# Patient Record
Sex: FEMALE | Race: OTHER | NOT HISPANIC OR LATINO | URBAN - METROPOLITAN AREA
[De-identification: names, ages, dates, MRNs, and addresses within clinical notes are randomized per-mention and may not be internally consistent; named-entity substitution may affect disease eponyms.]

---

## 2019-03-16 ENCOUNTER — INPATIENT (INPATIENT)
Facility: HOSPITAL | Age: 84
LOS: 1 days | Discharge: HOME | DRG: 872 | End: 2019-03-18
Attending: INTERNAL MEDICINE | Admitting: INTERNAL MEDICINE
Payer: MEDICARE

## 2019-03-16 VITALS
SYSTOLIC BLOOD PRESSURE: 164 MMHG | HEART RATE: 129 BPM | OXYGEN SATURATION: 90 % | TEMPERATURE: 101 F | RESPIRATION RATE: 22 BRPM | DIASTOLIC BLOOD PRESSURE: 84 MMHG | WEIGHT: 145.06 LBS | HEIGHT: 62 IN

## 2019-03-16 DIAGNOSIS — Z98.890 OTHER SPECIFIED POSTPROCEDURAL STATES: Chronic | ICD-10-CM

## 2019-03-16 DIAGNOSIS — Z29.9 ENCOUNTER FOR PROPHYLACTIC MEASURES, UNSPECIFIED: ICD-10-CM

## 2019-03-16 DIAGNOSIS — N18.9 CHRONIC KIDNEY DISEASE, UNSPECIFIED: ICD-10-CM

## 2019-03-16 DIAGNOSIS — A41.9 SEPSIS, UNSPECIFIED ORGANISM: ICD-10-CM

## 2019-03-16 DIAGNOSIS — E78.5 HYPERLIPIDEMIA, UNSPECIFIED: ICD-10-CM

## 2019-03-16 DIAGNOSIS — I10 ESSENTIAL (PRIMARY) HYPERTENSION: ICD-10-CM

## 2019-03-16 LAB
ALBUMIN SERPL ELPH-MCNC: 3.8 G/DL — SIGNIFICANT CHANGE UP (ref 3.5–5)
ALP SERPL-CCNC: 54 U/L — SIGNIFICANT CHANGE UP (ref 40–120)
ALT FLD-CCNC: 23 U/L DA — SIGNIFICANT CHANGE UP (ref 10–60)
ANION GAP SERPL CALC-SCNC: 7 MMOL/L — SIGNIFICANT CHANGE UP (ref 5–17)
APPEARANCE UR: CLEAR — SIGNIFICANT CHANGE UP
APTT BLD: 27.8 SEC — SIGNIFICANT CHANGE UP (ref 27.5–36.3)
AST SERPL-CCNC: 26 U/L — SIGNIFICANT CHANGE UP (ref 10–40)
BASOPHILS # BLD AUTO: 0.03 K/UL — SIGNIFICANT CHANGE UP (ref 0–0.2)
BASOPHILS NFR BLD AUTO: 0.3 % — SIGNIFICANT CHANGE UP (ref 0–2)
BILIRUB SERPL-MCNC: 1.1 MG/DL — SIGNIFICANT CHANGE UP (ref 0.2–1.2)
BILIRUB UR-MCNC: NEGATIVE — SIGNIFICANT CHANGE UP
BUN SERPL-MCNC: 64 MG/DL — HIGH (ref 7–18)
CALCIUM SERPL-MCNC: 8.9 MG/DL — SIGNIFICANT CHANGE UP (ref 8.4–10.5)
CHLORIDE SERPL-SCNC: 103 MMOL/L — SIGNIFICANT CHANGE UP (ref 96–108)
CHLORIDE UR-SCNC: 30 MMOL/L — LOW (ref 55–125)
CO2 SERPL-SCNC: 23 MMOL/L — SIGNIFICANT CHANGE UP (ref 22–31)
COLOR SPEC: YELLOW — SIGNIFICANT CHANGE UP
CREAT SERPL-MCNC: 2.02 MG/DL — HIGH (ref 0.5–1.3)
DIFF PNL FLD: NEGATIVE — SIGNIFICANT CHANGE UP
EOSINOPHIL # BLD AUTO: 0 K/UL — SIGNIFICANT CHANGE UP (ref 0–0.5)
EOSINOPHIL NFR BLD AUTO: 0 % — SIGNIFICANT CHANGE UP (ref 0–6)
FLU A RESULT: SIGNIFICANT CHANGE UP
FLU A RESULT: SIGNIFICANT CHANGE UP
FLUAV AG NPH QL: SIGNIFICANT CHANGE UP
FLUBV AG NPH QL: SIGNIFICANT CHANGE UP
GLUCOSE SERPL-MCNC: 121 MG/DL — HIGH (ref 70–99)
GLUCOSE UR QL: NEGATIVE — SIGNIFICANT CHANGE UP
HCT VFR BLD CALC: 29.4 % — LOW (ref 34.5–45)
HGB BLD-MCNC: 9.4 G/DL — LOW (ref 11.5–15.5)
IMM GRANULOCYTES NFR BLD AUTO: 0.9 % — SIGNIFICANT CHANGE UP (ref 0–1.5)
INR BLD: 1.01 RATIO — SIGNIFICANT CHANGE UP (ref 0.88–1.16)
KETONES UR-MCNC: NEGATIVE — SIGNIFICANT CHANGE UP
LACTATE SERPL-SCNC: 1.9 MMOL/L — SIGNIFICANT CHANGE UP (ref 0.7–2)
LEUKOCYTE ESTERASE UR-ACNC: ABNORMAL
LYMPHOCYTES # BLD AUTO: 0.61 K/UL — LOW (ref 1–3.3)
LYMPHOCYTES # BLD AUTO: 5.2 % — LOW (ref 13–44)
MCHC RBC-ENTMCNC: 32 GM/DL — SIGNIFICANT CHANGE UP (ref 32–36)
MCHC RBC-ENTMCNC: 32.8 PG — SIGNIFICANT CHANGE UP (ref 27–34)
MCV RBC AUTO: 102.4 FL — HIGH (ref 80–100)
MONOCYTES # BLD AUTO: 0.96 K/UL — HIGH (ref 0–0.9)
MONOCYTES NFR BLD AUTO: 8.2 % — SIGNIFICANT CHANGE UP (ref 2–14)
NEUTROPHILS # BLD AUTO: 9.95 K/UL — HIGH (ref 1.8–7.4)
NEUTROPHILS NFR BLD AUTO: 85.4 % — HIGH (ref 43–77)
NITRITE UR-MCNC: NEGATIVE — SIGNIFICANT CHANGE UP
NRBC # BLD: 0 /100 WBCS — SIGNIFICANT CHANGE UP (ref 0–0)
OSMOLALITY UR: 395 MOS/KG — SIGNIFICANT CHANGE UP (ref 50–1200)
PH UR: 5 — SIGNIFICANT CHANGE UP (ref 5–8)
PLATELET # BLD AUTO: 178 K/UL — SIGNIFICANT CHANGE UP (ref 150–400)
POTASSIUM SERPL-MCNC: 5.2 MMOL/L — SIGNIFICANT CHANGE UP (ref 3.5–5.3)
POTASSIUM SERPL-SCNC: 5.2 MMOL/L — SIGNIFICANT CHANGE UP (ref 3.5–5.3)
PROT SERPL-MCNC: 7.7 G/DL — SIGNIFICANT CHANGE UP (ref 6–8.3)
PROT UR-MCNC: 15
PROTHROM AB SERPL-ACNC: 11.2 SEC — SIGNIFICANT CHANGE UP (ref 10–12.9)
RBC # BLD: 2.87 M/UL — LOW (ref 3.8–5.2)
RBC # FLD: 11.7 % — SIGNIFICANT CHANGE UP (ref 10.3–14.5)
RSV RESULT: SIGNIFICANT CHANGE UP
RSV RNA RESP QL NAA+PROBE: SIGNIFICANT CHANGE UP
SODIUM SERPL-SCNC: 133 MMOL/L — LOW (ref 135–145)
SODIUM UR-SCNC: 15 MMOL/L — LOW (ref 40–220)
SP GR SPEC: 1.01 — SIGNIFICANT CHANGE UP (ref 1.01–1.02)
UROBILINOGEN FLD QL: NEGATIVE — SIGNIFICANT CHANGE UP
WBC # BLD: 11.66 K/UL — HIGH (ref 3.8–10.5)
WBC # FLD AUTO: 11.66 K/UL — HIGH (ref 3.8–10.5)

## 2019-03-16 PROCEDURE — 93010 ELECTROCARDIOGRAM REPORT: CPT

## 2019-03-16 PROCEDURE — 71045 X-RAY EXAM CHEST 1 VIEW: CPT | Mod: 26

## 2019-03-16 PROCEDURE — 99285 EMERGENCY DEPT VISIT HI MDM: CPT

## 2019-03-16 RX ORDER — ASPIRIN/CALCIUM CARB/MAGNESIUM 324 MG
81 TABLET ORAL DAILY
Qty: 0 | Refills: 0 | Status: DISCONTINUED | OUTPATIENT
Start: 2019-03-16 | End: 2019-03-18

## 2019-03-16 RX ORDER — VANCOMYCIN HCL 1 G
1000 VIAL (EA) INTRAVENOUS ONCE
Qty: 0 | Refills: 0 | Status: COMPLETED | OUTPATIENT
Start: 2019-03-16 | End: 2019-03-16

## 2019-03-16 RX ORDER — METOPROLOL TARTRATE 50 MG
25 TABLET ORAL
Qty: 0 | Refills: 0 | Status: DISCONTINUED | OUTPATIENT
Start: 2019-03-16 | End: 2019-03-18

## 2019-03-16 RX ORDER — SODIUM CHLORIDE 9 MG/ML
500 INJECTION INTRAMUSCULAR; INTRAVENOUS; SUBCUTANEOUS ONCE
Qty: 0 | Refills: 0 | Status: COMPLETED | OUTPATIENT
Start: 2019-03-16 | End: 2019-03-16

## 2019-03-16 RX ORDER — AMLODIPINE BESYLATE 2.5 MG/1
5 TABLET ORAL DAILY
Qty: 0 | Refills: 0 | Status: DISCONTINUED | OUTPATIENT
Start: 2019-03-16 | End: 2019-03-18

## 2019-03-16 RX ORDER — ACETAMINOPHEN 500 MG
650 TABLET ORAL ONCE
Qty: 0 | Refills: 0 | Status: COMPLETED | OUTPATIENT
Start: 2019-03-16 | End: 2019-03-16

## 2019-03-16 RX ORDER — CEFEPIME 1 G/1
1000 INJECTION, POWDER, FOR SOLUTION INTRAMUSCULAR; INTRAVENOUS ONCE
Qty: 0 | Refills: 0 | Status: COMPLETED | OUTPATIENT
Start: 2019-03-16 | End: 2019-03-16

## 2019-03-16 RX ORDER — ATORVASTATIN CALCIUM 80 MG/1
10 TABLET, FILM COATED ORAL AT BEDTIME
Qty: 0 | Refills: 0 | Status: DISCONTINUED | OUTPATIENT
Start: 2019-03-16 | End: 2019-03-18

## 2019-03-16 RX ORDER — INFLUENZA VIRUS VACCINE 15; 15; 15; 15 UG/.5ML; UG/.5ML; UG/.5ML; UG/.5ML
0.5 SUSPENSION INTRAMUSCULAR ONCE
Qty: 0 | Refills: 0 | Status: DISCONTINUED | OUTPATIENT
Start: 2019-03-16 | End: 2019-03-18

## 2019-03-16 RX ORDER — SODIUM CHLORIDE 9 MG/ML
1000 INJECTION INTRAMUSCULAR; INTRAVENOUS; SUBCUTANEOUS ONCE
Qty: 0 | Refills: 0 | Status: COMPLETED | OUTPATIENT
Start: 2019-03-16 | End: 2019-03-16

## 2019-03-16 RX ORDER — HEPARIN SODIUM 5000 [USP'U]/ML
5000 INJECTION INTRAVENOUS; SUBCUTANEOUS EVERY 8 HOURS
Qty: 0 | Refills: 0 | Status: DISCONTINUED | OUTPATIENT
Start: 2019-03-16 | End: 2019-03-18

## 2019-03-16 RX ORDER — PIPERACILLIN AND TAZOBACTAM 4; .5 G/20ML; G/20ML
3.38 INJECTION, POWDER, LYOPHILIZED, FOR SOLUTION INTRAVENOUS EVERY 12 HOURS
Qty: 0 | Refills: 0 | Status: DISCONTINUED | OUTPATIENT
Start: 2019-03-16 | End: 2019-03-16

## 2019-03-16 RX ORDER — SODIUM CHLORIDE 9 MG/ML
1000 INJECTION INTRAMUSCULAR; INTRAVENOUS; SUBCUTANEOUS
Qty: 0 | Refills: 0 | Status: DISCONTINUED | OUTPATIENT
Start: 2019-03-16 | End: 2019-03-17

## 2019-03-16 RX ADMIN — Medication 650 MILLIGRAM(S): at 17:48

## 2019-03-16 RX ADMIN — Medication 250 MILLIGRAM(S): at 18:55

## 2019-03-16 RX ADMIN — HEPARIN SODIUM 5000 UNIT(S): 5000 INJECTION INTRAVENOUS; SUBCUTANEOUS at 22:22

## 2019-03-16 RX ADMIN — CEFEPIME 1000 MILLIGRAM(S): 1 INJECTION, POWDER, FOR SOLUTION INTRAMUSCULAR; INTRAVENOUS at 19:02

## 2019-03-16 RX ADMIN — CEFEPIME 100 MILLIGRAM(S): 1 INJECTION, POWDER, FOR SOLUTION INTRAMUSCULAR; INTRAVENOUS at 17:49

## 2019-03-16 RX ADMIN — Medication 650 MILLIGRAM(S): at 19:02

## 2019-03-16 RX ADMIN — SODIUM CHLORIDE 500 MILLILITER(S): 9 INJECTION INTRAMUSCULAR; INTRAVENOUS; SUBCUTANEOUS at 17:10

## 2019-03-16 RX ADMIN — SODIUM CHLORIDE 500 MILLILITER(S): 9 INJECTION INTRAMUSCULAR; INTRAVENOUS; SUBCUTANEOUS at 19:00

## 2019-03-16 RX ADMIN — SODIUM CHLORIDE 1000 MILLILITER(S): 9 INJECTION INTRAMUSCULAR; INTRAVENOUS; SUBCUTANEOUS at 19:02

## 2019-03-16 NOTE — H&P ADULT - ASSESSMENT
89 y/o F AAOx4 w/ PMH HTN, Arthritis, CKD, and PSH L Forehead Lesion s/p Excision 3/14/19 p/w fever x 1 day - otherwise denied any other acute complaints including CP, SOB, abdominal pain, NVDC, urinary changes, or any other complaints. In the ED patient in NAD, vitals noted for /84fever 38.3 and patient received 1.5 L NS, 650 mg Tylenol, Vancomycin, and Cefepime - EKG noted for NSR 84 BPM. Imaging included CXR w/ no acute abnormality. Patient had a bed pan underneath her in which she urinated but the family accidentally threw out the urine sample. Patient recently underwent dermatological excision w/ Dr Aurelia Pino [phone] - discharged w/out ABx; wound on L forehead is not bleeding, no surrounding erythema, and patient denied any facial complaints. 89 y/o F AAOx4 w/ PMH HTN, Arthritis, CKD, and PSH L Forehead Lesion s/p Excision 3/14/19 p/w fever x 1 day - admitting for further monitoring and evaluation

## 2019-03-16 NOTE — H&P ADULT - OPHTHALMOLOGIC
Dental Pain    A crack or cavity in a tooth can cause tooth pain. This is because the crack or cavity exposes the sensitive inner area of the tooth. An infection in the gum or the root of the tooth can cause pain and swelling. The pain is often made worse when you drink hot or cold beverages. It can also be worse when you bite on hard foods. Pain may spread from the tooth to your ear or the area of the jaw on the same side.  Home care  Follow these tips when caring for yourself at home:  · Avoid hot and cold foods and drinks. Your tooth may be sensitive to changes in temperature.  · Use toothpaste made for sensitive teeth. Brush up and down instead of sideways. Brushing sideways can wear away root surfaces if they are exposed.  · If your tooth is chipped or cracked, or if there is a large open cavity, put oil of cloves directly on the tooth to relieve pain. You can buy oil of cloves at drugstores. Some pharmacies carry an over-the-counter \"toothache kit.\" This contains a paste that you can put on the exposed tooth to make it less sensitive.  · Put a cold pack on your jaw over the sore area to help reduce pain.  · You may use acetaminophen or ibuprofen to ease pain, unless another medicine was prescribed. Note: If you have chronic liver or kidney disease, talk with your health care provider before using these medicines. Also talk with your provider if you’ve had a stomach ulcer or GI bleeding.  · If you have signs of an infection, you will be given an antibiotic. Take it as directed.  Follow-up care  Follow up with your dentist as advised. Your pain may go away with the treatment given today. But only a dentist can fully look at and treat the cause of your pain. This will keep the pain from coming back.  A toothache is a sign of disease in your tooth. It should be looked at and treated by a dentist.  When to seek medical advice  Call your health care provider right away if any of these occur:  · Your face becomes  swollen or red  · Pain gets worse or spreads to your neck  · Fever over 100.4º F (38.0º C)  · Unusual drowsiness  · Headache or stiff neck  · Weakness or fainting  · Pus drains from the tooth  · Difficulty swallowing or breathing     © 9262-1910 [facility]. [address] [place] [address]. All rights reserved. This information is not intended as a substitute for professional medical care. Always follow your healthcare professional's instructions.    =========      Dental Abscess    An abscess is a pocket of pus at the tip of a tooth root in your jaw bone. It is caused by an infection at the root of the tooth. It can cause pain and swelling of the gum, cheek, or jaw. Pain may spread from the tooth to your ear or the area of your jaw on the same side. If the abscess isn’t treated, it spreads to the gum near the tooth. This causes more swelling and pain. More serious infections cause your face to swell.  A dental abscess usually starts with a crack or cavity in the tooth. The pain is often made worse by drinking hot or cold fluids, or biting on hard foods.  Home care  Follow these guidelines when caring for yourself at home:  · Avoid hot and cold foods and drinks. Your tooth may be sensitive to changes in temperature. Don’t chew on the side of the infected tooth.  · If your tooth is chipped or cracked, or if there is a large open cavity, put oil of cloves directly on the tooth to relieve pain. You can buy oil of cloves at drugstores. Some pharmacies carry an over-the-counter \"toothache kit.\" This contains a paste that you can put on the exposed tooth to make it less sensitive.  · Put a cold pack on your jaw over the sore area to help reduce pain.  · You may use acetaminophen or ibuprofen to ease pain, unless another medicine was prescribed. Note: If you have chronic liver or kidney disease, talk with your health care provider before using these medicines. Also talk with your provider if you’ve  had a stomach ulcer or GI bleeding.  · An antibiotic will be prescribed. Take it until finished, even if you are feeling better after a few days.  Follow-up care  Follow up as directed with your dentist or an oral surgeon. Once an infection occurs in a tooth, it will continue to be a problem until the infection is drained. This is done through surgery or a root canal. Or you may need to have your tooth pulled.  When to seek medical advice  Call your health care provider right away if any of these occur:  · Your face becomes more swollen or red  · Your eyelids become swollen  · Pain gets worse or spreads to your neck  · Fever over 100.4º F (38.0º C)  · Unusual drowsiness  · Headache or stiff neck  · Weakness or fainting  · Pus drains from the tooth  · Difficulty swallowing or breathing  © 1044-6892 [employer]. [address] [place] [address], [place] [address]. All rights reserved. This information is not intended as a substitute for professional medical care. Always follow your healthcare professional's instructions.           negative

## 2019-03-16 NOTE — ED PROVIDER NOTE - OBJECTIVE STATEMENT
89 y/o F patient with a significant PMHx of HTN and no significant PSHx presents to the ED with her family c/o fever x 1d. Patient's family endorses that the patient is known to have tachycardia. Patient denies any chest pain, back pain, dysuria, or any other acute complains. NDKA.

## 2019-03-16 NOTE — H&P ADULT - NEUROLOGICAL DETAILS
responds to verbal commands/strength decreased/sensation intact/responds to pain/alert and oriented x 3

## 2019-03-16 NOTE — ED PROVIDER NOTE - CLINICAL SUMMARY MEDICAL DECISION MAKING FREE TEXT BOX
Patient with fever and tachycardia. Patient meets CIRS criteria. Will obtain labs, cultures, XR, 500 cc of fluids, 30 cc per kilo. Concern for cc overload. Will reassess.

## 2019-03-16 NOTE — H&P ADULT - HISTORY OF PRESENT ILLNESS
89 y/o F AAOx4 w/ PMH HTN, Arthritis, CKD, and PSH L Forehead Lesion s/p Excision 3/14/19 p/w fever x 1 day - otherwise denied any other acute complaints including CP, SOB, abdominal pain, NVDC, urinary changes, or any other complaints. In the ED patient in NAD, vitals noted for /84 + fever 38.3 and patient received 1.5 L NS, 650 mg Tylenol, Vancomycin, and Cefepime - EKG noted for NSR 84 BPM. Imaging included CXR w/ no acute abnormality. Patient had a bed pan underneath her in which she urinated but the family accidentally threw out the urine sample. Patient recently underwent dermatological excision w/ Dr Aurelia Pino [phone] - discharged w/out ABx; wound on L forehead is not bleeding, no surrounding erythema, and patient denied any facial complaints. 89 y/o F AAOx4 w/ PMH HTN, Arthritis, CKD, and PSH L Forehead Lesion s/p Excision 3/14/19 p/w fever x 1 day - otherwise denied any other acute complaints including CP, SOB, abdominal pain, NVDC, urinary changes, or any other complaints. In the ED patient in NAD, vitals noted for /84 + fever 38.3 and patient received 1.5 L NS, 650 mg Tylenol, Vancomycin, and Cefepime - EKG noted for NSR 84 BPM. Imaging included CXR w/ no acute abnormality. Patient had a bed pan underneath her in which she urinated but the family accidentally threw out the urine sample. Patient recently underwent dermatological excision w/ Dr Aurelia Pino [phone] - discharged w/out ABx; wound on L forehead is not bleeding, no surrounding erythema, and patient denied any facial complaints. GOC discussed w/ patient and son at bedside - discussion never had but the family plans to think about it further - full code for now.

## 2019-03-16 NOTE — H&P ADULT - ATTENDING COMMENTS
1. Sepsis- pt s/p vanco/cefepime.  For now, will hold Abx.  F/u BCx.  Plan for IV or PO 3rd Gen Ceph tomorrow evening if conditions warrants.  2. SHAUNA- likely hemodyanamically-mediated.  Pt s/p IVF boluses in ED.  Begin gentle hydration, NS @ 50ml/hr.  Hold ACEi.  3. CKD3- likely due to hypertensive nephropathy.  W/u can be done as outpatient.  4. HTN- BP stable now.  Hold ACEi due to SHAUNA.  BP may be lower in the setting of sepsis.  Will give amlodipine and metoprolol with hold for SBP<120.  5. HLD- cont statin.      [facility] NEPHROLOGY  Joe Barnes M.D.  Ari Lawton D.O.  Michaela Mai M.D.  Tami Arshad, MSN, ANP-C    Telephone: [phone]  Facsimile: [phone]    [address] [place] [address]

## 2019-03-16 NOTE — H&P ADULT - NSICDXPROBLEM_GEN_ALL_CORE_FT
PROBLEM DIAGNOSES  Problem: Sepsis, due to unspecified organism  Assessment and Plan: 2/2 F 38.3 and sinus tachycardia 129; resolved w/ 1.5 L NS, 650 mg Tylenol, Vancomycin, and Cefepime   - No WBC elevation, CXR w/ no acute abnormality. ROS negative but recent excision w/ Dr Aurelia Pino [phone] and discharged w/out ABx  - Wound on L forehead does not appear infected but will cover broadly w/ Vancoymcin and Zosyn until Cx return  ID TBD  ***F/u BCx and UA/UCx    Problem: Chronic kidney disease (CKD)  Assessment and Plan: Known Hx of kidney disease as per patient and family but baseline currently unknown  - Likely component of anemia of CKD w/ H&H 9.4/29.4; f/u anemia panel and consider outpatient EPO if indicated  - Patient urianting freely, no changes nor c/o dysuria  - Held renal acting antihypertensives  ***F/u US renal, UA, urine lytes, and further recommendations    Problem: HTN (hypertension)  Assessment and Plan: Resumed Amlodipine and Metoprolol; held Lisinopril given unknown baseline renal function  - Monitor and adjust PRN    Problem: HLD (hyperlipidemia)  Assessment and Plan: Resumed Lovastatin equivalent; f/u Lipid panel and adjust PRN    Problem: Prophylactic measure  Assessment and Plan: IMPROVE score 2 2/2 age and immobilization - DVT PPx w/ HSQ PROBLEM DIAGNOSES  Problem: Sepsis, due to unspecified organism  Assessment and Plan: 2/2 F 38.3 and sinus tachycardia 129; resolved w/ 1.5 L NS, 650 mg Tylenol, Vancomycin, and Cefepime   - No WBC elevation, CXR w/ no acute abnormality. ROS negative but recent excision w/ Dr Janes Pino [phone] and discharged w/out ABx  - Wound on L forehead does not appear infected; will hold ABx for now  ***F/u BCx and UA/UCx; may consider transition to PO ABx in AM    Problem: Chronic kidney disease (CKD)  Assessment and Plan: Known Hx of kidney disease as per patient and family but baseline currently unknown  - Likely component of anemia of CKD w/ H&H 9.4/29.4; f/u anemia panel and consider outpatient EPO if indicated  - Patient urianting freely, no changes nor c/o dysuria  - Held renal acting antihypertensives  - S/p 1.5 L NS in ED;  C/w gentle IVF 50/HR x 24 hours  ***F/u US renal, UA, urine lytes, and further recommendations    Problem: HTN (hypertension)  Assessment and Plan: Resumed Amlodipine and Metoprolol; held Lisinopril given unknown baseline renal function  - Monitor and adjust PRN    Problem: HLD (hyperlipidemia)  Assessment and Plan: Resumed Lovastatin equivalent; f/u Lipid panel and adjust PRN    Problem: Prophylactic measure  Assessment and Plan: IMPROVE score 2 2/2 age and immobilization - DVT PPx w/ HSQ

## 2019-03-16 NOTE — H&P ADULT - NSHPPHYSICALEXAM_GEN_ALL_CORE
T(C): 37.8 (16 Mar 2019 18:53), Max: 38.3 (16 Mar 2019 16:20)  T(F): 100 (16 Mar 2019 18:53), Max: 101 (16 Mar 2019 16:20)  HR: 77 (16 Mar 2019 18:53) (77 - 129)  BP: 111/71 (16 Mar 2019 18:53) (111/71 - 164/84)  RR: 18 (16 Mar 2019 18:53) (18 - 22)  SpO2: 98% (16 Mar 2019 18:53) (90% - 98%)

## 2019-03-17 LAB
ANION GAP SERPL CALC-SCNC: 6 MMOL/L — SIGNIFICANT CHANGE UP (ref 5–17)
BASOPHILS # BLD AUTO: 0.01 K/UL — SIGNIFICANT CHANGE UP (ref 0–0.2)
BASOPHILS NFR BLD AUTO: 0.1 % — SIGNIFICANT CHANGE UP (ref 0–2)
BUN SERPL-MCNC: 53 MG/DL — HIGH (ref 7–18)
CALCIUM SERPL-MCNC: 8.1 MG/DL — LOW (ref 8.4–10.5)
CHLORIDE SERPL-SCNC: 110 MMOL/L — HIGH (ref 96–108)
CHOLEST SERPL-MCNC: 101 MG/DL — SIGNIFICANT CHANGE UP (ref 10–199)
CO2 SERPL-SCNC: 21 MMOL/L — LOW (ref 22–31)
CREAT SERPL-MCNC: 1.59 MG/DL — HIGH (ref 0.5–1.3)
EOSINOPHIL # BLD AUTO: 0.01 K/UL — SIGNIFICANT CHANGE UP (ref 0–0.5)
EOSINOPHIL NFR BLD AUTO: 0.1 % — SIGNIFICANT CHANGE UP (ref 0–6)
FERRITIN SERPL-MCNC: 155 NG/ML — HIGH (ref 15–150)
FOLATE SERPL-MCNC: 10.5 NG/ML — SIGNIFICANT CHANGE UP
GLUCOSE SERPL-MCNC: 92 MG/DL — SIGNIFICANT CHANGE UP (ref 70–99)
HBA1C BLD-MCNC: 5.4 % — SIGNIFICANT CHANGE UP (ref 4–5.6)
HCT VFR BLD CALC: 24.5 % — LOW (ref 34.5–45)
HDLC SERPL-MCNC: 55 MG/DL — SIGNIFICANT CHANGE UP
HGB BLD-MCNC: 7.8 G/DL — LOW (ref 11.5–15.5)
IMM GRANULOCYTES NFR BLD AUTO: 0.4 % — SIGNIFICANT CHANGE UP (ref 0–1.5)
IRON SATN MFR SERPL: 13 UG/DL — LOW (ref 40–150)
IRON SATN MFR SERPL: 6 % — LOW (ref 15–50)
LIPID PNL WITH DIRECT LDL SERPL: 35 MG/DL — SIGNIFICANT CHANGE UP
LYMPHOCYTES # BLD AUTO: 0.93 K/UL — LOW (ref 1–3.3)
LYMPHOCYTES # BLD AUTO: 13.1 % — SIGNIFICANT CHANGE UP (ref 13–44)
MAGNESIUM SERPL-MCNC: 2.1 MG/DL — SIGNIFICANT CHANGE UP (ref 1.6–2.6)
MCHC RBC-ENTMCNC: 31.8 GM/DL — LOW (ref 32–36)
MCHC RBC-ENTMCNC: 32.4 PG — SIGNIFICANT CHANGE UP (ref 27–34)
MCV RBC AUTO: 101.7 FL — HIGH (ref 80–100)
MONOCYTES # BLD AUTO: 0.53 K/UL — SIGNIFICANT CHANGE UP (ref 0–0.9)
MONOCYTES NFR BLD AUTO: 7.5 % — SIGNIFICANT CHANGE UP (ref 2–14)
NEUTROPHILS # BLD AUTO: 5.6 K/UL — SIGNIFICANT CHANGE UP (ref 1.8–7.4)
NEUTROPHILS NFR BLD AUTO: 78.8 % — HIGH (ref 43–77)
NRBC # BLD: 0 /100 WBCS — SIGNIFICANT CHANGE UP (ref 0–0)
PHOSPHATE SERPL-MCNC: 2.8 MG/DL — SIGNIFICANT CHANGE UP (ref 2.5–4.5)
PLATELET # BLD AUTO: 145 K/UL — LOW (ref 150–400)
POTASSIUM SERPL-MCNC: 4.5 MMOL/L — SIGNIFICANT CHANGE UP (ref 3.5–5.3)
POTASSIUM SERPL-SCNC: 4.5 MMOL/L — SIGNIFICANT CHANGE UP (ref 3.5–5.3)
RBC # BLD: 2.41 M/UL — LOW (ref 3.8–5.2)
RBC # FLD: 11.9 % — SIGNIFICANT CHANGE UP (ref 10.3–14.5)
SODIUM SERPL-SCNC: 137 MMOL/L — SIGNIFICANT CHANGE UP (ref 135–145)
TIBC SERPL-MCNC: 223 UG/DL — LOW (ref 250–450)
TOTAL CHOLESTEROL/HDL RATIO MEASUREMENT: 1.8 RATIO — LOW (ref 3.3–7.1)
TRIGL SERPL-MCNC: 55 MG/DL — SIGNIFICANT CHANGE UP (ref 10–149)
TSH SERPL-MCNC: 1.63 UU/ML — SIGNIFICANT CHANGE UP (ref 0.34–4.82)
UIBC SERPL-MCNC: 210 UG/DL — SIGNIFICANT CHANGE UP (ref 110–370)
VIT B12 SERPL-MCNC: 440 PG/ML — SIGNIFICANT CHANGE UP (ref 232–1245)
WBC # BLD: 7.11 K/UL — SIGNIFICANT CHANGE UP (ref 3.8–10.5)
WBC # FLD AUTO: 7.11 K/UL — SIGNIFICANT CHANGE UP (ref 3.8–10.5)

## 2019-03-17 RX ORDER — SODIUM CHLORIDE 9 MG/ML
1000 INJECTION INTRAMUSCULAR; INTRAVENOUS; SUBCUTANEOUS
Qty: 0 | Refills: 0 | Status: DISCONTINUED | OUTPATIENT
Start: 2019-03-17 | End: 2019-03-18

## 2019-03-17 RX ORDER — POLYETHYLENE GLYCOL 3350 17 G/17G
17 POWDER, FOR SOLUTION ORAL ONCE
Qty: 0 | Refills: 0 | Status: COMPLETED | OUTPATIENT
Start: 2019-03-17 | End: 2019-03-18

## 2019-03-17 RX ORDER — CEFTRIAXONE 500 MG/1
1 INJECTION, POWDER, FOR SOLUTION INTRAMUSCULAR; INTRAVENOUS ONCE
Qty: 0 | Refills: 0 | Status: COMPLETED | OUTPATIENT
Start: 2019-03-17 | End: 2019-03-18

## 2019-03-17 RX ADMIN — HEPARIN SODIUM 5000 UNIT(S): 5000 INJECTION INTRAVENOUS; SUBCUTANEOUS at 05:17

## 2019-03-17 RX ADMIN — AMLODIPINE BESYLATE 5 MILLIGRAM(S): 2.5 TABLET ORAL at 09:20

## 2019-03-17 RX ADMIN — HEPARIN SODIUM 5000 UNIT(S): 5000 INJECTION INTRAVENOUS; SUBCUTANEOUS at 21:48

## 2019-03-17 RX ADMIN — HEPARIN SODIUM 5000 UNIT(S): 5000 INJECTION INTRAVENOUS; SUBCUTANEOUS at 14:20

## 2019-03-17 RX ADMIN — ATORVASTATIN CALCIUM 10 MILLIGRAM(S): 80 TABLET, FILM COATED ORAL at 21:50

## 2019-03-17 RX ADMIN — Medication 81 MILLIGRAM(S): at 12:10

## 2019-03-17 RX ADMIN — Medication 25 MILLIGRAM(S): at 18:18

## 2019-03-17 RX ADMIN — Medication 25 MILLIGRAM(S): at 09:20

## 2019-03-17 NOTE — PROGRESS NOTE ADULT - ATTENDING COMMENTS
[facility] NEPHROLOGY  Joe Barnes M.D.  Ari Lawton D.O.  Michaela Mai M.D.  Tami Arshad, MSN, ANP-C    Telephone: [phone]  Facsimile: [phone]    [address] [place] [address]

## 2019-03-17 NOTE — PROGRESS NOTE ADULT - ASSESSMENT
Assessment and Plan:   Assessment:  · Assessment		  89 y/o F AAOx4 w/ PMH HTN, Arthritis, CKD, and PSH L Forehead Lesion s/p Excision 3/14/19 p/w fever x 1 day - admitting for further monitoring and evaluation    1. Sepsis- pt s/p vanco/cefepime.  For now, will hold Abx.  F/u BCx.  Plan for IV or PO 3rd Gen Ceph tomorrow evening if conditions warrants.  2. SHAUNA- Cr downtrending, likely hemodyanamically-mediated.  Pt s/p IVF boluses in ED.  Continue gentle hydration, NS @ 50ml/hr plan to discontinue in the morning.  Hold ACEi.  3. CKD3- likely due to hypertensive nephropathy.  W/u can be done as outpatient.  4. HTN- BP stable now.  Hold ACEi due to SHAUNA.  BP may be lower in the setting of sepsis.  Will give amlodipine and metoprolol with hold for SBP<120.  5. HLD- cont statin.      [facility] NEPHROLOGY  Joe Barnes M.D.  Ari Lawton D.O.  Michaela Mai M.D.  Tami Arshad, VIRGINIE, ANP-C    Telephone: [phone]  Facsimile: [phone]    [address] [place] [address]. Assessment and Plan:   Assessment:  · Assessment		  89 y/o F AAOx4 w/ PMH HTN, Arthritis, CKD, and PSH L Forehead Lesion s/p Excision 3/14/19 p/w fever x 1 day - admitting for further monitoring and evaluation    1. Sepsis- pt s/p vanco/cefepime.  For now, will hold Abx.  F/u BCx.  Plan for IV or PO 3rd Gen Ceph tomorrow evening if conditions warrants.  2. SHAUNA- Cr downtrending, likely hemodyanamically-mediated.  Pt s/p IVF boluses in ED.  Continue gentle hydration, NS @ 50ml/hr (U Na 15) plan to discontinue iIVF tomorrow, encourage po intake.  Hold ACEi.  3. CKD3- likely due to hypertensive nephropathy.  W/u can be done as outpatient.  4. HTN- BP stable now.  Hold ACEi due to SHAUNA.  BP may be lower in the setting of sepsis.  Will give amlodipine and metoprolol with hold for SBP<120.  5. HLD- cont statin.  6. Iron deficiency Anemia- low Tsat Hgb downtrending follow up Hgb in am (questionable if dilutional)  check stool OB       [facility] NEPHROLOGY  Joe Branes M.D.  Ari Lawton D.O.  Michaela Mai M.D.  Tami Arshad, MSN, ANP-C    Telephone: [phone]  Facsimile: [phone]    [address] [place] [address]. Assessment and Plan:   Assessment:  · Assessment		  89 y/o F AAOx4 w/ PMH HTN, Arthritis, CKD, and PSH L Forehead Lesion s/p Excision 3/14/19 p/w fever x 1 day - admitting for further monitoring and evaluation    1. Sepsis- pt s/p vanco/cefepime. F/u BCx.  Given rapid improvement in WBCs s/p Abx, it is possible that pt has bacterial infection.  Will give Cephtriaxone 1g X 1 now.  ID consult , Dr. Kan, in the am for recommendations on PO abx for d/c tomorrow if possible.    2. SHAUNA- Cr downtrending, likely hemodyanamically-mediated. Continue IV hydration, NS @ 50ml/hr (U Na 15) plan to discontinue IVF tomorrow, encourage po intake.  Hold ACEi still.  No need for potassium or phosphorus diet restriction- these have been discontinued.  3. CKD3- likely due to hypertensive nephropathy.  W/u can be done as outpatient.  4. HTN- BP stable now.  Hold ACEi due to SHAUNA.  BP may be lower in the setting of sepsis.  Continue amlodipine and metoprolol with hold for SBP<120.  5. HLD- cont statin.  6. Iron deficiency Anemia- low Tsat Hgb downtrending follow up Hgb in am (likely dilutional)  check stool OB. Monitor H/H.   Consider po iron as outpatient.   7. Constipation- Miralax

## 2019-03-17 NOTE — PROGRESS NOTE ADULT - SUBJECTIVE AND OBJECTIVE BOX
[facility] NEPHROLOGY- PROGRESS NOTE, Follow up     Patient is a 89y Female with PMH HTN, Arthritis, CKD, and PSH L Forehead Lesion s/p Excision 3/14/19 p/w fever x 1 day      3/16/19-denied any other acute complaints including CP, SOB, abdominal pain, NVDC, urinary changes, or any other complaints. In the ED patient in NAD, vitals noted for /84 + fever 38.3 and patient received 1.5 L NS, 650 mg Tylenol, Vancomycin, and Cefepime - EKG noted for NSR 84 BPM. Imaging included CXR w/ no acute abnormality. Patient had a bed pan underneath her in which she urinated but the family accidentally threw out the urine sample. Patient recently underwent dermatological excision w/ Dr Aurelia Pino [phone] - discharged w/out ABx; wound on L forehead is not bleeding, no surrounding erythema, and patient denied any facial complaints. GOC discussed w/ patient and son at bedside - discussion never had but the family plans to think about it further - full code for now.    Allergy:  No Known Allergies    Hospital Medications:   MEDICATIONS  (STANDING):  amLODIPine   Tablet 5 milliGRAM(s) Oral daily  aspirin  chewable 81 milliGRAM(s) Oral daily  atorvastatin 10 milliGRAM(s) Oral at bedtime  heparin  Injectable 5000 Unit(s) SubCutaneous every 8 hours  influenza   Vaccine 0.5 milliLiter(s) IntraMuscular once  metoprolol tartrate 25 milliGRAM(s) Oral two times a day  sodium chloride 0.9%. 1000 milliLiter(s) (50 mL/Hr) IV Continuous <Continuous>    Home Medications:   * Patient Currently Takes Medications as of 16-Mar-2019 20:20 documented in Structured Notes  · 	amLODIPine 5 mg oral tablet: 1 tab(s) orally once a day, Last Dose Taken:    · 	lisinopril 40 mg oral tablet: 1 tab(s) orally once a day, Last Dose Taken:    · 	lovastatin 20 mg oral tablet: 1 tab(s) orally once a day, Last Dose Taken:    · 	metoprolol tartrate 25 mg oral tablet: 1 tab(s) orally 2 times a day, Last Dose Taken:    · 	aspirin 81 mg oral tablet, chewable: 1 tab(s) orally once a day, Last Dose Taken:      .    VITALS:  T(F): 99.1 (19 @ 05:07), Max: 101 (19 @ 16:20)  HR: 85 (19 @ 05:07)  BP: 117/38 (19 @ 05:07)  RR: 18 (19 @ 05:07)  SpO2: 98% (19 @ 05:07)  Wt(kg): --    Height (cm): 157.48 ( @ 15:54)  Weight (kg): 72.4 ( @ 21:40)  BMI (kg/m2): 29.2 ( @ 21:40)  BSA (m2): 1.74 ( @ 21:40)    PHYSICAL EXAM:  GEN AAOx4 NAD   HEENT NC left forehead with wound above eyebrow scabbed over and dry EOMI MMM   Lungs Clear B/L   CV Reg S1S2   Abd obese soft NT ND + BS   Ext No pitting edema of B/L LE   Skin warm and dry       LABS:      137  |  110<H>  |  53<H>  ----------------------------<  92  4.5   |  21<L>  |  1.59<H>    Ca    8.1<L>      17 Mar 2019 07:29  Phos  2.8       Mg     2.1         TPro  7.7  /  Alb  3.8  /  TBili  1.1  /  DBili  x   /  AST  26  /  ALT  23  /  AlkPhos  54      Creatinine Trend: 1.59 <--, 2.02 <--                        7.8    7.11  )-----------( 145      ( 17 Mar 2019 07:29 )             24.5     Urine Studies:  Urinalysis Basic - ( 16 Mar 2019 22:50 )    Color: Yellow / Appearance: Clear / S.010 / pH:   Gluc:  / Ketone: Negative  / Bili: Negative / Urobili: Negative   Blood:  / Protein: 15 / Nitrite: Negative   Leuk Esterase: Small / RBC: 0-2 /HPF / WBC 6-10 /HPF   Sq Epi:  / Non Sq Epi: Few /HPF / Bacteria: Few /HPF      Chloride, Random Urine: 30 mmol/L ( @ 22:50)  Sodium, Random Urine: 15 mmol/L ( @ 22:50)  Osmolality, Random Urine: 395 mos/kg ( @ 22:50)  Creatinine, Random Urine: 80 mg/dL ( @ 22:49)    RADIOLOGY & ADDITIONAL STUDIES:  < from: Xray Chest 1 View-PORTABLE IMMEDIATE (19 @ 18:09) >  EXAM:  XR CHEST PORTABLE IMMED 1V                            PROCEDURE DATE:  2019          INTERPRETATION:  Clinical history: 89-year-old female, sepsis.    Single expiratory/kyphotic view without comparison demonstrates mild   cardiomegaly and normal pulmonary vasculature with no consolidation,   effusion, gross adenopathy, or thorax or osseous finding.    Slightly increased perihilar markings are secondary to poor inspiration.    Extensive degenerative change particularly at the right shoulder noted   with no acute findings.    Impression    Mild cardiomegaly with no acute findings.                GRAYSON CRENSHAW M.D., ATTENDING RADIOLOGIST  This document has been electronically signed. Mar 17 2019 11:09AM        < end of copied text > [facility] NEPHROLOGY- PROGRESS NOTE, Follow up     Patient is a 89y Female with PMH HTN, Arthritis, CKD, and PSH L Forehead Lesion s/p Excision 3/14/19 p/w fever x 1 day      3/16/19-denied any other acute complaints including CP, SOB, abdominal pain, NVDC, urinary changes, or any other complaints. In the ED patient in NAD, vitals noted for /84 + fever 38.3 and patient received 1.5 L NS, 650 mg Tylenol, Vancomycin, and Cefepime - EKG noted for NSR 84 BPM. Imaging included CXR w/ no acute abnormality. Patient had a bed pan underneath her in which she urinated but the family accidentally threw out the urine sample. Patient recently underwent dermatological excision w/ Dr Aurelia Pino [phone] - discharged w/out ABx; wound on L forehead is not bleeding, no surrounding erythema, and patient denied any facial complaints. GOC discussed w/ patient and son at bedside - discussion never had but the family plans to think about it further - full code for now.    Allergy:  No Known Allergies    Hospital Medications:   MEDICATIONS  (STANDING):  amLODIPine   Tablet 5 milliGRAM(s) Oral daily  aspirin  chewable 81 milliGRAM(s) Oral daily  atorvastatin 10 milliGRAM(s) Oral at bedtime  heparin  Injectable 5000 Unit(s) SubCutaneous every 8 hours  influenza   Vaccine 0.5 milliLiter(s) IntraMuscular once  metoprolol tartrate 25 milliGRAM(s) Oral two times a day  sodium chloride 0.9%. 1000 milliLiter(s) (50 mL/Hr) IV Continuous <Continuous>    Home Medications:   * Patient Currently Takes Medications as of 16-Mar-2019 20:20 documented in Structured Notes  · 	amLODIPine 5 mg oral tablet: 1 tab(s) orally once a day, Last Dose Taken:    · 	lisinopril 40 mg oral tablet: 1 tab(s) orally once a day, Last Dose Taken:    · 	lovastatin 20 mg oral tablet: 1 tab(s) orally once a day, Last Dose Taken:    · 	metoprolol tartrate 25 mg oral tablet: 1 tab(s) orally 2 times a day, Last Dose Taken:    · 	aspirin 81 mg oral tablet, chewable: 1 tab(s) orally once a day, Last Dose Taken:      .    VITALS:  T(F): 99.1 (19 @ 05:07), Max: 101 (19 @ 16:20)  HR: 85 (19 @ 05:07)  BP: 117/38 (19 @ 05:07)  RR: 18 (19 @ 05:07)  SpO2: 98% (19 @ 05:07)  Wt(kg): --    Height (cm): 157.48 ( @ 15:54)  Weight (kg): 72.4 ( @ 21:40)  BMI (kg/m2): 29.2 ( @ 21:40)  BSA (m2): 1.74 ( @ 21:40)    PHYSICAL EXAM:  GEN AAOx4 NAD   HEENT NC left forehead with wound above eyebrow scabbed over and dry EOMI MMM   Lungs Clear B/L   CV Reg S1S2   Abd obese soft NT ND + BS   Ext No pitting edema of B/L LE   Skin warm and dry       LABS:      137  |  110<H>  |  53<H>  ----------------------------<  92  4.5   |  21<L>  |  1.59<H>    Ca    8.1<L>      17 Mar 2019 07:29  Phos  2.8       Mg     2.1         TPro  7.7  /  Alb  3.8  /  TBili  1.1  /  DBili  x   /  AST  26  /  ALT  23  /  AlkPhos  54  -    Creatinine Trend: 1.59 <--, 2.02 <--                        7.8    7.11  )-----------( 145      ( 17 Mar 2019 07:29 )             24.5       Iron with Total Binding Capacity (19 @ 07:29)    Iron - Total Binding Capacity.: 223 ug/dL    % Saturation, Iron: 6 %    Iron Total, Serum: 13 ug/dL    Unsaturated Iron Binding Capacity: 210 ug/dL      Urine Studies:  Urinalysis Basic - ( 16 Mar 2019 22:50 )    Color: Yellow / Appearance: Clear / S.010 / pH:   Gluc:  / Ketone: Negative  / Bili: Negative / Urobili: Negative   Blood:  / Protein: 15 / Nitrite: Negative   Leuk Esterase: Small / RBC: 0-2 /HPF / WBC 6-10 /HPF   Sq Epi:  / Non Sq Epi: Few /HPF / Bacteria: Few /HPF      Chloride, Random Urine: 30 mmol/L ( @ 22:50)  Sodium, Random Urine: 15 mmol/L ( @ 22:50)  Osmolality, Random Urine: 395 mos/kg ( @ 22:50)  Creatinine, Random Urine: 80 mg/dL ( @ 22:49)    RADIOLOGY & ADDITIONAL STUDIES:  < from: Xray Chest 1 View-PORTABLE IMMEDIATE (19 @ 18:09) >  EXAM:  XR CHEST PORTABLE IMMED 1V                            PROCEDURE DATE:  2019          INTERPRETATION:  Clinical history: 89-year-old female, sepsis.    Single expiratory/kyphotic view without comparison demonstrates mild   cardiomegaly and normal pulmonary vasculature with no consolidation,   effusion, gross adenopathy, or thorax or osseous finding.    Slightly increased perihilar markings are secondary to poor inspiration.    Extensive degenerative change particularly at the right shoulder noted   with no acute findings.    Impression    Mild cardiomegaly with no acute findings.                GRAYSON CRENSHAW M.D., ATTENDING RADIOLOGIST  This document has been electronically signed. Mar 17 2019 11:09AM        < end of copied text > [facility] NEPHROLOGY- PROGRESS NOTE, Follow up     Patient is a 89y Female with PMH HTN, Arthritis, CKD, and PSH L Forehead Lesion s/p Excision 3/14/19 p/w fever x 1 day      3/16/19-denied any other acute complaints including CP, SOB, abdominal pain, NVDC, urinary changes, or any other complaints. In the ED patient in NAD, vitals noted for /84 + fever 38.3 and patient received 1.5 L NS, 650 mg Tylenol, Vancomycin, and Cefepime - EKG noted for NSR 84 BPM. Imaging included CXR w/ no acute abnormality. Patient had a bed pan underneath her in which she urinated but the family accidentally threw out the urine sample. Patient recently underwent dermatological excision w/ Dr Aurelia Pino [phone] - discharged w/out ABx; wound on L forehead is not bleeding, no surrounding erythema, and patient denied any facial complaints. GOC discussed w/ patient and son at bedside - discussion never had but the family plans to think about it further - full code for now.    Pt feels well, no complaints.    Allergy:  No Known Allergies    Hospital Medications:   MEDICATIONS  (STANDING):  amLODIPine   Tablet 5 milliGRAM(s) Oral daily  aspirin  chewable 81 milliGRAM(s) Oral daily  atorvastatin 10 milliGRAM(s) Oral at bedtime  heparin  Injectable 5000 Unit(s) SubCutaneous every 8 hours  influenza   Vaccine 0.5 milliLiter(s) IntraMuscular once  metoprolol tartrate 25 milliGRAM(s) Oral two times a day  sodium chloride 0.9%. 1000 milliLiter(s) (50 mL/Hr) IV Continuous <Continuous>    Home Medications:   * Patient Currently Takes Medications as of 16-Mar-2019 20:20 documented in Structured Notes  · 	amLODIPine 5 mg oral tablet: 1 tab(s) orally once a day, Last Dose Taken:    · 	lisinopril 40 mg oral tablet: 1 tab(s) orally once a day, Last Dose Taken:    · 	lovastatin 20 mg oral tablet: 1 tab(s) orally once a day, Last Dose Taken:    · 	metoprolol tartrate 25 mg oral tablet: 1 tab(s) orally 2 times a day, Last Dose Taken:    · 	aspirin 81 mg oral tablet, chewable: 1 tab(s) orally once a day, Last Dose Taken:      .    VITALS:  T(F): 99.1 (19 @ 05:07), Max: 101 (19 @ 16:20)  HR: 85 (19 @ 05:07)  BP: 117/38 (19 @ 05:07)  RR: 18 (19 @ 05:07)  SpO2: 98% (19 @ 05:07)  Wt(kg): --    Height (cm): 157.48 ( @ 15:54)  Weight (kg): 72.4 ( @ 21:40)  BMI (kg/m2): 29.2 ( @ 21:40)  BSA (m2): 1.74 ( @ 21:40)    PHYSICAL EXAM:  GEN AAOx4 NAD   HEENT NC left forehead with wound above eyebrow scabbed over and dry EOMI MMM   Lungs Clear B/L   CV Reg S1S2   Abd obese soft NT ND + BS   Ext No pitting edema of B/L LE   Skin warm and dry       LABS:    Culture - Blood (19 @ 21:45)    Specimen Source: .Blood    Culture Results:   No growth to date.          137  |  110<H>  |  53<H>  ----------------------------<  92  4.5   |  21<L>  |  1.59<H>    Ca    8.1<L>      17 Mar 2019 07:29  Phos  2.8       Mg     2.1         TPro  7.7  /  Alb  3.8  /  TBili  1.1  /  DBili  x   /  AST  26  /  ALT  23  /  AlkPhos  54      Creatinine Trend: 1.59 <--, 2.02 <--                        7.8    7.11  )-----------( 145      ( 17 Mar 2019 07:29 )             24.5       Iron with Total Binding Capacity (19 @ 07:29)    Iron - Total Binding Capacity.: 223 ug/dL    % Saturation, Iron: 6 %    Iron Total, Serum: 13 ug/dL    Unsaturated Iron Binding Capacity: 210 ug/dL      Urine Studies:  Urinalysis Basic - ( 16 Mar 2019 22:50 )    Color: Yellow / Appearance: Clear / S.010 / pH:   Gluc:  / Ketone: Negative  / Bili: Negative / Urobili: Negative   Blood:  / Protein: 15 / Nitrite: Negative   Leuk Esterase: Small / RBC: 0-2 /HPF / WBC 6-10 /HPF   Sq Epi:  / Non Sq Epi: Few /HPF / Bacteria: Few /HPF      Chloride, Random Urine: 30 mmol/L ( @ 22:50)  Sodium, Random Urine: 15 mmol/L ( @ 22:50)  Osmolality, Random Urine: 395 mos/kg ( @ 22:50)  Creatinine, Random Urine: 80 mg/dL ( @ 22:49)    RADIOLOGY & ADDITIONAL STUDIES:  < from: Xray Chest 1 View-PORTABLE IMMEDIATE (19 @ 18:09) >  EXAM:  XR CHEST PORTABLE IMMED 1V                            PROCEDURE DATE:  2019          INTERPRETATION:  Clinical history: 89-year-old female, sepsis.    Single expiratory/kyphotic view without comparison demonstrates mild   cardiomegaly and normal pulmonary vasculature with no consolidation,   effusion, gross adenopathy, or thorax or osseous finding.    Slightly increased perihilar markings are secondary to poor inspiration.    Extensive degenerative change particularly at the right shoulder noted   with no acute findings.    Impression    Mild cardiomegaly with no acute findings.                GRAYSON CRENSHAW M.D., ATTENDING RADIOLOGIST  This document has been electronically signed. Mar 17 2019 11:09AM        < end of copied text >

## 2019-03-18 VITALS
HEART RATE: 67 BPM | SYSTOLIC BLOOD PRESSURE: 105 MMHG | OXYGEN SATURATION: 99 % | TEMPERATURE: 98 F | DIASTOLIC BLOOD PRESSURE: 78 MMHG | RESPIRATION RATE: 16 BRPM

## 2019-03-18 LAB
ANION GAP SERPL CALC-SCNC: 7 MMOL/L — SIGNIFICANT CHANGE UP (ref 5–17)
BASOPHILS # BLD AUTO: 0.02 K/UL — SIGNIFICANT CHANGE UP (ref 0–0.2)
BASOPHILS NFR BLD AUTO: 0.3 % — SIGNIFICANT CHANGE UP (ref 0–2)
BUN SERPL-MCNC: 54 MG/DL — HIGH (ref 7–18)
CALCIUM SERPL-MCNC: 7.9 MG/DL — LOW (ref 8.4–10.5)
CHLORIDE SERPL-SCNC: 114 MMOL/L — HIGH (ref 96–108)
CO2 SERPL-SCNC: 19 MMOL/L — LOW (ref 22–31)
CREAT SERPL-MCNC: 1.88 MG/DL — HIGH (ref 0.5–1.3)
EOSINOPHIL # BLD AUTO: 0.16 K/UL — SIGNIFICANT CHANGE UP (ref 0–0.5)
EOSINOPHIL NFR BLD AUTO: 2.5 % — SIGNIFICANT CHANGE UP (ref 0–6)
GLUCOSE SERPL-MCNC: 83 MG/DL — SIGNIFICANT CHANGE UP (ref 70–99)
HCT VFR BLD CALC: 23.2 % — LOW (ref 34.5–45)
HCT VFR BLD CALC: 23.3 % — LOW (ref 34.5–45)
HGB BLD-MCNC: 7.2 G/DL — LOW (ref 11.5–15.5)
HGB BLD-MCNC: 7.3 G/DL — LOW (ref 11.5–15.5)
IMM GRANULOCYTES NFR BLD AUTO: 0.6 % — SIGNIFICANT CHANGE UP (ref 0–1.5)
LYMPHOCYTES # BLD AUTO: 1.62 K/UL — SIGNIFICANT CHANGE UP (ref 1–3.3)
LYMPHOCYTES # BLD AUTO: 25.2 % — SIGNIFICANT CHANGE UP (ref 13–44)
MCHC RBC-ENTMCNC: 31 GM/DL — LOW (ref 32–36)
MCHC RBC-ENTMCNC: 31.3 GM/DL — LOW (ref 32–36)
MCHC RBC-ENTMCNC: 32.1 PG — SIGNIFICANT CHANGE UP (ref 27–34)
MCHC RBC-ENTMCNC: 32.4 PG — SIGNIFICANT CHANGE UP (ref 27–34)
MCV RBC AUTO: 103.6 FL — HIGH (ref 80–100)
MCV RBC AUTO: 103.6 FL — HIGH (ref 80–100)
MONOCYTES # BLD AUTO: 0.66 K/UL — SIGNIFICANT CHANGE UP (ref 0–0.9)
MONOCYTES NFR BLD AUTO: 10.3 % — SIGNIFICANT CHANGE UP (ref 2–14)
NEUTROPHILS # BLD AUTO: 3.92 K/UL — SIGNIFICANT CHANGE UP (ref 1.8–7.4)
NEUTROPHILS NFR BLD AUTO: 61.1 % — SIGNIFICANT CHANGE UP (ref 43–77)
NRBC # BLD: 0 /100 WBCS — SIGNIFICANT CHANGE UP (ref 0–0)
NRBC # BLD: 0 /100 WBCS — SIGNIFICANT CHANGE UP (ref 0–0)
PHOSPHATE SERPL-MCNC: 3.4 MG/DL — SIGNIFICANT CHANGE UP (ref 2.5–4.5)
PLATELET # BLD AUTO: 135 K/UL — LOW (ref 150–400)
PLATELET # BLD AUTO: 136 K/UL — LOW (ref 150–400)
POTASSIUM SERPL-MCNC: 4.4 MMOL/L — SIGNIFICANT CHANGE UP (ref 3.5–5.3)
POTASSIUM SERPL-SCNC: 4.4 MMOL/L — SIGNIFICANT CHANGE UP (ref 3.5–5.3)
RBC # BLD: 2.24 M/UL — LOW (ref 3.8–5.2)
RBC # BLD: 2.25 M/UL — LOW (ref 3.8–5.2)
RBC # FLD: 12 % — SIGNIFICANT CHANGE UP (ref 10.3–14.5)
RBC # FLD: 12 % — SIGNIFICANT CHANGE UP (ref 10.3–14.5)
SODIUM SERPL-SCNC: 140 MMOL/L — SIGNIFICANT CHANGE UP (ref 135–145)
WBC # BLD: 5.6 K/UL — SIGNIFICANT CHANGE UP (ref 3.8–10.5)
WBC # BLD: 6.42 K/UL — SIGNIFICANT CHANGE UP (ref 3.8–10.5)
WBC # FLD AUTO: 5.6 K/UL — SIGNIFICANT CHANGE UP (ref 3.8–10.5)
WBC # FLD AUTO: 6.42 K/UL — SIGNIFICANT CHANGE UP (ref 3.8–10.5)

## 2019-03-18 PROCEDURE — 84156 ASSAY OF PROTEIN URINE: CPT

## 2019-03-18 PROCEDURE — 86850 RBC ANTIBODY SCREEN: CPT

## 2019-03-18 PROCEDURE — 82570 ASSAY OF URINE CREATININE: CPT

## 2019-03-18 PROCEDURE — 36415 COLL VENOUS BLD VENIPUNCTURE: CPT

## 2019-03-18 PROCEDURE — 71045 X-RAY EXAM CHEST 1 VIEW: CPT

## 2019-03-18 PROCEDURE — 86900 BLOOD TYPING SEROLOGIC ABO: CPT

## 2019-03-18 PROCEDURE — 80053 COMPREHEN METABOLIC PANEL: CPT

## 2019-03-18 PROCEDURE — 83540 ASSAY OF IRON: CPT

## 2019-03-18 PROCEDURE — 82607 VITAMIN B-12: CPT

## 2019-03-18 PROCEDURE — 85730 THROMBOPLASTIN TIME PARTIAL: CPT

## 2019-03-18 PROCEDURE — 85610 PROTHROMBIN TIME: CPT

## 2019-03-18 PROCEDURE — 84300 ASSAY OF URINE SODIUM: CPT

## 2019-03-18 PROCEDURE — 85027 COMPLETE CBC AUTOMATED: CPT

## 2019-03-18 PROCEDURE — 84443 ASSAY THYROID STIM HORMONE: CPT

## 2019-03-18 PROCEDURE — 84100 ASSAY OF PHOSPHORUS: CPT

## 2019-03-18 PROCEDURE — 94640 AIRWAY INHALATION TREATMENT: CPT

## 2019-03-18 PROCEDURE — 83935 ASSAY OF URINE OSMOLALITY: CPT

## 2019-03-18 PROCEDURE — 99285 EMERGENCY DEPT VISIT HI MDM: CPT | Mod: 25

## 2019-03-18 PROCEDURE — 96375 TX/PRO/DX INJ NEW DRUG ADDON: CPT

## 2019-03-18 PROCEDURE — 96374 THER/PROPH/DIAG INJ IV PUSH: CPT

## 2019-03-18 PROCEDURE — 83605 ASSAY OF LACTIC ACID: CPT

## 2019-03-18 PROCEDURE — 80048 BASIC METABOLIC PNL TOTAL CA: CPT

## 2019-03-18 PROCEDURE — 82436 ASSAY OF URINE CHLORIDE: CPT

## 2019-03-18 PROCEDURE — 86901 BLOOD TYPING SEROLOGIC RH(D): CPT

## 2019-03-18 PROCEDURE — 82746 ASSAY OF FOLIC ACID SERUM: CPT

## 2019-03-18 PROCEDURE — 87040 BLOOD CULTURE FOR BACTERIA: CPT

## 2019-03-18 PROCEDURE — 93005 ELECTROCARDIOGRAM TRACING: CPT

## 2019-03-18 PROCEDURE — 82728 ASSAY OF FERRITIN: CPT

## 2019-03-18 PROCEDURE — 81001 URINALYSIS AUTO W/SCOPE: CPT

## 2019-03-18 PROCEDURE — 83735 ASSAY OF MAGNESIUM: CPT

## 2019-03-18 PROCEDURE — 93306 TTE W/DOPPLER COMPLETE: CPT

## 2019-03-18 PROCEDURE — 80061 LIPID PANEL: CPT

## 2019-03-18 PROCEDURE — 83036 HEMOGLOBIN GLYCOSYLATED A1C: CPT

## 2019-03-18 PROCEDURE — 87631 RESP VIRUS 3-5 TARGETS: CPT

## 2019-03-18 PROCEDURE — 83550 IRON BINDING TEST: CPT

## 2019-03-18 RX ORDER — ALBUTEROL 90 UG/1
2 AEROSOL, METERED ORAL EVERY 6 HOURS
Qty: 0 | Refills: 0 | Status: DISCONTINUED | OUTPATIENT
Start: 2019-03-18 | End: 2019-03-18

## 2019-03-18 RX ORDER — DOCUSATE SODIUM 100 MG
100 CAPSULE ORAL DAILY
Qty: 0 | Refills: 0 | Status: DISCONTINUED | OUTPATIENT
Start: 2019-03-18 | End: 2019-03-18

## 2019-03-18 RX ORDER — SENNA PLUS 8.6 MG/1
2 TABLET ORAL
Qty: 14 | Refills: 0
Start: 2019-03-18 | End: 2019-03-24

## 2019-03-18 RX ORDER — LOVASTATIN 20 MG
1 TABLET ORAL
Qty: 0 | Refills: 0 | COMMUNITY

## 2019-03-18 RX ORDER — ATORVASTATIN CALCIUM 80 MG/1
1 TABLET, FILM COATED ORAL
Qty: 20 | Refills: 0
Start: 2019-03-18 | End: 2019-04-06

## 2019-03-18 RX ORDER — POLYETHYLENE GLYCOL 3350 17 G/17G
17 POWDER, FOR SOLUTION ORAL ONCE
Qty: 0 | Refills: 0 | Status: COMPLETED | OUTPATIENT
Start: 2019-03-18 | End: 2019-03-18

## 2019-03-18 RX ORDER — LACTULOSE 10 G/15ML
200 SOLUTION ORAL ONCE
Qty: 0 | Refills: 0 | Status: DISCONTINUED | OUTPATIENT
Start: 2019-03-18 | End: 2019-03-18

## 2019-03-18 RX ORDER — DOCUSATE SODIUM 100 MG
1 CAPSULE ORAL
Qty: 7 | Refills: 0
Start: 2019-03-18 | End: 2019-03-24

## 2019-03-18 RX ORDER — SENNA PLUS 8.6 MG/1
2 TABLET ORAL AT BEDTIME
Qty: 0 | Refills: 0 | Status: DISCONTINUED | OUTPATIENT
Start: 2019-03-18 | End: 2019-03-18

## 2019-03-18 RX ADMIN — Medication 81 MILLIGRAM(S): at 11:59

## 2019-03-18 RX ADMIN — Medication 1 ENEMA: at 16:25

## 2019-03-18 RX ADMIN — HEPARIN SODIUM 5000 UNIT(S): 5000 INJECTION INTRAVENOUS; SUBCUTANEOUS at 05:09

## 2019-03-18 RX ADMIN — POLYETHYLENE GLYCOL 3350 17 GRAM(S): 17 POWDER, FOR SOLUTION ORAL at 11:59

## 2019-03-18 RX ADMIN — ALBUTEROL 2 PUFF(S): 90 AEROSOL, METERED ORAL at 16:25

## 2019-03-18 RX ADMIN — CEFTRIAXONE 100 GRAM(S): 500 INJECTION, POWDER, FOR SOLUTION INTRAMUSCULAR; INTRAVENOUS at 05:09

## 2019-03-18 NOTE — CONSULT NOTE ADULT - ASSESSMENT
Fevers - unknown source, likely viral in origin    plan - can dc home today on no antibiotics  reconsult prn

## 2019-03-18 NOTE — DISCHARGE NOTE PROVIDER - NSDCCPCAREPLAN_GEN_ALL_CORE_FT
PRINCIPAL DISCHARGE DIAGNOSIS  Diagnosis: Sepsis, due to unspecified organism  Assessment and Plan of Treatment: You presented with fever and tachycardis. You were given IV antibiotics at ED along with IV fluids. Your symptoms improved, We consulted Infectious disease and no antibiotics were recommended. You should follow up with primary care provider.      SECONDARY DISCHARGE DIAGNOSES  Diagnosis: Anemia  Assessment and Plan of Treatment: Your hemoglobin was found to be on lower range, likely due to anemia of chronic disease. You should follow up with your primary care physician.    Diagnosis: HTN (hypertension)  Assessment and Plan of Treatment: You should continue your medication as above. You should consume low salt diet like fruits and vegetables. You should monitor your blood pressure and follow up with primary care provider.    Diagnosis: Constipation  Assessment and Plan of Treatment: You should continue your medicaiton as above and follow up with primary care physician.    Diagnosis: Chronic kidney disease (CKD)  Assessment and Plan of Treatment: You should avoid nephrotoxic medications like ibuprofen and follow up with primary care physician.

## 2019-03-18 NOTE — CONSULT NOTE ADULT - GASTROINTESTINAL DETAILS
bowel sounds normal/no organomegaly/no guarding/no distention/no masses palpable/nontender/no rebound tenderness

## 2019-03-18 NOTE — CONSULT NOTE ADULT - SUBJECTIVE AND OBJECTIVE BOX
HPI:  89 y/o F AAOx4 w/ PMH HTN, Arthritis, CKD, and PSH L Forehead Lesion s/p Excision 3/14/19 p/w fever x 1 day - otherwise denied any other acute complaints including CP, SOB, abdominal pain, NVDC, urinary changes, or any other complaints. In the ED patient in NAD, vitals noted for /84 + fever 38.3 and patient received 1.5 L NS, 650 mg Tylenol, Vancomycin, and Cefepime - EKG noted for NSR 84 BPM. Imaging included CXR w/ no acute abnormality. Patient had a bed pan underneath her in which she urinated but the family accidentally threw out the urine sample. Patient recently underwent dermatological excision w/ Dr Aurelia Pino [phone] - discharged w/out ABx; wound on L forehead is not bleeding, no surrounding erythema, and patient denied any facial complaints. GOC discussed w/ patient and son at bedside - discussion never had but the family plans to think about it further - full code for now.     History as above on admission. Pt had a fever on 3/16 along with shaking of hands which brought her to the hospital. Pt is s/p forehead lesion excision on 3/14 by dermatologist Dr. Pino. Denies any redness, swelling or drainage from the excision site.      PAST MEDICAL & SURGICAL HISTORY:  HLD (hyperlipidemia)  Chronic kidney disease (CKD)  HTN (hypertension)  H/O local excision of skin lesion      No Known Allergies      Meds:  ALBUTerol    90 MICROgram(s) HFA Inhaler 2 Puff(s) Inhalation every 6 hours PRN  amLODIPine   Tablet 5 milliGRAM(s) Oral daily  aspirin  chewable 81 milliGRAM(s) Oral daily  atorvastatin 10 milliGRAM(s) Oral at bedtime  heparin  Injectable 5000 Unit(s) SubCutaneous every 8 hours  influenza   Vaccine 0.5 milliLiter(s) IntraMuscular once  metoprolol tartrate 25 milliGRAM(s) Oral two times a day  sodium chloride 0.9%. 1000 milliLiter(s) IV Continuous <Continuous>      SOCIAL HISTORY:  Smoker:  NO    ETOH use:   NO          Ilicit Drug use:   NO  Occupation:  Assisted device use (Cane / Walker): Cane, walker  Live with: Alone    FAMILY HISTORY:  No pertinent family history in first degree relatives      VITALS:  Vital Signs Last 24 Hrs  T(C): 37 (18 Mar 2019 05:57), Max: 37.2 (17 Mar 2019 14:47)  T(F): 98.6 (18 Mar 2019 05:57), Max: 98.9 (17 Mar 2019 14:47)  HR: 62 (18 Mar 2019 05:57) (61 - 70)  BP: 107/43 (18 Mar 2019 05:57) (105/37 - 113/54)  BP(mean): --  RR: 18 (18 Mar 2019 05:57) (17 - 18)  SpO2: 97% (18 Mar 2019 05:57) (97% - 97%)    LABS/DIAGNOSTIC TESTS:                          7.3    5.60  )-----------( 135      ( 18 Mar 2019 12:24 )             23.3     WBC Count: 5.60 K/uL ( @ 12:24)  WBC Count: 6.42 K/uL ( @ 07:37)  WBC Count: 7.11 K/uL ( @ 07:29)  WBC Count: 11.66 K/uL ( @ 17:42)          140  |  114<H>  |  54<H>  ----------------------------<  83  4.4   |  19<L>  |  1.88<H>    Ca    7.9<L>      18 Mar 2019 07:37  Phos  3.4       Mg     2.1         TPro  7.7  /  Alb  3.8  /  TBili  1.1  /  DBili  x   /  AST  26  /  ALT  23  /  AlkPhos  54  03-16      Urinalysis Basic - ( 16 Mar 2019 22:50 )    Color: Yellow / Appearance: Clear / S.010 / pH: x  Gluc: x / Ketone: Negative  / Bili: Negative / Urobili: Negative   Blood: x / Protein: 15 / Nitrite: Negative   Leuk Esterase: Small / RBC: 0-2 /HPF / WBC 6-10 /HPF   Sq Epi: x / Non Sq Epi: Few /HPF / Bacteria: Few /HPF        LIVER FUNCTIONS - ( 16 Mar 2019 17:42 )  Alb: 3.8 g/dL / Pro: 7.7 g/dL / ALK PHOS: 54 U/L / ALT: 23 U/L DA / AST: 26 U/L / GGT: x             PT/INR - ( 16 Mar 2019 17:42 )   PT: 11.2 sec;   INR: 1.01 ratio         PTT - ( 16 Mar 2019 17:42 )  PTT:27.8 sec    LACTATE:    ABG -     CULTURES:   .Blood   @ 21:45   No growth to date.  --  --          RADIOLOGY:  < from: Xray Chest 1 View-PORTABLE IMMEDIATE (19 @ 18:09) >  EXAM:  XR CHEST PORTABLE IMMED 1V                            PROCEDURE DATE:  2019          INTERPRETATION:  Clinical history: 89-year-old female, sepsis.    Single expiratory/kyphotic view without comparison demonstrates mild   cardiomegaly and normal pulmonary vasculature with no consolidation,   effusion, gross adenopathy, or thorax or osseous finding.    Slightly increased perihilar markings are secondary to poor inspiration.    Extensive degenerative change particularly at the right shoulder noted   with no acute findings.    Impression    Mild cardiomegaly with no acute findings.    < end of copied text > HPI:  89 y/o F AAOx4 w/ PMH HTN, Arthritis, CKD, and PSH L Forehead Lesion s/p Excision 3/14/19 p/w fever x 1 day - otherwise denied any other acute complaints including CP, SOB, abdominal pain, NVDC, urinary changes, or any other complaints. In the ED patient in NAD, vitals noted for /84 + fever 38.3 and patient received 1.5 L NS, 650 mg Tylenol, Vancomycin, and Cefepime - EKG noted for NSR 84 BPM. Imaging included CXR w/ no acute abnormality. Patient had a bed pan underneath her in which she urinated but the family accidentally threw out the urine sample. Patient recently underwent dermatological excision w/ Dr Aurelia Pino [phone] - discharged w/out ABx; wound on L forehead is not bleeding, no surrounding erythema, and patient denied any facial complaints. GOC discussed w/ patient and son at bedside - discussion never had but the family plans to think about it further - full code for now.     History as above on admission. Pt had a fever on 3/16 along with shaking of hands which brought her to the hospital. Pt is s/p forehead lesion excision on 3/14 by dermatologist Dr. Pino. Denies any redness, swelling or drainage from the excision site.  She has not had any recurrence of fevers since being here and her u/a, blood cultures and CXR have been negative, she is asymptomatic at this time as well. She was on antibiotics for a few days and now off.    PAST MEDICAL & SURGICAL HISTORY:  HLD (hyperlipidemia)  Chronic kidney disease (CKD)  HTN (hypertension)  H/O local excision of skin lesion      No Known Allergies      Meds:  ALBUTerol    90 MICROgram(s) HFA Inhaler 2 Puff(s) Inhalation every 6 hours PRN  amLODIPine   Tablet 5 milliGRAM(s) Oral daily  aspirin  chewable 81 milliGRAM(s) Oral daily  atorvastatin 10 milliGRAM(s) Oral at bedtime  heparin  Injectable 5000 Unit(s) SubCutaneous every 8 hours  influenza   Vaccine 0.5 milliLiter(s) IntraMuscular once  metoprolol tartrate 25 milliGRAM(s) Oral two times a day  sodium chloride 0.9%. 1000 milliLiter(s) IV Continuous <Continuous>      SOCIAL HISTORY:  Smoker:  NO    ETOH use:   NO          Ilicit Drug use:   NO  Occupation:  Assisted device use (Cane / Walker): Cane, walker  Live with: Alone    FAMILY HISTORY:  No pertinent family history in first degree relatives      VITALS:  Vital Signs Last 24 Hrs  T(C): 37 (18 Mar 2019 05:57), Max: 37.2 (17 Mar 2019 14:47)  T(F): 98.6 (18 Mar 2019 05:57), Max: 98.9 (17 Mar 2019 14:47)  HR: 62 (18 Mar 2019 05:57) (61 - 70)  BP: 107/43 (18 Mar 2019 05:57) (105/37 - 113/54)  BP(mean): --  RR: 18 (18 Mar 2019 05:57) (17 - 18)  SpO2: 97% (18 Mar 2019 05:57) (97% - 97%)    LABS/DIAGNOSTIC TESTS:                          7.3    5.60  )-----------( 135      ( 18 Mar 2019 12:24 )             23.3     WBC Count: 5.60 K/uL ( @ 12:24)  WBC Count: 6.42 K/uL ( @ 07:37)  WBC Count: 7.11 K/uL ( @ 07:29)  WBC Count: 11.66 K/uL ( @ 17:42)          140  |  114<H>  |  54<H>  ----------------------------<  83  4.4   |  19<L>  |  1.88<H>    Ca    7.9<L>      18 Mar 2019 07:37  Phos  3.4       Mg     2.1         TPro  7.7  /  Alb  3.8  /  TBili  1.1  /  DBili  x   /  AST  26  /  ALT  23  /  AlkPhos  54        Urinalysis Basic - ( 16 Mar 2019 22:50 )    Color: Yellow / Appearance: Clear / S.010 / pH: x  Gluc: x / Ketone: Negative  / Bili: Negative / Urobili: Negative   Blood: x / Protein: 15 / Nitrite: Negative   Leuk Esterase: Small / RBC: 0-2 /HPF / WBC 6-10 /HPF   Sq Epi: x / Non Sq Epi: Few /HPF / Bacteria: Few /HPF        LIVER FUNCTIONS - ( 16 Mar 2019 17:42 )  Alb: 3.8 g/dL / Pro: 7.7 g/dL / ALK PHOS: 54 U/L / ALT: 23 U/L DA / AST: 26 U/L / GGT: x             PT/INR - ( 16 Mar 2019 17:42 )   PT: 11.2 sec;   INR: 1.01 ratio         PTT - ( 16 Mar 2019 17:42 )  PTT:27.8 sec    LACTATE:    ABG -     CULTURES:   .Blood   @ 21:45   No growth to date.  --  --          RADIOLOGY:  < from: Xray Chest 1 View-PORTABLE IMMEDIATE (19 @ 18:09) >  EXAM:  XR CHEST PORTABLE IMMED 1V                            PROCEDURE DATE:  2019          INTERPRETATION:  Clinical history: 89-year-old female, sepsis.    Single expiratory/kyphotic view without comparison demonstrates mild   cardiomegaly and normal pulmonary vasculature with no consolidation,   effusion, gross adenopathy, or thorax or osseous finding.    Slightly increased perihilar markings are secondary to poor inspiration.    Extensive degenerative change particularly at the right shoulder noted   with no acute findings.    Impression    Mild cardiomegaly with no acute findings.    < end of copied text >

## 2019-03-18 NOTE — CONSULT NOTE ADULT - RS GEN PE MLT RESP DETAILS PC
no rhonchi/no wheezes/no rales/breath sounds equal/clear to auscultation bilaterally good air movement/no rhonchi/no wheezes/no rales/breath sounds equal/clear to auscultation bilaterally

## 2019-03-18 NOTE — DISCHARGE NOTE NURSING/CASE MANAGEMENT/SOCIAL WORK - NSDCDPATPORTLINK_GEN_ALL_CORE
You can access the [facility] Patient Portal, offered by [facility], by registering with the following website: [email or web address]

## 2019-03-18 NOTE — DISCHARGE NOTE PROVIDER - HOSPITAL COURSE
fever x 1 day - otherwise denied any other acute complaints including CP, SOB, abdominal pain, NVDC, urinary changes, or any other complaints. In the ED patient in NAD, vitals noted for /84 + fever 38.3 and patient received 1.5 L NS, 650 mg Tylenol, Vancomycin, and Cefepime - EKG noted for NSR 84 BPM. Imaging included CXR w/ no acute abnormality. Patient had a bed pan underneath her in which she urinated but the family accidentally threw out the urine sample. Patient recently underwent dermatological excision w/ Dr Aurelia Pino [phone] - discharged w/out ABx; wound on L forehead is not bleeding, no surrounding erythema, and patient denied any facial complaints. GOC discussed w/ patient and son at bedside - discussion never had but the family plans to think about it further - full code for now. Patient is a 89 y/o F AAOx4 with PMH of HTN, Arthritis, CKD, and PSH of left Forehead Lesion s/p Excision 3/14/19 who presented with fever x 1 da. In the ED patient  was in NAD, vitals noted for /84 + fever 38.3 and patient received 1.5 L NS, 650 mg Tylenol, Vancomycin, and Cefepime . EKG noted for NSR 84 BPM. Imaging included chest x-ray  without  acute abnormality. Patient recently underwent dermatological excision with  Dr Aurelia Pino [phone]. Pt was given IV antibiotics at ED. Pt was admitted for infection likely from forehead infection. Blood culture was negative, Patient is a 89 y/o F AAOx4 with PMH of HTN, Arthritis, CKD, and PSH of left Forehead Lesion s/p Excision 3/14/19 who presented with fever x 1 da. In the ED patient  was in NAD, vitals noted for /84 + fever 38.3 and patient received 1.5 L NS, 650 mg Tylenol, Vancomycin, and Cefepime . EKG noted for NSR 84 BPM. Imaging included chest x-ray  without  acute abnormality. Patient recently underwent dermatological excision with  Dr Aurelia Pino [phone]. Pt was given IV antibiotics at ED. Pt was admitted for infection likely from forehead infection. Blood culture was negative. Infectious disease was consulted and no ant biotics were recommended.     Patient is stable for discharge. Case has been discussed with the attending. This is just a summary of the case. For further information please refer to pt. chart document.

## 2019-03-21 LAB
CULTURE RESULTS: SIGNIFICANT CHANGE UP
CULTURE RESULTS: SIGNIFICANT CHANGE UP
SPECIMEN SOURCE: SIGNIFICANT CHANGE UP
SPECIMEN SOURCE: SIGNIFICANT CHANGE UP

## 2019-04-24 ENCOUNTER — INPATIENT (INPATIENT)
Facility: HOSPITAL | Age: 84
LOS: 4 days | Discharge: HOME | DRG: 378 | End: 2019-04-29
Attending: INTERNAL MEDICINE | Admitting: INTERNAL MEDICINE
Payer: MEDICARE

## 2019-04-24 VITALS
RESPIRATION RATE: 17 BRPM | HEART RATE: 73 BPM | DIASTOLIC BLOOD PRESSURE: 60 MMHG | TEMPERATURE: 98 F | HEIGHT: 59 IN | SYSTOLIC BLOOD PRESSURE: 96 MMHG | OXYGEN SATURATION: 100 % | WEIGHT: 113.98 LBS

## 2019-04-24 DIAGNOSIS — D64.9 ANEMIA, UNSPECIFIED: ICD-10-CM

## 2019-04-24 DIAGNOSIS — I10 ESSENTIAL (PRIMARY) HYPERTENSION: ICD-10-CM

## 2019-04-24 DIAGNOSIS — N17.9 ACUTE KIDNEY FAILURE, UNSPECIFIED: ICD-10-CM

## 2019-04-24 DIAGNOSIS — Z98.890 OTHER SPECIFIED POSTPROCEDURAL STATES: Chronic | ICD-10-CM

## 2019-04-24 DIAGNOSIS — M19.90 UNSPECIFIED OSTEOARTHRITIS, UNSPECIFIED SITE: ICD-10-CM

## 2019-04-24 DIAGNOSIS — L03.90 CELLULITIS, UNSPECIFIED: ICD-10-CM

## 2019-04-24 DIAGNOSIS — Z29.9 ENCOUNTER FOR PROPHYLACTIC MEASURES, UNSPECIFIED: ICD-10-CM

## 2019-04-24 DIAGNOSIS — E78.5 HYPERLIPIDEMIA, UNSPECIFIED: ICD-10-CM

## 2019-04-24 PROBLEM — N18.9 CHRONIC KIDNEY DISEASE, UNSPECIFIED: Chronic | Status: ACTIVE | Noted: 2019-03-16

## 2019-04-24 LAB
ALBUMIN SERPL ELPH-MCNC: 2.9 G/DL — LOW (ref 3.5–5)
ALP SERPL-CCNC: 55 U/L — SIGNIFICANT CHANGE UP (ref 40–120)
ALT FLD-CCNC: 24 U/L DA — SIGNIFICANT CHANGE UP (ref 10–60)
ANION GAP SERPL CALC-SCNC: 7 MMOL/L — SIGNIFICANT CHANGE UP (ref 5–17)
APTT BLD: 24.4 SEC — LOW (ref 27.5–36.3)
AST SERPL-CCNC: 33 U/L — SIGNIFICANT CHANGE UP (ref 10–40)
BASOPHILS # BLD AUTO: 0.01 K/UL — SIGNIFICANT CHANGE UP (ref 0–0.2)
BASOPHILS NFR BLD AUTO: 0.1 % — SIGNIFICANT CHANGE UP (ref 0–2)
BILIRUB SERPL-MCNC: 0.7 MG/DL — SIGNIFICANT CHANGE UP (ref 0.2–1.2)
BUN SERPL-MCNC: 93 MG/DL — HIGH (ref 7–18)
CALCIUM SERPL-MCNC: 8.1 MG/DL — LOW (ref 8.4–10.5)
CHLORIDE SERPL-SCNC: 108 MMOL/L — SIGNIFICANT CHANGE UP (ref 96–108)
CO2 SERPL-SCNC: 20 MMOL/L — LOW (ref 22–31)
CREAT SERPL-MCNC: 2.49 MG/DL — HIGH (ref 0.5–1.3)
EOSINOPHIL # BLD AUTO: 0.02 K/UL — SIGNIFICANT CHANGE UP (ref 0–0.5)
EOSINOPHIL NFR BLD AUTO: 0.3 % — SIGNIFICANT CHANGE UP (ref 0–6)
GLUCOSE SERPL-MCNC: 105 MG/DL — HIGH (ref 70–99)
HCT VFR BLD CALC: 16.1 % — CRITICAL LOW (ref 34.5–45)
HGB BLD-MCNC: 5.1 G/DL — CRITICAL LOW (ref 11.5–15.5)
IMM GRANULOCYTES NFR BLD AUTO: 0.8 % — SIGNIFICANT CHANGE UP (ref 0–1.5)
INR BLD: 1.03 RATIO — SIGNIFICANT CHANGE UP (ref 0.88–1.16)
IRON SATN MFR SERPL: 13 UG/DL — LOW (ref 40–150)
IRON SATN MFR SERPL: 13 UG/DL — LOW (ref 40–150)
IRON SATN MFR SERPL: 5 % — LOW (ref 15–50)
LDH SERPL L TO P-CCNC: 145 U/L — SIGNIFICANT CHANGE UP (ref 120–225)
LYMPHOCYTES # BLD AUTO: 0.56 K/UL — LOW (ref 1–3.3)
LYMPHOCYTES # BLD AUTO: 7.3 % — LOW (ref 13–44)
MCHC RBC-ENTMCNC: 31.7 GM/DL — LOW (ref 32–36)
MCHC RBC-ENTMCNC: 33.1 PG — SIGNIFICANT CHANGE UP (ref 27–34)
MCV RBC AUTO: 104.5 FL — HIGH (ref 80–100)
MONOCYTES # BLD AUTO: 0.41 K/UL — SIGNIFICANT CHANGE UP (ref 0–0.9)
MONOCYTES NFR BLD AUTO: 5.4 % — SIGNIFICANT CHANGE UP (ref 2–14)
NEUTROPHILS # BLD AUTO: 6.57 K/UL — SIGNIFICANT CHANGE UP (ref 1.8–7.4)
NEUTROPHILS NFR BLD AUTO: 86.1 % — HIGH (ref 43–77)
NRBC # BLD: 0 /100 WBCS — SIGNIFICANT CHANGE UP (ref 0–0)
OB PNL STL: POSITIVE
PLATELET # BLD AUTO: 224 K/UL — SIGNIFICANT CHANGE UP (ref 150–400)
POTASSIUM SERPL-MCNC: 5.3 MMOL/L — SIGNIFICANT CHANGE UP (ref 3.5–5.3)
POTASSIUM SERPL-SCNC: 5.3 MMOL/L — SIGNIFICANT CHANGE UP (ref 3.5–5.3)
PROT SERPL-MCNC: 6.3 G/DL — SIGNIFICANT CHANGE UP (ref 6–8.3)
PROTHROM AB SERPL-ACNC: 11.4 SEC — SIGNIFICANT CHANGE UP (ref 10–12.9)
RBC # BLD: 1.39 M/UL — LOW (ref 3.8–5.2)
RBC # BLD: 1.54 M/UL — LOW (ref 3.8–5.2)
RBC # FLD: 14.4 % — SIGNIFICANT CHANGE UP (ref 10.3–14.5)
RETICS #: 80.8 K/UL — SIGNIFICANT CHANGE UP (ref 25–125)
RETICS/RBC NFR: 5.8 % — HIGH (ref 0.5–2.5)
SODIUM SERPL-SCNC: 135 MMOL/L — SIGNIFICANT CHANGE UP (ref 135–145)
TIBC SERPL-MCNC: 248 UG/DL — LOW (ref 250–450)
UIBC SERPL-MCNC: 235 UG/DL — SIGNIFICANT CHANGE UP (ref 110–370)
WBC # BLD: 7.63 K/UL — SIGNIFICANT CHANGE UP (ref 3.8–10.5)
WBC # FLD AUTO: 7.63 K/UL — SIGNIFICANT CHANGE UP (ref 3.8–10.5)

## 2019-04-24 PROCEDURE — 71045 X-RAY EXAM CHEST 1 VIEW: CPT | Mod: 26

## 2019-04-24 PROCEDURE — 99285 EMERGENCY DEPT VISIT HI MDM: CPT

## 2019-04-24 RX ORDER — DOCUSATE SODIUM 100 MG
100 CAPSULE ORAL DAILY
Qty: 0 | Refills: 0 | Status: DISCONTINUED | OUTPATIENT
Start: 2019-04-24 | End: 2019-04-29

## 2019-04-24 RX ORDER — SENNA PLUS 8.6 MG/1
2 TABLET ORAL AT BEDTIME
Qty: 0 | Refills: 0 | Status: DISCONTINUED | OUTPATIENT
Start: 2019-04-24 | End: 2019-04-29

## 2019-04-24 RX ORDER — PANTOPRAZOLE SODIUM 20 MG/1
40 TABLET, DELAYED RELEASE ORAL EVERY 12 HOURS
Qty: 0 | Refills: 0 | Status: DISCONTINUED | OUTPATIENT
Start: 2019-04-24 | End: 2019-04-27

## 2019-04-24 RX ORDER — PANTOPRAZOLE SODIUM 20 MG/1
40 TABLET, DELAYED RELEASE ORAL ONCE
Qty: 0 | Refills: 0 | Status: COMPLETED | OUTPATIENT
Start: 2019-04-24 | End: 2019-04-24

## 2019-04-24 RX ORDER — SODIUM CHLORIDE 9 MG/ML
1000 INJECTION INTRAMUSCULAR; INTRAVENOUS; SUBCUTANEOUS ONCE
Qty: 0 | Refills: 0 | Status: COMPLETED | OUTPATIENT
Start: 2019-04-24 | End: 2019-04-24

## 2019-04-24 RX ORDER — AMPICILLIN SODIUM AND SULBACTAM SODIUM 250; 125 MG/ML; MG/ML
1.5 INJECTION, POWDER, FOR SUSPENSION INTRAMUSCULAR; INTRAVENOUS EVERY 24 HOURS
Qty: 0 | Refills: 0 | Status: DISCONTINUED | OUTPATIENT
Start: 2019-04-24 | End: 2019-04-26

## 2019-04-24 RX ORDER — ATORVASTATIN CALCIUM 80 MG/1
10 TABLET, FILM COATED ORAL AT BEDTIME
Qty: 0 | Refills: 0 | Status: DISCONTINUED | OUTPATIENT
Start: 2019-04-24 | End: 2019-04-29

## 2019-04-24 RX ADMIN — PANTOPRAZOLE SODIUM 40 MILLIGRAM(S): 20 TABLET, DELAYED RELEASE ORAL at 18:55

## 2019-04-24 RX ADMIN — SODIUM CHLORIDE 2000 MILLILITER(S): 9 INJECTION INTRAMUSCULAR; INTRAVENOUS; SUBCUTANEOUS at 18:54

## 2019-04-24 NOTE — H&P ADULT - ASSESSMENT
Patient admitted for likely upper GI bleeding, pt is guaiac positive on admission with no BRBPR, hemoglobin of 5.1 on admission; plan to transfuse 2U PRBC for now, f/u post-transfusion CBC, will continue to monitor CBC q6h, will keep pt NPO for now.

## 2019-04-24 NOTE — H&P ADULT - NSHPPHYSICALEXAM_GEN_ALL_CORE
Vital Signs Last 24 Hrs  T(C): 37.3 (24 Apr 2019 20:50), Max: 37.3 (24 Apr 2019 20:50)  T(F): 99.2 (24 Apr 2019 20:50), Max: 99.2 (24 Apr 2019 20:50)  HR: 71 (24 Apr 2019 20:50) (70 - 73)  BP: 133/94 (24 Apr 2019 20:50) (90/43 - 133/94)  RR: 17 (24 Apr 2019 20:50) (16 - 17)  SpO2: 100% (24 Apr 2019 20:50) (97% - 100%)  ________________________________________________  PHYSICAL EXAM:  GENERAL: NAD  HEENT: Normocephalic;  conjunctivae and sclerae clear; moist mucous membranes;   NECK : supple, no JVD  CHEST/LUNG: Clear to auscultation bilaterally with good air entry   HEART: S1 S2  regular; no murmurs, gallops or rubs  ABDOMEN: Soft, Nontender, Nondistended; Bowel sounds present  EXTREMITIES: no cyanosis; no edema; no calf tenderness  NERVOUS SYSTEM:  Awake and alert; Oriented  to place, person and time Vital Signs Last 24 Hrs  T(C): 37.3 (24 Apr 2019 20:50), Max: 37.3 (24 Apr 2019 20:50)  T(F): 99.2 (24 Apr 2019 20:50), Max: 99.2 (24 Apr 2019 20:50)  HR: 71 (24 Apr 2019 20:50) (70 - 73)  BP: 133/94 (24 Apr 2019 20:50) (90/43 - 133/94)  RR: 17 (24 Apr 2019 20:50) (16 - 17)  SpO2: 100% (24 Apr 2019 20:50) (97% - 100%)  ________________________________________________  PHYSICAL EXAM:  GENERAL: NAD  HEENT: Normocephalic;  conjunctival pallor; dry mucous membranes;   NECK : supple, no JVD  CHEST/LUNG: Clear to auscultation bilaterally with good air entry   HEART: S1 S2  regular; no murmurs, gallops or rubs  ABDOMEN: Soft, Nontender, Nondistended; Bowel sounds present  EXTREMITIES: no cyanosis; RLE 2+ pitting edema on bilateral lower extremities; erythema and tenderness to touch of RLE; no calf tenderness  NERVOUS SYSTEM:  Awake and alert; Oriented  to place, person and time

## 2019-04-24 NOTE — H&P ADULT - PROBLEM SELECTOR PLAN 3
RLE with erythema, tender on palpation  will given Unasyn for now SAHUNA on CKD4 likely due to hypovolemia; possible ATN  will transfuse 2 U PRBC overnight  s/p 1L NS bolus  f/u creatinine in AM

## 2019-04-24 NOTE — H&P ADULT - PROBLEM SELECTOR PLAN 1
hemoglobin 5.1 on admission  plan to transfuse 2U PRBC  guaiac positive on admission, however no bright red blood per rectum as per patient  likely upper GI bleeding  will keep NPO for now  c/w protonix 40mg IV bid for now  f/u GI consult in AM  f/u CBC post transfusion hemoglobin 5.1 on admission  plan to transfuse 2U PRBC  guaiac positive on admission, however no bright red blood per rectum as per patient  likely upper GI bleeding  will keep NPO for now  c/w protonix 40mg IV bid for now  f/u GI consult in AM - Dr. Yoder  f/u CBC post transfusion

## 2019-04-24 NOTE — H&P ADULT - NSHPLABSRESULTS_GEN_ALL_CORE
LABS:      CBC Full  -  ( 24 Apr 2019 21:11 )  WBC Count : x  RBC Count : 1.39 M/uL  Hemoglobin : x  Hematocrit : x  Platelet Count - Automated : x  Mean Cell Volume : x  Mean Cell Hemoglobin : x  Mean Cell Hemoglobin Concentration : x  Auto Neutrophil # : x  Auto Lymphocyte # : x  Auto Monocyte # : x  Auto Eosinophil # : x  Auto Basophil # : x  Auto Neutrophil % : x  Auto Lymphocyte % : x  Auto Monocyte % : x  Auto Eosinophil % : x  Auto Basophil % : x    04-24    135  |  108  |  93<H>  ----------------------------<  105<H>  5.3   |  20<L>  |  2.49<H>    Ca    8.1<L>      24 Apr 2019 17:08    TPro  6.3  /  Alb  2.9<L>  /  TBili  0.7  /  DBili  x   /  AST  33  /  ALT  24  /  AlkPhos  55  04-24    PT/INR - ( 24 Apr 2019 17:08 )   PT: 11.4 sec;   INR: 1.03 ratio      PTT - ( 24 Apr 2019 17:08 )  PTT:24.4 sec    RADIOLOGY & ADDITIONAL STUDIES (The following images were personally reviewed): LABS:      CBC Full  -  ( 24 Apr 2019 21:11 )  WBC Count : x  RBC Count : 1.39 M/uL  Hemoglobin : x  Hematocrit : x  Platelet Count - Automated : x  Mean Cell Volume : x  Mean Cell Hemoglobin : x  Mean Cell Hemoglobin Concentration : x  Auto Neutrophil # : x  Auto Lymphocyte # : x  Auto Monocyte # : x  Auto Eosinophil # : x  Auto Basophil # : x  Auto Neutrophil % : x  Auto Lymphocyte % : x  Auto Monocyte % : x  Auto Eosinophil % : x  Auto Basophil % : x    04-24    135  |  108  |  93<H>  ----------------------------<  105<H>  5.3   |  20<L>  |  2.49<H>    Ca    8.1<L>      24 Apr 2019 17:08    TPro  6.3  /  Alb  2.9<L>  /  TBili  0.7  /  DBili  x   /  AST  33  /  ALT  24  /  AlkPhos  55  04-24    PT/INR - ( 24 Apr 2019 17:08 )   PT: 11.4 sec;   INR: 1.03 ratio      PTT - ( 24 Apr 2019 17:08 )  PTT:24.4 sec

## 2019-04-24 NOTE — H&P ADULT - NSICDXPASTMEDICALHX_GEN_ALL_CORE_FT
PAST MEDICAL HISTORY:  Arthritis     Chronic kidney disease (CKD)     HLD (hyperlipidemia)     HTN (hypertension)

## 2019-04-24 NOTE — H&P ADULT - PROBLEM SELECTOR PLAN 2
SHAUNA on CKD likely due to hypovolemia  will transfuse 2 U PRBC overnight  s/p 1L NS bolus  f/u creatinine in AM SHAUNA on CKD likely due to hypovolemia; possible ATN  will transfuse 2 U PRBC overnight  s/p 1L NS bolus  f/u creatinine in AM hemoglobin 5.1 on admission  plan to transfuse 2U PRBC  guaiac positive on admission, however no bright red blood per rectum as per patient  likely upper GI bleeding  will keep NPO for now  c/w protonix 40mg IV bid for now  f/u GI consult in AM  f/u CBC post transfusion

## 2019-04-24 NOTE — ED ADULT NURSE NOTE - NSIMPLEMENTINTERV_GEN_ALL_ED
Implemented All Fall with Harm Risk Interventions:  [place] to call system. Call bell, personal items and telephone within reach. Instruct patient to call for assistance. Room bathroom lighting operational. Non-slip footwear when patient is off stretcher. Physically safe environment: no spills, clutter or unnecessary equipment. Stretcher in lowest position, wheels locked, appropriate side rails in place. Provide visual cue, wrist band, yellow gown, etc. Monitor gait and stability. Monitor for mental status changes and reorient to person, place, and time. Review medications for side effects contributing to fall risk. Reinforce activity limits and safety measures with patient and family. Provide visual clues: red socks.

## 2019-04-24 NOTE — H&P ADULT - PROBLEM SELECTOR PLAN 6
c/w statin  f/u lipid profile in AM holding antihypertensives due to hypotension in setting of GI bleeding  continue to monitor BP  re-start medications as appropriate

## 2019-04-24 NOTE — H&P ADULT - ATTENDING COMMENTS
[facility] NEPHROLOGY  Joe Barnes M.D.  Ari Lawton D.O.  Michaela Mai M.D.  Tami Arshad, MSN, ANP-C    Telephone: [phone]  Facsimile: [phone]    [address] [place] [address]

## 2019-04-24 NOTE — H&P ADULT - PROBLEM SELECTOR PLAN 5
holding antihypertensives in setting of GI bleeding and hypotension  continue to monitor BP  re-start medications as appropriate CKD Stage 4 . Avoid nephrotoxins/ RCA/ NSAIDs. Monitor BMP.

## 2019-04-24 NOTE — H&P ADULT - PROBLEM SELECTOR PLAN 9
IMPROVE VTE Individual Risk Assessment          RISK                                                          Points  [  ] Previous VTE                                                3  [  ] Thrombophilia                                             2  [  ] Lower limb paralysis                                   2        (unable to hold up >15 seconds)    [  ] Current Cancer                                             2         (within 6 months)  [ x ] Immobilization > 24 hrs                              1  [  ] ICU/CCU stay > 24 hours                             1  [ x ] Age > 60                                                         1    IMPROVE VTE Score: 2    holding vte prophylaxis in setting of gi bleeding

## 2019-04-24 NOTE — H&P ADULT - PROBLEM SELECTOR PLAN 7
IMPROVE VTE Individual Risk Assessment          RISK                                                          Points  [  ] Previous VTE                                                3  [  ] Thrombophilia                                             2  [  ] Lower limb paralysis                                   2        (unable to hold up >15 seconds)    [  ] Current Cancer                                             2         (within 6 months)  [ x ] Immobilization > 24 hrs                              1  [  ] ICU/CCU stay > 24 hours                             1  [ x ] Age > 60                                                         1    IMPROVE VTE Score: 2    holding vte prophylaxis in setting of gi bleeding not on any medication

## 2019-04-24 NOTE — H&P ADULT - HISTORY OF PRESENT ILLNESS
89 y/o F with a PMHx of HTN, Arthritis, CKD, HLD and no significant PSHx presents to ED due to a hemoglobin lab finding of 6.3 as outpatient. Pt endorses increased tiredness but denies any other complaints. Pt denies shortness of breath, abdominal pain, nausea, vomiting, diarrhea, pt denies BRBPR. All other ROS negative. In ED, pt found to be guaiac positive and hypotensive to 96/60 with HR of 70s. Pt was given 1L NS bolus with appropriate rise of blood pressure to systolic . 89 y/o F with a PMHx of HTN, Arthritis, CKD, HLD and no significant PSHx presents to ED due to a hemoglobin lab finding of 6.3 as outpatient. Pt endorses increased tiredness, has been sleeping more than usual as per son, however denies any other complaints including shortness of breath, abdominal pain, nausea, vomiting. Pt notes diarrhea couple of days ago, however states this was after taking a lot of prune juice after constipation, denies seeing any kaity blood in stool. All other ROS negative. Pt notes she had endoscopy/colonoscopy 5 years ago, results of which were normal.     In ED, pt found to be guaiac positive and hypotensive to 96/60 with HR of 70s. Pt was given 1L NS bolus with appropriate rise of blood pressure to systolic .

## 2019-04-25 DIAGNOSIS — N18.4 CHRONIC KIDNEY DISEASE, STAGE 4 (SEVERE): ICD-10-CM

## 2019-04-25 DIAGNOSIS — K92.2 GASTROINTESTINAL HEMORRHAGE, UNSPECIFIED: ICD-10-CM

## 2019-04-25 LAB
ANION GAP SERPL CALC-SCNC: 8 MMOL/L — SIGNIFICANT CHANGE UP (ref 5–17)
APPEARANCE UR: ABNORMAL
BILIRUB UR-MCNC: NEGATIVE — SIGNIFICANT CHANGE UP
BUN SERPL-MCNC: 76 MG/DL — HIGH (ref 7–18)
CALCIUM SERPL-MCNC: 7.8 MG/DL — LOW (ref 8.4–10.5)
CHLORIDE SERPL-SCNC: 112 MMOL/L — HIGH (ref 96–108)
CO2 SERPL-SCNC: 19 MMOL/L — LOW (ref 22–31)
COLOR SPEC: YELLOW — SIGNIFICANT CHANGE UP
CREAT SERPL-MCNC: 2.1 MG/DL — HIGH (ref 0.5–1.3)
DIFF PNL FLD: NEGATIVE — SIGNIFICANT CHANGE UP
FERRITIN SERPL-MCNC: 162 NG/ML — HIGH (ref 15–150)
GLUCOSE SERPL-MCNC: 87 MG/DL — SIGNIFICANT CHANGE UP (ref 70–99)
GLUCOSE UR QL: NEGATIVE — SIGNIFICANT CHANGE UP
HCT VFR BLD CALC: 23 % — LOW (ref 34.5–45)
HCT VFR BLD CALC: 29.3 % — LOW (ref 34.5–45)
HGB BLD-MCNC: 7.3 G/DL — LOW (ref 11.5–15.5)
HGB BLD-MCNC: 9.2 G/DL — LOW (ref 11.5–15.5)
KETONES UR-MCNC: NEGATIVE — SIGNIFICANT CHANGE UP
LEUKOCYTE ESTERASE UR-ACNC: NEGATIVE — SIGNIFICANT CHANGE UP
MAGNESIUM SERPL-MCNC: 1.9 MG/DL — SIGNIFICANT CHANGE UP (ref 1.6–2.6)
MCHC RBC-ENTMCNC: 30.7 PG — SIGNIFICANT CHANGE UP (ref 27–34)
MCHC RBC-ENTMCNC: 31.2 PG — SIGNIFICANT CHANGE UP (ref 27–34)
MCHC RBC-ENTMCNC: 31.4 GM/DL — LOW (ref 32–36)
MCHC RBC-ENTMCNC: 31.7 GM/DL — LOW (ref 32–36)
MCV RBC AUTO: 97.7 FL — SIGNIFICANT CHANGE UP (ref 80–100)
MCV RBC AUTO: 98.3 FL — SIGNIFICANT CHANGE UP (ref 80–100)
NITRITE UR-MCNC: NEGATIVE — SIGNIFICANT CHANGE UP
NRBC # BLD: 0 /100 WBCS — SIGNIFICANT CHANGE UP (ref 0–0)
NRBC # BLD: 0 /100 WBCS — SIGNIFICANT CHANGE UP (ref 0–0)
PH UR: 5 — SIGNIFICANT CHANGE UP (ref 5–8)
PHOSPHATE SERPL-MCNC: 3.4 MG/DL — SIGNIFICANT CHANGE UP (ref 2.5–4.5)
PLATELET # BLD AUTO: 186 K/UL — SIGNIFICANT CHANGE UP (ref 150–400)
PLATELET # BLD AUTO: 195 K/UL — SIGNIFICANT CHANGE UP (ref 150–400)
POTASSIUM SERPL-MCNC: 4.2 MMOL/L — SIGNIFICANT CHANGE UP (ref 3.5–5.3)
POTASSIUM SERPL-SCNC: 4.2 MMOL/L — SIGNIFICANT CHANGE UP (ref 3.5–5.3)
PROT UR-MCNC: 15
RBC # BLD: 2.34 M/UL — LOW (ref 3.8–5.2)
RBC # BLD: 3 M/UL — LOW (ref 3.8–5.2)
RBC # FLD: 16.1 % — HIGH (ref 10.3–14.5)
RBC # FLD: 17.1 % — HIGH (ref 10.3–14.5)
SODIUM SERPL-SCNC: 139 MMOL/L — SIGNIFICANT CHANGE UP (ref 135–145)
SP GR SPEC: 1.01 — SIGNIFICANT CHANGE UP (ref 1.01–1.02)
TRANSFERRIN SERPL-MCNC: 187 MG/DL — LOW (ref 200–360)
UROBILINOGEN FLD QL: NEGATIVE — SIGNIFICANT CHANGE UP
WBC # BLD: 5.35 K/UL — SIGNIFICANT CHANGE UP (ref 3.8–10.5)
WBC # BLD: 5.73 K/UL — SIGNIFICANT CHANGE UP (ref 3.8–10.5)
WBC # FLD AUTO: 5.35 K/UL — SIGNIFICANT CHANGE UP (ref 3.8–10.5)
WBC # FLD AUTO: 5.73 K/UL — SIGNIFICANT CHANGE UP (ref 3.8–10.5)

## 2019-04-25 RX ORDER — IRON SUCROSE 20 MG/ML
200 INJECTION, SOLUTION INTRAVENOUS EVERY 24 HOURS
Qty: 0 | Refills: 0 | Status: DISCONTINUED | OUTPATIENT
Start: 2019-04-25 | End: 2019-04-29

## 2019-04-25 RX ADMIN — PANTOPRAZOLE SODIUM 40 MILLIGRAM(S): 20 TABLET, DELAYED RELEASE ORAL at 17:44

## 2019-04-25 RX ADMIN — PANTOPRAZOLE SODIUM 40 MILLIGRAM(S): 20 TABLET, DELAYED RELEASE ORAL at 06:15

## 2019-04-25 RX ADMIN — IRON SUCROSE 110 MILLIGRAM(S): 20 INJECTION, SOLUTION INTRAVENOUS at 17:44

## 2019-04-25 RX ADMIN — ATORVASTATIN CALCIUM 10 MILLIGRAM(S): 80 TABLET, FILM COATED ORAL at 22:16

## 2019-04-25 RX ADMIN — Medication 100 MILLIGRAM(S): at 11:41

## 2019-04-25 RX ADMIN — AMPICILLIN SODIUM AND SULBACTAM SODIUM 100 GRAM(S): 250; 125 INJECTION, POWDER, FOR SUSPENSION INTRAMUSCULAR; INTRAVENOUS at 10:16

## 2019-04-25 NOTE — PROGRESS NOTE ADULT - ATTENDING COMMENTS
Patient seen and examined. Agree with plan above.  Note edited as necessary.  Pt with iron def anemia in the setting of GI bleed  Will start Venofer 200mg IV daily x 5 doses  s/p 2 units prbc O/N. Plan for 1 unit prbc today.   Will give Lasix 80mg IV x1 prn.   f/u [facility] NEPHROLOGY  Joe Barnes M.D.  Ari Lawton D.O.  Michaela Mai M.D.  Tami Arshad, MSN, ANP-C  [phone]    [address] [place], [place] [id number] Patient seen and examined. Agree with plan above.  Note edited as necessary.  Pt with iron def anemia in the setting of GI bleed  Will start Venofer 200mg IV daily x 5 doses  s/p 2 units prbc O/N. Plan for 1 unit prbc today.   Will give Lasix 80mg IV x1 prn if fluid overloaded  f/u [facility] NEPHROLOGY  Joe Barnes M.D.  Ari Lawton D.O.  Michaela Mai M.D.  Tami Arshad, MSN, ANP-C  [phone]    [address] [place], [place] [id number]

## 2019-04-25 NOTE — PROGRESS NOTE ADULT - PROBLEM SELECTOR PLAN 9
IMPROVE VTE Individual Risk Assessment          RISK                                                          Points  [  ] Previous VTE                                                3  [  ] Thrombophilia                                             2  [  ] Lower limb paralysis                                   2        (unable to hold up >15 seconds)    [  ] Current Cancer                                             2         (within 6 months)  [ x ] Immobilization > 24 hrs                              1  [  ] ICU/CCU stay > 24 hours                             1  [ x ] Age > 60                                                         1    IMPROVE VTE Score: 2    holding vte prophylaxis in setting of gi bleeding IMPROVE VTE Individual Risk Assessment          RISK                                                          Points  [  ] Previous VTE                                                3  [  ] Thrombophilia                                             2  [  ] Lower limb paralysis                                   2        (unable to hold up >15 seconds)    [  ] Current Cancer                                             2         (within 6 months)  [ x ] Immobilization > 24 hrs                              1  [  ] ICU/CCU stay > 24 hours                             1  [ x ] Age > 60                                                         1    IMPROVE VTE Score: 2    Holding VTE ppx in setting of GI bleeding

## 2019-04-25 NOTE — PROGRESS NOTE ADULT - SUBJECTIVE AND OBJECTIVE BOX
PGY1 Note discussed with Supervising Resident and Primary Attending.    Patient is a 89y old  Female who presents with a chief complaint of low hemoglobin, upper GI bleeding (24 Apr 2019 21:01)      INTERVAL HPI/OVERNIGHT EVENTS :  No acute overnight events. Patient seen and examined at bedside. Patient has no current complaints. Patient denies nausea, vomiting, diarrhea, chest pain, SOB, headache.  MEDICATIONS  (STANDING):  ampicillin/sulbactam  IVPB 1.5 Gram(s) IV Intermittent every 24 hours  atorvastatin 10 milliGRAM(s) Oral at bedtime  docusate sodium 100 milliGRAM(s) Oral daily  pantoprazole  Injectable 40 milliGRAM(s) IV Push every 12 hours  senna 2 Tablet(s) Oral at bedtime    MEDICATIONS  (PRN):      Allergies    No Known Allergies    Intolerances        REVIEW OF SYSTEMS :  * General: denies chills, fatigue  * Eyes: denies vision changes  * Respiratory: denies cough, SOB, wheezing  * Cardio: denies chest pain, palpitations  * GI: denies abd pain, nausea, vomiting, diarrhea  * : denies dysuria  * Neuro: denies headaches, numbness, weakness  * MSK: denies joint pain  * Skin: denies itching, rashes    Vital Signs Last 24 Hrs  T(C): 36.7 (25 Apr 2019 05:04), Max: 37.3 (24 Apr 2019 20:50)  T(F): 98.1 (25 Apr 2019 05:04), Max: 99.2 (24 Apr 2019 20:50)  HR: 71 (25 Apr 2019 05:04) (68 - 93)  BP: 124/85 (25 Apr 2019 05:04) (72/54 - 134/80)  BP(mean): 50 (25 Apr 2019 00:55) (50 - 50)  RR: 16 (25 Apr 2019 05:04) (16 - 19)  SpO2: 98% (25 Apr 2019 05:04) (94% - 100%)    PHYSICAL EXAM :  * General: no acute distress, well-nourished, well-developed  * HEENT: atraumatic, normocephalic; moist mucus membranes; supple neck; no JVD  * CN: EOMI, PERRL  * Respiratory: cta b/l; no wheezes, rales, rhonchi  * Cardio: RRR; no appreciable murmurs, rubs, gallops  * Abd: soft, nontender, nondistended, +BS  * Neuro: AAOx3; strength 5/5; sensation intact throughout  * Extremities: no clubbing, cyanosis, edema  * Skin: no rashes    LABS:                          7.3    5.35  )-----------( 186      ( 25 Apr 2019 07:23 )             23.0     04-25    139  |  112<H>  |  76<H>  ----------------------------<  87  4.2   |  19<L>  |  2.10<H>    Ca    7.8<L>      25 Apr 2019 07:23  Phos  3.4     04-25  Mg     1.9     04-25    TPro  6.3  /  Alb  2.9<L>  /  TBili  0.7  /  DBili  x   /  AST  33  /  ALT  24  /  AlkPhos  55  04-24    PT/INR - ( 24 Apr 2019 17:08 )   PT: 11.4 sec;   INR: 1.03 ratio         PTT - ( 24 Apr 2019 17:08 )  PTT:24.4 sec    CAPILLARY BLOOD GLUCOSE          RADIOLOGY & ADDITIONAL TESTS:   no new imaging    Imaging Personally Reviewed:    Consultant(s) Notes Reviewed: PGY1 Note discussed with Supervising Resident and Primary Attending.    Patient is a 89y old  Female who presents with a chief complaint of low hemoglobin, upper GI bleeding (24 Apr 2019 21:01)      INTERVAL HPI/OVERNIGHT EVENTS :  No acute overnight events. Patient seen and examined at bedside. Patient has no current complaints. Patient denies nausea, vomiting, diarrhea, chest pain, SOB, headache.  MEDICATIONS  (STANDING):  ampicillin/sulbactam  IVPB 1.5 Gram(s) IV Intermittent every 24 hours  atorvastatin 10 milliGRAM(s) Oral at bedtime  docusate sodium 100 milliGRAM(s) Oral daily  pantoprazole  Injectable 40 milliGRAM(s) IV Push every 12 hours  senna 2 Tablet(s) Oral at bedtime    MEDICATIONS  (PRN):      Allergies    No Known Allergies    Intolerances        REVIEW OF SYSTEMS :  * General: denies chills, fatigue  * Eyes: denies vision changes  * Respiratory: denies cough, SOB, wheezing  * Cardio: denies chest pain, palpitations  * GI: denies abd pain, nausea, vomiting, diarrhea  * : denies dysuria  * Neuro: denies headaches, numbness, weakness  * MSK: denies joint pain  * Skin: denies itching, rashes    Vital Signs Last 24 Hrs  T(C): 36.7 (25 Apr 2019 05:04), Max: 37.3 (24 Apr 2019 20:50)  T(F): 98.1 (25 Apr 2019 05:04), Max: 99.2 (24 Apr 2019 20:50)  HR: 71 (25 Apr 2019 05:04) (68 - 93)  BP: 124/85 (25 Apr 2019 05:04) (72/54 - 134/80)  BP(mean): 50 (25 Apr 2019 00:55) (50 - 50)  RR: 16 (25 Apr 2019 05:04) (16 - 19)  SpO2: 98% (25 Apr 2019 05:04) (94% - 100%)    PHYSICAL EXAM :  * General: no acute distress, well-nourished, well-developed  * HEENT: atraumatic, normocephalic; moist mucus membranes; supple neck; no JVD  * CN: EOMI, PERRL  * Respiratory: cta b/l; no wheezes, rales, rhonchi  * Cardio: RRR; +ROLLY  * Abd: soft, nontender, nondistended, +BS  * Neuro: AAOx3; strength 5/5; sensation intact throughout  * Extremities: trace edema +RLE erythema and warmth      LABS:                          7.3    5.35  )-----------( 186      ( 25 Apr 2019 07:23 )             23.0     04-25    139  |  112<H>  |  76<H>  ----------------------------<  87  4.2   |  19<L>  |  2.10<H>    Ca    7.8<L>      25 Apr 2019 07:23  Phos  3.4     04-25  Mg     1.9     04-25    TPro  6.3  /  Alb  2.9<L>  /  TBili  0.7  /  DBili  x   /  AST  33  /  ALT  24  /  AlkPhos  55  04-24    PT/INR - ( 24 Apr 2019 17:08 )   PT: 11.4 sec;   INR: 1.03 ratio         PTT - ( 24 Apr 2019 17:08 )  PTT:24.4 sec    CAPILLARY BLOOD GLUCOSE          RADIOLOGY & ADDITIONAL TESTS:   no new imaging    Imaging Personally Reviewed:    Consultant(s) Notes Reviewed: PGY1 Note discussed with Supervising Resident and Primary Attending.    Patient is a 89y old  Female who presents with a chief complaint of low hemoglobin, upper GI bleeding (24 Apr 2019 21:01)      INTERVAL HPI/OVERNIGHT EVENTS:  No acute overnight events. Patient seen and examined at bedside. Patient reports some RLE pain and tenderness. Patient denies nausea, vomiting, diarrhea, chest pain, SOB, headache. S/p 2PRBC, giving 1PRBC today and 1 dose venofer. Will repeat cbc in evening.    MEDICATIONS  (STANDING):  ampicillin/sulbactam  IVPB 1.5 Gram(s) IV Intermittent every 24 hours  atorvastatin 10 milliGRAM(s) Oral at bedtime  docusate sodium 100 milliGRAM(s) Oral daily  pantoprazole  Injectable 40 milliGRAM(s) IV Push every 12 hours  senna 2 Tablet(s) Oral at bedtime    MEDICATIONS  (PRN):      Allergies    No Known Allergies    Intolerances        REVIEW OF SYSTEMS:  * General: denies chills, fatigue  * Eyes: denies vision changes  * Respiratory: denies cough, SOB, wheezing  * Cardio: denies chest pain, palpitations  * GI: denies abd pain, nausea, vomiting, diarrhea  * : denies dysuria  * Neuro: denies headaches, numbness, weakness  * MSK: reports some mild RLE pain and swelling and tenderness; denies joint pain  * Skin: denies itching, rashes    Vital Signs Last 24 Hrs  T(C): 36.7 (25 Apr 2019 05:04), Max: 37.3 (24 Apr 2019 20:50)  T(F): 98.1 (25 Apr 2019 05:04), Max: 99.2 (24 Apr 2019 20:50)  HR: 71 (25 Apr 2019 05:04) (68 - 93)  BP: 124/85 (25 Apr 2019 05:04) (72/54 - 134/80)  BP(mean): 50 (25 Apr 2019 00:55) (50 - 50)  RR: 16 (25 Apr 2019 05:04) (16 - 19)  SpO2: 98% (25 Apr 2019 05:04) (94% - 100%)    PHYSICAL EXAM:  * General: no acute distress, well-nourished, well-developed  * HEENT: atraumatic, normocephalic; moist mucus membranes; supple neck; no JVD  * CN: EOMI, PERRL  * Respiratory: cta b/l; no wheezes, rales, rhonchi  * Cardio: RRR; +ROLLY  * Abd: soft, nontender, nondistended, +BS  * Neuro: AAOx3; strength 5/5; sensation intact throughout  * Extremities: trace edema of LLE; +RLE erythema and warmth with edema      LABS:                          7.3    5.35  )-----------( 186      ( 25 Apr 2019 07:23 )             23.0     04-25    139  |  112<H>  |  76<H>  ----------------------------<  87  4.2   |  19<L>  |  2.10<H>    Ca    7.8<L>      25 Apr 2019 07:23  Phos  3.4     04-25  Mg     1.9     04-25    TPro  6.3  /  Alb  2.9<L>  /  TBili  0.7  /  DBili  x   /  AST  33  /  ALT  24  /  AlkPhos  55  04-24    PT/INR - ( 24 Apr 2019 17:08 )   PT: 11.4 sec;   INR: 1.03 ratio         PTT - ( 24 Apr 2019 17:08 )  PTT:24.4 sec    CAPILLARY BLOOD GLUCOSE          RADIOLOGY & ADDITIONAL TESTS:   no new imaging    Consultant(s) Notes Reviewed: none

## 2019-04-25 NOTE — PROGRESS NOTE ADULT - ASSESSMENT
Patient admitted for likely upper GI bleeding, pt is guaiac positive on admission with no BRBPR, hemoglobin of 5.1 on admission; plan to transfuse 2U PRBC for now, f/u post-transfusion CBC, will continue to monitor CBC q6h, will keep pt NPO for now. Patient admitted for likely upper GI bleeding, pt is guaiac positive on admission with no BRBPR, hemoglobin of 5.1 on admission;

## 2019-04-25 NOTE — PROGRESS NOTE ADULT - PROBLEM SELECTOR PLAN 2
hemoglobin 5.1 on admission  plan to transfuse 2U PRBC  guaiac positive on admission, however no bright red blood per rectum as per patient  likely upper GI bleeding  will keep NPO for now  c/w protonix 40mg IV bid for now  f/u GI consult in AM  f/u CBC post transfusion hgb improving s/p 2units prbc tx. Plan for 1 unit prbc today.   guaiac positive on admission, however no bright red blood per rectum as per patient  likely upper GI bleeding  will keep NPO for now  c/w protonix 40mg IV bid for now  f/u GI consult   monitor CBC Hgb improving s/p 2PRBC  - Plan for 1PRBC + 1 dose venofer today  - Guaiac positive on admission, however no bright red blood per rectum as per patient  - No active bleeding since admission  - Likely upper GI bleeding  - Clears for now, keep NPO after midnight in case of EGD/colonoscopy tomorrow  - C/w protonix 40mg IV bid for now  - GI consult Dr Del Toro  - F/u post transfusion CBC

## 2019-04-25 NOTE — PROGRESS NOTE ADULT - PROBLEM SELECTOR PLAN 3
SHAUNA on CKD4 likely due to hypovolemia; possible ATN  will transfuse 2 U PRBC overnight  s/p 1L NS bolus  f/u creatinine in AM SHAUNA on CKD4 likely due to hypovolemia;   Renal function improving s/p prbc tx.   Strict I/Os. Avoid nephrotoxins/ NSAIDs/ RCA. Monitor BMP. SHAUNA on CKD4 likely due to hypovolemia  - Renal function improving s/p PRBC  - Strict I/Os  - Avoid nephrotoxins/NSAIDs/RCA  - Monitor BMP

## 2019-04-25 NOTE — PROGRESS NOTE ADULT - PROBLEM SELECTOR PLAN 6
holding antihypertensives due to hypotension in setting of GI bleeding  continue to monitor BP  re-start medications as appropriate Holding antihypertensives due to hypotension in setting of GI bleeding  - Continue to monitor BP, currently still on soft side  - Restart medications as appropriate

## 2019-04-25 NOTE — PROGRESS NOTE ADULT - PROBLEM SELECTOR PLAN 5
CKD Stage 4 . Avoid nephrotoxins/ RCA/ NSAIDs. Monitor BMP. CKD Stage 4  - Avoid nephrotoxins/ RCA/ NSAIDs  - Monitor BMP.

## 2019-04-25 NOTE — PROGRESS NOTE ADULT - PROBLEM SELECTOR PLAN 4
RLE with erythema, tender on palpation  will given Unasyn for now RLE with erythema, tender on palpation  c/w Unasyn RLE with erythema, tender on palpation, warm to touch  - Afebrile, WBC wnl  - C/w unasyn day 2

## 2019-04-25 NOTE — PROGRESS NOTE ADULT - PROBLEM SELECTOR PLAN 1
hemoglobin 5.1 on admission  plan to transfuse 2U PRBC  guaiac positive on admission, however no bright red blood per rectum as per patient  likely upper GI bleeding  will keep NPO for now  c/w protonix 40mg IV bid for now  f/u GI consult in AM - Dr. Yoder  f/u CBC post transfusion hemoglobin 5.1 on admission. s/p 2 units prbc with improvement in hgb to 7.3.  Plan for an additional 1 unit prbc tx.   guaiac positive on admission,   Will keep NPO for now  c/w protonix 40mg IV bid for now  f/u GI consult - Dr. Yoder Hgb 5.1 on admission  - S/p 2PRBC with improvement in hgb to 7.3  - Plan for an additional 1PRBC + 1 dose venofer  - Guaiac positive on admission,   - Will give clears, keep NPO after midnight in case of EGD/colonoscopy tomorrow  - C/w protonix 40mg IV bid for now  - GI consult Dr Del Toro

## 2019-04-26 LAB
ANION GAP SERPL CALC-SCNC: 7 MMOL/L — SIGNIFICANT CHANGE UP (ref 5–17)
APTT BLD: 28.8 SEC — SIGNIFICANT CHANGE UP (ref 27.5–36.3)
BUN SERPL-MCNC: 52 MG/DL — HIGH (ref 7–18)
CALCIUM SERPL-MCNC: 8.3 MG/DL — LOW (ref 8.4–10.5)
CHLORIDE SERPL-SCNC: 114 MMOL/L — HIGH (ref 96–108)
CO2 SERPL-SCNC: 20 MMOL/L — LOW (ref 22–31)
CREAT SERPL-MCNC: 1.62 MG/DL — HIGH (ref 0.5–1.3)
GLUCOSE SERPL-MCNC: 85 MG/DL — SIGNIFICANT CHANGE UP (ref 70–99)
HCT VFR BLD CALC: 27.3 % — LOW (ref 34.5–45)
HCT VFR BLD CALC: 29 % — LOW (ref 34.5–45)
HGB BLD-MCNC: 8.9 G/DL — LOW (ref 11.5–15.5)
HGB BLD-MCNC: 9.4 G/DL — LOW (ref 11.5–15.5)
INR BLD: 0.98 RATIO — SIGNIFICANT CHANGE UP (ref 0.88–1.16)
MCHC RBC-ENTMCNC: 30.9 PG — SIGNIFICANT CHANGE UP (ref 27–34)
MCHC RBC-ENTMCNC: 31 PG — SIGNIFICANT CHANGE UP (ref 27–34)
MCHC RBC-ENTMCNC: 32.4 GM/DL — SIGNIFICANT CHANGE UP (ref 32–36)
MCHC RBC-ENTMCNC: 32.6 GM/DL — SIGNIFICANT CHANGE UP (ref 32–36)
MCV RBC AUTO: 94.8 FL — SIGNIFICANT CHANGE UP (ref 80–100)
MCV RBC AUTO: 95.7 FL — SIGNIFICANT CHANGE UP (ref 80–100)
NRBC # BLD: 0 /100 WBCS — SIGNIFICANT CHANGE UP (ref 0–0)
NRBC # BLD: 0 /100 WBCS — SIGNIFICANT CHANGE UP (ref 0–0)
PLATELET # BLD AUTO: 214 K/UL — SIGNIFICANT CHANGE UP (ref 150–400)
PLATELET # BLD AUTO: 226 K/UL — SIGNIFICANT CHANGE UP (ref 150–400)
POTASSIUM SERPL-MCNC: 4.2 MMOL/L — SIGNIFICANT CHANGE UP (ref 3.5–5.3)
POTASSIUM SERPL-SCNC: 4.2 MMOL/L — SIGNIFICANT CHANGE UP (ref 3.5–5.3)
PROTHROM AB SERPL-ACNC: 10.9 SEC — SIGNIFICANT CHANGE UP (ref 10–12.9)
RBC # BLD: 2.88 M/UL — LOW (ref 3.8–5.2)
RBC # BLD: 3.03 M/UL — LOW (ref 3.8–5.2)
RBC # FLD: 18.2 % — HIGH (ref 10.3–14.5)
RBC # FLD: 18.2 % — HIGH (ref 10.3–14.5)
SODIUM SERPL-SCNC: 141 MMOL/L — SIGNIFICANT CHANGE UP (ref 135–145)
WBC # BLD: 4.55 K/UL — SIGNIFICANT CHANGE UP (ref 3.8–10.5)
WBC # BLD: 4.76 K/UL — SIGNIFICANT CHANGE UP (ref 3.8–10.5)
WBC # FLD AUTO: 4.55 K/UL — SIGNIFICANT CHANGE UP (ref 3.8–10.5)
WBC # FLD AUTO: 4.76 K/UL — SIGNIFICANT CHANGE UP (ref 3.8–10.5)

## 2019-04-26 PROCEDURE — 88312 SPECIAL STAINS GROUP 1: CPT | Mod: 26

## 2019-04-26 PROCEDURE — 99222 1ST HOSP IP/OBS MODERATE 55: CPT | Mod: 57,25

## 2019-04-26 PROCEDURE — 88305 TISSUE EXAM BY PATHOLOGIST: CPT | Mod: 26

## 2019-04-26 PROCEDURE — 43239 EGD BIOPSY SINGLE/MULTIPLE: CPT

## 2019-04-26 RX ORDER — CEFAZOLIN SODIUM 1 G
1000 VIAL (EA) INJECTION EVERY 12 HOURS
Qty: 0 | Refills: 0 | Status: DISCONTINUED | OUTPATIENT
Start: 2019-04-27 | End: 2019-04-29

## 2019-04-26 RX ORDER — CEFAZOLIN SODIUM 1 G
VIAL (EA) INJECTION
Qty: 0 | Refills: 0 | Status: DISCONTINUED | OUTPATIENT
Start: 2019-04-26 | End: 2019-04-29

## 2019-04-26 RX ORDER — INFLUENZA VIRUS VACCINE 15; 15; 15; 15 UG/.5ML; UG/.5ML; UG/.5ML; UG/.5ML
0.5 SUSPENSION INTRAMUSCULAR ONCE
Qty: 0 | Refills: 0 | Status: DISCONTINUED | OUTPATIENT
Start: 2019-04-26 | End: 2019-04-29

## 2019-04-26 RX ORDER — CEFAZOLIN SODIUM 1 G
1000 VIAL (EA) INJECTION ONCE
Qty: 0 | Refills: 0 | Status: COMPLETED | OUTPATIENT
Start: 2019-04-26 | End: 2019-04-26

## 2019-04-26 RX ADMIN — AMPICILLIN SODIUM AND SULBACTAM SODIUM 100 GRAM(S): 250; 125 INJECTION, POWDER, FOR SUSPENSION INTRAMUSCULAR; INTRAVENOUS at 10:42

## 2019-04-26 RX ADMIN — PANTOPRAZOLE SODIUM 40 MILLIGRAM(S): 20 TABLET, DELAYED RELEASE ORAL at 05:14

## 2019-04-26 RX ADMIN — ATORVASTATIN CALCIUM 10 MILLIGRAM(S): 80 TABLET, FILM COATED ORAL at 22:58

## 2019-04-26 RX ADMIN — PANTOPRAZOLE SODIUM 40 MILLIGRAM(S): 20 TABLET, DELAYED RELEASE ORAL at 18:19

## 2019-04-26 RX ADMIN — SENNA PLUS 2 TABLET(S): 8.6 TABLET ORAL at 22:58

## 2019-04-26 RX ADMIN — Medication 100 MILLIGRAM(S): at 18:57

## 2019-04-26 RX ADMIN — IRON SUCROSE 110 MILLIGRAM(S): 20 INJECTION, SOLUTION INTRAVENOUS at 18:19

## 2019-04-26 NOTE — DIETITIAN INITIAL EVALUATION ADULT. - PERTINENT MEDS FT
reviewed   MEDICATIONS  (STANDING):  ampicillin/sulbactam  IVPB 1.5 Gram(s) IV Intermittent every 24 hours  atorvastatin 10 milliGRAM(s) Oral at bedtime  docusate sodium 100 milliGRAM(s) Oral daily  influenza   Vaccine 0.5 milliLiter(s) IntraMuscular once  iron sucrose IVPB 200 milliGRAM(s) IV Intermittent every 24 hours  pantoprazole  Injectable 40 milliGRAM(s) IV Push every 12 hours  senna 2 Tablet(s) Oral at bedtime    MEDICATIONS  (PRN):

## 2019-04-26 NOTE — ADVANCED PRACTICE NURSE CONSULT - RECOMMEDATIONS
-Clean all wounds with normal saline and apply skin prep to the surrounding skin  -Apply Zinc Oxide Moisture Barrier Cream to the Bilateral Gluteal areas bi.d. PRN  -Elevate/float the patients heels using heel protectors and reposition the patient Q 2hrs using wedges or pillows

## 2019-04-26 NOTE — CONSULT NOTE ADULT - ASSESSMENT
A/P: Profound anemia of unclear etiology.   The patient has a history of anemia. The etiology of the anemia is unknown. I recommend following to hemoglobin/hematocrit level q. shift. I recommend a type and screen any event that the patient requires a blood transfusion. The initial hemoglobin/hematocrit level was 5.1/16.1, respectively.  I recommend a trial of pantoprazole 40 mg once a day for the symptoms.  I recommend an upper endoscopy and possible colonoscopy to assess the anemia. The patient was told of the risk and benefits of the procedure the patient was told of the risks of perforation, emergency surgery, bleeding, infections and missed lesions. The patient agreed and signed a consent. The patient is to be kept n.p.o. after midnight. Will proceed with the upper endoscopy.  If the upper endoscopy is unremarkable, patient will require a colonoscopy.  Continue present care.  Will follow the patient closely.
Right leg Cellulitis    plan - dc unasyn  start Kefzol 1gm iv q12hrs

## 2019-04-26 NOTE — PROGRESS NOTE ADULT - PROBLEM SELECTOR PLAN 3
SHAUNA on CKD4 likely due to hypovolemia  - Renal function improving s/p PRBCs  - Strict I/Os  - Avoid nephrotoxins/NSAIDs/RCA  - Monitor BMP

## 2019-04-26 NOTE — PHYSICAL THERAPY INITIAL EVALUATION ADULT - PERTINENT HX OF CURRENT PROBLEM, REHAB EVAL
Pt. admitted from MD office due to abnormal lab finding; pt.'s son also states patient has been feeling increasingly tired.

## 2019-04-26 NOTE — ADVANCED PRACTICE NURSE CONSULT - ASSESSMENT
This is a 89yr old female patient admitted for Anemia, presenting with the following:  -There is a healed wound to the L. Gluteus  -There is a Stage 1 Pressure Injury to the L. Gluteus and Bilateral Heels, as evident by non-blanchable erythema

## 2019-04-26 NOTE — PROGRESS NOTE ADULT - PROBLEM SELECTOR PLAN 6
Holding antihypertensives due to hypotension in setting of GI bleeding  - Continue to monitor BP, currently still on soft side  - Restart medications as appropriate

## 2019-04-26 NOTE — CONSULT NOTE ADULT - SUBJECTIVE AND OBJECTIVE BOX
The patient is a 89 year-old female with a past medical history significant for hypertension, chronic kidney disease, arthritis, hyperlipidemia who was admitted to [facility] [place] on April 24, 2019 because of an abnormal lab finding with a hemoglobin of 6.3 g/dL.  I was asked to render an opinion for consultation.  The patient complains that she is feeling tired.  The patient complains of constipation.  The patient denies any abdominal pain, abdominal gas and bloating, nausea, vomiting, gastroesophageal reflux disease, dysphagia, atypical chest pain, shortness of breath, palpitations, diarrhea, bright red blood per rectum, melena, hematemesis, weight loss, anorexia, fever or chills.  The patient admits to having a prior upper endoscopy and colonoscopy performed approximately 5 years ago.  According to the patient a colonoscopy and upper endoscopy were unremarkable.   PMH: hypertension, chronic kidney disease, arthritis, hyperlipidemia  PSH:   Allergies: NKDA  Social Hx: (-) smoking, (-) ETOH, (-) IVDA  Family Hx. noncontributory  Review of Symptoms:   	  	NORMAL	ABNORMAL	  Constitutional	?	?weight loss	?anorexia	?fever	?weakness	?sweats	  Eyes	?	?blurred vision	?diplopia	?eyepain	?red eye		  ENT	?	?hearing loss	?tinnitus	?vertigo	?tongue burning	?hoarseness	  CVS	?	?chest pain	?palpitations	?syncope	?dizziness		  Respir	?	?cough	?breathless	?wheezing	?sputum		  GI	?	?nausea	?heartburn	?dysphagia	?abd distension		  	?	?dysuria	?polyuria	?hematuria	?nocturia	?impotence	  Musculo	?	?joint pain	?muscle pain	?joint stiffness	?joint swelling		  Integument	?	?rash	?lesion	?photosensitivity			  Neurol	?	?headache	?dizziness	?seizure	?syncope		  Psychiatry	?	?depression	?suicidal	?anxiety disorder	?sleep disorder		  Endocrin	?	?heat intol	?goiter	?irrad neck	?polyuria		  Hematol	?	?anemia	?spont bleed	?post-op bleeding	?LN swelling		  Allergy	?	?seasonal allergies	?rhin	?itchy eyes			  	      Vitals: Temp: 98.1, BP: 124/85, HR: 71, RR: 16  Physical Examination:   General: well developed, well-nourished white female in no acute distress  Eyes: No icteric sclera, conjunctiva clear.  Ears/Nose/Throat: Oropharynx not erythematous, no enlarged tonsils, nose not congested, ears unremarkable  Head: normal cephalic, nontraumatic  Neck: Neck supple, no masses, thyroid not palpable, no JVD  Chest: normal appearance, normal symmetrical,  Respiratory: clear breath sounds equal bilaterally no wheezing no rales or rhonchi   Cardiovascular: S1-S2 audible, no murmur, no rubs or gallops, no palpable masses  Abdomen: (+) BS, soft, nontender, no rebound or guarding or masses noted. No hepatosplenomegaly  Back: (-) CVAT, no spinal tenderness  Rectal: guaiac (+) no masses  Extremities: full range of motion in all extremities, (-) c, (-) c, (-) e  Musculoskeletal: Poor motor strength, poor range of motion, normal appearing lower extremities  Neuro: A+O x 3, no ataxia,  Psych: good affect, not depressed, no anxiety  Skin: normal, no jaundice  Complete Blood Count (04.25.19 @ 17:11)    Nucleated RBC: 0 /100 WBCs    WBC Count: 5.73 K/uL    RBC Count: 3.00 M/uL    Hemoglobin: 9.2 g/dL    Hematocrit: 29.3 %    Mean Cell Volume: 97.7 fl    Mean Cell Hemoglobin: 30.7 pg    Mean Cell Hemoglobin Conc: 31.4 gm/dL    Red Cell Distrib Width: 17.1 %    Platelet Count - Automated: 195 K/uL    Basic Metabolic Panel in AM (04.25.19 @ 07:23)    Sodium, Serum: 139 mmol/L    Potassium, Serum: 4.2: Results verified. mmol/L    Chloride, Serum: 112 mmol/L    Carbon Dioxide, Serum: 19 mmol/L    Anion Gap, Serum: 8 mmol/L    Blood Urea Nitrogen, Serum: 76 mg/dL    Creatinine, Serum: 2.10 mg/dL    Glucose, Serum: 87 mg/dL    Calcium, Total Serum: 7.8 mg/dL    eGFR if Non : 20: Interpretative comment  The units for eGFR are mL/min/1.73M2 (normalized body surface area). The  eGFR is calculated from a serum creatinine using the CKD-EPI equation.  Other variables required for calculation are race, age and sex. Among  patients with chronic kidney disease (CKD), the eGFR is useful in  determining the stage of disease according to KDOQI CKD classification.  All eGFR results are reported numerically with the following  interpretation.          GFR                    With                 Without     (ml/min/1.73 m2)    Kidney Damage       Kidney Damage        >= 90                    Stage 1                     Normal        60-89                    Stage 2                     Decreased GFR        30-59     Stage 3                     Stage 3        15-29                    Stage 4                     Stage 4        < 15                      Stage 5                     Stage 5  Each stage of CKD assumes that the associated GFR level has been in  effect for at least 3 months. Determination of stages one and two (with  eGFR > 59 ml/min/m2) requires estimation of kidney damage for at least 3  months as defined by structural or functional abnormalities.  Limitations: All estimates of GFR will be less accurate for patients at  extremes of muscle mass (including but not limited to frail elderly,  critically ill, or cancer patients), those with unusual diets, and those  with conditions associated with reduced secretion or extrarenal  elimination of creatinine. The eGFR equation is not recommended for use  in patients with unstable creatinine levels. mL/min/1.73M2    eGFR if African American: 23 mL/min/1.73M2    Blood Typing (ABO + Rho D) (04.24.19 @ 17:20)    ABO RH Interpretation: A POS    Occult Blood, Feces (04.24.19 @ 16:00)    Occult Blood, Feces: Positive  EXAM:  XR CHEST PORTABLE IMMED 1V                            PROCEDURE DATE:  04/24/2019          INTERPRETATION:  INDICATION: Shortness of breath, anemia.    PRIORS: 3/16/2019    VIEWS: Portable AP radiography of the chest performed.    FINDINGS: Heart size appears within normal limits. No hilar or superior   mediastinal abnormalities are identified. There is no evidence for focal   infiltrate, lobar consolidation or pulmonary edema. No pleural effusion   or pneumothorax is demonstrated. No mediastinal shift is noted. Chronic   appearing remodeling deformity of the bilateral humeral heads with   degenerative change noted. Degenerative changes of the thoracic spine   also evident.    IMPRESSION: No evidence for focal infiltrate or lobar consolidation.                 LUBNA PIKE   This document has been electronically signed. Apr 24 2019  5:38PM

## 2019-04-26 NOTE — CONSULT NOTE ADULT - SUBJECTIVE AND OBJECTIVE BOX
HPI:  89 y/o F with a PMHx of HTN, Arthritis, CKD, HLD and no significant PSHx presents to ED due to a hemoglobin lab finding of 6.3 as outpatient. Pt endorses increased tiredness, has been sleeping more than usual as per son, however denies any other complaints including shortness of breath, abdominal pain, nausea, vomiting. Pt notes diarrhea couple of days ago, however states this was after taking a lot of prune juice after constipation, denies seeing any kaity blood in stool. All other ROS negative. Pt notes she had endoscopy/colonoscopy 5 years ago, results of which were normal.     In ED, pt found to be guaiac positive and hypotensive to 96/60 with HR of 70s. Pt was given 1L NS bolus with appropriate rise of blood pressure to systolic . (2019 21:01)      PAST MEDICAL & SURGICAL HISTORY:  Arthritis  HLD (hyperlipidemia)  Chronic kidney disease (CKD)  HTN (hypertension)  H/O local excision of skin lesion      No Known Allergies      Meds:  ampicillin/sulbactam  IVPB 1.5 Gram(s) IV Intermittent every 24 hours  atorvastatin 10 milliGRAM(s) Oral at bedtime  docusate sodium 100 milliGRAM(s) Oral daily  influenza   Vaccine 0.5 milliLiter(s) IntraMuscular once  iron sucrose IVPB 200 milliGRAM(s) IV Intermittent every 24 hours  pantoprazole  Injectable 40 milliGRAM(s) IV Push every 12 hours  senna 2 Tablet(s) Oral at bedtime      SOCIAL HISTORY:  Smoker:  YES / NO        PACK YEARS:                         WHEN QUIT?  ETOH use:  YES / NO               FREQUENCY / QUANTITY:  [place] Drug use:  YES / NO  Occupation:  Assisted device use (Cane / Walker):  Live with:    FAMILY HISTORY:  No pertinent family history in first degree relatives      VITALS:  Vital Signs Last 24 Hrs  T(C): 36.9 (2019 14:56), Max: 36.9 (2019 14:56)  T(F): 98.4 (2019 14:56), Max: 98.4 (2019 14:56)  HR: 72 (2019 15:38) (70 - 75)  BP: 100/40 (2019 15:38) (100/40 - 122/55)  BP(mean): --  RR: 18 (2019 14:56) (18 - 18)  SpO2: 98% (2019 15:38) (96% - 98%)    LABS/DIAGNOSTIC TESTS:                          9.4    4.76  )-----------( 226      ( 2019 15:49 )             29.0     WBC Count: 4.76 K/uL ( @ 15:49)  WBC Count: 4.55 K/uL ( @ 07:07)  WBC Count: 5.73 K/uL ( @ 17:11)  WBC Count: 5.35 K/uL ( @ 07:23)  WBC Count: 7.63 K/uL ( @ 17:08)          141  |  114<H>  |  52<H>  ----------------------------<  85  4.2   |  20<L>  |  1.62<H>    Ca    8.3<L>      2019 07:07  Phos  3.4       Mg     1.9             Urinalysis Basic - ( 2019 16:46 )    Color: Yellow / Appearance: Slightly Turbid / S.010 / pH: x  Gluc: x / Ketone: Negative  / Bili: Negative / Urobili: Negative   Blood: x / Protein: 15 / Nitrite: Negative   Leuk Esterase: Negative / RBC: 0-2 /HPF / WBC 3-5 /HPF   Sq Epi: x / Non Sq Epi: Few /HPF / Bacteria: Few /HPF            PT/INR - ( 2019 07:07 )   PT: 10.9 sec;   INR: 0.98 ratio         PTT - ( 2019 07:07 )  PTT:28.8 sec    LACTATE:    ABG -     CULTURES:   .Blood  -16 @ 21:45   No growth at 5 days.  --  --          RADIOLOGY: < from: Xray Chest 1 View-PORTABLE IMMEDIATE (19 @ 17:26) >  EXAM:  XR CHEST PORTABLE IMMED 1V                            PROCEDURE DATE:  2019          INTERPRETATION:  INDICATION: Shortness of breath, anemia.    PRIORS: 3/16/2019    VIEWS: Portable AP radiography of the chest performed.    FINDINGS: Heart size appears within normal limits. No hilar or superior   mediastinal abnormalities are identified. There is no evidence for focal   infiltrate, lobar consolidation or pulmonary edema. No pleural effusion   or pneumothorax is demonstrated. No mediastinal shift is noted. Chronic   appearing remodeling deformity of the bilateral humeral heads with   degenerative change noted. Degenerative changes of the thoracic spine   also evident.    IMPRESSION: No evidence for focal infiltrate or lobar consolidation.           < end of copied text >        ROS  [  ] UNABLE TO ELICIT HPI:  89 y/o F with a PMHx of HTN, Arthritis, CKD, HLD and no significant PSHx presents to ED due to a hemoglobin lab finding of 6.3 as outpatient. Pt endorses increased tiredness, has been sleeping more than usual as per son, however denies any other complaints including shortness of breath, abdominal pain, nausea, vomiting. Pt notes diarrhea couple of days ago, however states this was after taking a lot of prune juice after constipation, denies seeing any kaity blood in stool. All other ROS negative. Pt notes she had endoscopy/colonoscopy 5 years ago, results of which were normal.     History as above, asked to eval pt as she was found to have a right leg cellulitis. she has mild pain and swelling of her right leg but no fevers or chills at this time.      PAST MEDICAL & SURGICAL HISTORY:  Arthritis  HLD (hyperlipidemia)  Chronic kidney disease (CKD)  HTN (hypertension)  H/O local excision of skin lesion      No Known Allergies      Meds:  ampicillin/sulbactam  IVPB 1.5 Gram(s) IV Intermittent every 24 hours  atorvastatin 10 milliGRAM(s) Oral at bedtime  docusate sodium 100 milliGRAM(s) Oral daily  influenza   Vaccine 0.5 milliLiter(s) IntraMuscular once  iron sucrose IVPB 200 milliGRAM(s) IV Intermittent every 24 hours  pantoprazole  Injectable 40 milliGRAM(s) IV Push every 12 hours  senna 2 Tablet(s) Oral at bedtime      SOCIAL HISTORY:  Smoker:  no  ETOH use:  no    FAMILY HISTORY:  No pertinent family history in first degree relatives      VITALS:  Vital Signs Last 24 Hrs  T(C): 36.9 (2019 14:56), Max: 36.9 (2019 14:56)  T(F): 98.4 (2019 14:56), Max: 98.4 (2019 14:56)  HR: 72 (2019 15:38) (70 - 75)  BP: 100/40 (2019 15:38) (100/40 - 122/55)  BP(mean): --  RR: 18 (2019 14:56) (18 - 18)  SpO2: 98% (2019 15:38) (96% - 98%)    LABS/DIAGNOSTIC TESTS:                          9.4    4.76  )-----------( 226      ( 2019 15:49 )             29.0     WBC Count: 4.76 K/uL ( @ 15:49)  WBC Count: 4.55 K/uL ( @ 07:07)  WBC Count: 5.73 K/uL ( @ 17:11)  WBC Count: 5.35 K/uL ( @ 07:23)  WBC Count: 7.63 K/uL ( @ 17:08)          141  |  114<H>  |  52<H>  ----------------------------<  85  4.2   |  20<L>  |  1.62<H>    Ca    8.3<L>      2019 07:07  Phos  3.4       Mg     1.9             Urinalysis Basic - ( 2019 16:46 )    Color: Yellow / Appearance: Slightly Turbid / S.010 / pH: x  Gluc: x / Ketone: Negative  / Bili: Negative / Urobili: Negative   Blood: x / Protein: 15 / Nitrite: Negative   Leuk Esterase: Negative / RBC: 0-2 /HPF / WBC 3-5 /HPF   Sq Epi: x / Non Sq Epi: Few /HPF / Bacteria: Few /HPF            PT/INR - ( 2019 07:07 )   PT: 10.9 sec;   INR: 0.98 ratio         PTT - ( 2019 07:07 )  PTT:28.8 sec    LACTATE:    ABG -     CULTURES:   .Blood   @ 21:45   No growth at 5 days.  --  --          RADIOLOGY: < from: Xray Chest 1 View-PORTABLE IMMEDIATE (19 @ 17:26) >  EXAM:  XR CHEST PORTABLE IMMED 1V                            PROCEDURE DATE:  2019          INTERPRETATION:  INDICATION: Shortness of breath, anemia.    PRIORS: 3/16/2019    VIEWS: Portable AP radiography of the chest performed.    FINDINGS: Heart size appears within normal limits. No hilar or superior   mediastinal abnormalities are identified. There is no evidence for focal   infiltrate, lobar consolidation or pulmonary edema. No pleural effusion   or pneumothorax is demonstrated. No mediastinal shift is noted. Chronic   appearing remodeling deformity of the bilateral humeral heads with   degenerative change noted. Degenerative changes of the thoracic spine   also evident.    IMPRESSION: No evidence for focal infiltrate or lobar consolidation.           < end of copied text >        ROS  [  ] UNABLE TO ELICIT

## 2019-04-26 NOTE — PROGRESS NOTE ADULT - SUBJECTIVE AND OBJECTIVE BOX
SUBJECTIVE:   Calvin Mckay is a 10 year old male who presents to clinic today with mother because of:    Chief Complaint   Patient presents with     Recheck Medication        HPI  ADHD Follow-Up    Date of last ADHD office visit: 12/2017  Status since last visit: Stable  Taking controlled (daily) medications as prescribed: Yes                       Parent/Patient Concerns with Medications: None  ADHD Medication     Amphetamines Disp Start End    amphetamine-dextroamphetamine (ADDERALL XR) 15 MG per 24 hr capsule 30 capsule 10/1/2018     Sig - Route: Take 1 capsule (15 mg) by mouth daily - Oral    Class: Local [employer]    amphetamine-dextroamphetamine (ADDERALL XR) 15 MG per 24 hr capsule 30 capsule 8/7/2018     Sig - Route: Take 1 capsule (15 mg) by mouth daily - Oral    Class: [employer]          School:  Name of  : [employer]   Grade: 5th   School Concerns/Teacher Feedback: Stable and nothing but good reports.  School services/Modifications: none  Homework: None  Grades: Stable    Sleep: no problems  Home/Family Concerns: Stable  Peer Concerns: Stable    Co-Morbid Diagnosis: None    Currently in counseling: No        Medication Benefits:   Controlled symptoms: Attention span, Distractability, Finishing tasks, Frustration tolerance, Accepting limits, Peer relations and School failure  Uncontrolled Symptoms: None    Medication side effects:  Side effects noted: keeping close eye on weight,   Denies: weight loss and insomnia          ROS  Constitutional, eye, ENT, skin, respiratory, cardiac, and GI are normal except as otherwise noted.    PROBLEM LIST  Patient Active Problem List    Diagnosis Date Noted     Mild intermittent reactive airway disease 09/29/2016     Priority: Medium     Eczema 02/08/2015     Priority: Medium     Attention deficit hyperactivity disorder (ADHD) 11/28/2014     Priority: Medium     Started trial of Adderall XR 5mg daily  11/28/2014  Doing well but wearing off in pm.  Will try 2 5mg  PGY1 Note discussed with Supervising Resident and Primary Attending.    Patient is a 89y old  Female who presents with a chief complaint of low hemoglobin, upper GI bleeding (2019 01:36)      INTERVAL HPI/OVERNIGHT EVENTS:  No acute overnight events. Patient seen and examined at bedside. Patient has no current complaints. Patient denies nausea, vomiting, diarrhea, chest pain, SOB, headache. EGD today.    MEDICATIONS  (STANDING):  ampicillin/sulbactam  IVPB 1.5 Gram(s) IV Intermittent every 24 hours  atorvastatin 10 milliGRAM(s) Oral at bedtime  docusate sodium 100 milliGRAM(s) Oral daily  influenza   Vaccine 0.5 milliLiter(s) IntraMuscular once  iron sucrose IVPB 200 milliGRAM(s) IV Intermittent every 24 hours  pantoprazole  Injectable 40 milliGRAM(s) IV Push every 12 hours  senna 2 Tablet(s) Oral at bedtime    MEDICATIONS  (PRN):      Allergies    No Known Allergies    Intolerances        REVIEW OF SYSTEMS:  * General: denies chills, fatigue  * Eyes: denies vision changes  * Respiratory: denies cough, SOB, wheezing  * Cardio: denies chest pain, palpitations  * GI: denies abd pain, nausea, vomiting, diarrhea  * : denies dysuria  * Neuro: denies headaches, numbness, weakness  * MSK: denies joint pain  * Skin: denies itching, rashes    Vital Signs Last 24 Hrs  T(C): 36.7 (2019 11:00), Max: 36.7 (2019 14:58)  T(F): 98.1 (2019 11:00), Max: 98.1 (2019 14:58)  HR: 75 (2019 11:00) (67 - 75)  BP: 120/50 (2019 11:00) (106/45 - 122/47)  BP(mean): --  RR: 18 (2019 11:00) (18 - 18)  SpO2: 97% (2019 11:00) (96% - 99%)    PHYSICAL EXAM:  * General: no acute distress, well-nourished, well-developed  * HEENT: atraumatic, normocephalic; moist mucus membranes; supple neck; no JVD  * CN: EOMI, PERRL  * Respiratory: cta b/l; no wheezes, rales, rhonchi  * Cardio: RRR; no appreciable murmurs, rubs, gallops  * Abd: soft, nontender, nondistended, +BS  * Neuro: AAOx3; strength 5/5; sensation intact throughout  * Extremities: no clubbing, cyanosis; erythema, warmth, mild edema of RLE  * Skin: no rashes    LABS:                          8.9    4.55  )-----------( 214      ( 2019 07:07 )             27.3     04-    141  |  114<H>  |  52<H>  ----------------------------<  85  4.2   |  20<L>  |  1.62<H>    Ca    8.3<L>      2019 07:07  Phos  3.4     04-25  Mg     1.9     04-25    TPro  6.3  /  Alb  2.9<L>  /  TBili  0.7  /  DBili  x   /  AST  33  /  ALT  24  /  AlkPhos  55  04-24    PT/INR - ( 2019 07:07 )   PT: 10.9 sec;   INR: 0.98 ratio         PTT - ( 2019 07:07 )  PTT:28.8 sec  Urinalysis Basic - ( 2019 16:46 )    Color: Yellow / Appearance: Slightly Turbid / S.010 / pH: x  Gluc: x / Ketone: Negative  / Bili: Negative / Urobili: Negative   Blood: x / Protein: 15 / Nitrite: Negative   Leuk Esterase: Negative / RBC: 0-2 /HPF / WBC 3-5 /HPF   Sq Epi: x / Non Sq Epi: Few /HPF / Bacteria: Few /HPF      CAPILLARY BLOOD GLUCOSE          RADIOLOGY & ADDITIONAL TESTS:   no new imaging    Consultant(s) Notes Reviewed: yes tabs q am.  If not tolerated may continue at Adderall XR 5mg daily or consider adding small non slow release pm dose.   3/6/2015  Tried above.  Felt Adderall XR 5mg working better.  Will continue. Follow up 3 mo    7/10/2015  School year ended extremely well.  Family using meds during week this summer for .  Plan to continue into new school year.  Family may call for 3 refills.  Recheck in October 2015, sooner for concerns  Problem list name updated by automated process. Provider to review    10/7/2015:  Patient doing well with increase from 5 to 10 mg of Adderall XR.  Confirmed this information with father.  Will give script today and family may call for one additional script.  Will see patient in office in December 2015 1/6/2016:  In for recheck ADHD. Doing great.  Family does NOT need refill today, have enough for next month.  May call for 4 additional refills.  Recheck near end of school year    7/1/2016: doing great; may call for 5 additional refills and then be seen in clinic    2/27/2017:  Had trouble when change to generic formulation of Adderall XR.  Will ask for Adderall non generic from this point.  Will see in 6 month.    12/18/2017:  Doing great.  Will plan another visit in 6 months.  Refill until that time          MEDICATIONS  Current Outpatient Prescriptions   Medication Sig Dispense Refill     albuterol (2.5 MG/3ML) 0.083% neb solution Take 1 vial (2.5 mg) by nebulization every 6 hours as needed for shortness of breath / dyspnea or wheezing 50 vial 1     amphetamine-dextroamphetamine (ADDERALL XR) 15 MG per 24 hr capsule Take 1 capsule (15 mg) by mouth daily 30 capsule 0     amphetamine-dextroamphetamine (ADDERALL XR) 15 MG per 24 hr capsule Take 1 capsule (15 mg) by mouth daily 30 capsule 0     budesonide (PULMICORT) 0.25 MG/2ML nebulizer solution Take 2 mLs (0.25 mg) by nebulization 2 times daily as needed 1 Box 0     EPINEPHrine (EPIPEN JR) 0.15 MG/0.3ML injection 2-pack Inject 0.3 mLs  "(0.15 mg) into the muscle as needed for anaphylaxis 0.6 mL 1     Multiple Vitamins-Minerals (MULTIVITAL) CHEW Take  by mouth.       triamcinolone (KENALOG) 0.1 % ointment Apply topically 2 times daily 80 g 3      ALLERGIES  Allergies   Allergen Reactions     Nkda [No Known Drug Allergies]        Reviewed and updated as needed this visit by clinical staff  Tobacco  Allergies  Meds  Soc Hx        Reviewed and updated as needed this visit by Provider       OBJECTIVE:     BP 99/64  Pulse 82  Temp 98.1  F (36.7  C) (Oral)  Ht 4' 7.5\" (1.41 m)  Wt 66 lb 6 oz (30.1 kg)  SpO2 100%  BMI 15.15 kg/m2  47 %ile based on CDC 2-20 Years stature-for-age data using vitals from 11/7/2018.  23 %ile based on CDC 2-20 Years weight-for-age data using vitals from 11/7/2018.  15 %ile based on CDC 2-20 Years BMI-for-age data using vitals from 11/7/2018.  Blood pressure percentiles are 42.5 % systolic and 55.6 % diastolic based on the August 2017 AAP Clinical Practice Guideline.    GENERAL:  Alert and interactive., EYES:  Normal extra-ocular movements.  PERRLA, LUNGS:  Clear, HEART:  Normal rate and rhythm.  Normal S1 and S2.  No murmurs., ABDOMEN:  Soft, non-tender, no organomegaly., NEURO:  No tics or tremor.  Normal tone and strength. Normal gait and balance.  and dry skin ( discussed care plan )    DIAGNOSTICS: None    ASSESSMENT/PLAN:   ADHD--combined type    ADHD Medications:   No change in medication. Continue on current Rx.     stable on his treatment plan.    Goal for measurement at Follow-up (specific criteria): Distractibility, Attention Span and Following Directions      Time: I spent 20 minutes with patient; greater than one half devoted to coordination of care for diagnosis and plan above.           FOLLOW UP: 6 months    Lana Correa MD     "

## 2019-04-26 NOTE — CHART NOTE - NSCHARTNOTEFT_GEN_A_CORE
This is a 89 y/o female with PMH of HTN, arthritis, CKD, HLD who has deconditioning and fatigue with minimal ambulation. Walker is no longer sufficient. This patient would benefit from wheelchair.

## 2019-04-26 NOTE — PHYSICAL THERAPY INITIAL EVALUATION ADULT - RANGE OF MOTION EXAMINATION, REHAB EVAL
Left LE ROM was WFL (within functional limits)/Left UE ROM was WFL (within functional limits)/Right UE ROM was WFL (within functional limits)/Right LE ROM was WFL (within functional limits)

## 2019-04-26 NOTE — PROGRESS NOTE ADULT - ASSESSMENT
Patient admitted for likely upper GI bleeding, pt is guaiac positive on admission with no BRBPR, hemoglobin of 5.1 on admission;

## 2019-04-26 NOTE — PROGRESS NOTE ADULT - PROBLEM SELECTOR PLAN 1
Hgb 5.1 on admission  - S/p 3PRBC and venofer with improvement in hgb to 8.9  - Guaiac positive on admission,   - NPO past midnight last night  - EGD today  - C/w protonix 40mg IV bid for now  - F/u EGD report  - GI Dr Del Toro

## 2019-04-26 NOTE — DIETITIAN INITIAL EVALUATION ADULT. - OTHER INFO
Nutrition assessment for Clear Liquid/NPO status x 2 to 3d; lives home PTA; pressure ulcer stage I, Wound Care consult noted; NPO for endoscopy today

## 2019-04-26 NOTE — PROGRESS NOTE ADULT - ATTENDING COMMENTS
Patient seen and examined. Agree with plan above.  Note edited as necessary.  hgb improved s/p 3 units. Pt for EGD today. f/u GI Dr. Del Toro.   +RLE cellultis- f/u ID  [facility] NEPHROLOGY  Joe Barnes M.D.  Ari Lawton D.O.  Michaela Mai M.D.  Tami Arshad, MSN, ANP-C  [phone]    [address] [place], [place] [id number]

## 2019-04-26 NOTE — PHYSICAL THERAPY INITIAL EVALUATION ADULT - GENERAL OBSERVATIONS, REHAB EVAL
Pt. received in supine; AxOX3; son present Pt. received in supine; AxOX3; son present; pt. presents w/ hematoma on (L) lower leg; (R) lower leg warm to touch.

## 2019-04-26 NOTE — CONSULT NOTE ADULT - RS GEN PE MLT RESP DETAILS PC
clear to auscultation bilaterally/no rales/no wheezes/breath sounds equal/good air movement/no rhonchi

## 2019-04-26 NOTE — PROGRESS NOTE ADULT - PROBLEM SELECTOR PLAN 2
Hgb improving s/p 3PRBC + venofer  - No longer symptomatic  - Guaiac positive on admission, however no bright red blood per rectum as per patient  - No active bleeding since admission  - Likely upper GI bleeding  - Clears for now, keep NPO after midnight in case of EGD/colonoscopy tomorrow  - C/w protonix 40mg IV bid for now  - GI Dr Del Toro

## 2019-04-26 NOTE — PROGRESS NOTE ADULT - PROBLEM SELECTOR PLAN 9
IMPROVE VTE Individual Risk Assessment          RISK                                                          Points  [  ] Previous VTE                                                3  [  ] Thrombophilia                                             2  [  ] Lower limb paralysis                                   2        (unable to hold up >15 seconds)    [  ] Current Cancer                                             2         (within 6 months)  [ x ] Immobilization > 24 hrs                              1  [  ] ICU/CCU stay > 24 hours                             1  [ x ] Age > 60                                                         1    IMPROVE VTE Score: 2    Holding VTE ppx in setting of GI bleeding

## 2019-04-26 NOTE — CONSULT NOTE ADULT - SKIN COMMENTS
erythema, mild warmth , swelling and mild tenderness of her right leg with extension of erythema and warmth to lower thigh

## 2019-04-27 LAB
ANION GAP SERPL CALC-SCNC: 6 MMOL/L — SIGNIFICANT CHANGE UP (ref 5–17)
BUN SERPL-MCNC: 28 MG/DL — HIGH (ref 7–18)
CALCIUM SERPL-MCNC: 8.3 MG/DL — LOW (ref 8.4–10.5)
CHLORIDE SERPL-SCNC: 115 MMOL/L — HIGH (ref 96–108)
CO2 SERPL-SCNC: 22 MMOL/L — SIGNIFICANT CHANGE UP (ref 22–31)
CREAT SERPL-MCNC: 1.34 MG/DL — HIGH (ref 0.5–1.3)
GLUCOSE SERPL-MCNC: 92 MG/DL — SIGNIFICANT CHANGE UP (ref 70–99)
HCT VFR BLD CALC: 28.2 % — LOW (ref 34.5–45)
HCT VFR BLD CALC: 30.9 % — LOW (ref 34.5–45)
HGB BLD-MCNC: 10 G/DL — LOW (ref 11.5–15.5)
HGB BLD-MCNC: 8.8 G/DL — LOW (ref 11.5–15.5)
MCHC RBC-ENTMCNC: 30.3 PG — SIGNIFICANT CHANGE UP (ref 27–34)
MCHC RBC-ENTMCNC: 30.9 PG — SIGNIFICANT CHANGE UP (ref 27–34)
MCHC RBC-ENTMCNC: 31.2 GM/DL — LOW (ref 32–36)
MCHC RBC-ENTMCNC: 32.4 GM/DL — SIGNIFICANT CHANGE UP (ref 32–36)
MCV RBC AUTO: 95.4 FL — SIGNIFICANT CHANGE UP (ref 80–100)
MCV RBC AUTO: 97.2 FL — SIGNIFICANT CHANGE UP (ref 80–100)
NRBC # BLD: 0 /100 WBCS — SIGNIFICANT CHANGE UP (ref 0–0)
NRBC # BLD: 0 /100 WBCS — SIGNIFICANT CHANGE UP (ref 0–0)
PLATELET # BLD AUTO: 220 K/UL — SIGNIFICANT CHANGE UP (ref 150–400)
PLATELET # BLD AUTO: 237 K/UL — SIGNIFICANT CHANGE UP (ref 150–400)
POTASSIUM SERPL-MCNC: 4.3 MMOL/L — SIGNIFICANT CHANGE UP (ref 3.5–5.3)
POTASSIUM SERPL-SCNC: 4.3 MMOL/L — SIGNIFICANT CHANGE UP (ref 3.5–5.3)
RBC # BLD: 2.9 M/UL — LOW (ref 3.8–5.2)
RBC # BLD: 3.24 M/UL — LOW (ref 3.8–5.2)
RBC # FLD: 17.7 % — HIGH (ref 10.3–14.5)
RBC # FLD: 18 % — HIGH (ref 10.3–14.5)
SODIUM SERPL-SCNC: 143 MMOL/L — SIGNIFICANT CHANGE UP (ref 135–145)
WBC # BLD: 4.61 K/UL — SIGNIFICANT CHANGE UP (ref 3.8–10.5)
WBC # BLD: 4.73 K/UL — SIGNIFICANT CHANGE UP (ref 3.8–10.5)
WBC # FLD AUTO: 4.61 K/UL — SIGNIFICANT CHANGE UP (ref 3.8–10.5)
WBC # FLD AUTO: 4.73 K/UL — SIGNIFICANT CHANGE UP (ref 3.8–10.5)

## 2019-04-27 RX ORDER — PANTOPRAZOLE SODIUM 20 MG/1
40 TABLET, DELAYED RELEASE ORAL
Qty: 0 | Refills: 0 | Status: DISCONTINUED | OUTPATIENT
Start: 2019-04-27 | End: 2019-04-29

## 2019-04-27 RX ADMIN — Medication 100 MILLIGRAM(S): at 12:10

## 2019-04-27 RX ADMIN — Medication 100 MILLIGRAM(S): at 18:06

## 2019-04-27 RX ADMIN — IRON SUCROSE 110 MILLIGRAM(S): 20 INJECTION, SOLUTION INTRAVENOUS at 18:02

## 2019-04-27 RX ADMIN — Medication 100 MILLIGRAM(S): at 05:52

## 2019-04-27 RX ADMIN — ATORVASTATIN CALCIUM 10 MILLIGRAM(S): 80 TABLET, FILM COATED ORAL at 22:33

## 2019-04-27 RX ADMIN — PANTOPRAZOLE SODIUM 40 MILLIGRAM(S): 20 TABLET, DELAYED RELEASE ORAL at 05:53

## 2019-04-27 NOTE — PROGRESS NOTE ADULT - PROBLEM SELECTOR PLAN 4
RLE with erythema, tender on palpation, warm to touch  abx changed to Kefzol IV.   ID - Dr. Kan following.

## 2019-04-27 NOTE — PROGRESS NOTE ADULT - ASSESSMENT
Patient is a 89yo Female with CKD-4, HTN, Arthritis, HLD a/w anemia, SHAUNA on CKD-4 and RLE cellulitis.

## 2019-04-27 NOTE — PROGRESS NOTE ADULT - SUBJECTIVE AND OBJECTIVE BOX
[facility] NEPHROLOGY- PROGRESS NOTE    Patient is a 89yo Female with CKD-4, HTN, Arthritis, HLD a/w anemia, SHAUNA on CKD-4 and RLE cellulitis.   s/p 3 unit prbc.     Hospital Medications: Medications reviewed.  REVIEW OF SYSTEMS:  CONSTITUTIONAL: No fevers or chills  RESPIRATORY: No shortness of breath  CARDIOVASCULAR: No chest pain.  GASTROINTESTINAL: No nausea, vomiting, diarrhea or abdominal pain. No BM  VASCULAR: No bilateral lower extremity edema.     VITALS:  T(F): 98.4 (19 @ 06:40), Max: 98.4 (19 @ 14:56)  HR: 77 (19 @ 06:40)  BP: 114/45 (19 @ 06:40)  RR: 18 (19 @ 06:40)  SpO2: 96% (19 @ 06:40)  Wt(kg): --  Height (cm): 149.86 ( @ 14:25)  Weight (kg): 51.7 ( @ 14:25)  BMI (kg/m2): 23 ( @ 14:25)  BSA (m2): 1.45 ( @ 14:25)    PHYSICAL EXAM:  Constitutional: NAD  Neurological: Awake and Alert  HEENT: anicteric sclera,   Respiratory: CTA ant  Cardiovascular: S1, S2, +ROLLY  Gastrointestinal: BS+, soft, NT/ND  : No pearson.  Extremities: Trace b/l peripheral edema, +improving RLE erythema/ warmth    LABS:      143  |  115<H>  |  28<H>  ----------------------------<  92  4.3   |  22  |  1.34<H>    Ca    8.3<L>      2019 06:57      Creatinine Trend: 1.34 <--, 1.62 <--, 2.10 <--, 2.49 <--                        8.8    4.73  )-----------( 220      ( 2019 06:57 )             28.2     Urine Studies:  Urinalysis Basic - ( 2019 16:46 )    Color: Yellow / Appearance: Slightly Turbid / S.010 / pH:   Gluc:  / Ketone: Negative  / Bili: Negative / Urobili: Negative   Blood:  / Protein: 15 / Nitrite: Negative   Leuk Esterase: Negative / RBC: 0-2 /HPF / WBC 3-5 /HPF   Sq Epi:  / Non Sq Epi: Few /HPF / Bacteria: Few /HPF        RADIOLOGY & ADDITIONAL STUDIES:

## 2019-04-27 NOTE — PROGRESS NOTE ADULT - ATTENDING COMMENTS
[facility] NEPHROLOGY  Joe Barnes M.D.  Ari Lawotn D.O.  Michaela Mai M.D.  Tami Arshad, MSN, ANP-C  [phone]    [address] [place] [address]

## 2019-04-27 NOTE — PROGRESS NOTE ADULT - PROBLEM SELECTOR PLAN 2
Hgb improving s/p 3PRBC + venofer  - No longer symptomatic  - Guaiac positive on admission,  - No active bleeding since admission  - C/w protonix   - GI Dr Del Toro  Monitor CBC

## 2019-04-27 NOTE — PROGRESS NOTE ADULT - PROBLEM SELECTOR PLAN 1
Hgb 5.1 on admission. hgb improving s/p 3 units prbc  - Guaiac positive on admission, Dr. Del Toro (GI following)   - s/p EGD 4/26 mild diffuse bile gastritis with scattered antral ulcer/ erosions. Several non-bleeding clean based duodenal ulcers and moderate duodenitis.   Check CBC q 12hrs. If declining h/h, will pursue colonoscopy.   Will change protonix IV to 40mg PO daily (x 6 months)  f/u GI Dr Del Toro

## 2019-04-27 NOTE — PROGRESS NOTE ADULT - PROBLEM SELECTOR PLAN 6
Holding antihypertensives due to hypotension in setting of GI bleeding  - Continue to monitor BP,  - Continue to hold anti-hypertensive medications.

## 2019-04-28 DIAGNOSIS — K25.9 GASTRIC ULCER, UNSPECIFIED AS ACUTE OR CHRONIC, WITHOUT HEMORRHAGE OR PERFORATION: ICD-10-CM

## 2019-04-28 LAB
ANION GAP SERPL CALC-SCNC: 3 MMOL/L — LOW (ref 5–17)
BUN SERPL-MCNC: 18 MG/DL — SIGNIFICANT CHANGE UP (ref 7–18)
CALCIUM SERPL-MCNC: 8.4 MG/DL — SIGNIFICANT CHANGE UP (ref 8.4–10.5)
CHLORIDE SERPL-SCNC: 115 MMOL/L — HIGH (ref 96–108)
CO2 SERPL-SCNC: 25 MMOL/L — SIGNIFICANT CHANGE UP (ref 22–31)
CREAT SERPL-MCNC: 1.19 MG/DL — SIGNIFICANT CHANGE UP (ref 0.5–1.3)
GLUCOSE SERPL-MCNC: 92 MG/DL — SIGNIFICANT CHANGE UP (ref 70–99)
HCT VFR BLD CALC: 28.2 % — LOW (ref 34.5–45)
HCT VFR BLD CALC: 30 % — LOW (ref 34.5–45)
HGB BLD-MCNC: 8.9 G/DL — LOW (ref 11.5–15.5)
HGB BLD-MCNC: 9.5 G/DL — LOW (ref 11.5–15.5)
MCHC RBC-ENTMCNC: 30.9 PG — SIGNIFICANT CHANGE UP (ref 27–34)
MCHC RBC-ENTMCNC: 30.9 PG — SIGNIFICANT CHANGE UP (ref 27–34)
MCHC RBC-ENTMCNC: 31.6 GM/DL — LOW (ref 32–36)
MCHC RBC-ENTMCNC: 31.7 GM/DL — LOW (ref 32–36)
MCV RBC AUTO: 97.7 FL — SIGNIFICANT CHANGE UP (ref 80–100)
MCV RBC AUTO: 97.9 FL — SIGNIFICANT CHANGE UP (ref 80–100)
NRBC # BLD: 0 /100 WBCS — SIGNIFICANT CHANGE UP (ref 0–0)
NRBC # BLD: 0 /100 WBCS — SIGNIFICANT CHANGE UP (ref 0–0)
PLATELET # BLD AUTO: 226 K/UL — SIGNIFICANT CHANGE UP (ref 150–400)
PLATELET # BLD AUTO: 228 K/UL — SIGNIFICANT CHANGE UP (ref 150–400)
POTASSIUM SERPL-MCNC: 4.2 MMOL/L — SIGNIFICANT CHANGE UP (ref 3.5–5.3)
POTASSIUM SERPL-SCNC: 4.2 MMOL/L — SIGNIFICANT CHANGE UP (ref 3.5–5.3)
RBC # BLD: 2.88 M/UL — LOW (ref 3.8–5.2)
RBC # BLD: 3.07 M/UL — LOW (ref 3.8–5.2)
RBC # FLD: 17.2 % — HIGH (ref 10.3–14.5)
RBC # FLD: 17.3 % — HIGH (ref 10.3–14.5)
SODIUM SERPL-SCNC: 143 MMOL/L — SIGNIFICANT CHANGE UP (ref 135–145)
WBC # BLD: 3.39 K/UL — LOW (ref 3.8–10.5)
WBC # BLD: 4.65 K/UL — SIGNIFICANT CHANGE UP (ref 3.8–10.5)
WBC # FLD AUTO: 3.39 K/UL — LOW (ref 3.8–10.5)
WBC # FLD AUTO: 4.65 K/UL — SIGNIFICANT CHANGE UP (ref 3.8–10.5)

## 2019-04-28 PROCEDURE — 99232 SBSQ HOSP IP/OBS MODERATE 35: CPT

## 2019-04-28 RX ORDER — LACTOBACILLUS ACIDOPHILUS 100MM CELL
1 CAPSULE ORAL THREE TIMES A DAY
Qty: 0 | Refills: 0 | Status: DISCONTINUED | OUTPATIENT
Start: 2019-04-28 | End: 2019-04-29

## 2019-04-28 RX ADMIN — IRON SUCROSE 110 MILLIGRAM(S): 20 INJECTION, SOLUTION INTRAVENOUS at 17:27

## 2019-04-28 RX ADMIN — PANTOPRAZOLE SODIUM 40 MILLIGRAM(S): 20 TABLET, DELAYED RELEASE ORAL at 05:02

## 2019-04-28 RX ADMIN — Medication 100 MILLIGRAM(S): at 05:02

## 2019-04-28 RX ADMIN — Medication 100 MILLIGRAM(S): at 17:22

## 2019-04-28 RX ADMIN — Medication 1 TABLET(S): at 21:19

## 2019-04-28 RX ADMIN — SENNA PLUS 2 TABLET(S): 8.6 TABLET ORAL at 21:19

## 2019-04-28 RX ADMIN — Medication 100 MILLIGRAM(S): at 12:17

## 2019-04-28 RX ADMIN — ATORVASTATIN CALCIUM 10 MILLIGRAM(S): 80 TABLET, FILM COATED ORAL at 21:19

## 2019-04-28 NOTE — PROGRESS NOTE ADULT - SUBJECTIVE AND OBJECTIVE BOX
PGY1 Note discussed with Supervising Resident and Primary Attending.    Patient is a 89y old  Female who presents with a chief complaint of low hemoglobin, upper GI bleeding (27 Apr 2019 13:28)      INTERVAL HPI/OVERNIGHT EVENTS :  No acute overnight events. Patient seen and examined at bedside. Patient has no current complaints. Patient denies nausea, vomiting, diarrhea, chest pain, SOB, headache.  MEDICATIONS  (STANDING):  atorvastatin 10 milliGRAM(s) Oral at bedtime  ceFAZolin   IVPB      ceFAZolin   IVPB 1000 milliGRAM(s) IV Intermittent every 12 hours  docusate sodium 100 milliGRAM(s) Oral daily  influenza   Vaccine 0.5 milliLiter(s) IntraMuscular once  iron sucrose IVPB 200 milliGRAM(s) IV Intermittent every 24 hours  pantoprazole    Tablet 40 milliGRAM(s) Oral before breakfast  senna 2 Tablet(s) Oral at bedtime    MEDICATIONS  (PRN):      Allergies    No Known Allergies    Intolerances        REVIEW OF SYSTEMS :  * General: denies chills, fatigue  * Eyes: denies vision changes  * Respiratory: denies cough, SOB, wheezing  * Cardio: denies chest pain, palpitations  * GI: denies abd pain, nausea, vomiting, diarrhea  * : denies dysuria  * Neuro: denies headaches, numbness, weakness  * MSK: denies joint pain  * Skin: denies itching, rashes    Vital Signs Last 24 Hrs  T(C): 36.9 (27 Apr 2019 21:50), Max: 36.9 (27 Apr 2019 06:40)  T(F): 98.5 (27 Apr 2019 21:50), Max: 98.5 (27 Apr 2019 21:50)  HR: 79 (27 Apr 2019 21:50) (73 - 79)  BP: 127/50 (27 Apr 2019 21:50) (114/45 - 132/56)  BP(mean): --  RR: 18 (27 Apr 2019 21:50) (18 - 18)  SpO2: 96% (27 Apr 2019 21:50) (96% - 96%)    PHYSICAL EXAM :  * General: no acute distress, well-nourished, well-developed  * HEENT: atraumatic, normocephalic; moist mucus membranes; supple neck; no JVD  * CN: EOMI, PERRL  * Respiratory: cta b/l; no wheezes, rales, rhonchi  * Cardio: RRR; no appreciable murmurs, rubs, gallops  * Abd: soft, nontender, nondistended, +BS  * Neuro: AAOx3; strength 5/5; sensation intact throughout  * Extremities: no clubbing, cyanosis, edema  * Skin: no rashes    LABS:                          10.0   4.61  )-----------( 237      ( 27 Apr 2019 17:34 )             30.9     04-27    143  |  115<H>  |  28<H>  ----------------------------<  92  4.3   |  22  |  1.34<H>    Ca    8.3<L>      27 Apr 2019 06:57      PT/INR - ( 26 Apr 2019 07:07 )   PT: 10.9 sec;   INR: 0.98 ratio         PTT - ( 26 Apr 2019 07:07 )  PTT:28.8 sec    CAPILLARY BLOOD GLUCOSE          RADIOLOGY & ADDITIONAL TESTS:   no new imaging    Imaging Personally Reviewed:    Consultant(s) Notes Reviewed: PGY1 Note discussed with Supervising Resident and Primary Attending.    Patient is a 89y old  Female who presents with a chief complaint of low hemoglobin, upper GI bleeding (27 Apr 2019 13:28)      INTERVAL HPI/OVERNIGHT EVENTS:  No acute overnight events. Patient seen and examined at bedside. Patient has no current complaints. Patient denies nausea, vomiting, diarrhea, chest pain, SOB, headache.    MEDICATIONS  (STANDING):  atorvastatin 10 milliGRAM(s) Oral at bedtime  ceFAZolin   IVPB      ceFAZolin   IVPB 1000 milliGRAM(s) IV Intermittent every 12 hours  docusate sodium 100 milliGRAM(s) Oral daily  influenza   Vaccine 0.5 milliLiter(s) IntraMuscular once  iron sucrose IVPB 200 milliGRAM(s) IV Intermittent every 24 hours  pantoprazole    Tablet 40 milliGRAM(s) Oral before breakfast  senna 2 Tablet(s) Oral at bedtime    MEDICATIONS  (PRN):      Allergies    No Known Allergies    Intolerances        REVIEW OF SYSTEMS:  * General: denies chills, fatigue  * Eyes: denies vision changes  * Respiratory: denies cough, SOB, wheezing  * Cardio: denies chest pain, palpitations  * GI: denies abd pain, nausea, vomiting, diarrhea  * : denies dysuria  * Neuro: denies headaches, numbness, weakness  * MSK: denies joint pain  * Skin: denies itching, rashes    Vital Signs Last 24 Hrs  T(C): 36.9 (27 Apr 2019 21:50), Max: 36.9 (27 Apr 2019 06:40)  T(F): 98.5 (27 Apr 2019 21:50), Max: 98.5 (27 Apr 2019 21:50)  HR: 79 (27 Apr 2019 21:50) (73 - 79)  BP: 127/50 (27 Apr 2019 21:50) (114/45 - 132/56)  BP(mean): --  RR: 18 (27 Apr 2019 21:50) (18 - 18)  SpO2: 96% (27 Apr 2019 21:50) (96% - 96%)    PHYSICAL EXAM:  * General: no acute distress, well-nourished, well-developed  * HEENT: atraumatic, normocephalic; moist mucus membranes; supple neck; no JVD  * CN: EOMI, PERRL  * Respiratory: cta b/l; no wheezes, rales, rhonchi  * Cardio: RRR; no appreciable murmurs, rubs, gallops  * Abd: soft, nontender, nondistended, +BS  * Neuro: AAOx3; strength 5/5; sensation intact throughout  * Extremities: no clubbing, cyanosis, edema; RLE erythema + warmth  * Skin: no rashes    LABS:                          10.0   4.61  )-----------( 237      ( 27 Apr 2019 17:34 )             30.9     04-27    143  |  115<H>  |  28<H>  ----------------------------<  92  4.3   |  22  |  1.34<H>    Ca    8.3<L>      27 Apr 2019 06:57      PT/INR - ( 26 Apr 2019 07:07 )   PT: 10.9 sec;   INR: 0.98 ratio         PTT - ( 26 Apr 2019 07:07 )  PTT:28.8 sec    CAPILLARY BLOOD GLUCOSE          RADIOLOGY & ADDITIONAL TESTS:   no new imaging    Consultant(s) Notes Reviewed: yes PGY1 Note discussed with Supervising Resident and Primary Attending.    Patient is a 89y old  Female who presents with a chief complaint of low hemoglobin, upper GI bleeding (27 Apr 2019 13:28)      INTERVAL HPI/OVERNIGHT EVENTS:  No acute overnight events. Patient seen and examined at bedside. Patient has no current complaints. Patient denies nausea, vomiting, diarrhea, chest pain, SOB, headache.    MEDICATIONS  (STANDING):  atorvastatin 10 milliGRAM(s) Oral at bedtime  ceFAZolin   IVPB      ceFAZolin   IVPB 1000 milliGRAM(s) IV Intermittent every 12 hours  docusate sodium 100 milliGRAM(s) Oral daily  influenza   Vaccine 0.5 milliLiter(s) IntraMuscular once  iron sucrose IVPB 200 milliGRAM(s) IV Intermittent every 24 hours  pantoprazole    Tablet 40 milliGRAM(s) Oral before breakfast  senna 2 Tablet(s) Oral at bedtime    MEDICATIONS  (PRN):      Allergies    No Known Allergies    Intolerances        REVIEW OF SYSTEMS:  * General: denies chills, fatigue  * Eyes: denies vision changes  * Respiratory: denies cough, SOB, wheezing  * Cardio: denies chest pain, palpitations  * GI: denies abd pain, nausea, vomiting, diarrhea  * : denies dysuria  * Neuro: denies headaches, numbness, weakness  * MSK: denies joint pain  * Skin: denies itching, rashes    Vital Signs Last 24 Hrs  T(C): 36.9 (27 Apr 2019 21:50), Max: 36.9 (27 Apr 2019 06:40)  T(F): 98.5 (27 Apr 2019 21:50), Max: 98.5 (27 Apr 2019 21:50)  HR: 79 (27 Apr 2019 21:50) (73 - 79)  BP: 127/50 (27 Apr 2019 21:50) (114/45 - 132/56)  BP(mean): --  RR: 18 (27 Apr 2019 21:50) (18 - 18)  SpO2: 96% (27 Apr 2019 21:50) (96% - 96%)    PHYSICAL EXAM:  * General: no acute distress, well-nourished, well-developed  * HEENT: atraumatic, normocephalic; moist mucus membranes; supple neck; no JVD  * CN: EOMI, PERRL  * Respiratory: cta b/l; no wheezes, rales, rhonchi  * Cardio: RRR; no appreciable murmurs, rubs, gallops  * Abd: soft, nontender, nondistended, +BS  * Neuro: AAOx3; strength 5/5; sensation intact throughout  * Extremities: no clubbing, cyanosis, edema; RLE erythema + warmth- improving   * Skin: no rashes    LABS:                          10.0   4.61  )-----------( 237      ( 27 Apr 2019 17:34 )             30.9     04-27    143  |  115<H>  |  28<H>  ----------------------------<  92  4.3   |  22  |  1.34<H>    Ca    8.3<L>      27 Apr 2019 06:57      PT/INR - ( 26 Apr 2019 07:07 )   PT: 10.9 sec;   INR: 0.98 ratio         PTT - ( 26 Apr 2019 07:07 )  PTT:28.8 sec    CAPILLARY BLOOD GLUCOSE          RADIOLOGY & ADDITIONAL TESTS:   no new imaging    Consultant(s) Notes Reviewed: yes

## 2019-04-28 NOTE — PROGRESS NOTE ADULT - PROBLEM SELECTOR PLAN 4
RLE with erythema, tender on palpation, warm to touch  - Afebrile, WBC wnl  - C/w unasyn day 3 RLE with erythema, tender on palpation, warm to touch  - Afebrile, WBC wnl  - Switched unasyn to kefzol  - ID Dr Kan

## 2019-04-28 NOTE — PROGRESS NOTE ADULT - PROBLEM SELECTOR PLAN 1
Hgb 5.1 on admission  - S/p 3PRBC and venofer with improvement in hgb to 8.9  - Guaiac positive on admission,   - NPO past midnight last night  - EGD today  - C/w protonix 40mg IV bid for now  - F/u EGD report  - GI Dr Del Toro P/w bleed, EGD found antral ulcers, gastritis, clean based duodenal ulcers, and duodenitis  - Guaiac positive on admission  - C/w protonix 40mg IV bid for now  - S/p 3 PRBC + venofer  - Hb stable, continue to monitor, if declines will need colonoscopy  - Protonix 40 po qd  - GI Dr Del Toro P/w bleed, EGD found antral ulcers, gastritis, clean based duodenal ulcers, and duodenitis  - Guaiac positive on admission  - C/w protonix 40mgPO daily   - S/p 3 PRBC + venofer  - Hb stable, continue to monitor, if declines will need colonoscopy  - Protonix 40 po qd  - GI Dr Del Toro

## 2019-04-28 NOTE — PROGRESS NOTE ADULT - PROBLEM SELECTOR PLAN 2
Hgb improving s/p 3PRBC + venofer  - No longer symptomatic  - Guaiac positive on admission, however no bright red blood per rectum as per patient  - No active bleeding since admission  - Likely upper GI bleeding  - Clears for now, keep NPO after midnight in case of EGD/colonoscopy tomorrow  - C/w protonix 40mg IV bid for now  - GI Dr Del Toro Hgb stable s/p 3PRBC + venofer  - No longer symptomatic  - Guaiac positive on admission, however no bright red blood per rectum  - No active bleeding since admission  - S/p EGD as above  - Protonix 40 po qd  - GI Dr Del Toro

## 2019-04-28 NOTE — PROGRESS NOTE ADULT - ASSESSMENT
A/P: Anemia, GI bleeding, small hiatal hernia, mild diffuse bile gastritis scattered antral ulcers/erosions, several nonbleeding clean-based duodenal ulcers, moderate duodenitis  The patient states that she is feeling better today. She denies any complaints. The last hemoglobin/hematocrit level was able at 10.0/3.9, respectively.  The patient had an upper endoscopy performed on April 26, 2019. The upper endoscopy revealed a small hiatal hernia, mild diffuse I'll gastritis scattered antral ulcers/erosions. Several nonbleeding clean-based duodenal ulcers and moderate duodenitis.  Biopsies were taken. The results are pending.  The patient tolerated the procedure well.  I recommend a trial of pantoprazole 40 mg once a day 6 months. Also recommend avoiding nonsteroidal inflammatory drugs and aspirin. If the anemia persists, the patient may require a colonoscopy to assess her colon pathology.  Please reconsult GI if any further issues or problems.

## 2019-04-28 NOTE — PROGRESS NOTE ADULT - SUBJECTIVE AND OBJECTIVE BOX
The patient is lying in bed, resting comfortably, in no acute distress.  The patient states that she is feeling better.  She care of has no complaints.  The patient denies any abdominal pain, abdominal gas and bloating, nausea, vomiting, gastroesophageal reflux disease, dysphagia, atypical chest pain, shortness of breath, palpitations, diarrhea, constipation, bright red blood per rectum, melena, hematemesis, weight loss, anorexia, fever or chills.  The last hemoglobin/hematocrit level was able at 10.0/3.9, respectively.  The patient had an upper endoscopy performed on April 26, 2019. The upper endoscopy revealed a small hiatal hernia, mild diffuse I'll gastritis scattered antral ulcers/erosions. Several nonbleeding clean-based duodenal ulcers and moderate duodenitis.  Biopsies were taken. The results are pending.  The patient tolerated the procedure well.  Vitals: Temp: 98., BP: 137/56, HR: 73, RR: 18  Physical Examination:   General: well developed, well-nourished white female in no acute distress  Eyes: No icteric sclera, conjunctiva clear.  Ears/Nose/Throat: Oropharynx not erythematous, nose not congested, ears unremarkable  Head: normal cephalic, nontraumatic  Neck: Neck supple, no masses,   Chest: normal appearance, normal symmetrical,  Respiratory: clear breath sounds equal bilaterally no wheezing no rales or rhonchi   Cardiovascular: S1-S2 audible, no murmur, no rubs or gallops, no palpable masses  Abdomen: (+) BS, soft, nontender, no rebound or guarding or masses noted.   Back: (-) CVAT, no spinal tenderness  Extremities: limited range of motion in all extremities, (-) c, (-) c, (-) e  Musculoskeletal: Poor motor strength, poor range of motion, normal appearing lower extremities  Neuro: A+O x 3, no ataxia,  Psych: good affect, not depressed, no anxiety  Skin: normal, no jaundice    Complete Blood Count (04.27.19 @ 17:34)    Nucleated RBC: 0 /100 WBCs    WBC Count: 4.61 K/uL    RBC Count: 3.24 M/uL    Hemoglobin: 10.0 g/dL    Hematocrit: 30.9 %    Mean Cell Volume: 95.4 fl    Mean Cell Hemoglobin: 30.9 pg    Mean Cell Hemoglobin Conc: 32.4 gm/dL    Red Cell Distrib Width: 17.7 %    Platelet Count - Automated: 237 K/uL    Basic Metabolic Panel in AM (04.27.19 @ 06:57)    Sodium, Serum: 143 mmol/L    Potassium, Serum: 4.3 mmol/L    Chloride, Serum: 115 mmol/L    Carbon Dioxide, Serum: 22 mmol/L    Anion Gap, Serum: 6 mmol/L    Blood Urea Nitrogen, Serum: 28 mg/dL    Creatinine, Serum: 1.34 mg/dL    Glucose, Serum: 92 mg/dL    Calcium, Total Serum: 8.3 mg/dL    eGFR if Non : 34: Interpretative comment  The units for eGFR are mL/min/1.73M2 (normalized body surface area). The  eGFR is calculated from a serum creatinine using the CKD-EPI equation.  Other variables required for calculation are race, age and sex. Among  patients with chronic kidney disease (CKD), the eGFR is useful in  determining the stage of disease according to KDOQI CKD classification.  All eGFR results are reported numerically with the following  interpretation.          GFR                    With                 Without     (ml/min/1.73 m2)    Kidney Damage       Kidney Damage        >= 90                    Stage 1                     Normal        60-89                    Stage 2                     Decreased GFR        30-59     Stage 3                     Stage 3        15-29                    Stage 4                     Stage 4        < 15                      Stage 5                     Stage 5  Each stage of CKD assumes that the associated GFR level has been in  effect for at least 3 months. Determination of stages one and two (with  eGFR > 59 ml/min/m2) requires estimation of kidney damage for at least 3  months as defined by structural or functional abnormalities.  Limitations: All estimates of GFR will be less accurate for patients at  extremes of muscle mass (including but not limited to frail elderly,  critically ill, or cancer patients), those with unusual diets, and those  with conditions associated with reduced secretion or extrarenal  elimination of creatinine. The eGFR equation is not recommended for use  in patients with unstable creatinine levels. mL/min/1.73M2    eGFR if African American: 39 mL/min/1.73M2      EXAM:  XR CHEST PORTABLE IMMED 1V                            PROCEDURE DATE:  04/24/2019          INTERPRETATION:  INDICATION: Shortness of breath, anemia.    PRIORS: 3/16/2019    VIEWS: Portable AP radiography of the chest performed.    FINDINGS: Heart size appears within normal limits. No hilar or superior   mediastinal abnormalities are identified. There is no evidence for focal   infiltrate, lobar consolidation or pulmonary edema. No pleural effusion   or pneumothorax is demonstrated. No mediastinal shift is noted. Chronic   appearing remodeling deformity of the bilateral humeral heads with   degenerative change noted. Degenerative changes of the thoracic spine   also evident.    IMPRESSION: No evidence for focal infiltrate or lobar consolidation.                 LUBNA PIKE   This document has been electronically signed. Apr 24 2019  5:38PM

## 2019-04-28 NOTE — PROGRESS NOTE ADULT - ATTENDING COMMENTS
Patient seen and examined. Agree with plan above.  Note edited as necessary.  Will trend CBC q 12hrs. If declining hgb will consider inpt colonoscopy. f/u GI  c/w protonix 40mg PO daily. Will change to mechanical soft diet.   If hgb stable- will d/c home with home PT. f/u GI in 3 weeks for path report/ ?possible colonscopy.   [facility] NEPHROLOGY  Joe Barnes M.D.  Ari Lawton D.O.  Michaela Mai M.D.  Tami Arshad, MSN, ANP-C  [phone]    [address] [place] [address]

## 2019-04-29 VITALS
DIASTOLIC BLOOD PRESSURE: 60 MMHG | TEMPERATURE: 98 F | SYSTOLIC BLOOD PRESSURE: 156 MMHG | RESPIRATION RATE: 18 BRPM | HEART RATE: 79 BPM | OXYGEN SATURATION: 95 %

## 2019-04-29 LAB
ANION GAP SERPL CALC-SCNC: 6 MMOL/L — SIGNIFICANT CHANGE UP (ref 5–17)
BUN SERPL-MCNC: 14 MG/DL — SIGNIFICANT CHANGE UP (ref 7–18)
CALCIUM SERPL-MCNC: 8.2 MG/DL — LOW (ref 8.4–10.5)
CHLORIDE SERPL-SCNC: 111 MMOL/L — HIGH (ref 96–108)
CO2 SERPL-SCNC: 25 MMOL/L — SIGNIFICANT CHANGE UP (ref 22–31)
CREAT SERPL-MCNC: 1.14 MG/DL — SIGNIFICANT CHANGE UP (ref 0.5–1.3)
GLUCOSE SERPL-MCNC: 98 MG/DL — SIGNIFICANT CHANGE UP (ref 70–99)
HCT VFR BLD CALC: 31.2 % — LOW (ref 34.5–45)
HGB BLD-MCNC: 9.6 G/DL — LOW (ref 11.5–15.5)
MCHC RBC-ENTMCNC: 30.1 PG — SIGNIFICANT CHANGE UP (ref 27–34)
MCHC RBC-ENTMCNC: 30.8 GM/DL — LOW (ref 32–36)
MCV RBC AUTO: 97.8 FL — SIGNIFICANT CHANGE UP (ref 80–100)
NRBC # BLD: 0 /100 WBCS — SIGNIFICANT CHANGE UP (ref 0–0)
PLATELET # BLD AUTO: 229 K/UL — SIGNIFICANT CHANGE UP (ref 150–400)
POTASSIUM SERPL-MCNC: 4.1 MMOL/L — SIGNIFICANT CHANGE UP (ref 3.5–5.3)
POTASSIUM SERPL-SCNC: 4.1 MMOL/L — SIGNIFICANT CHANGE UP (ref 3.5–5.3)
RBC # BLD: 3.19 M/UL — LOW (ref 3.8–5.2)
RBC # FLD: 16.8 % — HIGH (ref 10.3–14.5)
SODIUM SERPL-SCNC: 142 MMOL/L — SIGNIFICANT CHANGE UP (ref 135–145)
WBC # BLD: 4.83 K/UL — SIGNIFICANT CHANGE UP (ref 3.8–10.5)
WBC # FLD AUTO: 4.83 K/UL — SIGNIFICANT CHANGE UP (ref 3.8–10.5)

## 2019-04-29 PROCEDURE — 84466 ASSAY OF TRANSFERRIN: CPT

## 2019-04-29 PROCEDURE — 84100 ASSAY OF PHOSPHORUS: CPT

## 2019-04-29 PROCEDURE — 86850 RBC ANTIBODY SCREEN: CPT

## 2019-04-29 PROCEDURE — 97162 PT EVAL MOD COMPLEX 30 MIN: CPT

## 2019-04-29 PROCEDURE — 80048 BASIC METABOLIC PNL TOTAL CA: CPT

## 2019-04-29 PROCEDURE — 71045 X-RAY EXAM CHEST 1 VIEW: CPT

## 2019-04-29 PROCEDURE — 36415 COLL VENOUS BLD VENIPUNCTURE: CPT

## 2019-04-29 PROCEDURE — 83550 IRON BINDING TEST: CPT

## 2019-04-29 PROCEDURE — 85610 PROTHROMBIN TIME: CPT

## 2019-04-29 PROCEDURE — 81001 URINALYSIS AUTO W/SCOPE: CPT

## 2019-04-29 PROCEDURE — 86901 BLOOD TYPING SEROLOGIC RH(D): CPT

## 2019-04-29 PROCEDURE — 93005 ELECTROCARDIOGRAM TRACING: CPT

## 2019-04-29 PROCEDURE — 88305 TISSUE EXAM BY PATHOLOGIST: CPT

## 2019-04-29 PROCEDURE — 85730 THROMBOPLASTIN TIME PARTIAL: CPT

## 2019-04-29 PROCEDURE — 86923 COMPATIBILITY TEST ELECTRIC: CPT

## 2019-04-29 PROCEDURE — 82272 OCCULT BLD FECES 1-3 TESTS: CPT

## 2019-04-29 PROCEDURE — 80053 COMPREHEN METABOLIC PANEL: CPT

## 2019-04-29 PROCEDURE — 99285 EMERGENCY DEPT VISIT HI MDM: CPT | Mod: 25

## 2019-04-29 PROCEDURE — P9040: CPT

## 2019-04-29 PROCEDURE — 88312 SPECIAL STAINS GROUP 1: CPT

## 2019-04-29 PROCEDURE — 86900 BLOOD TYPING SEROLOGIC ABO: CPT

## 2019-04-29 PROCEDURE — 85045 AUTOMATED RETICULOCYTE COUNT: CPT

## 2019-04-29 PROCEDURE — 83540 ASSAY OF IRON: CPT

## 2019-04-29 PROCEDURE — 83615 LACTATE (LD) (LDH) ENZYME: CPT

## 2019-04-29 PROCEDURE — 83735 ASSAY OF MAGNESIUM: CPT

## 2019-04-29 PROCEDURE — 36430 TRANSFUSION BLD/BLD COMPNT: CPT

## 2019-04-29 PROCEDURE — 82728 ASSAY OF FERRITIN: CPT

## 2019-04-29 PROCEDURE — 85027 COMPLETE CBC AUTOMATED: CPT

## 2019-04-29 RX ORDER — LISINOPRIL 2.5 MG/1
1 TABLET ORAL
Qty: 0 | Refills: 0 | COMMUNITY

## 2019-04-29 RX ORDER — CEPHALEXIN 500 MG
1 CAPSULE ORAL
Qty: 15 | Refills: 0
Start: 2019-04-29 | End: 2019-05-03

## 2019-04-29 RX ORDER — PANTOPRAZOLE SODIUM 20 MG/1
1 TABLET, DELAYED RELEASE ORAL
Qty: 30 | Refills: 0
Start: 2019-04-29 | End: 2019-05-28

## 2019-04-29 RX ORDER — METOPROLOL TARTRATE 50 MG
1 TABLET ORAL
Qty: 0 | Refills: 0 | COMMUNITY

## 2019-04-29 RX ORDER — LACTOBACILLUS ACIDOPHILUS 100MM CELL
1 CAPSULE ORAL
Qty: 0 | Refills: 0 | DISCHARGE
Start: 2019-04-29

## 2019-04-29 RX ADMIN — Medication 100 MILLIGRAM(S): at 11:49

## 2019-04-29 RX ADMIN — PANTOPRAZOLE SODIUM 40 MILLIGRAM(S): 20 TABLET, DELAYED RELEASE ORAL at 05:16

## 2019-04-29 RX ADMIN — Medication 100 MILLIGRAM(S): at 05:15

## 2019-04-29 RX ADMIN — Medication 1 TABLET(S): at 05:15

## 2019-04-29 NOTE — PROGRESS NOTE ADULT - PROBLEM SELECTOR PLAN 4
RLE with erythema, tender on palpation, warm to touch  abx changed to Kefzol IV.   Can d/c on Keflex tid for 5 days.   Dr. Kan following.

## 2019-04-29 NOTE — DISCHARGE NOTE PROVIDER - HOSPITAL COURSE
HPI:    89 y/o F with a PMHx of HTN, Arthritis, CKD, HLD and no significant PSHx presents to ED due to a hemoglobin lab finding of 6.3 as outpatient. Pt endorses increased tiredness, has been sleeping more than usual as per son, however denies any other complaints including shortness of breath, abdominal pain, nausea, vomiting. Pt notes diarrhea couple of days ago, however states this was after taking a lot of prune juice after constipation, denies seeing any kaity blood in stool. All other ROS negative. Pt notes she had endoscopy/colonoscopy 5 years ago, results of which were normal.         In ED, pt found to be guaiac positive and hypotensive to 96/60 with HR of 70s. Pt was given 1L NS bolus with appropriate rise of blood pressure to systolic . (24 Apr 2019 21:01)        Hospital Course: Hgb on admission was 5.1, received 2 PRBC and corrected appropriately. Made NPO for EGD, gave 1 more PRBC and hgb corrected appropriately. EGD showed HPI:    89 y/o F with a PMHx of HTN, Arthritis, CKD, HLD and no significant PSHx presents to ED due to a hemoglobin lab finding of 6.3 as outpatient. Pt endorses increased tiredness, has been sleeping more than usual as per son, however denies any other complaints including shortness of breath, abdominal pain, nausea, vomiting. Pt notes diarrhea couple of days ago, however states this was after taking a lot of prune juice after constipation, denies seeing any kaity blood in stool. All other ROS negative. Pt notes she had endoscopy/colonoscopy 5 years ago, results of which were normal.         In ED, pt found to be guaiac positive and hypotensive to 96/60 with HR of 70s. Pt was given 1L NS bolus with appropriate rise of blood pressure to systolic . (24 Apr 2019 21:01)        Hospital Course: Hgb on admission was 5.1, received 2 PRBC and corrected appropriately. Made NPO for EGD, gave 1 more PRBC and hgb corrected appropriately. EGD showed gastritis, antral ulcers/erosions, clean based duodenal ulcers, and duodenitis. Hgb has been stable. She is tolerating diet. She also has a cellulitis, received 2 days unasyn + 3 days kefzol. Can dc on po protonix qd x6 months. HPI:    89 y/o F with a PMHx of HTN, Arthritis, CKD, HLD and no significant PSHx presents to ED due to a hemoglobin lab finding of 6.3 as outpatient. Pt endorses increased tiredness, has been sleeping more than usual as per son, however denies any other complaints including shortness of breath, abdominal pain, nausea, vomiting. Pt notes diarrhea couple of days ago, however states this was after taking a lot of prune juice after constipation, denies seeing any kaity blood in stool. All other ROS negative. Pt notes she had endoscopy/colonoscopy 5 years ago, results of which were normal.         In ED, pt found to be guaiac positive and hypotensive to 96/60 with HR of 70s. Pt was given 1L NS bolus with appropriate rise of blood pressure to systolic . (24 Apr 2019 21:01)        Hospital Course: Hgb on admission was 5.1, received 2 PRBC and corrected appropriately. Made NPO for EGD, gave 1 more PRBC and hgb corrected appropriately. EGD showed gastritis, antral ulcers/erosions, clean based duodenal ulcers, and duodenitis. Hgb has been stable. She is tolerating diet. She also has a cellulitis, received 2 days unasyn + 3 days kefzol. Can dc on po protonix qd x6 months + keflex 500 tid x5 days. Will send on amlodipine 5mg qd and hold other bp meds. Medically stable as per attending. Plan has been d/w Dr Mai. Will advise to f/u with GI in 3 months and PCP in 3 days. Please see chart for full hospital course as this is just a brief summary.

## 2019-04-29 NOTE — PROGRESS NOTE ADULT - ASSESSMENT
Patient is a 89yo Female with CKD-4, HTN, Arthritis, HLD a/w anemia, SHAUNA on CKD-4 and RLE cellulitis.

## 2019-04-29 NOTE — PROGRESS NOTE ADULT - PROBLEM SELECTOR PLAN 1
Hgb 5.1 on admission. hgb improving s/p 3 units prbc  - Guaiac positive on admission, Dr. Del Toro (GI following)   - s/p EGD 4/26 mild diffuse bile gastritis with scattered antral ulcer/ erosions. Several non-bleeding clean based duodenal ulcers and moderate duodenitis.   - hgb stable.   d/c planning on protonix 40mg PO daily (x 6 months). f/u Dr. Del Toro in 3 weeks for biopsy results and possible colonoscopy.   Discussed with

## 2019-04-29 NOTE — DISCHARGE NOTE PROVIDER - CARE PROVIDER_API CALL
Vamshi Del Toro)  Gastroenterology  [address] [address] [place] [id number]  Phone: [phone]  Fax: [phone]  Follow Up Time:

## 2019-04-29 NOTE — PROGRESS NOTE ADULT - SUBJECTIVE AND OBJECTIVE BOX
[facility] NEPHROLOGY- PROGRESS NOTE    Patient is a 89yo Female with CKD-4, HTN, Arthritis, HLD a/w anemia, SHAUNA on CKD-4 and RLE cellulitis.   s/p 3 unit prbc.     Hospital Medications: Medications reviewed.  REVIEW OF SYSTEMS:  CONSTITUTIONAL: No fevers or chills  RESPIRATORY: No shortness of breath  CARDIOVASCULAR: No chest pain.  GASTROINTESTINAL: No nausea, vomiting, diarrhea or abdominal pain. No BM  VASCULAR: No bilateral lower extremity edema.     VITALS:  T(F): 98.2 (19 @ 05:55), Max: 99 (19 @ 14:51)  HR: 79 (19 @ 05:55)  BP: 156/60 (19 @ 05:55)  RR: 18 (19 @ 05:55)  SpO2: 95% (19 @ 05:55)  Wt(kg): --        PHYSICAL EXAM:  Constitutional: NAD  Neurological: Awake and Alert  HEENT: anicteric sclera,   Respiratory: CTA ant  Cardiovascular: S1, S2, +ROLLY  Gastrointestinal: BS+, soft, NT/ND  : No pearson.  Extremities: Trace b/l peripheral edema, +resolving RLE erythema/ warmth    LABS:      142  |  111<H>  |  14  ----------------------------<  98  4.1   |  25  |  1.14    Ca    8.2<L>      2019 07:09      Creatinine Trend: 1.14 <--, 1.19 <--, 1.34 <--, 1.62 <--, 2.10 <--, 2.49 <--                        9.6    4.83  )-----------( 229      ( 2019 07:09 )             31.2     Urine Studies:  Urinalysis Basic - ( 2019 16:46 )    Color: Yellow / Appearance: Slightly Turbid / S.010 / pH:   Gluc:  / Ketone: Negative  / Bili: Negative / Urobili: Negative   Blood:  / Protein: 15 / Nitrite: Negative   Leuk Esterase: Negative / RBC: 0-2 /HPF / WBC 3-5 /HPF   Sq Epi:  / Non Sq Epi: Few /HPF / Bacteria: Few /HPF

## 2019-04-29 NOTE — DISCHARGE NOTE NURSING/CASE MANAGEMENT/SOCIAL WORK - NSDCDPATPORTLINK_GEN_ALL_CORE
You can access the [facility] Patient Portal, offered by [facility], by registering with the following website: [email or web address]

## 2019-04-29 NOTE — PROGRESS NOTE ADULT - PROBLEM SELECTOR PROBLEM 3
SHAUNA (acute kidney injury)

## 2019-04-29 NOTE — PROGRESS NOTE ADULT - ATTENDING COMMENTS
[facility] NEPHROLOGY  Joe Barnes M.D.  Ari Lawton D.O.  Michaela Mai M.D.  aTmi Arshad, MSN, ANP-C  [phone]    [address] [place] [address]

## 2019-04-29 NOTE — DISCHARGE NOTE PROVIDER - NSDCCPCAREPLAN_GEN_ALL_CORE_FT
PRINCIPAL DISCHARGE DIAGNOSIS  Diagnosis: Gastritis and duodenitis  Assessment and Plan of Treatment: You presented with anemia. You received a total of 3 units of blood and IV iron. You had an endoscopy that showed gastritis and duodenitis, as well as gastric antral ulcers/erosions and duodenal ulcers. You received protonix. Your hemoglobin has been stable. You are medically stable for discharge home on protonix 40mg orally daily before breakfast. Please continue this medication for 6 months. Follow up with your primary doctor in 3 days, and a gastroenterologist in 3 months. The contact information for the gastroenterologist who saw you in the hospital is provided.      SECONDARY DISCHARGE DIAGNOSES  Diagnosis: Gastric ulcer  Assessment and Plan of Treatment: You presented with anemia. You received a total of 3 units of blood and IV iron. You had an endoscopy that showed gastritis and duodenitis, as well as gastric antral ulcers/erosions and duodenal ulcers. You received protonix. Your hemoglobin has been stable. You are medically stable for discharge home on protonix 40mg orally daily before breakfast. Please continue this medication for 6 months. Follow up with your primary doctor in 3 days, and a gastroenterologist in 3 months. The contact information for the gastroenterologist who saw you in the hospital is provided.    Diagnosis: Cellulitis  Assessment and Plan of Treatment: You were found to have a right leg cellulitis. You were started on IV antibiotics. Please continue taking keflex orally three times a day for 5 more days. Follow up with your primary doctor in 3 days.    Diagnosis: HTN (hypertension)  Assessment and Plan of Treatment: You have a history of hypertension. Your blood pressure meds were held because your blood pressure was low on admission. Please continue taking amlodipine 5mg daily. Hold your other BP meds for now including your metoprolol and lisinopril. Follow up with your primary doctor in 3 days in order to further address this.    Diagnosis: CKD (chronic kidney disease) stage 4, GFR 15-29 ml/min  Assessment and Plan of Treatment: You have a history of CKD, and you presented with acute kidney injury. This resolved with blood transfusions and was likely due to volume depletion. Please avoid nephrotoxic medications including NSAIDs.

## 2019-04-29 NOTE — PROGRESS NOTE ADULT - PROBLEM SELECTOR PLAN 3
SHAUNA on CKD4 likely due to hypovolemia- resolved s/p PRBC.   - Avoid nephrotoxins/NSAIDs/RCA  - Monitor BMP

## 2019-04-29 NOTE — PROGRESS NOTE ADULT - PROBLEM SELECTOR PLAN 2
Hgb improving s/p 3PRBC + venofer  - No longer symptomatic  - No active bleeding since admission  - C/w protonix   - GI Dr Del Toro  Monitor CBC

## 2019-08-23 NOTE — PATIENT PROFILE ADULT - NSPROGENBLOODRESTRICT_GEN_A_NUR
Patient:   BARRETT VASQUEZ            MRN: [id number]            FIN: [id number]              Age:   71 years     Sex:  FEMALE     :  48   Associated Diagnoses:   None   Author:   KRISTEN CAT     Chief Complaint: Progressive dyspnea, cough, wheezing, chest pain for few  days      History of present illness:  this is a case of a 71 years old female patient known to have COPD with chronic respiratory failure requiring 2 L of oxygene at home, HTN, HLD, and extensive smoking history presented for the above chief complaint. patient said that her symptoms started few days ago with progressive SOB and wheezing with a cough that is productive of sputum. She said that by that time the inhaler did not improve her symptoms. Her symptoms worsen with exertion,  and she also said that she had a chest pain that is central non radiating not associated with N/V, dizziness or headaches, fevers or chills.  The patient denied any recent sick contacts of recent travels    In the ED the patient was hemodynamically stable, not in acute distress, CXR was clear, EKG showed NSR, tops negative, probnp 802.    Review of Systems:  Constitutional: No fatigue  Skin: No rash  Eyes: No recent vision problems or eye pain    ENT: No congestion, ear pain, or sore throat  Endocrine: No thyroid problems    Cardiovascular: No chest pain  Respiratory: cough, shortness of breath, wheezing  Gastrointestinal: No nausea, no vomiting or diarrhea  Musculoskeletal: No joint swelling    Neurologic: No headache  Hematologic: No unusual bruising or bleeding  Psychiatric: No psychiatric problems, hallucinations or depression    Past medical history:  COPD - Chronic obstructive pulmonary disease - on 2 L oxygene at home  HLD  HTN (hypertension)      Past surgical history:   Colonoscopy  Hip block    Family history:   non contributary    Social history:  _   Alcohol  Details: Alcohol Abuse in Household: No.  Use: None.  If current Alcohol  user: More than 5 (M) or 4 (F) drinks within a couple of hours? No.  Details: Alcohol Abuse in Household: No.  Use: None.  Exercise  Details: Exercise: Never.  Sexual  Details: Sexual orientation: Straight or heterosexual.  Gender Identity: Identifies as female.  Gender on Ins: Female.  Preferred Pronouns: Female.  Substance Abuse  Details: Substance Abuse in Household: No.  Use: None.  Details: Substance Abuse in Household: No.  Use: None.  Tobacco  Details: Smoker in Houshold: Yes.  Smoked/Smokeless Tobacco Last 30 Days: Yes.  Smoking Tobacco Use: Current every day smoker.  Smokeless Tobacco Use Never.  Type: Cigarettes.  Cigarette Packs/Day: 5 or More Cigarettes <1 Pack Per Day.  Details: Smoked/Smokeless Tobacco Last 30 Days: Yes.  Smoking Tobacco Use: Current every day smoker.  Smokeless Tobacco Use Smokeless tobacco user within last 30 days.  Type: Cigarettes.  Year(s): 30.  Started Age: 20.0 Years.; Comment(s): pt started at age 20 then stopped then restarted again  Details: Smoked/Smokeless Tobacco Last 30 Days: Yes.  Use: Current every day smoker.  Type: Cigarettes.  Cultural/Lutheran Practices  Details: Lutheran or Cultural Practices: Catholic.  Lutheran or Cultural Practices While in Hospital: Yes.     Home Medications (10) Active  albuterol inhalation 0.083% 2.5 mg/3 mL solution (Proventil) 2.5 mg = 3 mL, PRN, Inhaled, Q6H  Anoro Ellipta inhaler oral 62.5-25 mcg/puff powder 1 puff, Inhaled, Daily  aspirin 81 mg oral tablet 81 mg = 1 tab, Oral, Daily  celecoxib oral 100 mg capsule 100 mg = 1 cap, Oral, Daily  hydrochlorothiazide-losartan 12.5 mg-50 mg oral tablet 1 tab, Oral, Daily  nicotine 21 mg/24 hr transdermal film, extended release 1 patch, TransDermal, Daily  predniSONE oral 20 mg tablet 40 mg = 2 tab, Oral, Daily  ProAir HFA 90 mcg/inh inhalation aerosol 2 puff, PRN, Inhaled, Q4H  rosuvastatin 40 mg oral tablet 40 mg = 1 tab, Oral, Daily  Toprol-XL (succinate) oral 25 mg tablet 25 mg = 1 tab,  Oral, Daily  Medications (13) Active  Scheduled: (10)  Aspirin 81 mg chew tab  81 mg 1 tab, Oral, Daily  Atorvastatin 80 mg tab  80 mg 1 tab, Oral, Daily  Azithromycin 250 mg tab  500 mg 2 tab, Oral, Daily  Enoxaparin 40 mg/0.4 mL syringe  40 mg 0.4 mL, Subcutaneous, Q Evening  Famotidine 20 mg tab  20 mg 1 tab, Oral, Q Bedtime  HydroCHLOROthiazide 12.5 mg tab  12.5 mg 1 tab, Oral, Daily  Losartan 50 mg tab  50 mg 1 tab, Oral, Daily  MethylPREDNISolone Na succ PF 40 mg/1 mL inj SDV  40 mg 1 mL, Slow IV Push, Q8H  Metoprolol succinate 25 mg XL tab  25 mg 1 tab, Oral, Daily  Nicotine 14 mg/24 hr daily ER patch  1 patch, TransDermal, Daily  Continuous: (0)  PRN: (3)  Albuterol 0.083% 2.5 mg/3 mL nebulizer soln  2.5 mg 3 mL, Inhaled, Q6H  Albuterol-ipratropium 2.5-0.5 mg/3 mL nebulizer soln  3 mL, Nebulizer, Q6H  Guaifenesin--20 mg/10 mL oral liquid UD  5 mL, Oral, Q4H     Allergies (1) Active Reaction  penicillins Facial swelling        Vitals between:   22-AUG-2019 11:52:21   TO   23-AUG-2019 11:52:21                   LAST RESULT MINIMUM MAXIMUM  Temperature 36.6 35.5 36.7  Heart Rate 83 82 96  Respiratory Rate 18 18 20  NISBP           121 121 177  NIDBP           73 70 90  NIMBP           89 89 112  SpO2                    99 89 99    Physical exam:  General: concious cooperative oriented not in distress  Head: atraumatic, normal  Eyes: Normal EOM, no erythema or discharge  Ears, nose, throat: normal no erythema or discharge  Neck: normal, atraumatic, no distention  Chest: decreased bilateral air entry with scattered wheezes that alternate between inspiratory and expiratory  Cardiac: regular S1 and S2, no murmurs  Abdomen: soft, non tender, non distended  Musculoskeletal: normal joints no swelling  Neurologic: AAO3, normal process of thoughts, normal CN 2-12  Vascular: pulses are good bilaterally  Skin: no rashes  Psychiatric: good mood and affect    Labs:     Labs between:  22-AUG-2019 11:52 to 23-AUG-2019  11:52    CBC:                 WBC  HgB  Hct  Plt  MCV  RDW   23-AUG-2019 5.7  (H) 17.8  (H) 56.7  230  91.9  14.9   22-AUG-2019 6.2  (H) 17.5  (H) 54.5  230  90.1  14.6     DIFF:                 Seg  Neutroph//ABS  Lymph//ABS  Mono//ABS  EOS/ABS  22-AUG-2019 NOT APPLICABLE  58 // 3.6 33 // 2.1 7 // 0.4 1 // 0.1    BMP:                 Na  Cl  BUN  Glu   23-AUG-2019 137  98  10  (H) 257                              K  CO2  Cr  Ca                              4.8  (H) 33  0.62  9.7   BMP:                 Na  Cl  BUN  Glu   22-AUG-2019 139  99  7  (H) 118                              K  CO2  Cr  Ca                              3.4  (H) 35  0.74  9.5     POC GLU:                 Latest Result  Latest Date  Minimum  Min Date  Maximum  Max Date                             (H) 219  23-AUG-2019 (H) 219  23-AUG-2019 (H) 223  23-AUG-2019                   Radiology:       Result title:  XR CHEST 2V  Result status:  Final  Verified by:  SHERIE, NIKKI SHEPHERD on 08/22/2019 0:54  FINDINGS:Stable mild cardiomegaly.  Aortic calcification.  Stable bilateral hilar prominence.  No focal consolidation, pleural effusions or detectable pneumothorax.  Degenerative changes in the cervical spine  IMPRESSION:No evidence of acute cardiopulmonary disease.         Assessment/Plan:   71 years old female with history of COPR and chronic respiratory failure on 2 L of O2 at home presented with COPD exacerbation      # COPD exacerbation  - History of COPD, On 2L Oxygene  - CXR negative  - Trops -ve, , Labs unremarkable    Plan   - Zithromax 500 mg  - Solumedrol 40 mg Q8  - Duoneb  - Robitussin    #HTN  -Contineu HCTZ and losartan and metoprolol    #extensive smoking history  -advising on importance of quitting  - Nicotine patch    CODE STATUS: FULL  DVT prophylaxis - SCD boots  Notified PCP, Primary Care Physician    Dispo: OBS [facility]  Physician Name:    Specialty :      No Consulting Physicians.   none

## 2019-08-29 NOTE — ED PROVIDER NOTE - OBJECTIVE STATEMENT
89 y/o F with a PMHx of HTN, Arthritis, CKD, HLD and no significant PSHx presents to ED due to a hemoglobin lab finding of 6.3. Pt     endorses increased tiredness but denies any other complaints. Specifically denies SOB, GI bleed symptoms, abd pain, vomiting, diarrhea. Pt lives alone. NKDA. Abdullahi

## 2019-12-20 NOTE — ED PROVIDER NOTE - GENITOURINARY [-], MLM
no dysuria Erivedge Counseling- I discussed with the patient the risks of Erivedge including but not limited to nausea, vomiting, diarrhea, constipation, weight loss, changes in the sense of taste, decreased appetite, muscle spasms, and hair loss.  The patient verbalized understanding of the proper use and possible adverse effects of Erivedge.  All of the patient's questions and concerns were addressed.

## 2020-01-20 ENCOUNTER — INPATIENT (INPATIENT)
Facility: HOSPITAL | Age: 85
LOS: 4 days | Discharge: HOME | DRG: 603 | End: 2020-01-25
Attending: INTERNAL MEDICINE | Admitting: INTERNAL MEDICINE
Payer: MEDICARE

## 2020-01-20 VITALS
DIASTOLIC BLOOD PRESSURE: 79 MMHG | SYSTOLIC BLOOD PRESSURE: 154 MMHG | RESPIRATION RATE: 18 BRPM | HEIGHT: 60 IN | OXYGEN SATURATION: 98 % | HEART RATE: 100 BPM | WEIGHT: 169.98 LBS | TEMPERATURE: 98 F

## 2020-01-20 DIAGNOSIS — L03.90 CELLULITIS, UNSPECIFIED: ICD-10-CM

## 2020-01-20 DIAGNOSIS — N17.9 ACUTE KIDNEY FAILURE, UNSPECIFIED: ICD-10-CM

## 2020-01-20 DIAGNOSIS — R60.0 LOCALIZED EDEMA: ICD-10-CM

## 2020-01-20 DIAGNOSIS — Z98.890 OTHER SPECIFIED POSTPROCEDURAL STATES: Chronic | ICD-10-CM

## 2020-01-20 DIAGNOSIS — I35.0 NONRHEUMATIC AORTIC (VALVE) STENOSIS: ICD-10-CM

## 2020-01-20 DIAGNOSIS — Z29.9 ENCOUNTER FOR PROPHYLACTIC MEASURES, UNSPECIFIED: ICD-10-CM

## 2020-01-20 DIAGNOSIS — I10 ESSENTIAL (PRIMARY) HYPERTENSION: ICD-10-CM

## 2020-01-20 DIAGNOSIS — Z71.89 OTHER SPECIFIED COUNSELING: ICD-10-CM

## 2020-01-20 DIAGNOSIS — T50.905A ADVERSE EFFECT OF UNSPECIFIED DRUGS, MEDICAMENTS AND BIOLOGICAL SUBSTANCES, INITIAL ENCOUNTER: ICD-10-CM

## 2020-01-20 PROBLEM — M19.90 UNSPECIFIED OSTEOARTHRITIS, UNSPECIFIED SITE: Chronic | Status: ACTIVE | Noted: 2019-04-24

## 2020-01-20 LAB
ACETONE SERPL-MCNC: NEGATIVE — SIGNIFICANT CHANGE UP
ALBUMIN SERPL ELPH-MCNC: 3.6 G/DL — SIGNIFICANT CHANGE UP (ref 3.5–5)
ALP SERPL-CCNC: 70 U/L — SIGNIFICANT CHANGE UP (ref 40–120)
ALT FLD-CCNC: 22 U/L DA — SIGNIFICANT CHANGE UP (ref 10–60)
ANION GAP SERPL CALC-SCNC: 8 MMOL/L — SIGNIFICANT CHANGE UP (ref 5–17)
APTT BLD: 29.5 SEC — SIGNIFICANT CHANGE UP (ref 27.5–36.3)
AST SERPL-CCNC: 20 U/L — SIGNIFICANT CHANGE UP (ref 10–40)
BASOPHILS # BLD AUTO: 0.04 K/UL — SIGNIFICANT CHANGE UP (ref 0–0.2)
BASOPHILS NFR BLD AUTO: 0.5 % — SIGNIFICANT CHANGE UP (ref 0–2)
BILIRUB SERPL-MCNC: 0.6 MG/DL — SIGNIFICANT CHANGE UP (ref 0.2–1.2)
BUN SERPL-MCNC: 26 MG/DL — HIGH (ref 7–18)
CALCIUM SERPL-MCNC: 9.1 MG/DL — SIGNIFICANT CHANGE UP (ref 8.4–10.5)
CHLORIDE SERPL-SCNC: 102 MMOL/L — SIGNIFICANT CHANGE UP (ref 96–108)
CK SERPL-CCNC: 50 U/L — SIGNIFICANT CHANGE UP (ref 21–215)
CO2 SERPL-SCNC: 26 MMOL/L — SIGNIFICANT CHANGE UP (ref 22–31)
CREAT SERPL-MCNC: 1.31 MG/DL — HIGH (ref 0.5–1.3)
EOSINOPHIL # BLD AUTO: 0.22 K/UL — SIGNIFICANT CHANGE UP (ref 0–0.5)
EOSINOPHIL NFR BLD AUTO: 2.6 % — SIGNIFICANT CHANGE UP (ref 0–6)
ERYTHROCYTE [SEDIMENTATION RATE] IN BLOOD: 73 MM/HR — HIGH (ref 0–20)
GLUCOSE SERPL-MCNC: 113 MG/DL — HIGH (ref 70–99)
HCT VFR BLD CALC: 34.4 % — LOW (ref 34.5–45)
HGB BLD-MCNC: 11.2 G/DL — LOW (ref 11.5–15.5)
IMM GRANULOCYTES NFR BLD AUTO: 0.1 % — SIGNIFICANT CHANGE UP (ref 0–1.5)
INR BLD: 0.97 RATIO — SIGNIFICANT CHANGE UP (ref 0.88–1.16)
LACTATE SERPL-SCNC: 1.7 MMOL/L — SIGNIFICANT CHANGE UP (ref 0.7–2)
LYMPHOCYTES # BLD AUTO: 2.38 K/UL — SIGNIFICANT CHANGE UP (ref 1–3.3)
LYMPHOCYTES # BLD AUTO: 28 % — SIGNIFICANT CHANGE UP (ref 13–44)
MAGNESIUM SERPL-MCNC: 1.9 MG/DL — SIGNIFICANT CHANGE UP (ref 1.6–2.6)
MCHC RBC-ENTMCNC: 32.6 GM/DL — SIGNIFICANT CHANGE UP (ref 32–36)
MCHC RBC-ENTMCNC: 32.7 PG — SIGNIFICANT CHANGE UP (ref 27–34)
MCV RBC AUTO: 100.3 FL — HIGH (ref 80–100)
MONOCYTES # BLD AUTO: 0.98 K/UL — HIGH (ref 0–0.9)
MONOCYTES NFR BLD AUTO: 11.5 % — SIGNIFICANT CHANGE UP (ref 2–14)
NEUTROPHILS # BLD AUTO: 4.88 K/UL — SIGNIFICANT CHANGE UP (ref 1.8–7.4)
NEUTROPHILS NFR BLD AUTO: 57.3 % — SIGNIFICANT CHANGE UP (ref 43–77)
NRBC # BLD: 0 /100 WBCS — SIGNIFICANT CHANGE UP (ref 0–0)
NT-PROBNP SERPL-SCNC: 1158 PG/ML — HIGH (ref 0–450)
PLATELET # BLD AUTO: 285 K/UL — SIGNIFICANT CHANGE UP (ref 150–400)
POTASSIUM SERPL-MCNC: 4.4 MMOL/L — SIGNIFICANT CHANGE UP (ref 3.5–5.3)
POTASSIUM SERPL-SCNC: 4.4 MMOL/L — SIGNIFICANT CHANGE UP (ref 3.5–5.3)
PROT SERPL-MCNC: 8 G/DL — SIGNIFICANT CHANGE UP (ref 6–8.3)
PROTHROM AB SERPL-ACNC: 10.7 SEC — SIGNIFICANT CHANGE UP (ref 10–12.9)
RBC # BLD: 3.43 M/UL — LOW (ref 3.8–5.2)
RBC # FLD: 11.9 % — SIGNIFICANT CHANGE UP (ref 10.3–14.5)
SODIUM SERPL-SCNC: 136 MMOL/L — SIGNIFICANT CHANGE UP (ref 135–145)
TROPONIN I SERPL-MCNC: <0.015 NG/ML — SIGNIFICANT CHANGE UP (ref 0–0.04)
WBC # BLD: 8.51 K/UL — SIGNIFICANT CHANGE UP (ref 3.8–10.5)
WBC # FLD AUTO: 8.51 K/UL — SIGNIFICANT CHANGE UP (ref 3.8–10.5)

## 2020-01-20 PROCEDURE — 99285 EMERGENCY DEPT VISIT HI MDM: CPT

## 2020-01-20 PROCEDURE — 71045 X-RAY EXAM CHEST 1 VIEW: CPT | Mod: 26

## 2020-01-20 PROCEDURE — 99223 1ST HOSP IP/OBS HIGH 75: CPT

## 2020-01-20 RX ORDER — ACETAMINOPHEN 500 MG
650 TABLET ORAL EVERY 6 HOURS
Refills: 0 | Status: DISCONTINUED | OUTPATIENT
Start: 2020-01-20 | End: 2020-01-25

## 2020-01-20 RX ORDER — ASPIRIN/CALCIUM CARB/MAGNESIUM 324 MG
1 TABLET ORAL
Qty: 0 | Refills: 0 | DISCHARGE

## 2020-01-20 RX ORDER — VANCOMYCIN HCL 1 G
1000 VIAL (EA) INTRAVENOUS DAILY
Refills: 0 | Status: DISCONTINUED | OUTPATIENT
Start: 2020-01-21 | End: 2020-01-25

## 2020-01-20 RX ORDER — PIPERACILLIN AND TAZOBACTAM 4; .5 G/20ML; G/20ML
3.38 INJECTION, POWDER, LYOPHILIZED, FOR SOLUTION INTRAVENOUS ONCE
Refills: 0 | Status: COMPLETED | OUTPATIENT
Start: 2020-01-20 | End: 2020-01-20

## 2020-01-20 RX ORDER — FUROSEMIDE 40 MG
20 TABLET ORAL DAILY
Refills: 0 | Status: DISCONTINUED | OUTPATIENT
Start: 2020-01-20 | End: 2020-01-21

## 2020-01-20 RX ORDER — ATORVASTATIN CALCIUM 80 MG/1
20 TABLET, FILM COATED ORAL AT BEDTIME
Refills: 0 | Status: DISCONTINUED | OUTPATIENT
Start: 2020-01-20 | End: 2020-01-25

## 2020-01-20 RX ORDER — HEPARIN SODIUM 5000 [USP'U]/ML
5000 INJECTION INTRAVENOUS; SUBCUTANEOUS EVERY 12 HOURS
Refills: 0 | Status: DISCONTINUED | OUTPATIENT
Start: 2020-01-20 | End: 2020-01-25

## 2020-01-20 RX ADMIN — PIPERACILLIN AND TAZOBACTAM 200 GRAM(S): 4; .5 INJECTION, POWDER, LYOPHILIZED, FOR SOLUTION INTRAVENOUS at 18:00

## 2020-01-20 NOTE — H&P ADULT - PROBLEM SELECTOR PLAN 3
-Patient has hx of severe aortic stenosis, not symptomatic  -Will get repeat ECHO  -cardiology consult -Patient has hx of severe aortic stenosis, not symptomatic  -Will get repeat ECHO  -cardiology consult Dr Gama

## 2020-01-20 NOTE — H&P ADULT - ATTENDING COMMENTS
Vital Signs Last 24 Hrs  T(C): 37.2 (20 Jan 2020 20:19), Max: 37.2 (20 Jan 2020 20:19)  T(F): 99 (20 Jan 2020 20:19), Max: 99 (20 Jan 2020 20:19)  HR: 98 (20 Jan 2020 20:19) (98 - 100)  BP: 135/74 (20 Jan 2020 20:19) (135/74 - 154/79)  BP(mean): --  RR: 18 (20 Jan 2020 20:19) (18 - 18)  SpO2: 99% (20 Jan 2020 20:19) (98% - 99%) Patient seen and examined ; case was discussed with the admitting resident    ROS: as in the HPI; all other ROS negative    SH and family history as above    Vital Signs Last 24 Hrs  T(C): 36.4 (21 Jan 2020 00:23), Max: 37.2 (20 Jan 2020 20:19)  T(F): 97.6 (21 Jan 2020 00:23), Max: 99 (20 Jan 2020 20:19)  HR: 80 (21 Jan 2020 00:23) (80 - 100)  BP: 153/67 (21 Jan 2020 00:23) (135/74 - 154/79)  BP(mean): --  RR: 18 (21 Jan 2020 00:23) (18 - 18)  SpO2: 99% (21 Jan 2020 00:23) (98% - 99%)    GEN: NAD  HEENT- normocephalic; mouth moist  CVS- S1S2+, IV/VI ROLLY loudest at RSB   LUNGS- clear to auscultation; no wheezing  ABD: Soft , nontender, nondistended, Bowel sounds are present  EXTREMITY: no calf tenderness, no cyanosis, ++edema, with overlying cellulitis  NEURO: AAOx3; non focal neurologic exam; cranial nerves grossly intact  PSYCH: normal affect and behavior  BACK: no swelling or mass;   VASCULAR: ++ distal peripheral pulses  SKIN: warm and dry, morbilliform eruption over back, legs, arms       Labs Reviewed:                         11.2   8.51  )-----------( 285      ( 20 Jan 2020 18:01 )             34.4     01-20    136  |  102  |  26<H>  ----------------------------<  113<H>  4.4   |  26  |  1.31<H>    Ca    9.1      20 Jan 2020 18:01  Mg     1.9     01-20    TPro  8.0  /  Alb  3.6  /  TBili  0.6  /  DBili  x   /  AST  20  /  ALT  22  /  AlkPhos  70  01-20    CARDIAC MARKERS ( 20 Jan 2020 18:01 )  <0.015 ng/mL / x     / 50 U/L / x     / x            PT/INR - ( 20 Jan 2020 18:01 )   PT: 10.7 sec;   INR: 0.97 ratio         PTT - ( 20 Jan 2020 18:01 )  PTT:29.5 sec  BNP: Serum Pro-Brain Natriuretic Peptide: 1158 pg/mL (01-20 @ 18:01)    MEDICATIONS  (STANDING):  atorvastatin 20 milliGRAM(s) Oral at bedtime  furosemide   Injectable 20 milliGRAM(s) IV Push daily  heparin  Injectable 5000 Unit(s) SubCutaneous every 12 hours  vancomycin  IVPB 1000 milliGRAM(s) IV Intermittent daily    MEDICATIONS  (PRN):  acetaminophen   Tablet .. 650 milliGRAM(s) Oral every 6 hours PRN Moderate Pain (4 - 6)    CXR reviewed  EKG Reviewed    89 y/o F with severe AS, CKD, HTN, arthritis, RA admitted with drug rash, cellulitis after failed outpatient tx.    1. Cellulitis with failed po tx- cont vancomycin, has tolerated cefazolin and zosyn on prev admissions, f/u blood cx, small dose IV lasix x1   2. Severe AS- noted to have new murmur at urgent care, although prev echo shows severe AS. She denies sx of AS but has worsening LE edema. Will check echo, appreciate cardiology consultation.   3. Morbilliform drug eruption- cetirizine added, not with ext pruritis, augmentin added to allergy profile. Would avoid hydroxyzine, benadryl due to anticholinergic properties  4. CKDIII- appears close to baseline renal fn based off prev labs, although patient was hospitalized at the time with anemia, and cellulitis. Avoid nephrotoxins, renally dose medications. Appreciate nephrology consultation.   5. Macrocytic anemia- check spep, b12, folate, anemia panel   6. PUD- cont ppi   7. Arthritis- cont apap, unclear diagnosis of RA, has not seen a rheumatologist, no active synovitis on exam.         Plan of care discussed with patient ;  all questions and concerns were addressed.  Discussed with Patient's family

## 2020-01-20 NOTE — H&P ADULT - HISTORY OF PRESENT ILLNESS
89 year old female patient from home, ambulates with help of walker with PMH significant for HTN, HLD, CKD, Severe AS, Arthritis presents to ED with c/o bilateral lower extremity edema and cellulitis. As per the son, patient's legs are usually swollen but for the last 2 weeks she was noticed to have increased swelling and redness. She went to her doctor who prescribed her amoxicillin-clavulanic acid, 4 days in to the therapy patient developed a rash  on her back and extremities  which was itchy. He also added that patient's leg erythema also worsened with new scaling. Patient denies facial/lip swelling, shortness of breath, fever, chills, chest pain, syncopal episode, orthopnea, PND, prior episodes of similar complaints.  In ED, she was given zosyn and levofloxacin.

## 2020-01-20 NOTE — H&P ADULT - ASSESSMENT
89 year old female patient from home, ambulates with help of walker with PMH significant for HTN, HLD, CKD, Severe AS, Arthritis presents to ED with c/o bilateral lower extremity edema and cellulitis. She was given amoxicillin- clavulanic acid with no improvement and developed maculopapular rash after using it.     Patient is admitted for cellulitis failed out-patient therapy.

## 2020-01-20 NOTE — ED PROVIDER NOTE - OBJECTIVE STATEMENT
89 year old female with PMHx of HLD, HTN, and RA sent to the ED with bilateral lower extremity edema and cellulitis, mild hypoxia, and new onset of heart murmur. As per son, legs are normally swollen due to her RA but for the past 2 weeks, the swelling has been worse along with redness. Patient is able to ambulate with a walker. Patient was seen by a podiatrist for cellulitis and was given antibiotics for 8 days. As per son, patient began to experience a rash on her back that is itchy after taking treatment. Patient denies fever, leg pain, shortness of breath, chest pain, loss of appetite, or any other acute complaints. NKDA. 89 year old female with PMHx of HLD, HTN, and RA sent to the ED with bilateral lower extremity edema and cellulitis, mild hypoxia, and new onset of heart murmur. As per son, legs are normally swollen due to her RA but for the past 2 weeks, the swelling has been worse along with redness. Patient is able to ambulate with a walker. Patient was seen by a podiatrist for cellulitis and was given antibiotics for 8 days. As per son, patient began to experience a rash on her back that is itchy after taking treatment. Patient denies fever, leg pain, shortness of breath, chest pain, loss of appetite, or any other acute complaints. NKDA.  Pt went to  found O2 sat 93% with heart murmur,

## 2020-01-20 NOTE — H&P ADULT - PROBLEM SELECTOR PLAN 1
-Patient has chronic venous stasis with super imposed cellulitis b/l lower extremities   -No fever or WBC count, failed out-patient oral abx   -Given allergic reaction to amoxicillin, will avoid penicillins or cephalosporin having risk of cross reactivity  -Will treat with IV vancomycin   -Follow blood cultures  -ID consult Dr Kan

## 2020-01-20 NOTE — ED PROVIDER NOTE - MUSCULOSKELETAL, MLM
Spine appears normal, range of motion is not limited, no muscle or joint tenderness, deisy legs-edematous, erythematous, excoriation, hot to touch

## 2020-01-20 NOTE — H&P ADULT - NSHPPHYSICALEXAM_GEN_ALL_CORE
CONSTITUTIONAL: Well appearing, well nourished, awake, alert and in no apparent distress  ENMT: Airway patent, Nasal mucosa clear. Mouth with normal mucosa. Throat has no vesicles, no oropharyngeal exudates and uvula is midline.  EYES: Clear bilaterally, pupils equal, round and reactive to light. EOMI.  CARDIAC: Normal rate, regular rhythm.  Heart sounds S1, S2.  pan systolic murmur at A2   RESPIRATORY: Breath sounds clear and equal bilaterally. No wheezes, rhales or rhonchi  MUSCULOSKELETAL: Spine appears normal, range of motion is not limited, no muscle or joint tenderness  EXTREMITIES: B/l edema with erythema and scaling, no purulent discharge   NEUROLOGICAL: Alert and oriented, no focal deficits, no motor or sensory deficits.  SKIN: maculopapular rash on her extremities and back and buttocks

## 2020-01-20 NOTE — ED ADULT NURSE NOTE - OBJECTIVE STATEMENT
Pt came in for b/l redness and swelling +3 that has gotten worse in the past week, was treated with antibiotics by podiatry, failed treatment. Pt also has b/l arm and trunk rash with pruritis which she noticed 3 days ago, rash is itchy.

## 2020-01-20 NOTE — H&P ADULT - PROBLEM SELECTOR PLAN 4
-Patient has hx of CKD stage 3   -Baseline around 1.34 in April' 2019 which is stable   -Nephro Dr Barnes

## 2020-01-20 NOTE — H&P ADULT - PROBLEM SELECTOR PLAN 7
GOC were discussed with patient's son. He said patient is DNI but not DNR. He was explained that patient cannot be DNI but not DNR. He said,  patient had GOC addressed during prior admission and will remain as discussed before, however no records seen.  Full code for now .

## 2020-01-20 NOTE — ED ADULT NURSE NOTE - NSIMPLEMENTINTERV_GEN_ALL_ED
Implemented All Fall with Harm Risk Interventions:  [place] to call system. Call bell, personal items and telephone within reach. Instruct patient to call for assistance. Room bathroom lighting operational. Non-slip footwear when patient is off stretcher. Physically safe environment: no spills, clutter or unnecessary equipment. Stretcher in lowest position, wheels locked, appropriate side rails in place. Provide visual cue, wrist band, yellow gown, etc. Monitor gait and stability. Monitor for mental status changes and reorient to person, place, and time. Review medications for side effects contributing to fall risk. Reinforce activity limits and safety measures with patient and family. Provide visual clues: red socks.

## 2020-01-20 NOTE — H&P ADULT - PROBLEM SELECTOR PLAN 2
-Patient p/w maculopapular rash after 4 days after taking amoxicillin.   -C/w loratadine, will avoid benadryl given anti-cholinergic effects

## 2020-01-20 NOTE — ED PROVIDER NOTE - CARE PLAN
Principal Discharge DX:	Leg edema  Secondary Diagnosis:	Cellulitis  Secondary Diagnosis:	Allergic reaction  Secondary Diagnosis:	Renal insufficiency

## 2020-01-20 NOTE — H&P ADULT - PROBLEM SELECTOR PLAN 6
IMPROVE VTE score:  [ ] Previous VTE                                                3  [ ] Thrombophilia                                             2  [ ] Lower limb paralysis                                  2        (unable to hold up >15 seconds)    [ ] Current Cancer (within 6 months)            2   x[] Immobilization > 24 hrs                              1  [ ] ICU/CCU stay > 24 hours                            1  []x Age > 60                                                         1  Heparin SC

## 2020-01-20 NOTE — ED PROVIDER NOTE - ENMT, MLM
Airway patent, Nasal mucosa clear. Mouth with normal mucosa. Throat has no vesicles, no oropharyngeal exudates and uvula is midline.  [place]

## 2020-01-20 NOTE — H&P ADULT - PROBLEM SELECTOR PLAN 5
-Patient has  hx of HTN   -She is on amlodipine, will hold given extensive leg swelling   -C/w Lasix 20 IV daily to help with limb edema

## 2020-01-21 DIAGNOSIS — N18.3 CHRONIC KIDNEY DISEASE, STAGE 3 (MODERATE): ICD-10-CM

## 2020-01-21 DIAGNOSIS — Z02.9 ENCOUNTER FOR ADMINISTRATIVE EXAMINATIONS, UNSPECIFIED: ICD-10-CM

## 2020-01-21 LAB
% ALBUMIN: 51.2 % — SIGNIFICANT CHANGE UP
% ALPHA 1: 5.1 % — SIGNIFICANT CHANGE UP
% ALPHA 2: 12.9 % — SIGNIFICANT CHANGE UP
% BETA: 12.5 % — SIGNIFICANT CHANGE UP
% GAMMA: 18.3 % — SIGNIFICANT CHANGE UP
ALBUMIN SERPL ELPH-MCNC: 2.9 G/DL — LOW (ref 3.5–5)
ALBUMIN SERPL ELPH-MCNC: 3.1 G/DL — LOW (ref 3.6–5.5)
ALBUMIN/GLOB SERPL ELPH: 1.1 RATIO — SIGNIFICANT CHANGE UP
ALP SERPL-CCNC: 59 U/L — SIGNIFICANT CHANGE UP (ref 40–120)
ALPHA1 GLOB SERPL ELPH-MCNC: 0.3 G/DL — SIGNIFICANT CHANGE UP (ref 0.1–0.4)
ALPHA2 GLOB SERPL ELPH-MCNC: 0.8 G/DL — SIGNIFICANT CHANGE UP (ref 0.5–1)
ALT FLD-CCNC: 16 U/L DA — SIGNIFICANT CHANGE UP (ref 10–60)
ANION GAP SERPL CALC-SCNC: 5 MMOL/L — SIGNIFICANT CHANGE UP (ref 5–17)
APPEARANCE UR: ABNORMAL
AST SERPL-CCNC: 16 U/L — SIGNIFICANT CHANGE UP (ref 10–40)
B-GLOBULIN SERPL ELPH-MCNC: 0.8 G/DL — SIGNIFICANT CHANGE UP (ref 0.5–1)
BACTERIA # UR AUTO: ABNORMAL /HPF
BASOPHILS # BLD AUTO: 0.02 K/UL — SIGNIFICANT CHANGE UP (ref 0–0.2)
BASOPHILS NFR BLD AUTO: 0.4 % — SIGNIFICANT CHANGE UP (ref 0–2)
BILIRUB SERPL-MCNC: 0.8 MG/DL — SIGNIFICANT CHANGE UP (ref 0.2–1.2)
BILIRUB UR-MCNC: NEGATIVE — SIGNIFICANT CHANGE UP
BUN SERPL-MCNC: 18 MG/DL — SIGNIFICANT CHANGE UP (ref 7–18)
CALCIUM SERPL-MCNC: 8.6 MG/DL — SIGNIFICANT CHANGE UP (ref 8.4–10.5)
CHLORIDE SERPL-SCNC: 105 MMOL/L — SIGNIFICANT CHANGE UP (ref 96–108)
CHOLEST SERPL-MCNC: 122 MG/DL — SIGNIFICANT CHANGE UP (ref 10–199)
CO2 SERPL-SCNC: 30 MMOL/L — SIGNIFICANT CHANGE UP (ref 22–31)
COLOR SPEC: YELLOW — SIGNIFICANT CHANGE UP
COMMENT - URINE: SIGNIFICANT CHANGE UP
CREAT SERPL-MCNC: 1.21 MG/DL — SIGNIFICANT CHANGE UP (ref 0.5–1.3)
DIFF PNL FLD: NEGATIVE — SIGNIFICANT CHANGE UP
EOSINOPHIL # BLD AUTO: 0.17 K/UL — SIGNIFICANT CHANGE UP (ref 0–0.5)
EOSINOPHIL NFR BLD AUTO: 3.7 % — SIGNIFICANT CHANGE UP (ref 0–6)
EPI CELLS # UR: ABNORMAL /HPF
FERRITIN SERPL-MCNC: 293 NG/ML — HIGH (ref 15–150)
FOLATE SERPL-MCNC: 15.9 NG/ML — SIGNIFICANT CHANGE UP
GAMMA GLOBULIN: 1.1 G/DL — SIGNIFICANT CHANGE UP (ref 0.6–1.6)
GLUCOSE SERPL-MCNC: 140 MG/DL — HIGH (ref 70–99)
GLUCOSE UR QL: NEGATIVE — SIGNIFICANT CHANGE UP
HBA1C BLD-MCNC: 5.3 % — SIGNIFICANT CHANGE UP (ref 4–5.6)
HCT VFR BLD CALC: 29.7 % — LOW (ref 34.5–45)
HDLC SERPL-MCNC: 57 MG/DL — SIGNIFICANT CHANGE UP
HGB BLD-MCNC: 9.7 G/DL — LOW (ref 11.5–15.5)
IMM GRANULOCYTES NFR BLD AUTO: 0.4 % — SIGNIFICANT CHANGE UP (ref 0–1.5)
IRON SATN MFR SERPL: 30 % — SIGNIFICANT CHANGE UP (ref 15–50)
IRON SATN MFR SERPL: 55 UG/DL — SIGNIFICANT CHANGE UP (ref 40–150)
KETONES UR-MCNC: NEGATIVE — SIGNIFICANT CHANGE UP
LEUKOCYTE ESTERASE UR-ACNC: NEGATIVE — SIGNIFICANT CHANGE UP
LIPID PNL WITH DIRECT LDL SERPL: 50 MG/DL — SIGNIFICANT CHANGE UP
LYMPHOCYTES # BLD AUTO: 1.18 K/UL — SIGNIFICANT CHANGE UP (ref 1–3.3)
LYMPHOCYTES # BLD AUTO: 25.7 % — SIGNIFICANT CHANGE UP (ref 13–44)
MAGNESIUM SERPL-MCNC: 1.8 MG/DL — SIGNIFICANT CHANGE UP (ref 1.6–2.6)
MCHC RBC-ENTMCNC: 32.7 GM/DL — SIGNIFICANT CHANGE UP (ref 32–36)
MCHC RBC-ENTMCNC: 32.7 PG — SIGNIFICANT CHANGE UP (ref 27–34)
MCV RBC AUTO: 100 FL — SIGNIFICANT CHANGE UP (ref 80–100)
MONOCYTES # BLD AUTO: 0.62 K/UL — SIGNIFICANT CHANGE UP (ref 0–0.9)
MONOCYTES NFR BLD AUTO: 13.5 % — SIGNIFICANT CHANGE UP (ref 2–14)
NEUTROPHILS # BLD AUTO: 2.59 K/UL — SIGNIFICANT CHANGE UP (ref 1.8–7.4)
NEUTROPHILS NFR BLD AUTO: 56.3 % — SIGNIFICANT CHANGE UP (ref 43–77)
NITRITE UR-MCNC: NEGATIVE — SIGNIFICANT CHANGE UP
NRBC # BLD: 0 /100 WBCS — SIGNIFICANT CHANGE UP (ref 0–0)
PH UR: 6.5 — SIGNIFICANT CHANGE UP (ref 5–8)
PHOSPHATE SERPL-MCNC: 3.2 MG/DL — SIGNIFICANT CHANGE UP (ref 2.5–4.5)
PLATELET # BLD AUTO: 213 K/UL — SIGNIFICANT CHANGE UP (ref 150–400)
POTASSIUM SERPL-MCNC: 3.5 MMOL/L — SIGNIFICANT CHANGE UP (ref 3.5–5.3)
POTASSIUM SERPL-SCNC: 3.5 MMOL/L — SIGNIFICANT CHANGE UP (ref 3.5–5.3)
PROT PATTERN SERPL ELPH-IMP: SIGNIFICANT CHANGE UP
PROT SERPL-MCNC: 6 G/DL — SIGNIFICANT CHANGE UP (ref 6–8.3)
PROT SERPL-MCNC: 6 G/DL — SIGNIFICANT CHANGE UP (ref 6–8.3)
PROT SERPL-MCNC: 6.6 G/DL — SIGNIFICANT CHANGE UP (ref 6–8.3)
PROT UR-MCNC: 30 MG/DL
RBC # BLD: 2.97 M/UL — LOW (ref 3.8–5.2)
RBC # FLD: 11.9 % — SIGNIFICANT CHANGE UP (ref 10.3–14.5)
SODIUM SERPL-SCNC: 140 MMOL/L — SIGNIFICANT CHANGE UP (ref 135–145)
SP GR SPEC: 1.01 — SIGNIFICANT CHANGE UP (ref 1.01–1.02)
TIBC SERPL-MCNC: 183 UG/DL — LOW (ref 250–450)
TOTAL CHOLESTEROL/HDL RATIO MEASUREMENT: 2.1 RATIO — LOW (ref 3.3–7.1)
TRIGL SERPL-MCNC: 77 MG/DL — SIGNIFICANT CHANGE UP (ref 10–149)
TSH SERPL-MCNC: 4.6 UU/ML — SIGNIFICANT CHANGE UP (ref 0.34–4.82)
UIBC SERPL-MCNC: 128 UG/DL — SIGNIFICANT CHANGE UP (ref 110–370)
UROBILINOGEN FLD QL: NEGATIVE — SIGNIFICANT CHANGE UP
VIT B12 SERPL-MCNC: 639 PG/ML — SIGNIFICANT CHANGE UP (ref 232–1245)
WBC # BLD: 4.6 K/UL — SIGNIFICANT CHANGE UP (ref 3.8–10.5)
WBC # FLD AUTO: 4.6 K/UL — SIGNIFICANT CHANGE UP (ref 3.8–10.5)
WBC UR QL: SIGNIFICANT CHANGE UP /HPF (ref 0–5)

## 2020-01-21 PROCEDURE — 99233 SBSQ HOSP IP/OBS HIGH 50: CPT | Mod: GC

## 2020-01-21 RX ORDER — FUROSEMIDE 40 MG
40 TABLET ORAL DAILY
Refills: 0 | Status: DISCONTINUED | OUTPATIENT
Start: 2020-01-22 | End: 2020-01-22

## 2020-01-21 RX ORDER — LORATADINE 10 MG/1
10 TABLET ORAL DAILY
Refills: 0 | Status: DISCONTINUED | OUTPATIENT
Start: 2020-01-21 | End: 2020-01-25

## 2020-01-21 RX ORDER — FUROSEMIDE 40 MG
20 TABLET ORAL ONCE
Refills: 0 | Status: COMPLETED | OUTPATIENT
Start: 2020-01-21 | End: 2020-01-21

## 2020-01-21 RX ADMIN — Medication 250 MILLIGRAM(S): at 13:02

## 2020-01-21 RX ADMIN — Medication 20 MILLIGRAM(S): at 17:01

## 2020-01-21 RX ADMIN — Medication 125 MILLIGRAM(S): at 17:06

## 2020-01-21 RX ADMIN — Medication 20 MILLIGRAM(S): at 05:39

## 2020-01-21 RX ADMIN — ATORVASTATIN CALCIUM 20 MILLIGRAM(S): 80 TABLET, FILM COATED ORAL at 22:24

## 2020-01-21 RX ADMIN — Medication 650 MILLIGRAM(S): at 17:55

## 2020-01-21 RX ADMIN — HEPARIN SODIUM 5000 UNIT(S): 5000 INJECTION INTRAVENOUS; SUBCUTANEOUS at 17:06

## 2020-01-21 RX ADMIN — HEPARIN SODIUM 5000 UNIT(S): 5000 INJECTION INTRAVENOUS; SUBCUTANEOUS at 05:39

## 2020-01-21 RX ADMIN — LORATADINE 10 MILLIGRAM(S): 10 TABLET ORAL at 13:02

## 2020-01-21 NOTE — CONSULT NOTE ADULT - ASSESSMENT
Patient is a 89yo Female with CKD-3, HTN, HLD, Severe AS, Arthritis presents with bilateral lower extremity edema. Pt a/w b/l cellulitis. Nephrology consulted for Elevated serum creatinine.      1. CKD-3- renal function at baseline- SCr 1.1-1.3. Avoid Nephrotoxins. Monitor lytes.   2. b/L LE cellulitis- pt on Vanco. Monitor Vanco trough. ID following  3. LE edema- in the setting of cellulitis. Recc Lasix 20 mg IV daily. Avoid overdiuresis.  Strict I/Os. Monitor urine o/p. Daily weights.   4. Aortic Stenosis- Severe based on last TTE. avoid overdiuresis. Plan as per primary team.

## 2020-01-21 NOTE — PROGRESS NOTE ADULT - PROBLEM SELECTOR PLAN 1
H/o chronic venous stasis  Afebrile  WBC 4.6  Continue with Vancomycin (day 1)  Blood cultures pending   Dr. Kan (ID) following

## 2020-01-21 NOTE — CONSULT NOTE ADULT - SUBJECTIVE AND OBJECTIVE BOX
CHIEF COMPLAINT:    HPI:HPI:  89 year old female patient from home, ambulates with help of walker with PMH significant for HTN, HLD, CKD, Severe AS, Arthritis presents to ED with c/o bilateral lower extremity edema and cellulitis. As per the son, patient's legs are usually swollen but for the last 2 weeks she was noticed to have increased swelling and redness. She went to her doctor who prescribed her amoxicillin-clavulanic acid, 4 days in to the therapy patient developed a rash  on her back and extremities  which was itchy. He also added that patient's leg erythema also worsened with new scaling. Patient denies facial/lip swelling, shortness of breath, fever, chills, chest pain, syncopal episode, orthopnea, PND, prior episodes of similar complaints.  In ED, she was given zosyn and levofloxacin. (20 Jan 2020 21:32)      PAST MEDICAL & SURGICAL HISTORY:  Arthritis  HLD (hyperlipidemia)  Chronic kidney disease (CKD)  HTN (hypertension)  H/O local excision of skin lesion      MEDICATIONS  (STANDING):  atorvastatin 20 milliGRAM(s) Oral at bedtime  furosemide   Injectable 20 milliGRAM(s) IV Push once  heparin  Injectable 5000 Unit(s) SubCutaneous every 12 hours  loratadine 10 milliGRAM(s) Oral daily  methylPREDNISolone sodium succinate Injectable 125 milliGRAM(s) IV Push every 12 hours  vancomycin  IVPB 1000 milliGRAM(s) IV Intermittent daily    MEDICATIONS  (PRN):  acetaminophen   Tablet .. 650 milliGRAM(s) Oral every 6 hours PRN Moderate Pain (4 - 6)      FAMILY HISTORY:  No pertinent family history in first degree relatives    No family history of premature coronary artery disease or sudden cardiac death    SOCIAL HISTORY:  Smoking-  Alcohol-  Ilicit Drug use-    REVIEW OF SYSTEMS:  Constitutional: [ ] fever, [ ]weight loss, [ ]fatigue Activity [ ] Bedbound,[ ] Ambulates [ ] Unassisted[ ] Cane/Walker [ ] Assistence.  Eyes: [ ] visual changes  Respiratory: [ ]shortness of breath;  [ ] cough, [ ]wheezing, [ ]chills, [ ]hemoptysis  Cardiovascular: [ ] chest pain, [ ]palpitations, [ ]dizziness,  [ ]leg swelling[ ]orthopnea [ ]PND  Gastrointestinal: [ ] abdominal pain, [ ]nausea, [ ]vomiting,  [ ]diarrhea,[ ]constipation  Genitourinary: [ ] dysuria, [ ] hematuria  Neurologic: [ ] headaches [ ] tremors[ ] weakness  Skin: [ ] itching, [ ]burning, [ ] rashes  Endocrine: [ ] heat or cold intolerance  Musculoskeletal: [ ] joint pain or swelling; [ ] muscle, back, or extremity pain  Psychiatric: [ ] depression, [ ]anxiety, [ ]mood swings, or [ ]difficulty sleeping  Hematologic: [ ] easy bruising, [ ] bleeding gums       [ x] All others negative	  [ ] Unable to obtain    Vital Signs Last 24 Hrs  T(C): 36.9 (21 Jan 2020 15:44), Max: 37.2 (20 Jan 2020 20:19)  T(F): 98.4 (21 Jan 2020 15:44), Max: 99 (20 Jan 2020 20:19)  HR: 80 (21 Jan 2020 15:44) (71 - 98)  BP: 145/56 (21 Jan 2020 15:44) (135/58 - 153/67)  BP(mean): --  RR: 18 (21 Jan 2020 15:44) (17 - 18)  SpO2: 99% (21 Jan 2020 15:44) (95% - 99%)  I&O's Summary      PHYSICAL EXAM:  General: No acute distress BMI-  HEENT: EOMI, PERRL[ ] Icteric  Neck: Supple, No JVD  Lungs: Equal air entry bilaterally; [ ] Rales [ ] Rhonchi [ ] Wheezing  Heart: Regular rate and rhythm;[ ] Murmurs-   /6 [ ] Systolic [ ] Diastolic [ ] Radiation,No rubs, or gallops  Abdomen: Nontender, bowel sounds present  Extremities: No clubbing, cyanosis, or edema[ ] Calf tenderness  Nervous system:  Alert & Oriented X3, no focal deficits  Psychiatric: Normal affect  Skin: No rashes or lesions      LABS:  01-21    140  |  105  |  18  ----------------------------<  140<H>  3.5   |  30  |  1.21    Ca    8.6      21 Jan 2020 10:37  Phos  3.2     01-21  Mg     1.8     01-21    TPro  6.0  /  Alb  x   /  TBili  x   /  DBili  x   /  AST  x   /  ALT  x   /  AlkPhos  x   01-21    Creatinine Trend: 1.21<--, 1.31<--                        9.7    4.60  )-----------( 213      ( 21 Jan 2020 10:37 )             29.7     PT/INR - ( 20 Jan 2020 18:01 )   PT: 10.7 sec;   INR: 0.97 ratio         PTT - ( 20 Jan 2020 18:01 )  PTT:29.5 sec    Lipid Panel: Cholesterol, Serum 122  Direct LDL 50  HDL Cholesterol, Serum 57  Triglycerides, Serum 77    Cardiac Enzymes: CARDIAC MARKERS ( 20 Jan 2020 18:01 )  <0.015 ng/mL / x     / 50 U/L / x     / x          Serum Pro-Brain Natriuretic Peptide: 1158 pg/mL (01-20-20 @ 18:01)        RADIOLOGY:    ECG [my interpretation]:    TELEMETRY:    ECHO:    STRESS TEST:    CATHETERIZATION:

## 2020-01-21 NOTE — CONSULT NOTE ADULT - SUBJECTIVE AND OBJECTIVE BOX
HPI:  89 year old female patient from home, ambulates with help of walker with PMH significant for HTN, HLD, CKD, Severe AS, Arthritis presents to ED with c/o bilateral lower extremity edema and cellulitis. As per the son, patient's legs are usually swollen but for the last 2 weeks she was noticed to have increased swelling and redness. She went to her doctor who prescribed her amoxicillin-clavulanic acid, 4 days in to the therapy patient developed a rash on her back and extremities which was itchy. He also added that patient's leg erythema also worsened with new scaling. Patient denies facial/lip swelling, shortness of breath, fever, chills, chest pain, syncopal episode, orthopnea, PND, prior episodes of similar complaints.    In ED, she was given zosyn and levofloxacin. Currently on Vancomycin.     PAST MEDICAL & SURGICAL HISTORY:  Arthritis  HLD (hyperlipidemia)  Chronic kidney disease (CKD)  HTN (hypertension)  H/O local excision of skin lesion      Augmentin (Rash; Urticaria)      Meds:  acetaminophen   Tablet .. 650 milliGRAM(s) Oral every 6 hours PRN  atorvastatin 20 milliGRAM(s) Oral at bedtime  furosemide   Injectable 20 milliGRAM(s) IV Push daily  heparin  Injectable 5000 Unit(s) SubCutaneous every 12 hours  loratadine 10 milliGRAM(s) Oral daily  vancomycin  IVPB 1000 milliGRAM(s) IV Intermittent daily      SOCIAL HISTORY:  Smoker:  NO        ETOH use:  NO                Ilicit Drug use:  NO    FAMILY HISTORY:  No pertinent family history in first degree relatives      VITALS:  Vital Signs Last 24 Hrs  T(C): 36.9 (21 Jan 2020 11:37), Max: 37.2 (20 Jan 2020 20:19)  T(F): 98.4 (21 Jan 2020 11:37), Max: 99 (20 Jan 2020 20:19)  HR: 75 (21 Jan 2020 11:37) (71 - 100)  BP: 138/49 (21 Jan 2020 11:37) (135/58 - 154/79)  BP(mean): --  RR: 18 (21 Jan 2020 11:37) (17 - 18)  SpO2: 95% (21 Jan 2020 11:37) (95% - 99%)    LABS/DIAGNOSTIC TESTS:                          9.7    4.60  )-----------( 213      ( 21 Jan 2020 10:37 )             29.7     WBC Count: 4.60 K/uL (01-21 @ 10:37)  WBC Count: 8.51 K/uL (01-20 @ 18:01)      01-21    140  |  105  |  18  ----------------------------<  140<H>  3.5   |  30  |  1.21    Ca    8.6      21 Jan 2020 10:37  Phos  3.2     01-21  Mg     1.8     01-21    TPro  6.6  /  Alb  2.9<L>  /  TBili  0.8  /  DBili  x   /  AST  16  /  ALT  16  /  AlkPhos  59  01-21          LIVER FUNCTIONS - ( 21 Jan 2020 10:37 )  Alb: 2.9 g/dL / Pro: 6.6 g/dL / ALK PHOS: 59 U/L / ALT: 16 U/L DA / AST: 16 U/L / GGT: x             PT/INR - ( 20 Jan 2020 18:01 )   PT: 10.7 sec;   INR: 0.97 ratio         PTT - ( 20 Jan 2020 18:01 )  PTT:29.5 sec    LACTATE:Lactate, Blood: 1.7 mmol/L (01-20 @ 18:01)      ABG -     CULTURES:       RADIOLOGY:    < from: Xray Chest 1 View AP/PA (01.20.20 @ 17:42) >    EXAM:  XR CHEST AP OR PA 1V                            PROCEDURE DATE:  01/20/2020          INTERPRETATION:  AP semierect chest on January 20, 2020 5:36 PM. Patient has bilateral leg edema.    Heart is likely enlarged.    The lung fields and pleural surfaces are unremarkable.    Advanced bilateral shoulder degeneration again noted.    Chest is similar to April 24, 2019.    IMPRESSION: Unchanged chest showing advanced bilateral shoulder degeneration.    < end of copied text >        ROS  [  ] UNABLE TO ELICIT HPI:  89 year old female patient from home, ambulates with help of walker with PMH significant for HTN, HLD, CKD, Severe AS, Arthritis presents to ED with c/o bilateral lower extremity edema and cellulitis. As per the son, patient's legs are usually swollen but for the last 2 weeks she was noticed to have increased swelling and redness. She went to her doctor who prescribed her amoxicillin-clavulanic acid, 4 days in to the therapy patient developed a rash on her back and extremities which was itchy. He also added that patient's leg erythema also worsened with new scaling. Patient denies facial/lip swelling, shortness of breath, fever, chills, chest pain, syncopal episode, orthopnea, PND, prior episodes of similar complaints.    In ED, she was given zosyn and levofloxacin. Currently on Vancomycin. She was found  to have rashes all over her body as well as her legs. Her Cellulitis of her legs appears to be improving. She has no difficult breathing or excessive itching .    PAST MEDICAL & SURGICAL HISTORY:  Arthritis  HLD (hyperlipidemia)  Chronic kidney disease (CKD)  HTN (hypertension)  H/O local excision of skin lesion      Augmentin (Rash; Urticaria)      Meds:  acetaminophen   Tablet .. 650 milliGRAM(s) Oral every 6 hours PRN  atorvastatin 20 milliGRAM(s) Oral at bedtime  furosemide   Injectable 20 milliGRAM(s) IV Push daily  heparin  Injectable 5000 Unit(s) SubCutaneous every 12 hours  loratadine 10 milliGRAM(s) Oral daily  vancomycin  IVPB 1000 milliGRAM(s) IV Intermittent daily      SOCIAL HISTORY:  Smoker:  NO        ETOH use:  NO                Ilicit Drug use:  NO    FAMILY HISTORY:  No pertinent family history in first degree relatives      VITALS:  Vital Signs Last 24 Hrs  T(C): 36.9 (21 Jan 2020 11:37), Max: 37.2 (20 Jan 2020 20:19)  T(F): 98.4 (21 Jan 2020 11:37), Max: 99 (20 Jan 2020 20:19)  HR: 75 (21 Jan 2020 11:37) (71 - 100)  BP: 138/49 (21 Jan 2020 11:37) (135/58 - 154/79)  BP(mean): --  RR: 18 (21 Jan 2020 11:37) (17 - 18)  SpO2: 95% (21 Jan 2020 11:37) (95% - 99%)    LABS/DIAGNOSTIC TESTS:                          9.7    4.60  )-----------( 213      ( 21 Jan 2020 10:37 )             29.7     WBC Count: 4.60 K/uL (01-21 @ 10:37)  WBC Count: 8.51 K/uL (01-20 @ 18:01)      01-21    140  |  105  |  18  ----------------------------<  140<H>  3.5   |  30  |  1.21    Ca    8.6      21 Jan 2020 10:37  Phos  3.2     01-21  Mg     1.8     01-21    TPro  6.6  /  Alb  2.9<L>  /  TBili  0.8  /  DBili  x   /  AST  16  /  ALT  16  /  AlkPhos  59  01-21          LIVER FUNCTIONS - ( 21 Jan 2020 10:37 )  Alb: 2.9 g/dL / Pro: 6.6 g/dL / ALK PHOS: 59 U/L / ALT: 16 U/L DA / AST: 16 U/L / GGT: x             PT/INR - ( 20 Jan 2020 18:01 )   PT: 10.7 sec;   INR: 0.97 ratio         PTT - ( 20 Jan 2020 18:01 )  PTT:29.5 sec    LACTATE:Lactate, Blood: 1.7 mmol/L (01-20 @ 18:01)      ABG -     CULTURES:       RADIOLOGY:    < from: Xray Chest 1 View AP/PA (01.20.20 @ 17:42) >    EXAM:  XR CHEST AP OR PA 1V                            PROCEDURE DATE:  01/20/2020          INTERPRETATION:  AP semierect chest on January 20, 2020 5:36 PM. Patient has bilateral leg edema.    Heart is likely enlarged.    The lung fields and pleural surfaces are unremarkable.    Advanced bilateral shoulder degeneration again noted.    Chest is similar to April 24, 2019.    IMPRESSION: Unchanged chest showing advanced bilateral shoulder degeneration.    < end of copied text >        ROS  [  ] UNABLE TO ELICIT

## 2020-01-21 NOTE — PROGRESS NOTE ADULT - PROBLEM SELECTOR PLAN 4
SCr 1.21  Avoid Nephrotoxic Meds/ Agents such as (NSAIDs, IV contrast, Aminoglycosides such as gentamicin, -Gadolinium contrast, Phosphate containing enemas, etc..)   Dr. Mai (Nephrology) following

## 2020-01-21 NOTE — PROGRESS NOTE ADULT - ATTENDING COMMENTS
Patient seen and examined earlier this afternoon; Agree with NP A/P above with editing as needed. My independent assessment, findings on exam, diagnosis and plan of care as listed below. Discussed with NP Tiny.    Elderly 89 Female, admitted with worsening leg swelling for past 2 weeks with redness admitted with cellulitis. She has Hx of severe Aortic stenosis but asymptomatic in the past.     Vital Signs Last 24 Hrs  T(C): 36.9 (21 Jan 2020 15:44), Max: 37.2 (20 Jan 2020 20:19)  T(F): 98.4 (21 Jan 2020 15:44), Max: 99 (20 Jan 2020 20:19)  HR: 80 (21 Jan 2020 15:44) (71 - 98)  BP: 145/56 (21 Jan 2020 15:44) (135/58 - 153/67)  RR: 18 (21 Jan 2020 15:44) (17 - 18)  SpO2: 99% (21 Jan 2020 15:44) (95% - 99%)    P/E:  Elderly, obese female comfortable in bed in no acute distress   Neuro: AAO x 2.5; Forgetful  CVS: S1S2 present  Resp: BLAE+, No wheeze or Rhonchi  GI: Soft, BS+, NT, Obese  Extr; B/L LE Venous stasis with erythema and tenderness with superimposed eruptions    Labs:                        9.7    4.60  )-----------( 213      ( 21 Jan 2020 10:37 )             29.7   01-21    140  |  105  |  18  ----------------------------<  140<H>  3.5   |  30  |  1.21    Ca    8.6      21 Jan 2020 10:37  Phos  3.2     01-21  Mg     1.8     01-21    TPro  6.0  /  Alb  x   /  TBili  x   /  DBili  x   /  AST  x   /  ALT  x   /  AlkPhos  x   01-21    Thyroid Stimulating Hormone, Serum (01.21.20 @ 10:37)    Thyroid Stimulating Hormone, Serum: 4.60 uU/mL    D/D:  Cellulitis of B/L LE with superimposed possible drug eruptions  Fluid Overload concern for Heart failure ?? Diastolic  Hx Severe Aortic Stenosis Patient seen and examined earlier this afternoon; Agree with NP A/P above with editing as needed. My independent assessment, findings on exam, diagnosis and plan of care as listed below. Discussed with NP Tiny.    Elderly 89 Female, admitted with worsening leg swelling for past 2 weeks with redness admitted with cellulitis. She has Hx of severe Aortic stenosis but asymptomatic in the past.     Vital Signs Last 24 Hrs  T(C): 36.9 (21 Jan 2020 15:44), Max: 37.2 (20 Jan 2020 20:19)  T(F): 98.4 (21 Jan 2020 15:44), Max: 99 (20 Jan 2020 20:19)  HR: 80 (21 Jan 2020 15:44) (71 - 98)  BP: 145/56 (21 Jan 2020 15:44) (135/58 - 153/67)  RR: 18 (21 Jan 2020 15:44) (17 - 18)  SpO2: 99% (21 Jan 2020 15:44) (95% - 99%)    P/E:  Elderly, obese female comfortable in bed in no acute distress   Neuro: AAO x 2.5; Forgetful  CVS: S1S2 present  Resp: BLAE+, No wheeze or Rhonchi  GI: Soft, BS+, NT, Obese  Extr; B/L LE Venous stasis with erythema and tenderness with superimposed eruptions    Labs:                        9.7    4.60  )-----------( 213      ( 21 Jan 2020 10:37 )             29.7   01-21    140  |  105  |  18  ----------------------------<  140<H>  3.5   |  30  |  1.21    Ca    8.6      21 Jan 2020 10:37  Phos  3.2     01-21  Mg     1.8     01-21    TPro  6.0  /  Alb  x   /  TBili  x   /  DBili  x   /  AST  x   /  ALT  x   /  AlkPhos  x   01-21    Thyroid Stimulating Hormone, Serum (01.21.20 @ 10:37)    Thyroid Stimulating Hormone, Serum: 4.60 uU/mL    D/D:  Cellulitis of B/L LE with superimposed possible drug eruptions  Fluid Overload concern for Heart failure ?? Diastolic versus Venous Stasis  Peripheral edema partly could be related to Calcium channel blocker  Hx Severe Aortic Stenosis    Plan:  Tele; Follow up 2D echo  Cardiology eval d/w Dr. Gama  Continue IV Lasix; will give 40 mg for a day or two  HOLD AMLODIPINE for now can contribute to edema  IV Vanco adjusted to Vanco trough; okay with 1gm for now  ID consult appreciated  Agree with IV Solumedrol for a day or so for possible allergic eruptions  Switch to oral prednisone in AM if clinically improved    Discussed with patient findings and plan of care  Discussed with LIANA Franks and Resident on ID service and RN

## 2020-01-21 NOTE — CONSULT NOTE ADULT - SUBJECTIVE AND OBJECTIVE BOX
PT SEEN AND EXAMINED: FULL NOTE TO FOLLOW [facility] NEPHROLOGY- CONSULTATION NOTE    Patient is a 89yo Female with CKD-3, HTN, HLD, Severe AS, Arthritis presents with bilateral lower extremity edema. Pt a/w b/l cellulitis. Nephrology consulted for Elevated serum creatinine.  Pt c/o LE edema worsening over the past 2 weeks with increased redness. As per H& P, she went to her doctor who prescribed her amoxicillin-clavulanic acid, 4 days in to the therapy patient developed a rash  on her back and extremities  which was itchy. He also added that patient's leg erythema also worsened with new scaling.     Pt denies any SOB, chest pain, tongue edema, n/v/d or urinary complaints.       PAST MEDICAL & SURGICAL HISTORY:  Arthritis  HLD (hyperlipidemia)  Chronic kidney disease (CKD)  HTN (hypertension)  H/O local excision of skin lesion    Augmentin (Rash; Urticaria)    Home Medications Reviewed  Hospital Medications:   MEDICATIONS  (STANDING):  atorvastatin 20 milliGRAM(s) Oral at bedtime  furosemide   Injectable 20 milliGRAM(s) IV Push once  heparin  Injectable 5000 Unit(s) SubCutaneous every 12 hours  loratadine 10 milliGRAM(s) Oral daily  methylPREDNISolone sodium succinate Injectable 125 milliGRAM(s) IV Push every 12 hours  vancomycin  IVPB 1000 milliGRAM(s) IV Intermittent daily    SOCIAL HISTORY:  no toxic habits  FAMILY HISTORY:  No pertinent family history in first degree relatives      REVIEW OF SYSTEMS:  Gen: no changes in weight  HEENT: no rhinorrhea  Neck: no sore throat  Cards: no chest pain  Resp: no dyspnea  GI: no nausea or vomiting or diarrhea  : no dysuria or hematuria  Vascular: + LE edema with redness  Derm: no rashes  Neuro: no numbness/tingling  All other review of systems is negative unless indicated above.    VITALS:  T(F): 98.4 (01-21-20 @ 15:44), Max: 99 (01-20-20 @ 20:19)  HR: 80 (01-21-20 @ 15:44)  BP: 145/56 (01-21-20 @ 15:44)  RR: 18 (01-21-20 @ 15:44)  SpO2: 99% (01-21-20 @ 15:44)  Wt(kg): --    Height (cm): 152.4 (01-20 @ 16:37)  Weight (kg): 77.1 (01-20 @ 16:37)  BMI (kg/m2): 33.2 (01-20 @ 16:37)  BSA (m2): 1.74 (01-20 @ 16:37)    PHYSICAL EXAM:  Gen: NAD, calm  HEENT: MMM, [place]  Neck: no JVD  Cards: RRR, +S1/S2, +ROLLY  Resp: mild rales at bases  GI: soft, NT/ND, NABS  : no CVA tenderness  Extremities: +LE edema B/L with erythema/ scaly skin  Derm: see above  Neuro: non-focal    LABS:  01-21    140  |  105  |  18  ----------------------------<  140<H>  3.5   |  30  |  1.21    Ca    8.6      21 Jan 2020 10:37  Phos  3.2     01-21  Mg     1.8     01-21    TPro  6.0  /  Alb      /  TBili      /  DBili      /  AST      /  ALT      /  AlkPhos      01-21    Creatinine Trend: 1.21 <--, 1.31 <--                        9.7    4.60  )-----------( 213      ( 21 Jan 2020 10:37 )             29.7     Urine Studies:      RADIOLOGY & ADDITIONAL STUDIES:        < from: Xray Chest 1 View AP/PA (01.20.20 @ 17:42) >    EXAM:  XR CHEST AP OR PA 1V                            PROCEDURE DATE:  01/20/2020          INTERPRETATION:  AP semierect chest on January 20, 2020 5:36 PM. Patient has bilateral leg edema.    Heart is likely enlarged.    The lung fields and pleural surfaces are unremarkable.    Advanced bilateral shoulder degeneration again noted.    Chest is similar to April 24, 2019.    IMPRESSION: Unchanged chest showing advanced bilateral shoulder degeneration.      < end of copied text >

## 2020-01-21 NOTE — ED ADULT NURSE REASSESSMENT NOTE - NS ED NURSE REASSESS COMMENT FT1
Received pt from [facility], pt is observed laying in bed, breathing room air, breathing room air, in no respiratory distress at time of assessment. Pt is A&O x3, able to make needs known, denies any distress/discomfort at time of assessment. Pt requests bedpan at intervals. Left AC #20Ga in place, b/l lower extremities redness and swelling noted, dermatitis looking, no pressure ulcer noted. Admitted to [facility], now on box A, awaiting bed, endorsed to [facility] for holding. No family at bedside at this time.

## 2020-01-21 NOTE — PATIENT PROFILE ADULT - CONTRAINDICATIONS & PRECAUTIONS (SELECT ALL THAT APPLY)
Moderate to severe acute illness with or without fever. Delay administration of vaccination until patient has been afebrile or illness resolved for 24hrs/Patient/surrogate refused vaccine...

## 2020-01-21 NOTE — PROGRESS NOTE ADULT - PROBLEM SELECTOR PLAN 2
Maculopapular rash likely secondary to amoxicillin  Continue with Solumedrol  Continue with Loratadine

## 2020-01-21 NOTE — CONSULT NOTE ADULT - ATTENDING COMMENTS
[facility] NEPHROLOGY  Joe Barnes M.D.  Ari Lawton D.O.  Michaela Mai M.D.  Tami Arshad, MSN, ANP-C  [phone]    [id number] [place] [address]
pt seen and  examined with ID resident

## 2020-01-21 NOTE — PROGRESS NOTE ADULT - SUBJECTIVE AND OBJECTIVE BOX
HPI:  89 year old female patient from home, ambulates with help of walker with PMH significant for HTN, HLD, CKD, Severe AS, Arthritis presents to ED with c/o bilateral lower extremity edema and cellulitis. As per the son, patient's legs are usually swollen but for the last 2 weeks she was noticed to have increased swelling and redness. She went to her doctor who prescribed her amoxicillin-clavulanic acid, 4 days in to the therapy patient developed a rash  on her back and extremities  which was itchy.  Patient examined at bedside, resting comfortably, denies pain or SOB, afebrile, VSS, NAD    OVERNIGHT EVENTS:  No new overnight events     REVIEW OF SYSTEMS:    CONSTITUTIONAL: No fever,   EYES: no acute visual disturbances  NECK: No pain or stiffness  RESPIRATORY: No cough; No shortness of breath  CARDIOVASCULAR: No chest pain, no palpitations  GASTROINTESTINAL: No pain. No nausea, vomiting or diarrhea   NEUROLOGICAL: No headache or numbness, no tremors  MUSCULOSKELETAL: No joint pain, no muscle pain  GENITOURINARY: no dysuria, no frequency, no hesitancy  PSYCHIATRY: no depression , no anxiety  ALL OTHER  ROS negative        Vital Signs Last 24 Hrs  T(C): 36.9 (21 Jan 2020 15:44), Max: 37.2 (20 Jan 2020 20:19)  T(F): 98.4 (21 Jan 2020 15:44), Max: 99 (20 Jan 2020 20:19)  HR: 80 (21 Jan 2020 15:44) (71 - 98)  BP: 145/56 (21 Jan 2020 15:44) (135/58 - 153/67)  BP(mean): --  RR: 18 (21 Jan 2020 15:44) (17 - 18)  SpO2: 99% (21 Jan 2020 15:44) (95% - 99%)    ________________________________________________  PHYSICAL EXAM:    GENERAL: NAD  HEENT: Normocephalic; conjunctivae and sclerae clear; moist mucous membranes;   NECK : supple, no JVD  CHEST/LUNG: Clear to auscultation bilaterally   HEART: S1 S2  regular  ABDOMEN: Soft, Nontender, Nondistended; Bowel sounds present  EXTREMITIES: no cyanosis; no LE edema; no calf tenderness  SKIN: warm and dry; no rash  NERVOUS SYSTEM:  Alert & Oriented x3; no new deficits    _________________________________________________  CURRENT MEDICATIONS:    MEDICATIONS  (STANDING):  atorvastatin 20 milliGRAM(s) Oral at bedtime  furosemide   Injectable 20 milliGRAM(s) IV Push once  heparin  Injectable 5000 Unit(s) SubCutaneous every 12 hours  loratadine 10 milliGRAM(s) Oral daily  methylPREDNISolone sodium succinate Injectable 125 milliGRAM(s) IV Push every 12 hours  vancomycin  IVPB 1000 milliGRAM(s) IV Intermittent daily    MEDICATIONS  (PRN):  acetaminophen   Tablet .. 650 milliGRAM(s) Oral every 6 hours PRN Moderate Pain (4 - 6)      __________________________________________________  LABS:                          9.7    4.60  )-----------( 213      ( 21 Jan 2020 10:37 )             29.7     01-21    140  |  105  |  18  ----------------------------<  140<H>  3.5   |  30  |  1.21    Ca    8.6      21 Jan 2020 10:37  Phos  3.2     01-21  Mg     1.8     01-21    TPro  6.0  /  Alb  x   /  TBili  x   /  DBili  x   /  AST  x   /  ALT  x   /  AlkPhos  x   01-21    PT/INR - ( 20 Jan 2020 18:01 )   PT: 10.7 sec;   INR: 0.97 ratio         PTT - ( 20 Jan 2020 18:01 )  PTT:29.5 sec    CAPILLARY BLOOD GLUCOSE          __________________________________________________  RADIOLOGY & ADDITIONAL TESTS:    Imaging Personally Reviewed:  YES    < from: Xray Chest 1 View AP/PA (01.20.20 @ 17:42) >  Heart is likely enlarged.    The lung fields and pleural surfaces are unremarkable.    Advanced bilateral shoulder degeneration again noted.    Chest is similar to April 24, 2019.    < end of copied text >    Consultant(s) Notes Reviewed:   YES     Plan of care was discussed with patient and /or primary care giver; all questions and concerns were addressed and care was aligned with patient's wishes.

## 2020-01-21 NOTE — CONSULT NOTE ADULT - ASSESSMENT
NOTE INCOMPLETE***    Assessment:          Plan:  Vancomycin 1g q daily #2  Check Vanco trough before 4th dose  Check Blood Cultures results Assessment:  Bilateral lower extremity cellulitis        Plan:  Vancomycin 1g q daily #2  Start Solumedrol 40mg IV Q 12hrs  Check Vanco trough before 4th dose  Check Blood Cultures results Assessment:  Bilateral lower extremity cellulitis  Allergic reaction to augmentin        Plan:  Vancomycin 1g q daily #2  Start Solumedrol 40mg IV Q 12hrs  Check Vanco trough before 4th dose  Check Blood Cultures results

## 2020-01-21 NOTE — PROGRESS NOTE ADULT - PROBLEM SELECTOR PLAN 8
Pt admitted from home  PT to evaluate for safe discharge planning Pt admitted from home  PT to evaluate for safe discharge planning  CM consulted, patient lives alone

## 2020-01-22 DIAGNOSIS — D64.9 ANEMIA, UNSPECIFIED: ICD-10-CM

## 2020-01-22 DIAGNOSIS — M19.90 UNSPECIFIED OSTEOARTHRITIS, UNSPECIFIED SITE: ICD-10-CM

## 2020-01-22 LAB
% GAMMA, URINE: 10.1 % — SIGNIFICANT CHANGE UP
ALBUMIN 24H MFR UR ELPH: 59 % — SIGNIFICANT CHANGE UP
ALBUMIN SERPL ELPH-MCNC: 2.8 G/DL — LOW (ref 3.5–5)
ALP SERPL-CCNC: 52 U/L — SIGNIFICANT CHANGE UP (ref 40–120)
ALPHA1 GLOB 24H MFR UR ELPH: 14.8 % — SIGNIFICANT CHANGE UP
ALPHA2 GLOB 24H MFR UR ELPH: 8.1 % — SIGNIFICANT CHANGE UP
ALT FLD-CCNC: 15 U/L DA — SIGNIFICANT CHANGE UP (ref 10–60)
ANION GAP SERPL CALC-SCNC: 8 MMOL/L — SIGNIFICANT CHANGE UP (ref 5–17)
AST SERPL-CCNC: 13 U/L — SIGNIFICANT CHANGE UP (ref 10–40)
B-GLOBULIN 24H MFR UR ELPH: 8 % — SIGNIFICANT CHANGE UP
BILIRUB SERPL-MCNC: 0.4 MG/DL — SIGNIFICANT CHANGE UP (ref 0.2–1.2)
BUN SERPL-MCNC: 32 MG/DL — HIGH (ref 7–18)
CALCIUM SERPL-MCNC: 8.6 MG/DL — SIGNIFICANT CHANGE UP (ref 8.4–10.5)
CHLORIDE SERPL-SCNC: 104 MMOL/L — SIGNIFICANT CHANGE UP (ref 96–108)
CO2 SERPL-SCNC: 28 MMOL/L — SIGNIFICANT CHANGE UP (ref 22–31)
CREAT SERPL-MCNC: 1.68 MG/DL — HIGH (ref 0.5–1.3)
CREATININE, URINE RESULT: 48 MG/DL — SIGNIFICANT CHANGE UP
GLUCOSE SERPL-MCNC: 145 MG/DL — HIGH (ref 70–99)
HCT VFR BLD CALC: 29.2 % — LOW (ref 34.5–45)
HGB BLD-MCNC: 9.4 G/DL — LOW (ref 11.5–15.5)
INTERPRETATION 24H UR IFE-IMP: SIGNIFICANT CHANGE UP
INTERPRETATION 24H UR IFE-IMP: SIGNIFICANT CHANGE UP
M PROTEIN 24H UR ELPH-MRATE: SIGNIFICANT CHANGE UP
MCHC RBC-ENTMCNC: 32.1 PG — SIGNIFICANT CHANGE UP (ref 27–34)
MCHC RBC-ENTMCNC: 32.2 GM/DL — SIGNIFICANT CHANGE UP (ref 32–36)
MCV RBC AUTO: 99.7 FL — SIGNIFICANT CHANGE UP (ref 80–100)
NRBC # BLD: 0 /100 WBCS — SIGNIFICANT CHANGE UP (ref 0–0)
PLATELET # BLD AUTO: 227 K/UL — SIGNIFICANT CHANGE UP (ref 150–400)
POTASSIUM SERPL-MCNC: 3.6 MMOL/L — SIGNIFICANT CHANGE UP (ref 3.5–5.3)
POTASSIUM SERPL-SCNC: 3.6 MMOL/L — SIGNIFICANT CHANGE UP (ref 3.5–5.3)
PROT ?TM UR-MCNC: 47 MG/DL — HIGH (ref 0–12)
PROT ?TM UR-MCNC: 47 MG/DL — HIGH (ref 0–12)
PROT PATTERN 24H UR ELPH-IMP: SIGNIFICANT CHANGE UP
PROT SERPL-MCNC: 6.4 G/DL — SIGNIFICANT CHANGE UP (ref 6–8.3)
RBC # BLD: 2.93 M/UL — LOW (ref 3.8–5.2)
RBC # FLD: 12 % — SIGNIFICANT CHANGE UP (ref 10.3–14.5)
SODIUM SERPL-SCNC: 140 MMOL/L — SIGNIFICANT CHANGE UP (ref 135–145)
TOTAL VOLUME - 24 HOUR: SIGNIFICANT CHANGE UP ML
URINE CREATININE CALCULATION: SIGNIFICANT CHANGE UP G/24 H (ref 0.8–1.8)
VANCOMYCIN TROUGH SERPL-MCNC: 9.3 UG/ML — LOW (ref 10–20)
WBC # BLD: 3.07 K/UL — LOW (ref 3.8–10.5)
WBC # FLD AUTO: 3.07 K/UL — LOW (ref 3.8–10.5)

## 2020-01-22 PROCEDURE — 99233 SBSQ HOSP IP/OBS HIGH 50: CPT | Mod: GC

## 2020-01-22 RX ADMIN — HEPARIN SODIUM 5000 UNIT(S): 5000 INJECTION INTRAVENOUS; SUBCUTANEOUS at 18:25

## 2020-01-22 RX ADMIN — ATORVASTATIN CALCIUM 20 MILLIGRAM(S): 80 TABLET, FILM COATED ORAL at 22:05

## 2020-01-22 RX ADMIN — Medication 40 MILLIGRAM(S): at 09:52

## 2020-01-22 RX ADMIN — LORATADINE 10 MILLIGRAM(S): 10 TABLET ORAL at 12:12

## 2020-01-22 RX ADMIN — HEPARIN SODIUM 5000 UNIT(S): 5000 INJECTION INTRAVENOUS; SUBCUTANEOUS at 05:46

## 2020-01-22 RX ADMIN — Medication 250 MILLIGRAM(S): at 13:50

## 2020-01-22 NOTE — PROGRESS NOTE ADULT - ATTENDING COMMENTS
Patient seen and examined earlier this afternoon; Agree with NP A/P above with editing as needed. My independent assessment, findings on exam, diagnosis and plan of care as listed below. Discussed with PGY1 Dr. Fitzgerald    Elderly 89 Female, admitted with worsening leg swelling for past 2 weeks with redness admitted with cellulitis. She has Hx of severe Aortic stenosis but asymptomatic in the past.     patient sitting OOB to chair; eating lunch; appears comfortable; able to recognize me; Leg swelling improved 50%; Decreased redness; Afebrile    Vital Signs Last 24 Hrs  T(C): 36.7 (22 Jan 2020 18:30), Max: 37.5 (21 Jan 2020 21:38)  T(F): 98.1 (22 Jan 2020 18:30), Max: 99.5 (21 Jan 2020 21:38)  HR: 101 (22 Jan 2020 18:30) (62 - 101)  BP: 133/63 (22 Jan 2020 18:30) (107/63 - 176/58)  RR: 16 (22 Jan 2020 18:30) (16 - 18)  SpO2: 98% (22 Jan 2020 18:30) (95% - 98%)    P/E:  Elderly, obese female comfortable in bed in no acute distress   Neuro: AAO x 2.5; Forgetful  CVS: S1S2 present  Resp: BLAE+, No wheeze or Rhonchi  GI: Soft, BS+, NT, Obese  Extr; B/L LE Venous stasis with erythema and tenderness with superimposed eruptions    Labs:                        9.7    4.60  )-----------( 213      ( 21 Jan 2020 10:37 )             29.7   01-21    140  |  105  |  18  ----------------------------<  140<H>  3.5   |  30  |  1.21    Ca    8.6      21 Jan 2020 10:37  Phos  3.2     01-21  Mg     1.8     01-21    TPro  6.0  /  Alb  x   /  TBili  x   /  DBili  x   /  AST  x   /  ALT  x   /  AlkPhos  x   01-21    Thyroid Stimulating Hormone, Serum (01.21.20 @ 10:37)    Thyroid Stimulating Hormone, Serum: 4.60 uU/mL    D/D:  Cellulitis of B/L LE with superimposed possible drug eruptions  Fluid Overload concern for Heart failure ?? Diastolic versus Venous Stasis  Peripheral edema partly could be related to Calcium channel blocker  Hx Severe Aortic Stenosis    Plan:  Tele; Follow up 2D echo  Cardiology eval d/w Dr. Gama  Continue IV Lasix; will give 40 mg for a day or two  HOLD AMLODIPINE for now can contribute to edema  IV Vanco adjusted to Vanco trough; okay with 1gm for now  ID consult appreciated  Agree with IV Solumedrol for a day or so for possible allergic eruptions  Switch to oral prednisone in AM if clinically improved    Discussed with patient findings and plan of care  Discussed with LIANA Franks and Resident on ID service and RN Patient seen and examined earlier this afternoon; Agree with NP A/P above with editing as needed. My independent assessment, findings on exam, diagnosis and plan of care as listed below. Discussed with PGY1 Dr. Fitzgerald    Elderly 89 Female, admitted with worsening leg swelling for past 2 weeks with redness admitted with cellulitis. She has Hx of severe Aortic stenosis but asymptomatic in the past.     patient sitting OOB to chair; eating lunch; appears comfortable; able to recognize me; Leg swelling improved 50%; Decreased redness; Afebrile    Vital Signs Last 24 Hrs  T(C): 36.7 (22 Jan 2020 18:30), Max: 37.5 (21 Jan 2020 21:38)  T(F): 98.1 (22 Jan 2020 18:30), Max: 99.5 (21 Jan 2020 21:38)  HR: 101 (22 Jan 2020 18:30) (62 - 101)  BP: 133/63 (22 Jan 2020 18:30) (107/63 - 176/58)  RR: 16 (22 Jan 2020 18:30) (16 - 18)  SpO2: 98% (22 Jan 2020 18:30) (95% - 98%)    P/E:  Elderly, obese female comfortable in bed in no acute distress   Neuro: AAO x 2.5; Forgetful  CVS: S1S2 present  Resp: BLAE+, No wheeze or Rhonchi  GI: Soft, BS+, NT, Obese  Extr; B/L LE Venous stasis with erythema and tenderness with superimposed eruptions    Labs:                        9.7    4.60  )-----------( 213      ( 21 Jan 2020 10:37 )             29.7   01-21    140  |  105  |  18  ----------------------------<  140<H>  3.5   |  30  |  1.21    Ca    8.6      21 Jan 2020 10:37  Phos  3.2     01-21  Mg     1.8     01-21    TPro  6.0  /  Alb  x   /  TBili  x   /  DBili  x   /  AST  x   /  ALT  x   /  AlkPhos  x   01-21    Thyroid Stimulating Hormone, Serum (01.21.20 @ 10:37)    Thyroid Stimulating Hormone, Serum: 4.60 uU/mL    D/D:  Cellulitis of B/L LE with superimposed possible drug eruptions  Fluid Overload concern for Heart failure ?? Diastolic versus Venous Stasis  Peripheral edema partly could be related to Calcium channel blocker  Hx Severe Aortic Stenosis    Plan:  Tele; Follow up 2D echo  Cardiology eval d/w Dr. Gama  Continue IV Lasix; will give 40 mg for a day or two  Holding Lasix until check creatinine in AM  HOLD AMLODIPINE for now can contribute to edema  IV Vanco adjusted to Vanco trough; okay with 1gm for now  ID consult appreciated; d/w Dr. Kan will continue Vanco until FRiday  Agree with IV Solumedrol for a day or so for possible allergic eruptions  Switch to oral prednisone in AM if clinically improved    Discussed with patient findings and plan of care; discussed likely discharge on Friday  Discussed with  Taisha discharge plan  Discussed with LIANA Franks and Resident on ID service and RN Patient seen and examined earlier this afternoon; Agree with NP A/P above with editing as needed. My independent assessment, findings on exam, diagnosis and plan of care as listed below. Discussed with PGY1 Dr. Fitzgerald    Elderly 89 Female, admitted with worsening leg swelling for past 2 weeks with redness admitted with cellulitis. She has Hx of severe Aortic stenosis but asymptomatic in the past.     patient sitting OOB to chair; eating lunch; appears comfortable; able to recognize me; Leg swelling improved 50%; Decreased redness; Afebrile    Vital Signs Last 24 Hrs  T(C): 36.7 (22 Jan 2020 18:30), Max: 37.5 (21 Jan 2020 21:38)  T(F): 98.1 (22 Jan 2020 18:30), Max: 99.5 (21 Jan 2020 21:38)  HR: 101 (22 Jan 2020 18:30) (62 - 101)  BP: 133/63 (22 Jan 2020 18:30) (107/63 - 176/58)  RR: 16 (22 Jan 2020 18:30) (16 - 18)  SpO2: 98% (22 Jan 2020 18:30) (95% - 98%)    P/E:  Elderly, obese female comfortable in bed in no acute distress   Neuro: AAO x 2.5; Forgetful  CVS: S1S2 present  Resp: BLAE+, No wheeze or Rhonchi  GI: Soft, BS+, NT, Obese  Extr; B/L LE Venous stasis with erythema and tenderness with superimposed eruptions    Labs:                        9.7    4.60  )-----------( 213      ( 21 Jan 2020 10:37 )             29.7   01-21    140  |  105  |  18  ----------------------------<  140<H>  3.5   |  30  |  1.21    Ca    8.6      21 Jan 2020 10:37  Phos  3.2     01-21  Mg     1.8     01-21    TPro  6.0  /  Alb  x   /  TBili  x   /  DBili  x   /  AST  x   /  ALT  x   /  AlkPhos  x   01-21    Thyroid Stimulating Hormone, Serum (01.21.20 @ 10:37)    Thyroid Stimulating Hormone, Serum: 4.60 uU/mL    D/D:  Cellulitis of B/L LE with superimposed possible drug eruptions  Acute Kidney injury likely due to diuretic  Fluid Overload concern for Heart failure ?? Diastolic versus Venous Stasis  Peripheral edema partly could be related to Calcium channel blocker  Hx Severe Aortic Stenosis    Plan:  Tele; Follow up 2D echo  Cardiology eval d/w Dr. Gama  Holding Lasix as Creatinine rise until check creatinine in AM  If clinically indicated will resume Lasix low dose  HOLD AMLODIPINE for now can contribute to edema  IV Vanco adjusted to Vanco trough; okay with 1gm for now  ID consult appreciated; d/w Dr. Kan will continue Vanco until Friday  Agree with IV Solumedrol for a day or so for possible allergic eruptions  Switch to oral prednisone in AM if clinically improved    Discussed with patient findings and plan of care; discussed likely discharge on Friday  Discussed with  Taisha discharge plan  Discussed with LIANA Franks and Resident on ID service and RN

## 2020-01-22 NOTE — PROGRESS NOTE ADULT - ATTENDING COMMENTS
Patient was seen and examined ,interim events noted,Laboratory and Imaging studies were reviewed.  Thank you for the courtesy of the consultation,I would be available for any further discussion if needed.  Eleazar Gama MD,FACC.  [address] [place] [address] [phone]

## 2020-01-22 NOTE — PROGRESS NOTE ADULT - ASSESSMENT
Patient is a 89y old  Female who presents with a chief complaint of Leg swelling admitted for cellulitis, and aortic stenosis hx for cardio workup to rule out chf.   Vital Signs Last 24 Hrs  T(C): 36.6 (22 Jan 2020 10:57), Max: 38.1 (21 Jan 2020 17:45)  T(F): 97.9 (22 Jan 2020 10:57), Max: 100.6 (21 Jan 2020 17:45)  HR: 79 (22 Jan 2020 10:57) (62 - 107)  BP: 127/72 (22 Jan 2020 10:57) (107/63 - 176/58)  BP(mean): --  RR: 17 (22 Jan 2020 10:57) (17 - 18)  SpO2: 96% (22 Jan 2020 10:57) (95% - 99%)

## 2020-01-22 NOTE — PROGRESS NOTE ADULT - PROBLEM SELECTOR PLAN 5
Controlled  Pt takes Amlodipine at home will hold it for now  Continue with IV lasix Controlled  Pt takes Amlodipine at home will hold it for now   IV lasix d/c today by cardio

## 2020-01-22 NOTE — PROGRESS NOTE ADULT - SUBJECTIVE AND OBJECTIVE BOX
PRESENTING CC:Edema    SUBJ: 89 year old female patient from home, ambulates with help of walker with PMH significant for HTN, HLD, CKD, Severe AS, Arthritis presents to ED with c/o bilateral lower extremity edema-no dyspnea is OOB less edema no chest pain      PMH -reviewed admission note, no change since admission  Heart failure: acute [ ] chronic [ ] acute or chronic [x ] diastolic [x ] systolic [ ] combined systolic and diastolic[ ]  SHAUNA: ATN[ ] renal medullary necrosis [ ] CKD I [ ]CKDII [ ]CKD III [ ]CKD IV [ ]CKD V [ ]Other pathological lesions [ ]    MEDICATIONS  (STANDING):  atorvastatin 20 milliGRAM(s) Oral at bedtime  furosemide   Injectable 40 milliGRAM(s) IV Push daily  heparin  Injectable 5000 Unit(s) SubCutaneous every 12 hours  loratadine 10 milliGRAM(s) Oral daily  methylPREDNISolone sodium succinate Injectable 40 milliGRAM(s) IV Push every 12 hours  vancomycin  IVPB 1000 milliGRAM(s) IV Intermittent daily    MEDICATIONS  (PRN):  acetaminophen   Tablet .. 650 milliGRAM(s) Oral every 6 hours PRN Moderate Pain (4 - 6)              REVIEW OF SYSTEMS:  Constitutional: [ ] fever, [ ]weight loss,  [x ]fatigue  Eyes: [ ] visual changes  Respiratory: [ ]shortness of breath;  [ ] cough, [ ]wheezing, [ ]chills, [ ]hemoptysis  Cardiovascular: [ ] chest pain, [ ]palpitations, [ ]dizziness,  [xx ]leg swelling[ ]orthopnea[ ]PND  Gastrointestinal: [ ] abdominal pain, [ ]nausea, [ ]vomiting,  [ ]diarrhea   Genitourinary: [ ] dysuria, [ ] hematuria  Neurologic: [ ] headaches [ ] tremors[ ]weakness  Skin: [ ] itching, [ ]burning, [ ] rashes  Endocrine: [ ] heat or cold intolerance  Musculoskeletal: [ ] joint pain or swelling; [ ] muscle, back, or extremity pain  Psychiatric: [ ] depression, [ ]anxiety, [ ]mood swings, or [ ]difficulty sleeping  Hematologic: [ ] easy bruising, [ ] bleeding gums    [x] All remaining systems negative except as per above.   [ ]Unable to obtain.    Vital Signs Last 24 Hrs  T(C): 37 (22 Jan 2020 05:19), Max: 38.1 (21 Jan 2020 17:45)  T(F): 98.6 (22 Jan 2020 05:19), Max: 100.6 (21 Jan 2020 17:45)  HR: 62 (22 Jan 2020 05:19) (62 - 107)  BP: 107/63 (22 Jan 2020 05:19) (107/63 - 176/58)  RR: 18 (22 Jan 2020 05:19) (18 - 18)  SpO2: 97% (22 Jan 2020 05:19) (95% - 99%)  I&O's Summary      PHYSICAL EXAM:  General: No acute distress BMI-33.2  HEENT: EOMI, PERRL  Neck: Supple, [ ] JVD  Lungs: Equal air entry bilaterally; [ ] rales [ ] wheezing [ ] rhonchi  Heart: Regular rate and rhythm; [x ] murmur   4/6 [x ] systolic ejection [ ] diastolic [x ] radiation[ ] rubs [ ]  gallops  Abdomen: Nontender, bowel sounds present  Extremities: No clubbing, cyanosis, [xx ] edema  Nervous system:  Alert & Oriented X3, no focal deficits  Psychiatric: Normal affect  Skin: No rashes or lesions    LABS:  01-22    140  |  104  |  32<H>  ----------------------------<  145<H>  3.6   |  28  |  1.68<H>    Ca    8.6      22 Jan 2020 06:18  Phos  3.2     01-21  Mg     1.8     01-21    TPro  6.4  /  Alb  2.8<L>  /  TBili  0.4  /  DBili  x   /  AST  13  /  ALT  15  /  AlkPhos  52  01-22    Creatinine Trend: 1.68<--, 1.21<--, 1.31<--                        9.4    3.07  )-----------( 227      ( 22 Jan 2020 06:18 )             29.2     PT/INR - ( 20 Jan 2020 18:01 )   PT: 10.7 sec;   INR: 0.97 ratio         PTT - ( 20 Jan 2020 18:01 )  PTT:29.5 sec  Lipid Panel:   Cardiac Enzymes: CARDIAC MARKERS ( 20 Jan 2020 18:01 )  <0.015 ng/mL / x     / 50 U/L / x     / x          Serum Pro-Brain Natriuretic Peptide: 1158 pg/mL (01-20-20 @ 18:01)      ECG [my interpretation]:Sinus rhythm at 90 BPM no acute ST T wave abnormalities    TELEMETRY:Sinus rhythm    ECHO:  Study Date: 3/18/2019  Normal Left Ventricular Systolic Function, (EF = 55 to 60%)   Grade I diastolic dysfunction (Impaired relaxation).  Calcified trileaflet aortic valve consistent with severe aortic stenosis.  Estimated aortic valve area equals 0.8 sqcm  RV systolic pressure is normal at  35 mm Hg.      IMPRESSION AND PLAN:    Problem/Plan - 1:  ·  Problem: Cellulitis.  Plan: H/o chronic venous stasis  Afebrile  WBC 4.6  Continue with Vancomycin       Problem/Plan - 2:  ·  Problem: Aortic stenosis, severe.  Plan:Not a candidate for SAVR/TAVR        Problem/Plan - 3:  ·  Problem: CKD (chronic kidney disease), stage III.  Plan: SCr 1.21  Avoid Nephrotoxic Meds/ Agents  Discontinue Lasix

## 2020-01-22 NOTE — PHYSICAL THERAPY INITIAL EVALUATION ADULT - GENERAL OBSERVATIONS, REHAB EVAL
Pt. received in supine; AxOx3; IV line Pt. received in supine; AxOx3; IV line in place, edema to both LE, ankles and feet; redness on both lower legs.

## 2020-01-22 NOTE — PROGRESS NOTE ADULT - PROBLEM SELECTOR PLAN 3
Asymptomatic  Dr. Gama (Cardiology) following  pt Not a candidate for SAVR / TAVR Asymptomatic  Dr. Gama (Cardiology) following  pt Not a candidate for SAVR / TAVR  - f/u TTE

## 2020-01-22 NOTE — PHYSICAL THERAPY INITIAL EVALUATION ADULT - RANGE OF MOTION EXAMINATION, REHAB EVAL
Right LE ROM was WFL (within functional limits)/Left LE ROM was WFL (within functional limits)/Right UE ROM was WFL (within functional limits)/Left UE ROM was WFL (within functional limits)

## 2020-01-22 NOTE — PHYSICAL THERAPY INITIAL EVALUATION ADULT - ASSISTIVE DEVICE FOR TRANSFER: BED/CHAIR, REHAB EVAL
Results reported to patient, she will repeat cbc and cmp in 6 months prior to her next appt   rolling walker

## 2020-01-22 NOTE — PROGRESS NOTE ADULT - SUBJECTIVE AND OBJECTIVE BOX
[facility] NEPHROLOGY- PROGRESS NOTE    Patient is a 89yo Female with CKD-3, HTN, HLD, Severe AS, Arthritis presents with bilateral lower extremity edema. Pt a/w b/l cellulitis. Nephrology consulted for Elevated serum creatinine.  Now with SHAUNA    Hospital Medications: Medications reviewed.  REVIEW OF SYSTEMS:  CONSTITUTIONAL: No fevers or chills  RESPIRATORY: No shortness of breath  CARDIOVASCULAR: No chest pain.  GASTROINTESTINAL: No nausea, vomiting, diarrhea or abdominal pain.   VASCULAR: +bilateral lower extremity edema.     VITALS:  T(F): 97.8 (20 @ 14:49), Max: 100.6 (20 @ 17:45)  HR: 72 (20 @ 14:49)  BP: 120/69 (20 @ 14:49)  RR: 17 (20 @ 14:49)  SpO2: 97% (20 @ 14:49)  Wt(kg): --  Height (cm): 152.4 ( @ 16:37)  Weight (kg): 77.1 ( @ 16:37)  BMI (kg/m2): 33.2 ( @ 16:37)  BSA (m2): 1.74 ( @ 16:37)    PHYSICAL EXAM:  Gen: NAD, calm  HEENT: MMM, [place]  Neck: no JVD  Cards: RRR, +S1/S2, +ROLLY  Resp: CTA b/l  GI: soft, NT/ND, NABS  : no CVA tenderness  Extremities: +non pitting LE edema B/L with erythema/ scaly skin  Derm: diffuse macular erythematous rash      LABS:      140  |  104  |  32<H>  ----------------------------<  145<H>  3.6   |  28  |  1.68<H>    Ca    8.6      2020 06:18  Phos  3.2       Mg     1.8         TPro  6.4  /  Alb  2.8<L>  /  TBili  0.4  /  DBili      /  AST  13  /  ALT  15  /  AlkPhos  52      Creatinine Trend: 1.68 <--, 1.21 <--, 1.31 <--                        9.4    3.07  )-----------( 227      ( 2020 06:18 )             29.2     Urine Studies:  Urinalysis Basic - ( 2020 17:07 )    Color: Yellow / Appearance: Slightly Turbid / S.010 / pH:   Gluc:  / Ketone: Negative  / Bili: Negative / Urobili: Negative   Blood:  / Protein: 30 mg/dL / Nitrite: Negative   Leuk Esterase: Negative / RBC:  / WBC 0-2 /HPF   Sq Epi:  / Non Sq Epi: Moderate /HPF / Bacteria: Trace /HPF        RADIOLOGY & ADDITIONAL STUDIES:

## 2020-01-22 NOTE — PROGRESS NOTE ADULT - ATTENDING COMMENTS
[facility] NEPHROLOGY  Joe Barnes M.D.  Air Lawton D.O.  Michaela Mai M.D.  Tami Arshad, MSN, ANP-C  [phone]    [id number] [place] [address]

## 2020-01-22 NOTE — PROGRESS NOTE ADULT - PROBLEM SELECTOR PLAN 2
Maculopapular rash likely secondary to amoxicillin  coverted  Solumedrol iv q12 to q d  Continue with Loratadine 10mg

## 2020-01-22 NOTE — PHYSICAL THERAPY INITIAL EVALUATION ADULT - DIAGNOSIS, PT EVAL
Patient presents w/ limited mobility due to swelling to [facility] LE. Patient w/ supervision for transfers and gait,

## 2020-01-22 NOTE — PROGRESS NOTE ADULT - ASSESSMENT
Patient is a 89yo Female with CKD-3, HTN, HLD, Severe AS, Arthritis presents with bilateral lower extremity edema. Pt a/w b/l cellulitis. Nephrology consulted for Elevated serum creatinine.    1. SHAUNA on CKD-3- SHAUNA likely due to overdiuresis; Agree with holding Lasix. Baseline- SCr 1.1-1.3. Avoid Nephrotoxins. Monitor lytes.   2. b/L LE cellulitis- pt on Vanco. Monitor Vanco trough. ID following  3. LE edema- in the setting of cellulitis. Agree with holding Lasix due to SHAUNA. Strict I/Os. Monitor urine o/p. Daily weights.   4. Aortic Stenosis- Severe based on last TTE.  Plan as per primary team.  Cardiology following.

## 2020-01-22 NOTE — PROGRESS NOTE ADULT - PROBLEM SELECTOR PLAN 1
H/o chronic venous stasis dermatitis  Afebrile  WBC 4.6  Continue with Vancomycin (day 1)  Blood cultures pending   Dr. Kan (ID) following

## 2020-01-22 NOTE — PROGRESS NOTE ADULT - SUBJECTIVE AND OBJECTIVE BOX
PGY1 Note discussed with Supervising Resident and Primary Attending.    Patient is a 89y old  Female who presents with a chief complaint of Leg swelling (2020 10:49)      INTERVAL HPI/OVERNIGHT EVENTS :    No acute event overnight reported by overnight team and nurses.   Pt remained hemodynamically stable.  Pt seen and examined  at bed side.   Pt concerned about her swollen leg ,she is   Report no new complaint. Pt denies any fever, nausea, vomiting.  Explained all test results and plan.  Appreciate all consults.     ***********************************************************************************************************    MEDICATIONS  (STANDING):  atorvastatin 20 milliGRAM(s) Oral at bedtime  heparin  Injectable 5000 Unit(s) SubCutaneous every 12 hours  loratadine 10 milliGRAM(s) Oral daily  methylPREDNISolone sodium succinate Injectable 40 milliGRAM(s) IV Push every 12 hours  vancomycin  IVPB 1000 milliGRAM(s) IV Intermittent daily    MEDICATIONS  (PRN):  acetaminophen   Tablet .. 650 milliGRAM(s) Oral every 6 hours PRN Moderate Pain (4 - 6)      ***********************************************************************************************************    Allergies    Augmentin (Rash; Urticaria)    Intolerances        ***********************************************************************************************************    REVIEW OF SYSTEMS :  * CONSTITUTIONAL      : No Fever, Weight loss, or Fatigue  * EYES                             : No eye pain , Visual disturbances or Discharge  * RESPIRATORY             : No Cough, Wheezing, Chills or Hemoptysis; No shortness of breath  * CARDIOVASCULAR     : No Chest pain, Palpitations, Dizziness, or Leg swelling  * GASTROINTESTINAL  : No Abdominal or Epigastric pain. No Nausea, Vomiting or Hematemesis; No Diarrhea or Constipation. No Melena or Hematochezia.  * GENITOURINARY        : No Dysuria , Frequency , Haematuria   * NEUROLOGICAL          : No Headaches, Memory loss, Loss of strength, Numbness, or Tremors  * MUSCULOSKELETAL   : No Joint pain  * PSYCHIATRY                 : No Depression or Anxiety   * HEME/LYMPH              : No Easy Bruising or Bleeding gums  * SKIN                               : No Itching, Burning, Rashes, or Lesions     ***********************************************************************************************************    Vital Signs Last 24 Hrs  T(C): 36.6 (2020 10:57), Max: 38.1 (2020 17:45)  T(F): 97.9 (2020 10:57), Max: 100.6 (2020 17:45)  HR: 79 (2020 10:57) (62 - 107)  BP: 127/72 (2020 10:57) (107/63 - 176/58)  BP(mean): --  RR: 17 (2020 10:57) (17 - 18)  SpO2: 96% (2020 10:57) (95% - 99%)    ***********************************************************************************************************    PHYSICAL EXAM :  * GENERAL                 : NAD, Well-groomed, Well-developed  * HEAD                       :  Atraumatic, Normocephalic  * EYES                         : EOMI, PERRLA, Conjunctiva and Sclera clear  * ENT                           : Moist Mucous Membranes  * NECK                         : Supple, No JVD, Normal Thyroid  * CHEST/LUNG           : Clear to Auscultation bilaterally; No Rales, Rhonchi, Wheezing or Rubs  * HEART                       : Regular Rate and Rhythm; No murmurs, Rubs or gallops  * ABDOMEN                : Soft, Non-tender, Non-distended; Bowel Sounds present  * NERVOUS SYSTEM  :  Alert & Oriented X3, Good Concentration; Motor Strength 5/5 B/L UL LL ; DTRs 2+ Intact and Symmetric  * EXTREMITIES            :  2+ Peripheral Pulses, No clubbing, cyanosis, or edema  * SKIN                           : No Rashes or Lesions    **********************************************************************************************************  LABS:                          9.4    3.07  )-----------( 227      ( 2020 06:18 )             29.2     -    140  |  104  |  32<H>  ----------------------------<  145<H>  3.6   |  28  |  1.68<H>    Ca    8.6      2020 06:18  Phos  3.2     01-21  Mg     1.8     -    TPro  6.4  /  Alb  2.8<L>  /  TBili  0.4  /  DBili  x   /  AST  13  /  ALT  15  /  AlkPhos  52  -    PT/INR - ( 2020 18:01 )   PT: 10.7 sec;   INR: 0.97 ratio         PTT - ( 2020 18:01 )  PTT:29.5 sec  Urinalysis Basic - ( 2020 17:07 )    Color: Yellow / Appearance: Slightly Turbid / S.010 / pH: x  Gluc: x / Ketone: Negative  / Bili: Negative / Urobili: Negative   Blood: x / Protein: 30 mg/dL / Nitrite: Negative   Leuk Esterase: Negative / RBC: x / WBC 0-2 /HPF   Sq Epi: x / Non Sq Epi: Moderate /HPF / Bacteria: Trace /HPF      CAPILLARY BLOOD GLUCOSE          **********************************************************************************************************    RADIOLOGY & ADDITIONAL TESTS:   No radiological imaging was required    Imaging Personally Reviewed   :  [ ] YES  [ ] NO    Consultant(s) Notes Reviewed :  [ ] YES  [ ] NO PGY1 Note discussed with Supervising Resident and Primary Attending.    Patient is a 89y old  Female who presents with a chief complaint of Leg swelling (2020 10:49)      INTERVAL HPI/OVERNIGHT EVENTS :    No acute event overnight reported by overnight team and nurses.   Pt remained hemodynamically stable.  Pt seen and examined  at bed side.   Pt concerned about her swollen leg ,she is ocuncelled about she is getting medications and it will come down slowly  Report no new complaint. Pt denies any acute pain.  pt on lasix 40 iv and solumedrol iv bid changing to gd as pt has still morbiliform drug reaction rash.  Explained all  plan.  Appreciate all consults.     ***********************************************************************************************************    MEDICATIONS  (STANDING):  atorvastatin 20 milliGRAM(s) Oral at bedtime  heparin  Injectable 5000 Unit(s) SubCutaneous every 12 hours  loratadine 10 milliGRAM(s) Oral daily  methylPREDNISolone sodium succinate Injectable 40 milliGRAM(s) IV Push every 12 hours  vancomycin  IVPB 1000 milliGRAM(s) IV Intermittent daily    MEDICATIONS  (PRN):  acetaminophen   Tablet .. 650 milliGRAM(s) Oral every 6 hours PRN Moderate Pain (4 - 6)      ***********************************************************************************************************    Allergies    Augmentin (Rash; Urticaria)    Intolerances        ***********************************************************************************************************    REVIEW OF SYSTEMS :  * CONSTITUTIONAL      : No Fever, pt not in pain , pt hears loud  * EYES                             : No eye pain , Visual disturbances  * RESPIRATORY             : No Cough, Wheezing, Chills or Hemoptysis; No shortness of breath  * CARDIOVASCULAR     : No Chest pain, Palpitations, Dizziness. leg swelling present   * GASTROINTESTINAL  : No Abdominal or Epigastric pain. No Nausea, Vomiting or Hematemesis;   * NEUROLOGICAL          : No Headaches,  * MUSCULOSKELETAL   :  Joint pain present due to her arthritis  * PSYCHIATRY                 : No Depression or Anxiety   * SKIN                               : No Itching, Burning. Morbiliform rash present     ***********************************************************************************************************    Vital Signs Last 24 Hrs  T(C): 36.6 (2020 10:57), Max: 38.1 (2020 17:45)  T(F): 97.9 (2020 10:57), Max: 100.6 (2020 17:45)  HR: 79 (2020 10:57) (62 - 107)  BP: 127/72 (2020 10:57) (107/63 - 176/58)  BP(mean): --  RR: 17 (2020 10:57) (17 - 18)  SpO2: 96% (2020 10:57) (95% - 99%)    ***********************************************************************************************************    PHYSICAL EXAM :  * GENERAL                 : NAD, some hearing impairment  * HEAD                       :  Atraumatic, Normocephalic  * EYES                         :  Conjunctiva and Sclera clear  * ENT                           : Moist Mucous Membranes  * NECK                         : Supple, No JVD, Normal Thyroid  * CHEST/LUNG           : Clear to Auscultation bilaterally; No Rales,   * HEART                       : Regular Rate and Rhythm; No murmurs,  * ABDOMEN                : Soft, Non-tender, Non-distended; Bowel Sounds present  * NERVOUS SYSTEM  :  Alert & Oriented X3, Good Concentration; Motor Strength 5/5 B/L UL LL ;  * EXTREMITIES            :  2+ Peripheral Pulses, pitting edema present with venous stasis dermatitis, warmer red cellulitis/ dermatitis  * SKIN                           : morbiliform rash from drug reaction    **********************************************************************************************************  LABS:                          9.4    3.07  )-----------( 227      ( 2020 06:18 )             29.2     01-22    140  |  104  |  32<H>  ----------------------------<  145<H>  3.6   |  28  |  1.68<H>    Ca    8.6      2020 06:18  Phos  3.2     01-21  Mg     1.8     -    TPro  6.4  /  Alb  2.8<L>  /  TBili  0.4  /  DBili  x   /  AST  13  /  ALT  15  /  AlkPhos  52  01-22    PT/INR - ( 2020 18:01 )   PT: 10.7 sec;   INR: 0.97 ratio         PTT - ( 2020 18:01 )  PTT:29.5 sec  Urinalysis Basic - ( 2020 17:07 )    Color: Yellow / Appearance: Slightly Turbid / S.010 / pH: x  Gluc: x / Ketone: Negative  / Bili: Negative / Urobili: Negative   Blood: x / Protein: 30 mg/dL / Nitrite: Negative   Leuk Esterase: Negative / RBC: x / WBC 0-2 /HPF   Sq Epi: x / Non Sq Epi: Moderate /HPF / Bacteria: Trace /HPF      CAPILLARY BLOOD GLUCOSE          **********************************************************************************************************    RADIOLOGY & ADDITIONAL TESTS:   No radiological imaging was required    Imaging Personally Reviewed   :  [x ] YES  [ ] NO    Consultant(s) Notes Reviewed :  [ x] YES  [ ] NO PGY1 Note discussed with Supervising Resident and Primary Attending.    Patient is a 89y old  Female who presents with a chief complaint of Leg swelling (2020 10:49)      INTERVAL HPI/OVERNIGHT EVENTS :    No acute event overnight reported by overnight team and nurses.   Pt remained hemodynamically stable.  Pt seen and examined  at bed side.   Pt concerned about her swollen leg ,she is ocuncelled about she is getting medications and it will come down slowly  Report no new complaint. Pt denies any acute pain.  pt on lasix 40 iv and solumedrol iv bid changing to PO prednison as pt has still morbiliform drug reaction rash.  vanctrough to be done before 4th dose on 24th in am   Explained all  plan.  Appreciate all consults.     ***********************************************************************************************************    MEDICATIONS  (STANDING):  atorvastatin 20 milliGRAM(s) Oral at bedtime  heparin  Injectable 5000 Unit(s) SubCutaneous every 12 hours  loratadine 10 milliGRAM(s) Oral daily  methylPREDNISolone sodium succinate Injectable 40 milliGRAM(s) IV Push every 12 hours  vancomycin  IVPB 1000 milliGRAM(s) IV Intermittent daily    MEDICATIONS  (PRN):  acetaminophen   Tablet .. 650 milliGRAM(s) Oral every 6 hours PRN Moderate Pain (4 - 6)      ***********************************************************************************************************    Allergies    Augmentin (Rash; Urticaria)    Intolerances        ***********************************************************************************************************    REVIEW OF SYSTEMS :  * CONSTITUTIONAL      : No Fever, pt not in pain , pt hears loud  * EYES                             : No eye pain , Visual disturbances  * RESPIRATORY             : No Cough, Wheezing, Chills or Hemoptysis; No shortness of breath  * CARDIOVASCULAR     : No Chest pain, Palpitations, Dizziness. leg swelling present   * GASTROINTESTINAL  : No Abdominal or Epigastric pain. No Nausea, Vomiting or Hematemesis;   * NEUROLOGICAL          : No Headaches,  * MUSCULOSKELETAL   :  Joint pain present due to her arthritis  * PSYCHIATRY                 : No Depression or Anxiety   * SKIN                               : No Itching, Burning. Morbiliform rash present     ***********************************************************************************************************    Vital Signs Last 24 Hrs  T(C): 36.6 (2020 10:57), Max: 38.1 (2020 17:45)  T(F): 97.9 (2020 10:57), Max: 100.6 (2020 17:45)  HR: 79 (2020 10:57) (62 - 107)  BP: 127/72 (2020 10:57) (107/63 - 176/58)  BP(mean): --  RR: 17 (2020 10:57) (17 - 18)  SpO2: 96% (2020 10:57) (95% - 99%)    ***********************************************************************************************************    PHYSICAL EXAM :  * GENERAL                 : NAD, some hearing impairment  * HEAD                       :  Atraumatic, Normocephalic  * EYES                         :  Conjunctiva and Sclera clear  * ENT                           : Moist Mucous Membranes  * NECK                         : Supple, No JVD, Normal Thyroid  * CHEST/LUNG           : Clear to Auscultation bilaterally; No Rales,   * HEART                       : Regular Rate and Rhythm; No murmurs,  * ABDOMEN                : Soft, Non-tender, Non-distended; Bowel Sounds present  * NERVOUS SYSTEM  :  Alert & Oriented X3, Good Concentration; Motor Strength 5/5 B/L UL LL ;  * EXTREMITIES            :  2+ Peripheral Pulses, pitting edema present with venous stasis dermatitis, warmer red cellulitis/ dermatitis  * SKIN                           : morbiliform rash from drug reaction    **********************************************************************************************************  LABS:                          9.4    3.07  )-----------( 227      ( 2020 06:18 )             29.2     -22    140  |  104  |  32<H>  ----------------------------<  145<H>  3.6   |  28  |  1.68<H>    Ca    8.6      2020 06:18  Phos  3.2     01-21  Mg     1.8     -    TPro  6.4  /  Alb  2.8<L>  /  TBili  0.4  /  DBili  x   /  AST  13  /  ALT  15  /  AlkPhos  52  01-22    PT/INR - ( 2020 18:01 )   PT: 10.7 sec;   INR: 0.97 ratio         PTT - ( 2020 18:01 )  PTT:29.5 sec  Urinalysis Basic - ( 2020 17:07 )    Color: Yellow / Appearance: Slightly Turbid / S.010 / pH: x  Gluc: x / Ketone: Negative  / Bili: Negative / Urobili: Negative   Blood: x / Protein: 30 mg/dL / Nitrite: Negative   Leuk Esterase: Negative / RBC: x / WBC 0-2 /HPF   Sq Epi: x / Non Sq Epi: Moderate /HPF / Bacteria: Trace /HPF      CAPILLARY BLOOD GLUCOSE          **********************************************************************************************************    RADIOLOGY & ADDITIONAL TESTS:   No radiological imaging was required    Imaging Personally Reviewed   :  [x ] YES  [ ] NO    Consultant(s) Notes Reviewed :  [ x] YES  [ ] NO PGY1 Note discussed with Supervising Resident and Primary Attending.    Patient is a 89y old  Female who presents with a chief complaint of Leg swelling (2020 10:49)      INTERVAL HPI/OVERNIGHT EVENTS :    No acute event overnight reported by overnight team and nurses.   Pt remained hemodynamically stable.  Pt seen and examined  at bed side.   Pt concerned about her swollen leg ,she is ocuncelled about she is getting medications and it will come down slowly  Report no new complaint. Pt denies any acute pain.  pt was  on lasix 40 iv which was d/c by cardiology and solumedrol iv bid changing to PO prednison as pt has still morbiliform drug reaction rash.  vanctrough to be done before 4th dose on 24th in am   Explained all  plan.  Appreciate all consults.     ***********************************************************************************************************    MEDICATIONS  (STANDING):  atorvastatin 20 milliGRAM(s) Oral at bedtime  heparin  Injectable 5000 Unit(s) SubCutaneous every 12 hours  loratadine 10 milliGRAM(s) Oral daily  methylPREDNISolone sodium succinate Injectable 40 milliGRAM(s) IV Push every 12 hours  vancomycin  IVPB 1000 milliGRAM(s) IV Intermittent daily    MEDICATIONS  (PRN):  acetaminophen   Tablet .. 650 milliGRAM(s) Oral every 6 hours PRN Moderate Pain (4 - 6)      ***********************************************************************************************************    Allergies    Augmentin (Rash; Urticaria)    Intolerances        ***********************************************************************************************************    REVIEW OF SYSTEMS :  * CONSTITUTIONAL      : No Fever, pt not in pain , pt hears loud  * EYES                             : No eye pain , Visual disturbances  * RESPIRATORY             : No Cough, Wheezing, Chills or Hemoptysis; No shortness of breath  * CARDIOVASCULAR     : No Chest pain, Palpitations, Dizziness. leg swelling present   * GASTROINTESTINAL  : No Abdominal or Epigastric pain. No Nausea, Vomiting or Hematemesis;   * NEUROLOGICAL          : No Headaches,  * MUSCULOSKELETAL   :  Joint pain present due to her arthritis  * PSYCHIATRY                 : No Depression or Anxiety   * SKIN                               : No Itching, Burning. Morbiliform rash present     ***********************************************************************************************************    Vital Signs Last 24 Hrs  T(C): 36.6 (2020 10:57), Max: 38.1 (2020 17:45)  T(F): 97.9 (2020 10:57), Max: 100.6 (2020 17:45)  HR: 79 (2020 10:57) (62 - 107)  BP: 127/72 (2020 10:57) (107/63 - 176/58)  BP(mean): --  RR: 17 (2020 10:57) (17 - 18)  SpO2: 96% (2020 10:57) (95% - 99%)    ***********************************************************************************************************    PHYSICAL EXAM :  * GENERAL                 : NAD, some hearing impairment  * HEAD                       :  Atraumatic, Normocephalic  * EYES                         :  Conjunctiva and Sclera clear  * ENT                           : Moist Mucous Membranes  * NECK                         : Supple, No JVD, Normal Thyroid  * CHEST/LUNG           : Clear to Auscultation bilaterally; No Rales,   * HEART                       : Regular Rate and Rhythm; No murmurs,  * ABDOMEN                : Soft, Non-tender, Non-distended; Bowel Sounds present  * NERVOUS SYSTEM  :  Alert & Oriented X3, Good Concentration; Motor Strength 5/5 B/L UL LL ;  * EXTREMITIES            :  2+ Peripheral Pulses, pitting edema present with venous stasis dermatitis, warmer red cellulitis/ dermatitis  * SKIN                           : morbiliform rash from drug reaction    **********************************************************************************************************  LABS:                          9.4    3.07  )-----------( 227      ( 2020 06:18 )             29.2     01-    140  |  104  |  32<H>  ----------------------------<  145<H>  3.6   |  28  |  1.68<H>    Ca    8.6      2020 06:18  Phos  3.2     -21  Mg     1.8     -    TPro  6.4  /  Alb  2.8<L>  /  TBili  0.4  /  DBili  x   /  AST  13  /  ALT  15  /  AlkPhos  52  -22    PT/INR - ( 2020 18:01 )   PT: 10.7 sec;   INR: 0.97 ratio         PTT - ( 2020 18:01 )  PTT:29.5 sec  Urinalysis Basic - ( 2020 17:07 )    Color: Yellow / Appearance: Slightly Turbid / S.010 / pH: x  Gluc: x / Ketone: Negative  / Bili: Negative / Urobili: Negative   Blood: x / Protein: 30 mg/dL / Nitrite: Negative   Leuk Esterase: Negative / RBC: x / WBC 0-2 /HPF   Sq Epi: x / Non Sq Epi: Moderate /HPF / Bacteria: Trace /HPF      CAPILLARY BLOOD GLUCOSE          **********************************************************************************************************    RADIOLOGY & ADDITIONAL TESTS:   No radiological imaging was required    Imaging Personally Reviewed   :  [x ] YES  [ ] NO    Consultant(s) Notes Reviewed :  [ x] YES  [ ] NO PGY1 Note discussed with Supervising Resident and Primary Attending.    Patient is a 89y old  Female who presents with a chief complaint of Leg swelling (2020 10:49)      INTERVAL HPI/OVERNIGHT EVENTS :    No acute event overnight reported by overnight team and nurses.   Pt remained hemodynamically stable.  Pt seen and examined  at bed side.   Pt concerned about her swollen leg ,she is councelled about she is getting medications and it will come down slowly  Report no new complaint. Pt denies any acute pain.  pt was  on lasix 40 iv which was d/c by cardiology and solumedrol iv bid changing to PO prednison as pt has still morbiliform drug reaction rash.  vanctrough to be done before 4th dose on  in am   Explained all  plan.  Appreciate all consults.     ***********************************************************************************************************    MEDICATIONS  (STANDING):  atorvastatin 20 milliGRAM(s) Oral at bedtime  heparin  Injectable 5000 Unit(s) SubCutaneous every 12 hours  loratadine 10 milliGRAM(s) Oral daily  methylPREDNISolone sodium succinate Injectable 40 milliGRAM(s) IV Push every 12 hours  vancomycin  IVPB 1000 milliGRAM(s) IV Intermittent daily    MEDICATIONS  (PRN):  acetaminophen   Tablet .. 650 milliGRAM(s) Oral every 6 hours PRN Moderate Pain (4 - 6)      ***********************************************************************************************************    Allergies    Augmentin (Rash; Urticaria)    Intolerances        ***********************************************************************************************************    REVIEW OF SYSTEMS :  * CONSTITUTIONAL      : No Fever, pt not in pain , pt hears loud  * EYES                             : No eye pain , Visual disturbances  * RESPIRATORY             : No Cough, Wheezing, Chills or Hemoptysis; No shortness of breath  * CARDIOVASCULAR     : No Chest pain, Palpitations, Dizziness. leg swelling present   * GASTROINTESTINAL  : No Abdominal or Epigastric pain. No Nausea, Vomiting or Hematemesis;   * NEUROLOGICAL          : No Headaches,  * MUSCULOSKELETAL   :  Joint pain present due to her arthritis  * PSYCHIATRY                 : No Depression or Anxiety   * SKIN                               : No Itching, Burning. Morbiliform rash present     ***********************************************************************************************************    Vital Signs Last 24 Hrs  T(C): 36.6 (2020 10:57), Max: 38.1 (2020 17:45)  T(F): 97.9 (2020 10:57), Max: 100.6 (2020 17:45)  HR: 79 (2020 10:57) (62 - 107)  BP: 127/72 (2020 10:57) (107/63 - 176/58)  BP(mean): --  RR: 17 (2020 10:57) (17 - 18)  SpO2: 96% (2020 10:57) (95% - 99%)    ***********************************************************************************************************    PHYSICAL EXAM :  * GENERAL                 : NAD, some hearing impairment  * HEAD                       :  Atraumatic, Normocephalic  * EYES                         :  Conjunctiva and Sclera clear  * ENT                           : Moist Mucous Membranes  * NECK                         : Supple, No JVD, Normal Thyroid  * CHEST/LUNG           : Clear to Auscultation bilaterally; No Rales,   * HEART                       : Regular Rate and Rhythm; No murmurs,  * ABDOMEN                : Soft, Non-tender, Non-distended; Bowel Sounds present  * NERVOUS SYSTEM  :  Alert & Oriented X3, Good Concentration; Motor Strength 5/5 B/L UL LL ;  * EXTREMITIES            :  2+ Peripheral Pulses, pitting edema present with venous stasis dermatitis, warmer red cellulitis/ dermatitis  * SKIN                           : morbiliform rash from drug reaction    **********************************************************************************************************  LABS:                          9.4    3.07  )-----------( 227      ( 2020 06:18 )             29.2     01-22    140  |  104  |  32<H>  ----------------------------<  145<H>  3.6   |  28  |  1.68<H>    Ca    8.6      2020 06:18  Phos  3.2     -21  Mg     1.8     -    TPro  6.4  /  Alb  2.8<L>  /  TBili  0.4  /  DBili  x   /  AST  13  /  ALT  15  /  AlkPhos  52  -22    PT/INR - ( 2020 18:01 )   PT: 10.7 sec;   INR: 0.97 ratio         PTT - ( 2020 18:01 )  PTT:29.5 sec  Urinalysis Basic - ( 2020 17:07 )    Color: Yellow / Appearance: Slightly Turbid / S.010 / pH: x  Gluc: x / Ketone: Negative  / Bili: Negative / Urobili: Negative   Blood: x / Protein: 30 mg/dL / Nitrite: Negative   Leuk Esterase: Negative / RBC: x / WBC 0-2 /HPF   Sq Epi: x / Non Sq Epi: Moderate /HPF / Bacteria: Trace /HPF      CAPILLARY BLOOD GLUCOSE          **********************************************************************************************************    RADIOLOGY & ADDITIONAL TESTS:   No radiological imaging was required    Imaging Personally Reviewed   :  [x ] YES  [ ] NO    Consultant(s) Notes Reviewed :  [ x] YES  [ ] NO

## 2020-01-22 NOTE — PHYSICAL THERAPY INITIAL EVALUATION ADULT - PERTINENT HX OF CURRENT PROBLEM, REHAB EVAL
Patient brought to ED due to swelling on B/L LE and limited mobility. Patient w/ h/o HTN, HLD, CKD and cellulitis

## 2020-01-22 NOTE — PROGRESS NOTE ADULT - PROBLEM SELECTOR PLAN 4
SCr 1.21  Avoid Nephrotoxic Meds/ Agents such as (NSAIDs, IV contrast, Aminoglycosides such as gentamicin, -Gadolinium contrast, Phosphate containing enemas, etc..)   Dr. Mai (Nephrology) following   osiel IO   DAILY WEIGHT

## 2020-01-23 DIAGNOSIS — I35.0 NONRHEUMATIC AORTIC (VALVE) STENOSIS: ICD-10-CM

## 2020-01-23 LAB
ALBUMIN SERPL ELPH-MCNC: 2.8 G/DL — LOW (ref 3.5–5)
ALP SERPL-CCNC: 47 U/L — SIGNIFICANT CHANGE UP (ref 40–120)
ALT FLD-CCNC: 14 U/L DA — SIGNIFICANT CHANGE UP (ref 10–60)
ANION GAP SERPL CALC-SCNC: 4 MMOL/L — LOW (ref 5–17)
AST SERPL-CCNC: 14 U/L — SIGNIFICANT CHANGE UP (ref 10–40)
BASOPHILS # BLD AUTO: 0.03 K/UL — SIGNIFICANT CHANGE UP (ref 0–0.2)
BASOPHILS NFR BLD AUTO: 0.5 % — SIGNIFICANT CHANGE UP (ref 0–2)
BILIRUB SERPL-MCNC: 0.4 MG/DL — SIGNIFICANT CHANGE UP (ref 0.2–1.2)
BUN SERPL-MCNC: 44 MG/DL — HIGH (ref 7–18)
CALCIUM SERPL-MCNC: 8.1 MG/DL — LOW (ref 8.4–10.5)
CHLORIDE SERPL-SCNC: 106 MMOL/L — SIGNIFICANT CHANGE UP (ref 96–108)
CO2 SERPL-SCNC: 29 MMOL/L — SIGNIFICANT CHANGE UP (ref 22–31)
CREAT SERPL-MCNC: 1.69 MG/DL — HIGH (ref 0.5–1.3)
EOSINOPHIL # BLD AUTO: 0.12 K/UL — SIGNIFICANT CHANGE UP (ref 0–0.5)
EOSINOPHIL NFR BLD AUTO: 1.9 % — SIGNIFICANT CHANGE UP (ref 0–6)
GLUCOSE SERPL-MCNC: 102 MG/DL — HIGH (ref 70–99)
HCT VFR BLD CALC: 26.5 % — LOW (ref 34.5–45)
HCT VFR BLD CALC: 31.9 % — LOW (ref 34.5–45)
HGB BLD-MCNC: 10.2 G/DL — LOW (ref 11.5–15.5)
HGB BLD-MCNC: 8.5 G/DL — LOW (ref 11.5–15.5)
IMM GRANULOCYTES NFR BLD AUTO: 0.3 % — SIGNIFICANT CHANGE UP (ref 0–1.5)
LYMPHOCYTES # BLD AUTO: 1.83 K/UL — SIGNIFICANT CHANGE UP (ref 1–3.3)
LYMPHOCYTES # BLD AUTO: 28.8 % — SIGNIFICANT CHANGE UP (ref 13–44)
MAGNESIUM SERPL-MCNC: 2.3 MG/DL — SIGNIFICANT CHANGE UP (ref 1.6–2.6)
MCHC RBC-ENTMCNC: 32 GM/DL — SIGNIFICANT CHANGE UP (ref 32–36)
MCHC RBC-ENTMCNC: 32 PG — SIGNIFICANT CHANGE UP (ref 27–34)
MCHC RBC-ENTMCNC: 32.1 GM/DL — SIGNIFICANT CHANGE UP (ref 32–36)
MCHC RBC-ENTMCNC: 32.7 PG — SIGNIFICANT CHANGE UP (ref 27–34)
MCV RBC AUTO: 102.2 FL — HIGH (ref 80–100)
MCV RBC AUTO: 99.6 FL — SIGNIFICANT CHANGE UP (ref 80–100)
MONOCYTES # BLD AUTO: 0.74 K/UL — SIGNIFICANT CHANGE UP (ref 0–0.9)
MONOCYTES NFR BLD AUTO: 11.6 % — SIGNIFICANT CHANGE UP (ref 2–14)
NEUTROPHILS # BLD AUTO: 3.62 K/UL — SIGNIFICANT CHANGE UP (ref 1.8–7.4)
NEUTROPHILS NFR BLD AUTO: 56.9 % — SIGNIFICANT CHANGE UP (ref 43–77)
NRBC # BLD: 0 /100 WBCS — SIGNIFICANT CHANGE UP (ref 0–0)
NRBC # BLD: 0 /100 WBCS — SIGNIFICANT CHANGE UP (ref 0–0)
PHOSPHATE SERPL-MCNC: 3.8 MG/DL — SIGNIFICANT CHANGE UP (ref 2.5–4.5)
PLATELET # BLD AUTO: 229 K/UL — SIGNIFICANT CHANGE UP (ref 150–400)
PLATELET # BLD AUTO: 236 K/UL — SIGNIFICANT CHANGE UP (ref 150–400)
POTASSIUM SERPL-MCNC: 3.5 MMOL/L — SIGNIFICANT CHANGE UP (ref 3.5–5.3)
POTASSIUM SERPL-SCNC: 3.5 MMOL/L — SIGNIFICANT CHANGE UP (ref 3.5–5.3)
PROT SERPL-MCNC: 6.3 G/DL — SIGNIFICANT CHANGE UP (ref 6–8.3)
RBC # BLD: 2.66 M/UL — LOW (ref 3.8–5.2)
RBC # BLD: 3.12 M/UL — LOW (ref 3.8–5.2)
RBC # FLD: 12 % — SIGNIFICANT CHANGE UP (ref 10.3–14.5)
RBC # FLD: 12.3 % — SIGNIFICANT CHANGE UP (ref 10.3–14.5)
SODIUM SERPL-SCNC: 139 MMOL/L — SIGNIFICANT CHANGE UP (ref 135–145)
VANCOMYCIN TROUGH SERPL-MCNC: 13.7 UG/ML — SIGNIFICANT CHANGE UP (ref 10–20)
WBC # BLD: 5.23 K/UL — SIGNIFICANT CHANGE UP (ref 3.8–10.5)
WBC # BLD: 6.36 K/UL — SIGNIFICANT CHANGE UP (ref 3.8–10.5)
WBC # FLD AUTO: 5.23 K/UL — SIGNIFICANT CHANGE UP (ref 3.8–10.5)
WBC # FLD AUTO: 6.36 K/UL — SIGNIFICANT CHANGE UP (ref 3.8–10.5)

## 2020-01-23 PROCEDURE — 99233 SBSQ HOSP IP/OBS HIGH 50: CPT | Mod: GC

## 2020-01-23 RX ORDER — SENNA PLUS 8.6 MG/1
1 TABLET ORAL AT BEDTIME
Refills: 0 | Status: DISCONTINUED | OUTPATIENT
Start: 2020-01-23 | End: 2020-01-25

## 2020-01-23 RX ADMIN — LORATADINE 10 MILLIGRAM(S): 10 TABLET ORAL at 12:06

## 2020-01-23 RX ADMIN — HEPARIN SODIUM 5000 UNIT(S): 5000 INJECTION INTRAVENOUS; SUBCUTANEOUS at 05:12

## 2020-01-23 RX ADMIN — Medication 40 MILLIGRAM(S): at 05:12

## 2020-01-23 RX ADMIN — ATORVASTATIN CALCIUM 20 MILLIGRAM(S): 80 TABLET, FILM COATED ORAL at 22:00

## 2020-01-23 RX ADMIN — Medication 250 MILLIGRAM(S): at 13:06

## 2020-01-23 RX ADMIN — HEPARIN SODIUM 5000 UNIT(S): 5000 INJECTION INTRAVENOUS; SUBCUTANEOUS at 17:16

## 2020-01-23 RX ADMIN — SENNA PLUS 1 TABLET(S): 8.6 TABLET ORAL at 22:00

## 2020-01-23 NOTE — PROGRESS NOTE ADULT - PROBLEM SELECTOR PLAN 2
Maculopapular rash likely secondary to amoxicillin  coverted  Solumedrol iv q12 to q d  Continue with Loratadine 10mg Maculopapular rash likely secondary to amoxicillin  coverted  Solumedrol iv q12 to po prednison 40mg qd  Continue with Loratadine 10mg  rash much better today

## 2020-01-23 NOTE — PROGRESS NOTE ADULT - SUBJECTIVE AND OBJECTIVE BOX
PGY1 Note discussed with Supervising Resident and Primary Attending.    Patient is a 89y old  Female who presents with a chief complaint of Leg swelling (2020 15:58)      INTERVAL HPI/OVERNIGHT EVENTS :    No acute event overnight reported by overnight team and nurses. Pt remained hemodynamically stable.  Pt seen and examined  at bed side. All concerns and questions answered.   Report no new complaint. Pt denies any fever, nausea, vomiting.  Explained all test results and plan.  Appreciate all consults.   ***********************************************************************************************************    MEDICATIONS  (STANDING):  atorvastatin 20 milliGRAM(s) Oral at bedtime  heparin  Injectable 5000 Unit(s) SubCutaneous every 12 hours  loratadine 10 milliGRAM(s) Oral daily  predniSONE   Tablet 40 milliGRAM(s) Oral daily  vancomycin  IVPB 1000 milliGRAM(s) IV Intermittent daily    MEDICATIONS  (PRN):  acetaminophen   Tablet .. 650 milliGRAM(s) Oral every 6 hours PRN Moderate Pain (4 - 6)      ***********************************************************************************************************    Allergies    Augmentin (Rash; Urticaria)    Intolerances        ***********************************************************************************************************    REVIEW OF SYSTEMS :  * CONSTITUTIONAL      : No Fever, Weight loss, or Fatigue  * EYES                             : No eye pain , Visual disturbances or Discharge  * RESPIRATORY             : No Cough, Wheezing, Chills or Hemoptysis; No shortness of breath  * CARDIOVASCULAR     : No Chest pain, Palpitations, Dizziness, or Leg swelling  * GASTROINTESTINAL  : No Abdominal or Epigastric pain. No Nausea, Vomiting or Hematemesis; No Diarrhea or Constipation. No Melena or Hematochezia.  * GENITOURINARY        : No Dysuria , Frequency , Haematuria   * NEUROLOGICAL          : No Headaches, Memory loss, Loss of strength, Numbness, or Tremors  * MUSCULOSKELETAL   : No Joint pain  * PSYCHIATRY                 : No Depression or Anxiety   * HEME/LYMPH              : No Easy Bruising or Bleeding gums  * SKIN                               : No Itching, Burning, Rashes, or Lesions     ***********************************************************************************************************    Vital Signs Last 24 Hrs  T(C): 36.7 (2020 04:57), Max: 36.8 (2020 21:30)  T(F): 98.1 (2020 04:57), Max: 98.3 (2020 21:30)  HR: 67 (2020 04:57) (67 - 101)  BP: 138/58 (2020 04:57) (120/67 - 138/58)  BP(mean): --  RR: 16 (2020 04:57) (16 - 17)  SpO2: 96% (2020 04:57) (95% - 98%)    ***********************************************************************************************************    PHYSICAL EXAM :  * GENERAL                 : NAD, Well-groomed, Well-developed  * HEAD                       :  Atraumatic, Normocephalic  * EYES                         : EOMI, PERRLA, Conjunctiva and Sclera clear  * ENT                           : Moist Mucous Membranes  * NECK                         : Supple, No JVD, Normal Thyroid  * CHEST/LUNG           : Clear to Auscultation bilaterally; No Rales, Rhonchi, Wheezing or Rubs  * HEART                       : Regular Rate and Rhythm; No murmurs, Rubs or gallops  * ABDOMEN                : Soft, Non-tender, Non-distended; Bowel Sounds present  * NERVOUS SYSTEM  :  Alert & Oriented X3, Good Concentration; Motor Strength 5/5 B/L UL LL ; DTRs 2+ Intact and Symmetric  * EXTREMITIES            :  2+ Peripheral Pulses, No clubbing, cyanosis, or edema  * SKIN                           : No Rashes or Lesions    **********************************************************************************************************  LABS:                          8.5    6.36  )-----------( 229      ( 2020 05:57 )             26.5         139  |  106  |  44<H>  ----------------------------<  102<H>  3.5   |  29  |  1.69<H>    Ca    8.1<L>      2020 05:57  Phos  3.8       Mg     2.3         TPro  6.3  /  Alb  2.8<L>  /  TBili  0.4  /  DBili  x   /  AST  14  /  ALT  14  /  AlkPhos  47        Urinalysis Basic - ( 2020 17:07 )    Color: Yellow / Appearance: Slightly Turbid / S.010 / pH: x  Gluc: x / Ketone: Negative  / Bili: Negative / Urobili: Negative   Blood: x / Protein: 30 mg/dL / Nitrite: Negative   Leuk Esterase: Negative / RBC: x / WBC 0-2 /HPF   Sq Epi: x / Non Sq Epi: Moderate /HPF / Bacteria: Trace /HPF      CAPILLARY BLOOD GLUCOSE          **********************************************************************************************************    RADIOLOGY & ADDITIONAL TESTS:   No radiological imaging was required    Imaging Personally Reviewed   :  [ ] YES  [ ] NO    Consultant(s) Notes Reviewed :  [ ] YES  [ ] NO PGY1 Note discussed with Supervising Resident and Primary Attending.    Patient is a 89y old  Female who presents with a chief complaint of Leg swelling (2020 15:58)      INTERVAL HPI/OVERNIGHT EVENTS :    No acute event overnight reported by overnight team and nurses.   Pt remained hemodynamically stable.  Pt seen and examined  at bed side.   All concerns and questions answered to both patient and family son at bed side   Report no new complaint.   Pt rash improved as compared to yesterday still on PO prednisone 40 mg  pt lasix on hold as Cr is 1.68 climbed up so monitor for now till it normalizes  Vanc trough done this morning due to climbing Cr ,level under therapeutic will continue with same dose of vancomycin today.  Echo shows moderate aortic valve stenosis  Explained all test results and plan.  Appreciate all consults.   ***********************************************************************************************************    MEDICATIONS  (STANDING):  atorvastatin 20 milliGRAM(s) Oral at bedtime  heparin  Injectable 5000 Unit(s) SubCutaneous every 12 hours  loratadine 10 milliGRAM(s) Oral daily  predniSONE   Tablet 40 milliGRAM(s) Oral daily  vancomycin  IVPB 1000 milliGRAM(s) IV Intermittent daily    MEDICATIONS  (PRN):  acetaminophen   Tablet .. 650 milliGRAM(s) Oral every 6 hours PRN Moderate Pain (4 - 6)      ***********************************************************************************************************    Allergies    Augmentin (Rash; Urticaria)    Intolerances        ***********************************************************************************************************    REVIEW OF SYSTEMS :  * CONSTITUTIONAL      : No Fever, Weight loss, or Fatigue  * EYES                             : No eye pain , Visual disturbances or Discharge  * RESPIRATORY             : No Cough, Wheezing, Chills or Hemoptysis; No shortness of breath  * CARDIOVASCULAR     : No Chest pain, Palpitations, Dizziness, or Leg swelling  * GASTROINTESTINAL  : No Abdominal or Epigastric pain. No Nausea, Vomiting or Hematemesis; No Diarrhea or Constipation. No Melena or Hematochezia.  * GENITOURINARY        : No Dysuria , Frequency , Haematuria   * NEUROLOGICAL          : No Headaches, Memory loss, Loss of strength, Numbness, or Tremors  * MUSCULOSKELETAL   : No Joint pain  * PSYCHIATRY                 : No Depression or Anxiety   * HEME/LYMPH              : No Easy Bruising or Bleeding gums  * SKIN                               : No Itching, Burning, Rashes, or Lesions     ***********************************************************************************************************    Vital Signs Last 24 Hrs  T(C): 36.7 (2020 04:57), Max: 36.8 (2020 21:30)  T(F): 98.1 (2020 04:57), Max: 98.3 (2020 21:30)  HR: 67 (2020 04:57) (67 - 101)  BP: 138/58 (2020 04:57) (120/67 - 138/58)  BP(mean): --  RR: 16 (2020 04:57) (16 - 17)  SpO2: 96% (2020 04:57) (95% - 98%)    ***********************************************************************************************************    PHYSICAL EXAM :  * GENERAL                 : NAD, Well-groomed, Well-developed  * HEAD                       :  Atraumatic, Normocephalic  * EYES                         : EOMI, PERRLA, Conjunctiva and Sclera clear  * ENT                           : Moist Mucous Membranes  * NECK                         : Supple, No JVD, Normal Thyroid  * CHEST/LUNG           : Clear to Auscultation bilaterally; No Rales, Rhonchi, Wheezing or Rubs  * HEART                       : Regular Rate and Rhythm; No murmurs, Rubs or gallops  * ABDOMEN                : Soft, Non-tender, Non-distended; Bowel Sounds present  * NERVOUS SYSTEM  :  Alert & Oriented X3, Good Concentration; Motor Strength 5/5 B/L UL LL ; DTRs 2+ Intact and Symmetric  * EXTREMITIES            :  2+ Peripheral Pulses, No clubbing, cyanosis, or edema  * SKIN                           : No Rashes or Lesions    **********************************************************************************************************  LABS:                          8.5    6.36  )-----------( 229      ( 2020 05:57 )             26.5     -    139  |  106  |  44<H>  ----------------------------<  102<H>  3.5   |  29  |  1.69<H>    Ca    8.1<L>      2020 05:57  Phos  3.8       Mg     2.3         TPro  6.3  /  Alb  2.8<L>  /  TBili  0.4  /  DBili  x   /  AST  14  /  ALT  14  /  AlkPhos  47        Urinalysis Basic - ( 2020 17:07 )    Color: Yellow / Appearance: Slightly Turbid / S.010 / pH: x  Gluc: x / Ketone: Negative  / Bili: Negative / Urobili: Negative   Blood: x / Protein: 30 mg/dL / Nitrite: Negative   Leuk Esterase: Negative / RBC: x / WBC 0-2 /HPF   Sq Epi: x / Non Sq Epi: Moderate /HPF / Bacteria: Trace /HPF      CAPILLARY BLOOD GLUCOSE          **********************************************************************************************************    RADIOLOGY & ADDITIONAL TESTS:   No radiological imaging was required    Imaging Personally Reviewed   :  [ ] YES  [ ] NO    Consultant(s) Notes Reviewed :  [ ] YES  [ ] NO PGY1 Note discussed with Supervising Resident and Primary Attending.    Patient is a 89y old  Female who presents with a chief complaint of Leg swelling (2020 15:58)      INTERVAL HPI/OVERNIGHT EVENTS :    No acute event overnight reported by overnight team and nurses.   Pt remained hemodynamically stable.  Pt seen and examined  at bed side.   All concerns and questions answered to both patient and family son at bed side   Report no new complaint.   Pt rash improved as compared to yesterday still on PO prednisone 40 mg  pt lasix on hold as Cr is 1.68 climbed up so monitor for now till it normalizes  Vanc trough done this morning due to climbing Cr ,level under therapeutic will continue with same dose of vancomycin today.  Echo shows moderate aortic valve stenosis  Explained plan.  Appreciate all consults.   d/c planing for saturday  ***********************************************************************************************************    MEDICATIONS  (STANDING):  atorvastatin 20 milliGRAM(s) Oral at bedtime  heparin  Injectable 5000 Unit(s) SubCutaneous every 12 hours  loratadine 10 milliGRAM(s) Oral daily  predniSONE   Tablet 40 milliGRAM(s) Oral daily  vancomycin  IVPB 1000 milliGRAM(s) IV Intermittent daily    MEDICATIONS  (PRN):  acetaminophen   Tablet .. 650 milliGRAM(s) Oral every 6 hours PRN Moderate Pain (4 - 6)      ***********************************************************************************************************    Allergies    Augmentin (Rash; Urticaria)    Intolerances        ***********************************************************************************************************    REVIEW OF SYSTEMS :  * CONSTITUTIONAL      : No Fever, or Fatigue  * EYES                             : No eye pain , Visual disturbances   * RESPIRATORY             : No Cough, Wheezing,  No shortness of breath  * CARDIOVASCULAR     : No Chest pain, Palpitations, Dizziness, or + Leg swelling and chronic skin changes   * GASTROINTESTINAL  : No Nausea, Vomiting or Hematemesis; No Diarrhea or Constipation. No Melena or Hematochezia.  * GENITOURINARY        : No Dysuria , Frequency , Haematuria   * NEUROLOGICAL          : No Headaches, Memory loss  * MUSCULOSKELETAL   : No Joint pain  * PSYCHIATRY                 : No Depression or Anxiety   * SKIN                               : No Itching, Burning, Rashes, or Lesions     ***********************************************************************************************************    Vital Signs Last 24 Hrs  T(C): 36.7 (2020 04:57), Max: 36.8 (2020 21:30)  T(F): 98.1 (2020 04:57), Max: 98.3 (2020 21:30)  HR: 67 (:57) (67 - 101)  BP: 138/58 (2020 04:57) (120/67 - 138/58)  BP(mean): --  RR: 16 (2020 04:57) (16 - 17)  SpO2: 96% (:57) (95% - 98%)    ***********************************************************************************************************    PHYSICAL EXAM :  * GENERAL                 : NAD,PLeasant lady  * HEAD                       :  Atraumatic, Normocephalic  * EYES                         :  Conjunctiva and Sclera clear  * ENT                           : Moist Mucous Membranes  * NECK                         : Supple, No JVD, Normal Thyroid  * CHEST/LUNG           : Clear to Auscultation bilaterally; No Rales,   * HEART                       : Regular Rate and Rhythm; No murmurs,  * ABDOMEN                : Soft, Non-tender, Non-distended; Bowel Sounds present  * NERVOUS SYSTEM  :  Alert & Oriented X3, Good Concentration; Motor Strength 5/5 B/L UL LL ;   * EXTREMITIES            :  2+ Peripheral Pulses, No clubbing, cyanosis. Pitting edema present with chroninc skin changes with red patches warmer than surrounding skin,   * SKIN                           : morbiliform Rashes present    **********************************************************************************************************  LABS:                          8.5    6.36  )-----------( 229      ( 2020 05:57 )             26.5         139  |  106  |  44<H>  ----------------------------<  102<H>  3.5   |  29  |  1.69<H>    Ca    8.1<L>      2020 05:57  Phos  3.8       Mg     2.3         TPro  6.3  /  Alb  2.8<L>  /  TBili  0.4  /  DBili  x   /  AST  14  /  ALT  14  /  AlkPhos  47        Urinalysis Basic - ( 2020 17:07 )    Color: Yellow / Appearance: Slightly Turbid / S.010 / pH: x  Gluc: x / Ketone: Negative  / Bili: Negative / Urobili: Negative   Blood: x / Protein: 30 mg/dL / Nitrite: Negative   Leuk Esterase: Negative / RBC: x / WBC 0-2 /HPF   Sq Epi: x / Non Sq Epi: Moderate /HPF / Bacteria: Trace /HPF      CAPILLARY BLOOD GLUCOSE          **********************************************************************************************************    RADIOLOGY & ADDITIONAL TESTS:   No radiological imaging was required    Imaging Personally Reviewed   :  [x ] YES  [ ] NO    Consultant(s) Notes Reviewed :  [x ] YES  [ ] NO

## 2020-01-23 NOTE — PROGRESS NOTE ADULT - PROBLEM SELECTOR PLAN 10
Pt admitted from home  PT to evaluate for safe discharge planning  CM consulted, patient lives alone
Pt admitted from home  PT to evaluate for safe discharge planning  CM consulted, patient lives alone

## 2020-01-23 NOTE — PROGRESS NOTE ADULT - PROBLEM SELECTOR PLAN 3
Asymptomatic  Dr. Gama (Cardiology) following  pt Not a candidate for SAVR / TAVR  - f/u TTE -pt echo showed moderate aortic stenosis  -Pt is not a candidate of surgical valve replacement  ECHO= Trace mitral regurgitation, aortic valve calcification, nornal left ventricle.

## 2020-01-23 NOTE — PROGRESS NOTE ADULT - PROBLEM SELECTOR PLAN 1
H/o chronic venous stasis dermatitis  Afebrile  WBC 4.6  Continue with Vancomycin (day 1)  Blood cultures pending   Dr. Kan (ID) following H/o chronic venous stasis dermatitis  Afebrile, chronic skin changes with red areas hotter than surrounding areas  - Continue with Vancomycin (day 1)  Blood cultures NGTD   Dr. Kan (ID) following

## 2020-01-23 NOTE — PROGRESS NOTE ADULT - ASSESSMENT
Patient is a 89yo Female with CKD-3, HTN, HLD, Severe AS, Arthritis presents with bilateral lower extremity edema. Pt a/w b/l cellulitis. Nephrology consulted for Elevated serum creatinine.    1. SHAUNA on CKD-3- SHAUNA likely due to overdiuresis; Continue to hold Lasix; if no improvement in renal function by tomorrow; recc NS 50cc/hr x5 hrs (gentle hydration). Baseline- SCr 1.1-1.3. Avoid Nephrotoxins. Monitor lytes.   2. b/L LE cellulitis- pt on Vanco. Monitor Vanco trough. ID following  3. LE edema- in the setting of cellulitis. Continue holding Lasix due to SHAUNA. Strict I/Os. Monitor urine o/p. Daily weights.   4. Aortic Stenosis-  Plan as per primary team/ Cardiology

## 2020-01-23 NOTE — PROGRESS NOTE ADULT - ATTENDING COMMENTS
[facility] NEPHROLOGY  Joe Barnes M.D.  Ari Lawton D.O.  Michaela Mai M.D.  Tami Arshad, MSN, ANP-C  [phone]    [address] [place] [address]

## 2020-01-23 NOTE — PROGRESS NOTE ADULT - ATTENDING COMMENTS
Patient seen and examined earlier this afternoon; Agree with NP A/P above with editing as needed. My independent assessment, findings on exam, diagnosis and plan of care as listed below. Discussed with PGY1 Dr. Fitzgerald    Elderly 89 Female, admitted with worsening leg swelling for past 2 weeks with redness admitted with cellulitis. She has Hx of severe Aortic stenosis but asymptomatic in the past.     patient doing wll; appears comfortable; ; Leg swelling improved; Decreased redness; Afebrile    Vital Signs Last 24 Hrs  T(C): 36.9 (23 Jan 2020 17:34), Max: 36.9 (23 Jan 2020 17:34)  T(F): 98.5 (23 Jan 2020 17:34), Max: 98.5 (23 Jan 2020 17:34)  HR: 68 (23 Jan 2020 17:34) (67 - 101)  BP: 127/59 (23 Jan 2020 17:34) (120/67 - 138/58)  RR: 17 (23 Jan 2020 17:34) (16 - 17)  SpO2: 94% (23 Jan 2020 17:34) (94% - 98%)    P/E:  Elderly, obese female comfortable in bed in no acute distress   Neuro: AAO x 3;   CVS: S1S2 present  Resp: BLAE+, No wheeze or Rhonchi  GI: Soft, BS+, NT, Obese  Extr; B/L LE Venous stasis with erythema and tenderness with superimposed eruptions    Labs:                        10.2   5.23  )-----------( 236      ( 23 Jan 2020 12:22 )             31.9   01-23    139  |  106  |  44<H>  ----------------------------<  102<H>  3.5   |  29  |  1.69<H>    Ca    8.1<L>      23 Jan 2020 05:57  Phos  3.8     01-23  Mg     2.3     01-23    TPro  6.3  /  Alb  2.8<L>  /  TBili  0.4  /  DBili  x   /  AST  14  /  ALT  14  /  AlkPhos  47  01-23    Vancomycin Level, Trough (01.23.20 @ 12:22)    Vancomycin Level, Trough: 13.7:    Transthoracic Echocardiogram (01.22.20 @ 13:40)    CONCLUSIONS:  1. Trace mitral regurgitation.  2. Calcified aortic valve with decreased opening. Peak transaortic valve gradient equals 49 mm Hg, mean transaortic valve gradient equals 27 mm Hg, estimated aortic valve area equals 1.1 sqcm (by continuity equation), consistent with moderate aortic stenosis.  (dimensionless index 0.36)  3. Mildly dilated left atrium.  LA volume index = 41 cc/m2.  4. Normal left ventricular internal dimensions and wall thicknesses.  5. Endocardium not well visualized; grossly normal left ventricular systolic function.  6. Right ventricle not well visualized. Probably normal right ventricular size and systolic function.  7. RV systolic pressure is 44 mm Hg. Mild pulmonary         D/D:  Cellulitis of B/L LE with superimposed possible drug eruptions resolving well  Acute Kidney injury likely due to diuretic stable  Fluid Overload concern for Heart failure ?? Diastolic versus Venous Stasis  Peripheral edema partly could be related to Calcium channel blocker  Hx Aortic Stenosis    Plan:  D/C Tele if okay with Cardio; No significant abnormality on Echo just moderate AS  Holding Lasix as Creatinine rise until check creatinine in AM  If clinically indicated will resume Lasix low dose on discharge  HOLD AMLODIPINE for now can contribute to edema  IV Vanco adjusted to Vanco trough; okay with 1gm for now  ID follow up; d/w Dr. Kan continue Vanco inhouse; Possible Doxy on discharge to complete course  Switch to Prednisone oral    Discussed with patient  and later this afternoon with Son at bedside findings and plan of care; discussed likely discharge on Friday  Discussed with  Taisha discharge plan  Discussed with LIANA Franks and Resident on ID service and RN Patient seen and examined earlier this afternoon; Agree with NP A/P above with editing as needed. My independent assessment, findings on exam, diagnosis and plan of care as listed below. Discussed with PGY1 Dr. Fitzgerald    Elderly 89 Female, admitted with worsening leg swelling for past 2 weeks with redness admitted with cellulitis. She has Hx of severe Aortic stenosis but asymptomatic in the past.     patient doing wll; appears comfortable; ; Leg swelling improved; Decreased redness; Afebrile    Vital Signs Last 24 Hrs  T(C): 36.9 (23 Jan 2020 17:34), Max: 36.9 (23 Jan 2020 17:34)  T(F): 98.5 (23 Jan 2020 17:34), Max: 98.5 (23 Jan 2020 17:34)  HR: 68 (23 Jan 2020 17:34) (67 - 101)  BP: 127/59 (23 Jan 2020 17:34) (120/67 - 138/58)  RR: 17 (23 Jan 2020 17:34) (16 - 17)  SpO2: 94% (23 Jan 2020 17:34) (94% - 98%)    P/E:  Elderly, obese female comfortable in bed in no acute distress   Neuro: AAO x 3;   CVS: S1S2 present  Resp: BLAE+, No wheeze or Rhonchi  GI: Soft, BS+, NT, Obese  Extr; B/L LE Venous stasis with erythema and tenderness with superimposed eruptions    Labs:                        10.2   5.23  )-----------( 236      ( 23 Jan 2020 12:22 )             31.9   01-23    139  |  106  |  44<H>  ----------------------------<  102<H>  3.5   |  29  |  1.69<H>    Ca    8.1<L>      23 Jan 2020 05:57  Phos  3.8     01-23  Mg     2.3     01-23    TPro  6.3  /  Alb  2.8<L>  /  TBili  0.4  /  DBili  x   /  AST  14  /  ALT  14  /  AlkPhos  47  01-23    Vancomycin Level, Trough (01.23.20 @ 12:22)    Vancomycin Level, Trough: 13.7:    Transthoracic Echocardiogram (01.22.20 @ 13:40)    CONCLUSIONS:  1. Trace mitral regurgitation.  2. Calcified aortic valve with decreased opening. Peak transaortic valve gradient equals 49 mm Hg, mean transaortic valve gradient equals 27 mm Hg, estimated aortic valve area equals 1.1 sqcm (by continuity equation), consistent with moderate aortic stenosis.  (dimensionless index 0.36)  3. Mildly dilated left atrium.  LA volume index = 41 cc/m2.  4. Normal left ventricular internal dimensions and wall thicknesses.  5. Endocardium not well visualized; grossly normal left ventricular systolic function.  6. Right ventricle not well visualized. Probably normal right ventricular size and systolic function.  7. RV systolic pressure is 44 mm Hg. Mild pulmonary         D/D:  Cellulitis of B/L LE with superimposed possible drug eruptions resolving well  Acute Kidney injury likely due to diuretic stable  Fluid Overload concern for Heart failure ?? Diastolic versus Venous Stasis  Peripheral edema partly could be related to Calcium channel blocker  Hx Aortic Stenosis    Plan:  D/C Tele if okay with Cardio; No significant abnormality on Echo just moderate AS  Holding Lasix as Creatinine rise until check creatinine in AM  If clinically indicated will resume Lasix low dose on discharge  HOLD AMLODIPINE for now can contribute to edema  If BP needs better control and more than 10/80 mm Hg can add low dose Hydralazine 25 mg q 8 hrs  IV Vanco adjusted to Vanco trough; okay with 1gm for now  ID follow up; d/w Dr. Kan continue Vanco inhouse; Possible Doxy on discharge to complete course  Switch to Prednisone oral    Discussed with patient  and later this afternoon with Son at bedside findings and plan of care; discussed likely discharge on Friday  Discussed with  Taisha discharge plan  Discussed with LIANA Franks and Resident on ID service and RN

## 2020-01-23 NOTE — PROGRESS NOTE ADULT - PROBLEM SELECTOR PLAN 4
SCr 1.21  Avoid Nephrotoxic Meds/ Agents such as (NSAIDs, IV contrast, Aminoglycosides such as gentamicin, -Gadolinium contrast, Phosphate containing enemas, etc..)   Dr. Mai (Nephrology) following   osiel IO   DAILY WEIGHT SCr 1.21 base line  -Increased Cr to 1.68 most likely due to lasix which are on hold for now  -Avoid Nephrotoxic Meds/ Agents such as (NSAIDs, IV contrast, Aminoglycosides such as gentamicin, -Gadolinium contrast, Phosphate containing enemas, etc..)   Dr. Mai (Nephrology) following   strick IO   DAILY WEIGHT

## 2020-01-23 NOTE — PROGRESS NOTE ADULT - PROBLEM SELECTOR PLAN 5
Controlled  Pt takes Amlodipine at home will hold it for now   IV lasix d/c today by cardio Controlled  Pt takes Amlodipine at home will hold it for now as maybe a contributory factory on LE swelling   IV lasix on hold for now and monitoring creatinine

## 2020-01-23 NOTE — PROGRESS NOTE ADULT - PROBLEM SELECTOR PLAN 6
-pt came in with hb of 11.2 which dropped to 9.4  -f/u occult  -monitor cbc for now -pt came in with hb of 11.2 which dropped to 9.4  -monitor cbc for now  -repeat cbc 10.2

## 2020-01-23 NOTE — PROGRESS NOTE ADULT - SUBJECTIVE AND OBJECTIVE BOX
[facility] NEPHROLOGY- PROGRESS NOTE    Patient is a 89yo Female with CKD-3, HTN, HLD, Severe AS, Arthritis presents with bilateral lower extremity edema. Pt a/w b/l cellulitis. Nephrology consulted for Elevated serum creatinine.  Now with SHAUNA    Hospital Medications: Medications reviewed.  REVIEW OF SYSTEMS:  CONSTITUTIONAL: No fevers or chills  RESPIRATORY: No shortness of breath  CARDIOVASCULAR: No chest pain.  GASTROINTESTINAL: No nausea, vomiting, diarrhea or abdominal pain.   VASCULAR: +bilateral lower extremity edema.     VITALS:  T(F): 98.5 (20 @ 17:34), Max: 98.5 (20 @ 17:34)  HR: 68 (20 @ 17:34)  BP: 127/59 (20 @ 17:34)  RR: 17 (20 @ 17:34)  SpO2: 94% (20 @ 17:34)  Wt(kg): --      PHYSICAL EXAM:  Gen: NAD, calm  HEENT: MMM, [place]  Neck: no JVD  Cards: RRR, +S1/S2, +ROLLY  Resp: CTA b/l  GI: soft, NT/ND, NABS  : no CVA tenderness  Extremities: +non pitting LE edema B/L with erythema/ scaly skin  Derm: diffuse macular erythematous rash- improving       LABS:      139  |  106  |  44<H>  ----------------------------<  102<H>  3.5   |  29  |  1.69<H>    Ca    8.1<L>      2020 05:57  Phos  3.8       Mg     2.3         TPro  6.3  /  Alb  2.8<L>  /  TBili  0.4  /  DBili      /  AST  14  /  ALT  14  /  AlkPhos  47      Creatinine Trend: 1.69 <--, 1.68 <--, 1.21 <--, 1.31 <--                        10.2   5.23  )-----------( 236      ( 2020 12:22 )             31.9     Urine Studies:  Urinalysis Basic - ( 2020 17:07 )    Color: Yellow / Appearance: Slightly Turbid / S.010 / pH:   Gluc:  / Ketone: Negative  / Bili: Negative / Urobili: Negative   Blood:  / Protein: 30 mg/dL / Nitrite: Negative   Leuk Esterase: Negative / RBC:  / WBC 0-2 /HPF   Sq Epi:  / Non Sq Epi: Moderate /HPF / Bacteria: Trace /HPF

## 2020-01-24 LAB
ALBUMIN SERPL ELPH-MCNC: 2.8 G/DL — LOW (ref 3.5–5)
ALP SERPL-CCNC: 44 U/L — SIGNIFICANT CHANGE UP (ref 40–120)
ALT FLD-CCNC: 19 U/L DA — SIGNIFICANT CHANGE UP (ref 10–60)
ANION GAP SERPL CALC-SCNC: 4 MMOL/L — LOW (ref 5–17)
AST SERPL-CCNC: 14 U/L — SIGNIFICANT CHANGE UP (ref 10–40)
BASOPHILS # BLD AUTO: 0.03 K/UL — SIGNIFICANT CHANGE UP (ref 0–0.2)
BASOPHILS NFR BLD AUTO: 0.5 % — SIGNIFICANT CHANGE UP (ref 0–2)
BILIRUB SERPL-MCNC: 0.3 MG/DL — SIGNIFICANT CHANGE UP (ref 0.2–1.2)
BUN SERPL-MCNC: 43 MG/DL — HIGH (ref 7–18)
CALCIUM SERPL-MCNC: 8.4 MG/DL — SIGNIFICANT CHANGE UP (ref 8.4–10.5)
CHLORIDE SERPL-SCNC: 103 MMOL/L — SIGNIFICANT CHANGE UP (ref 96–108)
CO2 SERPL-SCNC: 31 MMOL/L — SIGNIFICANT CHANGE UP (ref 22–31)
CREAT SERPL-MCNC: 1.38 MG/DL — HIGH (ref 0.5–1.3)
EOSINOPHIL # BLD AUTO: 0.12 K/UL — SIGNIFICANT CHANGE UP (ref 0–0.5)
EOSINOPHIL NFR BLD AUTO: 2 % — SIGNIFICANT CHANGE UP (ref 0–6)
GLUCOSE SERPL-MCNC: 97 MG/DL — SIGNIFICANT CHANGE UP (ref 70–99)
HCT VFR BLD CALC: 26.1 % — LOW (ref 34.5–45)
HGB BLD-MCNC: 8.3 G/DL — LOW (ref 11.5–15.5)
IMM GRANULOCYTES NFR BLD AUTO: 0.3 % — SIGNIFICANT CHANGE UP (ref 0–1.5)
LYMPHOCYTES # BLD AUTO: 2 K/UL — SIGNIFICANT CHANGE UP (ref 1–3.3)
LYMPHOCYTES # BLD AUTO: 33.2 % — SIGNIFICANT CHANGE UP (ref 13–44)
MAGNESIUM SERPL-MCNC: 2 MG/DL — SIGNIFICANT CHANGE UP (ref 1.6–2.6)
MCHC RBC-ENTMCNC: 31.8 GM/DL — LOW (ref 32–36)
MCHC RBC-ENTMCNC: 32.3 PG — SIGNIFICANT CHANGE UP (ref 27–34)
MCV RBC AUTO: 101.6 FL — HIGH (ref 80–100)
MONOCYTES # BLD AUTO: 0.76 K/UL — SIGNIFICANT CHANGE UP (ref 0–0.9)
MONOCYTES NFR BLD AUTO: 12.6 % — SIGNIFICANT CHANGE UP (ref 2–14)
NEUTROPHILS # BLD AUTO: 3.1 K/UL — SIGNIFICANT CHANGE UP (ref 1.8–7.4)
NEUTROPHILS NFR BLD AUTO: 51.4 % — SIGNIFICANT CHANGE UP (ref 43–77)
NRBC # BLD: 0 /100 WBCS — SIGNIFICANT CHANGE UP (ref 0–0)
PHOSPHATE SERPL-MCNC: 3 MG/DL — SIGNIFICANT CHANGE UP (ref 2.5–4.5)
PLATELET # BLD AUTO: 213 K/UL — SIGNIFICANT CHANGE UP (ref 150–400)
POTASSIUM SERPL-MCNC: 3.5 MMOL/L — SIGNIFICANT CHANGE UP (ref 3.5–5.3)
POTASSIUM SERPL-SCNC: 3.5 MMOL/L — SIGNIFICANT CHANGE UP (ref 3.5–5.3)
PROT SERPL-MCNC: 6.2 G/DL — SIGNIFICANT CHANGE UP (ref 6–8.3)
RBC # BLD: 2.57 M/UL — LOW (ref 3.8–5.2)
RBC # FLD: 12 % — SIGNIFICANT CHANGE UP (ref 10.3–14.5)
SODIUM SERPL-SCNC: 138 MMOL/L — SIGNIFICANT CHANGE UP (ref 135–145)
VANCOMYCIN TROUGH SERPL-MCNC: 17.8 UG/ML — SIGNIFICANT CHANGE UP (ref 10–20)
WBC # BLD: 6.03 K/UL — SIGNIFICANT CHANGE UP (ref 3.8–10.5)
WBC # FLD AUTO: 6.03 K/UL — SIGNIFICANT CHANGE UP (ref 3.8–10.5)

## 2020-01-24 PROCEDURE — 99232 SBSQ HOSP IP/OBS MODERATE 35: CPT | Mod: GC

## 2020-01-24 RX ORDER — AMLODIPINE BESYLATE 2.5 MG/1
1 TABLET ORAL
Qty: 0 | Refills: 0 | DISCHARGE

## 2020-01-24 RX ORDER — SENNA PLUS 8.6 MG/1
2 TABLET ORAL ONCE
Refills: 0 | Status: COMPLETED | OUTPATIENT
Start: 2020-01-24 | End: 2020-01-24

## 2020-01-24 RX ORDER — LORATADINE 10 MG/1
1 TABLET ORAL
Qty: 0 | Refills: 0 | DISCHARGE
Start: 2020-01-24

## 2020-01-24 RX ORDER — SENNA PLUS 8.6 MG/1
1 TABLET ORAL
Qty: 0 | Refills: 0 | DISCHARGE
Start: 2020-01-24

## 2020-01-24 RX ADMIN — Medication 250 MILLIGRAM(S): at 12:35

## 2020-01-24 RX ADMIN — SENNA PLUS 2 TABLET(S): 8.6 TABLET ORAL at 15:29

## 2020-01-24 RX ADMIN — Medication 40 MILLIGRAM(S): at 06:12

## 2020-01-24 RX ADMIN — ATORVASTATIN CALCIUM 20 MILLIGRAM(S): 80 TABLET, FILM COATED ORAL at 21:35

## 2020-01-24 RX ADMIN — LORATADINE 10 MILLIGRAM(S): 10 TABLET ORAL at 12:35

## 2020-01-24 RX ADMIN — HEPARIN SODIUM 5000 UNIT(S): 5000 INJECTION INTRAVENOUS; SUBCUTANEOUS at 06:12

## 2020-01-24 RX ADMIN — SENNA PLUS 1 TABLET(S): 8.6 TABLET ORAL at 21:35

## 2020-01-24 RX ADMIN — HEPARIN SODIUM 5000 UNIT(S): 5000 INJECTION INTRAVENOUS; SUBCUTANEOUS at 17:22

## 2020-01-24 NOTE — PROGRESS NOTE ADULT - PROBLEM SELECTOR PLAN 2
Maculopapular rash likely secondary to amoxicillin  coverted  Solumedrol iv q12 to po prednison 40mg qd  Continue with Loratadine 10mg  rash much better today Maculopapular rash likely secondary to amoxicillin reaction  coverted  Solumedrol iv q12 to po prednison 40mg qd  Continue with Loratadine 10mg  rash much better to almost resolved

## 2020-01-24 NOTE — PROGRESS NOTE ADULT - SUBJECTIVE AND OBJECTIVE BOX
89y Female    Meds:  vancomycin  IVPB 1000 milliGRAM(s) IV Intermittent daily    Allergies    Augmentin (Rash; Urticaria)    Intolerances        VITALS:  Vital Signs Last 24 Hrs  T(C): 37.1 (24 Jan 2020 14:50), Max: 37.1 (23 Jan 2020 21:44)  T(F): 98.7 (24 Jan 2020 14:50), Max: 98.7 (23 Jan 2020 21:44)  HR: 60 (24 Jan 2020 15:30) (58 - 91)  BP: 132/67 (24 Jan 2020 15:30) (118/67 - 173/76)  BP(mean): --  RR: 17 (24 Jan 2020 14:50) (16 - 18)  SpO2: 96% (24 Jan 2020 14:50) (95% - 98%)    LABS/DIAGNOSTIC TESTS:                          8.3    6.03  )-----------( 213      ( 24 Jan 2020 06:22 )             26.1         01-24    138  |  103  |  43<H>  ----------------------------<  97  3.5   |  31  |  1.38<H>    Ca    8.4      24 Jan 2020 06:22  Phos  3.0     01-24  Mg     2.0     01-24    TPro  6.2  /  Alb  2.8<L>  /  TBili  0.3  /  DBili  x   /  AST  14  /  ALT  19  /  AlkPhos  44  01-24      LIVER FUNCTIONS - ( 24 Jan 2020 06:22 )  Alb: 2.8 g/dL / Pro: 6.2 g/dL / ALK PHOS: 44 U/L / ALT: 19 U/L DA / AST: 14 U/L / GGT: x             CULTURES: .Blood  01-21 @ 01:03   No growth to date.  --  --            RADIOLOGY:      ROS:  [  ] UNABLE TO ELICIT 89y Female who is     Meds:  vancomycin  IVPB 1000 milliGRAM(s) IV Intermittent daily    Allergies    Augmentin (Rash; Urticaria)    Intolerances        VITALS:  Vital Signs Last 24 Hrs  T(C): 37.1 (24 Jan 2020 14:50), Max: 37.1 (23 Jan 2020 21:44)  T(F): 98.7 (24 Jan 2020 14:50), Max: 98.7 (23 Jan 2020 21:44)  HR: 60 (24 Jan 2020 15:30) (58 - 91)  BP: 132/67 (24 Jan 2020 15:30) (118/67 - 173/76)  BP(mean): --  RR: 17 (24 Jan 2020 14:50) (16 - 18)  SpO2: 96% (24 Jan 2020 14:50) (95% - 98%)    LABS/DIAGNOSTIC TESTS:                          8.3    6.03  )-----------( 213      ( 24 Jan 2020 06:22 )             26.1         01-24    138  |  103  |  43<H>  ----------------------------<  97  3.5   |  31  |  1.38<H>    Ca    8.4      24 Jan 2020 06:22  Phos  3.0     01-24  Mg     2.0     01-24    TPro  6.2  /  Alb  2.8<L>  /  TBili  0.3  /  DBili  x   /  AST  14  /  ALT  19  /  AlkPhos  44  01-24      LIVER FUNCTIONS - ( 24 Jan 2020 06:22 )  Alb: 2.8 g/dL / Pro: 6.2 g/dL / ALK PHOS: 44 U/L / ALT: 19 U/L DA / AST: 14 U/L / GGT: x             CULTURES: .Blood  01-21 @ 01:03   No growth to date.  --  --            RADIOLOGY:      ROS:  [  ] UNABLE TO ELICIT 89y Female who is doing well, her rashes all over her body have mostly gone away as has her bilat leg cellulitis, she has no fevers , chills or diarrhea, she c/o constipation only. She is for discharge home tomorrow.    Meds:  vancomycin  IVPB 1000 milliGRAM(s) IV Intermittent daily    Allergies    Augmentin (Rash; Urticaria)    Intolerances        VITALS:  Vital Signs Last 24 Hrs  T(C): 37.1 (24 Jan 2020 14:50), Max: 37.1 (23 Jan 2020 21:44)  T(F): 98.7 (24 Jan 2020 14:50), Max: 98.7 (23 Jan 2020 21:44)  HR: 60 (24 Jan 2020 15:30) (58 - 91)  BP: 132/67 (24 Jan 2020 15:30) (118/67 - 173/76)  BP(mean): --  RR: 17 (24 Jan 2020 14:50) (16 - 18)  SpO2: 96% (24 Jan 2020 14:50) (95% - 98%)    LABS/DIAGNOSTIC TESTS:                          8.3    6.03  )-----------( 213      ( 24 Jan 2020 06:22 )             26.1         01-24    138  |  103  |  43<H>  ----------------------------<  97  3.5   |  31  |  1.38<H>    Ca    8.4      24 Jan 2020 06:22  Phos  3.0     01-24  Mg     2.0     01-24    TPro  6.2  /  Alb  2.8<L>  /  TBili  0.3  /  DBili  x   /  AST  14  /  ALT  19  /  AlkPhos  44  01-24      LIVER FUNCTIONS - ( 24 Jan 2020 06:22 )  Alb: 2.8 g/dL / Pro: 6.2 g/dL / ALK PHOS: 44 U/L / ALT: 19 U/L DA / AST: 14 U/L / GGT: x             CULTURES: .Blood  01-21 @ 01:03   No growth to date.  --  --            RADIOLOGY:      ROS:  [  ] UNABLE TO ELICIT

## 2020-01-24 NOTE — PROGRESS NOTE ADULT - SUBJECTIVE AND OBJECTIVE BOX
PRESENTING CC:    SUBJ:       PMH -reviewed admission note, no change since admission  Heart failure: acute [ ] chronic [ ] acute or chronic [ ] diastolic [ ] systolic [ ] combined systolic and diastolic[ ]  SHAUNA: ATN[ ] renal medullary necrosis [ ] CKD I [ ]CKDII [ ]CKD III [ ]CKD IV [ ]CKD V [ ]Other pathological lesions [ ]    MEDICATIONS  (STANDING):  atorvastatin 20 milliGRAM(s) Oral at bedtime  heparin  Injectable 5000 Unit(s) SubCutaneous every 12 hours  loratadine 10 milliGRAM(s) Oral daily  predniSONE   Tablet 40 milliGRAM(s) Oral daily  senna 1 Tablet(s) Oral at bedtime  vancomycin  IVPB 1000 milliGRAM(s) IV Intermittent daily    MEDICATIONS  (PRN):  acetaminophen   Tablet .. 650 milliGRAM(s) Oral every 6 hours PRN Moderate Pain (4 - 6)              REVIEW OF SYSTEMS:  Constitutional: [ ] fever, [ ]weight loss,  [ ]fatigue  Eyes: [ ] visual changes  Respiratory: [ ]shortness of breath;  [ ] cough, [ ]wheezing, [ ]chills, [ ]hemoptysis  Cardiovascular: [ ] chest pain, [ ]palpitations, [ ]dizziness,  [ ]leg swelling[ ]orthopnea[ ]PND  Gastrointestinal: [ ] abdominal pain, [ ]nausea, [ ]vomiting,  [ ]diarrhea   Genitourinary: [ ] dysuria, [ ] hematuria  Neurologic: [ ] headaches [ ] tremors[ ]weakness  Skin: [ ] itching, [ ]burning, [ ] rashes  Endocrine: [ ] heat or cold intolerance  Musculoskeletal: [ ] joint pain or swelling; [ ] muscle, back, or extremity pain  Psychiatric: [ ] depression, [ ]anxiety, [ ]mood swings, or [ ]difficulty sleeping  Hematologic: [ ] easy bruising, [ ] bleeding gums    [x] All remaining systems negative except as per above.   [ ]Unable to obtain.    Vital Signs Last 24 Hrs  T(C): 37.1 (24 Jan 2020 14:50), Max: 37.1 (23 Jan 2020 21:44)  T(F): 98.7 (24 Jan 2020 14:50), Max: 98.7 (23 Jan 2020 21:44)  HR: 60 (24 Jan 2020 15:30) (58 - 91)  BP: 132/67 (24 Jan 2020 15:30) (118/67 - 173/76)  BP(mean): --  RR: 17 (24 Jan 2020 14:50) (16 - 18)  SpO2: 96% (24 Jan 2020 14:50) (94% - 98%)  I&O's Summary    24 Jan 2020 07:01  -  24 Jan 2020 16:14  --------------------------------------------------------  IN: 0 mL / OUT: 2 mL / NET: -2 mL        PHYSICAL EXAM:  General: No acute distress BMI-  HEENT: EOMI, PERRL  Neck: Supple, [ ] JVD  Lungs: Equal air entry bilaterally; [ ] rales [ ] wheezing [ ] rhonchi  Heart: Regular rate and rhythm; [ ] murmur   /6 [ ] systolic [ ] diastolic [ ] radiation[ ] rubs [ ]  gallops  Abdomen: Nontender, bowel sounds present  Extremities: No clubbing, cyanosis, [ ] edema  Nervous system:  Alert & Oriented X3, no focal deficits  Psychiatric: Normal affect  Skin: No rashes or lesions    LABS:  01-24    138  |  103  |  43<H>  ----------------------------<  97  3.5   |  31  |  1.38<H>    Ca    8.4      24 Jan 2020 06:22  Phos  3.0     01-24  Mg     2.0     01-24    TPro  6.2  /  Alb  2.8<L>  /  TBili  0.3  /  DBili  x   /  AST  14  /  ALT  19  /  AlkPhos  44  01-24    Creatinine Trend: 1.38<--, 1.69<--, 1.68<--, 1.21<--, 1.31<--                        8.3    6.03  )-----------( 213      ( 24 Jan 2020 06:22 )             26.1       Lipid Panel:   Cardiac Enzymes:           RADIOLOGY:    ECG [my interpretation]:    TELEMETRY:    ECHO:    STRESS TEST:    CATHETERIZATION:      IMPRESSION AND PLAN:

## 2020-01-24 NOTE — DISCHARGE NOTE PROVIDER - NSDCMRMEDTOKEN_GEN_ALL_CORE_FT
loratadine 10 mg oral tablet: 1 tab(s) orally once a day  lovastatin 20 mg oral tablet: 1 tab(s) orally once a day  senna oral tablet: 1 tab(s) orally once a day (at bedtime) doxycycline hyclate 100 mg oral capsule: 1 cap(s) orally 2 times a day   hydrALAZINE 25 mg oral tablet: 1 tab(s) orally every 8 hours  loratadine 10 mg oral tablet: 1 tab(s) orally once a day  lovastatin 20 mg oral tablet: 1 tab(s) orally once a day  predniSONE 10 mg oral tablet: 1 tab(s) orally once a day   senna oral tablet: 1 tab(s) orally once a day (at bedtime) doxycycline hyclate 100 mg oral capsule: 1 cap(s) orally 2 times a day   hydrALAZINE 25 mg oral tablet: 1 tab(s) orally every 8 hours  lovastatin 20 mg oral tablet: 1 tab(s) orally once a day  predniSONE 10 mg oral tablet: 1 tab(s) orally once a day   senna oral tablet: 1 tab(s) orally once a day (at bedtime)

## 2020-01-24 NOTE — PROGRESS NOTE ADULT - PROBLEM SELECTOR PLAN 6
-pt came in with hb of 11.2 which dropped to 9.4  -monitor cbc for now  -repeat cbc 10.2 -pt came in with hb of 11.2 which dropped to 9.4  -monitor cbc for now

## 2020-01-24 NOTE — PROGRESS NOTE ADULT - PROBLEM SELECTOR PLAN 1
H/o chronic venous stasis dermatitis  Afebrile, chronic skin changes with red areas hotter than surrounding areas  - Continue with Vancomycin (day 1)  Blood cultures NGTD   Dr. Kan (ID) following H/o chronic venous stasis dermatitis  Afebrile, chronic skin changes with red areas hotter than surrounding areas  - on Vancomycin 1g qd  Blood cultures NGTD   Dr. Kan (ID) following

## 2020-01-24 NOTE — DISCHARGE NOTE PROVIDER - HOSPITAL COURSE
HPI:    89 year old female patient from home, ambulates with help of walker with PMH significant for HTN, HLD, CKD, Severe AS, Arthritis presents to ED with c/o bilateral lower extremity edema and cellulitis. As per the son, patient's legs are usually swollen but for the last 2 weeks she was noticed to have increased swelling and redness. She went to her doctor who prescribed her amoxicillin-clavulanic acid, 4 days in to the therapy patient developed a rash  on her back and extremities  which was itchy. He also added that patient's leg erythema also worsened with new scaling. Patient denies facial/lip swelling, shortness of breath, fever, chills, chest pain, syncopal episode, orthopnea, PND, prior episodes of similar complaints.    In ED, she was given zosyn and levofloxacin. (20 Jan 2020 21:32)    Pt came in with leg swelling due to chronic venous statis and cellulitis for which she was started on vancomycin. Pt got amoxcicillin as an out pt and got skin morbiliform rash on whole body as a result of allergic reaction. Pt has moderate aortic stenosis and normal ventricle function. Pt developed SHAUNA due to lasix which were held and kidney function normalized. For hypertension pt was on amlodipine which was held as it could be contributory to pt leg swelling.    Pt is stable and ready to go home.

## 2020-01-24 NOTE — DISCHARGE NOTE PROVIDER - NSDCCPCAREPLAN_GEN_ALL_CORE_FT
PRINCIPAL DISCHARGE DIAGNOSIS  Diagnosis: Cellulitis  Assessment and Plan of Treatment: You have a hx of chronic dermatitis due to chronic venous stasis history. Your legs wer ealso swollen more than usual and you had cellulitis on both leg as you had some patches of your skin red and warmer than surroundings. We started you on vancomycin for cellulitis. For pedal edema lasix were started and amlodipin was on hold. Your cellulitis improved and you pitting edema also got better. We converted your IV antibiotics to PO. Please complete your antibiotic course as mentioned in your medicine reconcilliation. Please followup with your PCP within 1 week of discharge.      SECONDARY DISCHARGE DIAGNOSES  Diagnosis: Allergic reaction  Assessment and Plan of Treatment: You came in with morbiliform Rash on all your limbs as a result of reaction to amoxicillin 4 day course. We started you on steroids IV which made your rash better and then your were continued on PO steroids and your rash resolve kristian your stay in hospital. Please take the tappering dose of steroids as mentioned in your Medicine reconciliation. Please followup with your PCP within 1 week of discharge.    Diagnosis: Renal insufficiency  Assessment and Plan of Treatment: You Cr increased a little which then normalized over your stay. We monitored Your BMP and nephrologist was consulted. Please keep taking your medicatons on regular bases and followup with your PCP within 1 week of discharge.    Diagnosis: Leg edema  Assessment and Plan of Treatment: You have a long history of venous stasis and chr    Diagnosis: SHAUNA (acute kidney injury)  Assessment and Plan of Treatment: SHAUNA (acute kidney injury)    Diagnosis: HTN (hypertension)  Assessment and Plan of Treatment: HTN (hypertension)    Diagnosis: Aortic stenosis, severe  Assessment and Plan of Treatment: Aortic stenosis, severe    Diagnosis: Cellulitis  Assessment and Plan of Treatment: PRINCIPAL DISCHARGE DIAGNOSIS  Diagnosis: Cellulitis  Assessment and Plan of Treatment: You have a hx of chronic dermatitis due to chronic venous stasis history. Your legs wer ealso swollen more than usual and you had cellulitis on both leg as you had some patches of your skin red and warmer than surroundings. We started you on vancomycin for cellulitis. For pedal edema lasix were started and amlodipin was on hold. Your cellulitis improved and you pitting edema also got better. We converted your IV antibiotics to PO. Please complete your antibiotic course as mentioned in your medicine reconcilliation. Please followup with your PCP within 1 week of discharge.      SECONDARY DISCHARGE DIAGNOSES  Diagnosis: Allergic reaction  Assessment and Plan of Treatment: You came in with morbiliform Rash on all your limbs as a result of reaction to amoxicillin 4 day course. We started you on steroids IV which made your rash better and then your were continued on PO steroids and your rash resolve kristian your stay in hospital. Please take the tappering dose of steroids as mentioned in your Medicine reconciliation. Please followup with your PCP within 1 week of discharge.    Diagnosis: Renal insufficiency  Assessment and Plan of Treatment: You Cr increased a little which then normalized over your stay. We monitored Your BMP and nephrologist was consulted. Please keep taking your medicatons as per medicine reconciliation on regular bases and followup with your PCP within 1 week of discharge.    Diagnosis: Leg edema  Assessment and Plan of Treatment: You have a long history of venous stasis and chronic leg edema. You came in with pitting edema on top of cellulitis. We started you on lasix. Your edema resolved to some extent but due to your chronic venous stasis hx it wont be back to normal. Please keep your legs elevated and try to walk every day and use stokings for long walks.  Please keep taking your medicatons as per medicine reconciliation on regular bases and followup with your PCP within 1 week of discharge.    Diagnosis: SHAUNA (acute kidney injury)  Assessment and Plan of Treatment: Your creatinine increased with lasix use. We held your lasix and your Cr trended down.  Please keep taking your medicatons as per medicine reconciliation on regular bases and followup with your PCP within 1 week of discharge.    Diagnosis: HTN (hypertension)  Assessment and Plan of Treatment: You have a history of Hypertension.  Your Blood Pressure was adequately controlled.  You should continue on the current antihypertensive regimen regularly. You blood pressure should be within 140-120/80-90. You should follow-up with your PCP within 1 week of your discharge for routine blood pressure monitoring at your next visit. Notify your doctor if you have any of the following symptoms:   (Dizziness, Lightheadedness, Blurry vision, Headache, Chest pain, Shortness of breath.) You should maintain healthy lifestyle by eating healthy low salt diet, avoid fatty food, weight loss, exercise regularly as tolerated 30 mins X 3 time per week. Your Amlodipine was held as is could lead to leg swelling.    Please keep taking your medicatons as per medicine reconciliation on regular bases and followup with your PCP within 1 week of discharge.    Diagnosis: Arthritis  Assessment and Plan of Treatment: You have a  hx of Arthritis.  Please keep taking your medicatons as per medicine reconciliation on regular bases and followup with your PCP within 1 week of discharge.    Diagnosis: Aortic stenosis, severe  Assessment and Plan of Treatment: You have a hx of Aortic stenosis and you are not a candidate for aortic valve replacment. Your ECHO was done which showed moderate aortic stenosis with normal left ventricular function.  Please keep taking your medicatons as per medicine reconciliation on regular bases and followup with your PCP within 1 week of discharge. PRINCIPAL DISCHARGE DIAGNOSIS  Diagnosis: Cellulitis  Assessment and Plan of Treatment: You have a hx of chronic dermatitis due to chronic venous stasis history. Your legs wer ealso swollen more than usual and you had cellulitis on both leg as you had some patches of your skin red and warmer than surroundings. We started you on vancomycin for cellulitis. For pedal edema lasix were started and amlodipin was on hold. Your cellulitis improved and you pitting edema also got better. We converted your IV antibiotics to PO. Please complete your antibiotic course as mentioned in your medicine reconcilliation. Please followup with your PCP within 1 week of discharge.      SECONDARY DISCHARGE DIAGNOSES  Diagnosis: Allergic reaction  Assessment and Plan of Treatment: You came in with morbiliform Rash on all your limbs as a result of reaction to amoxicillin 4 day course. We started you on steroids IV which made your rash better and then your were continued on PO steroids and your rash resolve kristian your stay in hospital. Please take the tappering dose of steroids as mentioned in your Medicine reconciliation. Please followup with your PCP within 1 week of discharge.    Diagnosis: Renal insufficiency  Assessment and Plan of Treatment: You have chronic kidney diseae stage 3 with GFR in 20-30s. Your Cr increased a little which then normalized over your stay. We monitored your BMP and nephrologist was consulted. We dosed your antibiotics according to your kidney function.Your creatinine got better over your stay as we held lasix. You are going to be discharged on PRN lasix whenever you legs swells up you can use lasix.  Please keep taking your medicatons as per medicine reconciliation on regular bases and followup with your PCP within 1 week of discharge.    Diagnosis: Leg edema  Assessment and Plan of Treatment: You have a long history of venous stasis and chronic leg edema. You came in with pitting edema on top of cellulitis. We started you on lasix. Your edema resolved to some extent but due to your chronic venous stasis hx it wont be back to normal. Please keep your legs elevated and try to walk every day and use stokings for long walks.  Please keep taking your medicatons as per medicine reconciliation on regular bases and followup with your PCP within 1 week of discharge.    Diagnosis: SHAUNA (acute kidney injury)  Assessment and Plan of Treatment: You had SHAUNA on CKD stage 3. Your creatinine increased with lasix use. We held your lasix and your Cr trended down.  Please keep taking your medicatons as per medicine reconciliation on regular bases and followup with your PCP within 1 week of discharge.    Diagnosis: HTN (hypertension)  Assessment and Plan of Treatment: You have a history of Hypertension.  Your Blood Pressure was adequately controlled.  You should continue on the current antihypertensive regimen regularly. You blood pressure should be within 140-120/80-90. You should follow-up with your PCP within 1 week of your discharge for routine blood pressure monitoring at your next visit. Notify your doctor if you have any of the following symptoms:   (Dizziness, Lightheadedness, Blurry vision, Headache, Chest pain, Shortness of breath.) You should maintain healthy lifestyle by eating healthy low salt diet, avoid fatty food, weight loss, exercise regularly as tolerated 30 mins X 3 time per week. Your Amlodipine was held as is could lead to leg swelling.    Please keep taking your medicatons as per medicine reconciliation on regular bases and followup with your PCP within 1 week of discharge.    Diagnosis: Arthritis  Assessment and Plan of Treatment: You have a  hx of Arthritis.  Please keep taking your medicatons as per medicine reconciliation on regular bases and followup with your PCP within 1 week of discharge.    Diagnosis: Anemia  Assessment and Plan of Treatment: Your hemoglobin was monitored during your stay which fluctuated from 8-9. There is no active source of bleeding. Your hb was stable so it didnot needed any acute intervention. Please take multivitamins over the counter and add iron rich food in your diet. Please followup with your PCP within 1 week of discharge.    Diagnosis: Aortic stenosis, severe  Assessment and Plan of Treatment: You have a hx of Aortic stenosis and you are not a candidate for aortic valve replacment. Your ECHO was done which showed moderate aortic stenosis with normal left ventricular function.  Please keep taking your medicatons as per medicine reconciliation on regular bases and followup with your PCP within 1 week of discharge. PRINCIPAL DISCHARGE DIAGNOSIS  Diagnosis: Cellulitis  Assessment and Plan of Treatment: You have a hx of chronic dermatitis due to chronic venous stasis history. Your legs wer ealso swollen more than usual and you had cellulitis on both leg as you had some patches of your skin red and warmer than surroundings. We started you on vancomycin for cellulitis. For pedal edema lasix were started and amlodipin was on hold. Your cellulitis improved and you pitting edema also got better. We converted your IV antibiotics to PO. Please complete your antibiotic course as mentioned in your medicine reconcilliation. Please followup with your PCP within 1 week of discharge.      SECONDARY DISCHARGE DIAGNOSES  Diagnosis: Chronic venous stasis dermatitis  Assessment and Plan of Treatment: You have a long history of venous stasis and chronic leg edema. You came in with pitting edema on top of cellulitis. We started you on lasix. Your edema resolved to some extent but due to your chronic venous stasis hx it wont be back to normal. Please keep your legs elevated and try to walk every day and use stokings for long walks.  Please keep taking your medicatons as per medicine reconciliation on regular bases and followup with your PCP within 1 week of discharge.  NO AMLODIPINE AS IT CAN INCREASES LEG EDEMA.    Diagnosis: Allergic reaction  Assessment and Plan of Treatment: You came in with morbiliform Rash on all your limbs as a result of reaction to amoxicillin 4 day course. We started you on steroids IV which made your rash better and then your were continued on PO steroids and your rash resolve kristian your stay in hospital. Please take the tappering dose of steroids as mentioned in your Medicine reconciliation. Please followup with your PCP within 1 week of discharge.    Diagnosis: Renal insufficiency  Assessment and Plan of Treatment: You have chronic kidney diseae stage 3 with GFR in 20-30s. Your Cr increased a little which then normalized over your stay. We monitored your BMP and nephrologist was consulted. We dosed your antibiotics according to your kidney function.Your creatinine got better over your stay as we held lasix. You are going to be discharged on PRN lasix whenever you legs swells up you can use lasix.  Please keep taking your medicatons as per medicine reconciliation on regular bases and followup with your PCP within 1 week of discharge.    Diagnosis: Leg edema  Assessment and Plan of Treatment: You have a long history of venous stasis and chronic leg edema. You came in with pitting edema on top of cellulitis. We started you on lasix. Your edema resolved to some extent but due to your chronic venous stasis hx it wont be back to normal. Please keep your legs elevated and try to walk every day and use stokings for long walks.  Please keep taking your medicatons as per medicine reconciliation on regular bases and followup with your PCP within 1 week of discharge. pLEASE DONOT USE AMLODIPINE AS HYPERTENSIVE MEDICATION AS IT CAN INCREASE LEG SWELLING.    Diagnosis: SHAUNA (acute kidney injury)  Assessment and Plan of Treatment: You had SHAUNA on CKD stage 3. Your creatinine increased with lasix use. We held your lasix and your Cr trended down.  Please keep taking your medicatons as per medicine reconciliation on regular bases and followup with your PCP within 1 week of discharge.    Diagnosis: HTN (hypertension)  Assessment and Plan of Treatment: You have a history of Hypertension.  Your Blood Pressure was adequately controlled.  You should continue on the current antihypertensive regimen regularly. You blood pressure should be within 140-120/80-90. You should follow-up with your PCP within 1 week of your discharge for routine blood pressure monitoring at your next visit. Notify your doctor if you have any of the following symptoms:   (Dizziness, Lightheadedness, Blurry vision, Headache, Chest pain, Shortness of breath.) You should maintain healthy lifestyle by eating healthy low salt diet, avoid fatty food, weight loss, exercise regularly as tolerated 30 mins X 3 time per week. Your Amlodipine was held as is could lead to leg swelling.    Please keep taking your medicatons as per medicine reconciliation on regular bases and followup with your PCP within 1 week of discharge.    Diagnosis: Arthritis  Assessment and Plan of Treatment: You have a  hx of Arthritis.  Please keep taking your medicatons as per medicine reconciliation on regular bases and followup with your PCP within 1 week of discharge.    Diagnosis: Anemia  Assessment and Plan of Treatment: Your hemoglobin was monitored during your stay which fluctuated from 8-9. There is no active source of bleeding. Your hb was stable so it didnot needed any acute intervention. Please take multivitamins over the counter and add iron rich food in your diet. Please followup with your PCP within 1 week of discharge.    Diagnosis: Aortic stenosis, severe  Assessment and Plan of Treatment: You have a hx of Aortic stenosis and you are not a candidate for aortic valve replacment. Your ECHO was done which showed moderate aortic stenosis with normal left ventricular function.  Please keep taking your medicatons as per medicine reconciliation on regular bases and followup with your PCP within 1 week of discharge. PRINCIPAL DISCHARGE DIAGNOSIS  Diagnosis: Cellulitis  Assessment and Plan of Treatment: You have a hx of chronic dermatitis due to chronic venous stasis history. Your legs wer ealso swollen more than usual and you had cellulitis on both leg as you had some patches of your skin red and warmer than surroundings. We started you on vancomycin for cellulitis. For pedal edema lasix were started and amlodipin was on hold. Your cellulitis improved and you pitting edema also got better. We converted your IV antibiotics to PO doxycyclin 100mg for 4 days. Please complete your antibiotic course as mentioned in your medicine reconcilliation. Please followup with your PCP within 1 week of discharge.      SECONDARY DISCHARGE DIAGNOSES  Diagnosis: Chronic venous stasis dermatitis  Assessment and Plan of Treatment: You have a long history of venous stasis and chronic leg edema. You came in with pitting edema on top of cellulitis. We started you on lasix. Your edema resolved to some extent but due to your chronic venous stasis hx it wont be back to normal. Please keep your legs elevated and try to walk every day and use stokings for long walks.  Please keep taking your medicatons as per medicine reconciliation on regular bases and followup with your PCP within 1 week of discharge.  NO AMLODIPINE AS IT CAN INCREASES LEG EDEMA.    Diagnosis: Allergic reaction  Assessment and Plan of Treatment: You came in with morbiliform Rash on all your limbs as a result of reaction to amoxicillin 4 day course. We started you on steroids IV which made your rash better and then your were continued on PO steroids and your rash resolve kristian your stay in hospital. Please take the tappering dose of steroids as mentioned in your Medicine reconciliation. Please followup with your PCP within 1 week of discharge.    Diagnosis: Renal insufficiency  Assessment and Plan of Treatment: You have chronic kidney diseae stage 3 with GFR in 20-30s. Your Cr increased a little which then normalized over your stay. We monitored your BMP and nephrologist was consulted. We dosed your antibiotics according to your kidney function.Your creatinine got better over your stay as we held lasix. You are going to be discharged on PRN lasix whenever you legs swells up you can use lasix.  Please keep taking your medicatons as per medicine reconciliation on regular bases and followup with your PCP within 1 week of discharge.    Diagnosis: Leg edema  Assessment and Plan of Treatment: You have a long history of venous stasis and chronic leg edema. You came in with pitting edema on top of cellulitis. We started you on lasix. Your edema resolved to some extent but due to your chronic venous stasis hx it wont be back to normal. Please keep your legs elevated and try to walk every day and use stokings for long walks.  Please keep taking your medicatons as per medicine reconciliation on regular bases and followup with your PCP within 1 week of discharge. pLEASE DONOT USE AMLODIPINE AS HYPERTENSIVE MEDICATION AS IT CAN INCREASE LEG SWELLING.    Diagnosis: SHAUNA (acute kidney injury)  Assessment and Plan of Treatment: You had SHAUNA on CKD stage 3. Your creatinine increased with lasix use. We held your lasix and your Cr trended down.  Please keep taking your medicatons as per medicine reconciliation on regular bases and followup with your PCP within 1 week of discharge.    Diagnosis: HTN (hypertension)  Assessment and Plan of Treatment: You have a history of Hypertension.  Your Blood Pressure was adequately controlled.  You should continue on the current antihypertensive regimen regularly. You blood pressure should be within 140-120/80-90. You should follow-up with your PCP within 1 week of your discharge for routine blood pressure monitoring at your next visit. Notify your doctor if you have any of the following symptoms:   (Dizziness, Lightheadedness, Blurry vision, Headache, Chest pain, Shortness of breath.) You should maintain healthy lifestyle by eating healthy low salt diet, avoid fatty food, weight loss, exercise regularly as tolerated 30 mins X 3 time per week. Your Amlodipine was held as is could lead to leg swelling. You were    Please keep taking your medicatons as per medicine reconciliation on regular bases and followup with your PCP within 1 week of discharge.    Diagnosis: Arthritis  Assessment and Plan of Treatment: You have a  hx of Arthritis.  Please keep taking your medicatons as per medicine reconciliation on regular bases and followup with your PCP within 1 week of discharge.    Diagnosis: Anemia  Assessment and Plan of Treatment: Your hemoglobin was monitored during your stay which fluctuated from 8-9. There is no active source of bleeding. Your hb was stable so it didnot needed any acute intervention. Please take multivitamins over the counter and add iron rich food in your diet. Please followup with your PCP within 1 week of discharge.    Diagnosis: Aortic stenosis, severe  Assessment and Plan of Treatment: You have a hx of Aortic stenosis and you are not a candidate for aortic valve replacment. Your ECHO was done which showed moderate aortic stenosis with normal left ventricular function.  Please keep taking your medicatons as per medicine reconciliation on regular bases and followup with your PCP within 1 week of discharge. PRINCIPAL DISCHARGE DIAGNOSIS  Diagnosis: Cellulitis  Assessment and Plan of Treatment: You have a hx of chronic dermatitis due to chronic venous stasis history. Your legs wer ealso swollen more than usual and you had cellulitis on both leg as you had some patches of your skin red and warmer than surroundings. We started you on vancomycin for cellulitis. For pedal edema lasix were started and amlodipin was on hold. Your cellulitis improved and you pitting edema also got better. We converted your IV antibiotics to PO doxycyclin 100mg for 4 days. Please complete your antibiotic course as mentioned in your medicine reconcilliation. Please followup with your PCP within 1 week of discharge.      SECONDARY DISCHARGE DIAGNOSES  Diagnosis: Chronic venous stasis dermatitis  Assessment and Plan of Treatment: You have a long history of venous stasis and chronic leg edema. You came in with pitting edema on top of cellulitis. We started you on lasix. Your edema resolved to some extent but due to your chronic venous stasis hx it wont be back to normal. Please keep your legs elevated and try to walk every day and use stokings for long walks.  Please keep taking your medicatons as per medicine reconciliation on regular bases and followup with your PCP within 1 week of discharge.  NO AMLODIPINE AS IT CAN INCREASES LEG EDEMA.    Diagnosis: Allergic reaction  Assessment and Plan of Treatment: You came in with morbiliform Rash on all your limbs as a result of reaction to amoxicillin 4 day course. We started you on steroids IV which made your rash better and then your were continued on PO steroids and your rash resolve kristian your stay in hospital. Please take the tappering dose of steroids as mentioned in your Medicine reconciliation. Please followup with your PCP within 1 week of discharge.    Diagnosis: Renal insufficiency  Assessment and Plan of Treatment: You have chronic kidney diseae stage 3 with GFR in 20-30s. Your Cr increased a little which then normalized over your stay. We monitored your BMP and nephrologist was consulted. We dosed your antibiotics according to your kidney function.Your creatinine got better over your stay as we held lasix. You are going to be discharged on PRN lasix whenever you legs swells up you can use lasix.  Please keep taking your medicatons as per medicine reconciliation on regular bases and followup with your PCP within 1 week of discharge.    Diagnosis: Leg edema  Assessment and Plan of Treatment: You have a long history of venous stasis and chronic leg edema. You came in with pitting edema on top of cellulitis. We started you on lasix. Your edema resolved to some extent but due to your chronic venous stasis hx it wont be back to normal. Please keep your legs elevated and try to walk every day and use stokings for long walks.  Please keep taking your medicatons as per medicine reconciliation on regular bases and followup with your PCP within 1 week of discharge. pLEASE DONOT USE AMLODIPINE AS HYPERTENSIVE MEDICATION AS IT CAN INCREASE LEG SWELLING.    Diagnosis: SHAUNA (acute kidney injury)  Assessment and Plan of Treatment: You had SHAUNA on CKD stage 3. Your creatinine increased with lasix use. We held your lasix and your Cr trended down.  Please keep taking your medicatons as per medicine reconciliation on regular bases and followup with your PCP within 1 week of discharge.    Diagnosis: HTN (hypertension)  Assessment and Plan of Treatment: You have a history of Hypertension.  Your Blood Pressure was adequately controlled.  You should continue on the current antihypertensive regimen regularly. You blood pressure should be within 140-120/80-90. You should follow-up with your PCP within 1 week of your discharge for routine blood pressure monitoring at your next visit. Notify your doctor if you have any of the following symptoms:   (Dizziness, Lightheadedness, Blurry vision, Headache, Chest pain, Shortness of breath.) You should maintain healthy lifestyle by eating healthy low salt diet, avoid fatty food, weight loss, exercise regularly as tolerated 30 mins X 3 time per week. Your Amlodipine was held as is could lead to leg swelling. You were started on hydralazine 25mg three times a day. PLEASE NEVER TAKE AMLODIPINE FOR HYPERTENSION AS IT CAN INCREASE LEG SWELLING.   Please keep taking your medicatons as per medicine reconciliation on regular bases and followup with your PCP within 1 week of discharge.    Diagnosis: Arthritis  Assessment and Plan of Treatment: You have a  hx of Arthritis.  Please keep taking your medicatons as per medicine reconciliation on regular bases and followup with your PCP within 1 week of discharge.    Diagnosis: Anemia  Assessment and Plan of Treatment: Your hemoglobin was monitored during your stay which fluctuated from 8-9. There is no active source of bleeding. Your hb was stable so it didnot needed any acute intervention. Please take multivitamins over the counter and add iron rich food in your diet. Please followup with your PCP within 1 week of discharge.    Diagnosis: Aortic stenosis, severe  Assessment and Plan of Treatment: You have a hx of Aortic stenosis and you are not a candidate for aortic valve replacment. Your ECHO was done which showed moderate aortic stenosis with normal left ventricular function.  Please keep taking your medicatons as per medicine reconciliation on regular bases and followup with your PCP within 1 week of discharge. PRINCIPAL DISCHARGE DIAGNOSIS  Diagnosis: Cellulitis  Assessment and Plan of Treatment: You have a history of chronic dermatitis due to chronic venous stasis history. Your legs wer also swollen more than usual and you had cellulitis on both leg as you had some patches of your skin red and warmer than surroundings. You also has recieved Augmentin as outpatient which could have caused some allergy and you had drug eruptions. We started you on intravenous vancomycin for cellulitis. You was seen by Infectious Diseases Dr. Kan. For pedal edema lasix were started and amlodipin was on hold. Your cellulitis improved and you pitting edema also got better. We converted your antibiotics to oral Doxycycline 100 mg twice daily for 4 more days to complete the course. Please complete your antibiotic course as mentioned in your medicine reconcilliation. Please followup with your PCP within 1 week of discharge.      SECONDARY DISCHARGE DIAGNOSES  Diagnosis: Chronic venous stasis dermatitis  Assessment and Plan of Treatment: You have a long history of venous stasis and chronic leg edema. You came in with pitting edema on top of cellulitis. We started you on lasix. Your edema resolved to some extent but due to your chronic venous stasis hisotry it wont be back to normal. Please keep your legs elevated when sitting (Recliner) and try to walk every day and use stockings for long walks.  Please keep taking your medicatons as per medicine reconciliation on regular bases and followup with your PCP within 1 week of discharge. Please note that Amlodipine also can cause leg edema in some patients. We have discontinued Amlodipine and placed you on a new medication called Hydralazine NO AMLODIPINE FOR NOW. Folow up as per your PCP Dr. Lloyd    Diagnosis: Anemia  Assessment and Plan of Treatment: Your hemoglobin was monitored during your stay which fluctuated from 8 to 9 gms. There is no active source of bleeding. Your hb was stable so it did not needed any acute intervention. Please take multivitamins over the counter and add iron rich food in your diet. Please follow up with your PCP within 1 week of discharge. You have an appt scheduled for next week.    Diagnosis: SHAUNA (acute kidney injury)  Assessment and Plan of Treatment: You had SHAUNA on CKD stage 3. Your creatinine increased with lasix use. We held your lasix and your Creatinine tended back down to normal limits.  Please keep taking your medicatons as per medicine reconciliation on regular bases and followup with your PCP within 1 week of discharge.    Diagnosis: Arthritis  Assessment and Plan of Treatment: You have a  history of Arthritis.  Please keep taking your medicatons as per medicine reconciliation on regular bases and followup with your PCP within 1 week of discharge. Avoid medications like Ibuprofen or Aleeve or Advil as it can affect your kidney finctions. Take Tylenol as needed for pain.    Diagnosis: Aortic stenosis, severe  Assessment and Plan of Treatment: You have a hx of Aortic stenosis and you are not a candidate for aortic valve replacment. Your ECHO was done which showed moderate aortic stenosis with normal left ventricular function.  Please keep taking your medicatons as per medicine reconciliation on regular bases and followup with your PCP within 1 week of discharge.    Diagnosis: HTN (hypertension)  Assessment and Plan of Treatment: You have a history of Hypertension.  Your Blood Pressure was adequately controlled.  You should continue on the current antihypertensive regimen regularly. You blood pressure should be within 140-120/80-90. You should follow-up with your PCP within 1 week of your discharge for routine blood pressure monitoring at your next visit. Notify your doctor if you have any of the following symptoms:   (Dizziness, Lightheadedness, Blurry vision, Headache, Chest pain, Shortness of breath.) You should maintain healthy lifestyle by eating healthy low salt diet, avoid fatty food, weight loss, exercise regularly as tolerated 30 mins X 3 time per week. Your Amlodipine was held as is could lead to leg swelling. You were started on hydralazine 25mg three times a day. PLEASE NEVER TAKE AMLODIPINE FOR HYPERTENSION AS IT CAN INCREASE LEG SWELLING.   Please keep taking your medicatons as per medicine reconciliation on regular bases and followup with your PCP within 1 week of discharge.    Diagnosis: Leg edema  Assessment and Plan of Treatment: You have a long history of venous stasis and chronic leg edema. You came in with pitting edema on top of cellulitis. We started you on lasix. Your edema resolved to some extent but due to your chronic venous stasis hx it wont be back to normal. Please keep your legs elevated and try to walk every day and use stokings for long walks.  Please keep taking your medicatons as per medicine reconciliation on regular bases and followup with your PCP within 1 week of discharge. PLEASE DO NOT USE AMLODIPINE AS HYPERTENSIVE MEDICATION AS IT CAN INCREASE LEG SWELLING.   Your PCP may place you on low dose diuretics lasix as needed if leg swelling worsens again. For now we prescribed Lasix 20 mg every other day to be used only if any worsening leg swelling befor you see your PCP    Diagnosis: Renal insufficiency  Assessment and Plan of Treatment: You have chronic kidney diseae stage 3 with GFR in 20-30s. Your Cr increased a little which then normalized over your stay. We monitored your BMP and nephrologist was consulted. We dosed your antibiotics according to your kidney function.Your creatinine got better over your stay as we held lasix. You are going to be discharged on PRN lasix whenever you legs swells up you can use lasix.  Please keep taking your medicatons as per medicine reconciliation on regular bases and followup with your PCP within 1 week of discharge.    Diagnosis: Allergic reaction  Assessment and Plan of Treatment: You came in with morbiliform Rash on all your limbs as a result of reaction to amoxicillin 4 day course. We started you on steroids IV which made your rash better and then your were continued on PO steroids and your rash resolve kristian your stay in hospital. Please take the tappering dose of steroids as mentioned in your Medicine reconciliation. Please followup with your PCP within 1 week of discharge.

## 2020-01-24 NOTE — PROGRESS NOTE ADULT - SUBJECTIVE AND OBJECTIVE BOX
[facility] NEPHROLOGY- PROGRESS NOTE    Patient is a 89yo Female with CKD-3, HTN, HLD, Severe AS, Arthritis presents with bilateral lower extremity edema. Pt a/w b/l cellulitis. Nephrology consulted for Elevated serum creatinine.  Now with SHAUNA    Hospital Medications: Medications reviewed.  REVIEW OF SYSTEMS:  CONSTITUTIONAL: No fevers or chills  RESPIRATORY: No shortness of breath  CARDIOVASCULAR: No chest pain.  GASTROINTESTINAL: No nausea, vomiting, diarrhea or abdominal pain. +constipation  VASCULAR: No bilateral lower extremity edema.     VITALS:  T(F): 98.7 (20 @ 14:50), Max: 98.7 (20 @ 21:44)  HR: 60 (20 @ 15:30)  BP: 132/67 (20 @ 15:30)  RR: 17 (20 @ 14:50)  SpO2: 96% (20 @ 14:50)  Wt(kg): --     @ 07:01  -   @ 17:01  --------------------------------------------------------  IN: 0 mL / OUT: 2 mL / NET: -2 mL    PHYSICAL EXAM:  Gen: NAD, calm  HEENT: MMM, [place]  Neck: no JVD  Cards: RRR, +S1/S2, +ROLLY  Resp: CTA b/l  GI: soft, NT/ND, NABS  : no CVA tenderness  Extremities: +non pitting LE edema B/L with erythema/ scaly skin  Derm: diffuse macular erythematous rash- resolving       LABS:      138  |  103  |  43<H>  ----------------------------<  97  3.5   |  31  |  1.38<H>    Ca    8.4      2020 06:22  Phos  3.0       Mg     2.0         TPro  6.2  /  Alb  2.8<L>  /  TBili  0.3  /  DBili      /  AST  14  /  ALT  19  /  AlkPhos  44      Creatinine Trend: 1.38 <--, 1.69 <--, 1.68 <--, 1.21 <--, 1.31 <--                        8.3    6.03  )-----------( 213      ( 2020 06:22 )             26.1     Urine Studies:  Urinalysis Basic - ( 2020 17:07 )    Color: Yellow / Appearance: Slightly Turbid / S.010 / pH:   Gluc:  / Ketone: Negative  / Bili: Negative / Urobili: Negative   Blood:  / Protein: 30 mg/dL / Nitrite: Negative   Leuk Esterase: Negative / RBC:  / WBC 0-2 /HPF   Sq Epi:  / Non Sq Epi: Moderate /HPF / Bacteria: Trace /HPF

## 2020-01-24 NOTE — PROGRESS NOTE ADULT - ASSESSMENT
Cellulitis of legs - improved  Allergic rashes - resolved    Plan - can DC home tomorrow on Doxycycline 100mgs po bid x 4 days  reconsult prn.

## 2020-01-24 NOTE — PROGRESS NOTE ADULT - PROBLEM SELECTOR PLAN 7
Patient to remain FULL CODE
-Pt has a long hx of arthriris  -unclear RA hx as never been to rheumatologist

## 2020-01-24 NOTE — DISCHARGE NOTE PROVIDER - CARE PROVIDER_API CALL
Aníbal Lloyd)  Internal Medicine  [address] [place] [id number]  Phone: [phone]  Fax: [phone]  Follow Up Time: 1 week

## 2020-01-24 NOTE — PROGRESS NOTE ADULT - ATTENDING COMMENTS
[facility] NEPHROLOGY  Joe Barnes M.D.  Ari Lawton D.O.  Michaela Mai M.D.  Tami Arshad, MSN, ANP-C  [phone]    [address] [place] [address]

## 2020-01-24 NOTE — DISCHARGE NOTE PROVIDER - NSDCFUADDINST_GEN_ALL_CORE_FT
Follow up with Dr. Lloyd in one week as scheduled. You have an appointment on February 3rd. Please take discharge instructions with you to the Doctor's office    Monitor Blood count and Kidney function closely. Repeat Blood work in 10 days with your PCP.     Please note that you may be allergic to Penicillin products as you had a rash which could be related to Augmentin (Antibiotic).    Keep your legs elevated while sitting to prevent leg swelling worsening    Do not take Amlodipine for now.     You are discharged on a new medication for Blood pressure control which is taken three times a day

## 2020-01-24 NOTE — PROGRESS NOTE ADULT - SUBJECTIVE AND OBJECTIVE BOX
PGY1 Note discussed with Supervising Resident and Primary Attending.    Patient is a 89y old  Female who presents with a chief complaint of Leg swelling (23 Jan 2020 21:41)      INTERVAL HPI/OVERNIGHT EVENTS :    No acute event overnight reported by overnight team and nurses. Pt remained hemodynamically stable.  Pt seen and examined  at bed side. All concerns and questions answered.   Report no new complaint. Pt denies any fever, nausea, vomiting.  Explained all test results and plan.  Appreciate all consults.   ***********************************************************************************************************    MEDICATIONS  (STANDING):  atorvastatin 20 milliGRAM(s) Oral at bedtime  heparin  Injectable 5000 Unit(s) SubCutaneous every 12 hours  loratadine 10 milliGRAM(s) Oral daily  predniSONE   Tablet 40 milliGRAM(s) Oral daily  senna 1 Tablet(s) Oral at bedtime  vancomycin  IVPB 1000 milliGRAM(s) IV Intermittent daily    MEDICATIONS  (PRN):  acetaminophen   Tablet .. 650 milliGRAM(s) Oral every 6 hours PRN Moderate Pain (4 - 6)      ***********************************************************************************************************    Allergies    Augmentin (Rash; Urticaria)    Intolerances        ***********************************************************************************************************    REVIEW OF SYSTEMS :  * CONSTITUTIONAL      : No Fever, Weight loss, or Fatigue  * EYES                             : No eye pain , Visual disturbances or Discharge  * RESPIRATORY             : No Cough, Wheezing, Chills or Hemoptysis; No shortness of breath  * CARDIOVASCULAR     : No Chest pain, Palpitations, Dizziness, or Leg swelling  * GASTROINTESTINAL  : No Abdominal or Epigastric pain. No Nausea, Vomiting or Hematemesis; No Diarrhea or Constipation. No Melena or Hematochezia.  * GENITOURINARY        : No Dysuria , Frequency , Haematuria   * NEUROLOGICAL          : No Headaches, Memory loss, Loss of strength, Numbness, or Tremors  * MUSCULOSKELETAL   : No Joint pain  * PSYCHIATRY                 : No Depression or Anxiety   * HEME/LYMPH              : No Easy Bruising or Bleeding gums  * SKIN                               : No Itching, Burning, Rashes, or Lesions     ***********************************************************************************************************    Vital Signs Last 24 Hrs  T(C): 36.8 (24 Jan 2020 05:04), Max: 37.1 (23 Jan 2020 21:44)  T(F): 98.3 (24 Jan 2020 05:04), Max: 98.7 (23 Jan 2020 21:44)  HR: 58 (24 Jan 2020 05:04) (58 - 79)  BP: 118/67 (24 Jan 2020 05:04) (118/67 - 136/58)  BP(mean): --  RR: 18 (24 Jan 2020 05:04) (16 - 18)  SpO2: 95% (24 Jan 2020 05:04) (94% - 98%)    ***********************************************************************************************************    PHYSICAL EXAM :  * GENERAL                 : NAD, Well-groomed, Well-developed  * HEAD                       :  Atraumatic, Normocephalic  * EYES                         : EOMI, PERRLA, Conjunctiva and Sclera clear  * ENT                           : Moist Mucous Membranes  * NECK                         : Supple, No JVD, Normal Thyroid  * CHEST/LUNG           : Clear to Auscultation bilaterally; No Rales, Rhonchi, Wheezing or Rubs  * HEART                       : Regular Rate and Rhythm; No murmurs, Rubs or gallops  * ABDOMEN                : Soft, Non-tender, Non-distended; Bowel Sounds present  * NERVOUS SYSTEM  :  Alert & Oriented X3, Good Concentration; Motor Strength 5/5 B/L UL LL ; DTRs 2+ Intact and Symmetric  * EXTREMITIES            :  2+ Peripheral Pulses, No clubbing, cyanosis, or edema  * SKIN                           : No Rashes or Lesions    **********************************************************************************************************  LABS:                          8.3    6.03  )-----------( 213      ( 24 Jan 2020 06:22 )             26.1     01-24    138  |  103  |  43<H>  ----------------------------<  97  3.5   |  31  |  1.38<H>    Ca    8.4      24 Jan 2020 06:22  Phos  3.0     01-24  Mg     2.0     01-24    TPro  6.2  /  Alb  2.8<L>  /  TBili  0.3  /  DBili  x   /  AST  14  /  ALT  19  /  AlkPhos  44  01-24        CAPILLARY BLOOD GLUCOSE          **********************************************************************************************************    RADIOLOGY & ADDITIONAL TESTS:   No radiological imaging was required    Imaging Personally Reviewed   :  [ ] YES  [ ] NO    Consultant(s) Notes Reviewed :  [ ] YES  [ ] NO PGY1 Note discussed with Supervising Resident and Primary Attending.    Patient is a 89y old  Female who presents with a chief complaint of Leg swelling (23 Jan 2020 21:41)      INTERVAL HPI/OVERNIGHT EVENTS :    No acute event overnight reported by overnight team and nurses.   Pt remained hemodynamically stable BP controled  Pt seen and examined  at bed side.  Pt edema is better and rash almost gone.  Pt creatinine trended down.   No need for vanc trough today  Appreciate all consults.   planing d/c to tommorrow   ***********************************************************************************************************    MEDICATIONS  (STANDING):  atorvastatin 20 milliGRAM(s) Oral at bedtime  heparin  Injectable 5000 Unit(s) SubCutaneous every 12 hours  loratadine 10 milliGRAM(s) Oral daily  predniSONE   Tablet 40 milliGRAM(s) Oral daily  senna 1 Tablet(s) Oral at bedtime  vancomycin  IVPB 1000 milliGRAM(s) IV Intermittent daily    MEDICATIONS  (PRN):  acetaminophen   Tablet .. 650 milliGRAM(s) Oral every 6 hours PRN Moderate Pain (4 - 6)      ***********************************************************************************************************    Allergies    Augmentin (Rash; Urticaria)    Intolerances        ***********************************************************************************************************    REVIEW OF SYSTEMS :  * CONSTITUTIONAL      : No Fever, Weight loss, or Fatigue  * EYES                             : No eye pain ,  Discharge  * RESPIRATORY             : No Cough, Wheezing; No shortness of breath  * CARDIOVASCULAR     : No Chest pain, Palpitations, Dizziness, or + Leg swelling  * GASTROINTESTINAL  : No Abdominal or Epigastric pain. No Nausea, Vomiting o  * GENITOURINARY        : No Dysuria , Frequency , Haematuria   * NEUROLOGICAL          : No Headaches,  * MUSCULOSKELETAL   : No Joint pain  * PSYCHIATRY                 : No Depression or Anxiety   * SKIN                               : rash much better, swollen legs due to chroninc venous stasis    ***********************************************************************************************************    Vital Signs Last 24 Hrs  T(C): 36.8 (24 Jan 2020 05:04), Max: 37.1 (23 Jan 2020 21:44)  T(F): 98.3 (24 Jan 2020 05:04), Max: 98.7 (23 Jan 2020 21:44)  HR: 58 (24 Jan 2020 05:04) (58 - 79)  BP: 118/67 (24 Jan 2020 05:04) (118/67 - 136/58)  BP(mean): --  RR: 18 (24 Jan 2020 05:04) (16 - 18)  SpO2: 95% (24 Jan 2020 05:04) (94% - 98%)    ***********************************************************************************************************    PHYSICAL EXAM :   GENERAL                 : NAD, PLeasant lady hears a bit loud  * HEAD                       :  Atraumatic, Normocephalic  * EYES                         :  Conjunctiva and Sclera clear  * ENT                           : Moist Mucous Membranes  * NECK                         : Supple, No JVD, Normal Thyroid  * CHEST/LUNG           : Clear to Auscultation bilaterally; No Rales,   * HEART                       : Regular Rate and Rhythm; No murmurs,  * ABDOMEN                : Soft, Non-tender, Non-distended; Bowel Sounds present  * NERVOUS SYSTEM  :  Alert & Oriented X3, Good Concentration; Motor Strength 5/5 B/L UL LL ;   * EXTREMITIES            :  2+ Peripheral Pulses, No clubbing, cyanosis. Pitting edema present with chronic skin changes with red patches warmer than surrounding skin,   * SKIN                           : morbiliform Rashes present    **********************************************************************************************************  LABS:                          8.3    6.03  )-----------( 213      ( 24 Jan 2020 06:22 )             26.1     01-24    138  |  103  |  43<H>  ----------------------------<  97  3.5   |  31  |  1.38<H>    Ca    8.4      24 Jan 2020 06:22  Phos  3.0     01-24  Mg     2.0     01-24    TPro  6.2  /  Alb  2.8<L>  /  TBili  0.3  /  DBili  x   /  AST  14  /  ALT  19  /  AlkPhos  44  01-24        CAPILLARY BLOOD GLUCOSE          **********************************************************************************************************    RADIOLOGY & ADDITIONAL TESTS:   No radiological imaging was required    Imaging Personally Reviewed   :  [ x] YES  [ ] NO    Consultant(s) Notes Reviewed :  [ x] YES  [ ] NO

## 2020-01-24 NOTE — DISCHARGE NOTE PROVIDER - NSTOBACCOUSAGEY/N_GEN_A_CS
[facility] Physical Therapy Observation Triage Note    Physical therapy order received and discharged following established triage process with  Occupational therapy.     OT completed assessment  for mobility, balance and safety deficits completed by the Occupational therapist (see results and discharge recommendations in the OT plan of care note). Pt was independent with mobility and denied having concerns for d/c. Pt does not feel she needs PT evaluation.   Results indicate that no acute care level Physical therapy services are indicated.      Post acute recommendations:    PT: Home per OT eval      OT:           Recommendations for Discharge: OT: Home (03/06/17 2864)   No

## 2020-01-24 NOTE — PROGRESS NOTE ADULT - PROBLEM SELECTOR PLAN 5
Controlled  Pt takes Amlodipine at home will hold it for now as maybe a contributory factory on LE swelling   IV lasix on hold for now and monitoring creatinine

## 2020-01-24 NOTE — PROGRESS NOTE ADULT - PROBLEM SELECTOR PLAN 3
-pt echo showed moderate aortic stenosis  -Pt is not a candidate of surgical valve replacement  ECHO= Trace mitral regurgitation, aortic valve calcification, nornal left ventricle. -pt echo showed moderate aortic stenosis  -Pt is not a candidate of surgical valve replacement as per cardio  ECHO= Trace mitral regurgitation, aortic valve calcification, normal left ventricle.

## 2020-01-24 NOTE — PROGRESS NOTE ADULT - ATTENDING COMMENTS
Patient seen and examined earlier this afternoon; Agree with NP A/P above with editing as needed. My independent assessment, findings on exam, diagnosis and plan of care as listed below. Discussed with PGY1 Dr. Fitzgerald    Elderly 89 Female, admitted with worsening leg swelling for past 2 weeks with redness admitted with cellulitis. She has Hx of severe Aortic stenosis but asymptomatic in the past.     patient doing wll; appears comfortable; ; Leg swelling much improved; some residual edema persiist;  No warmth or significant redness; Afebrile    Vital Signs Last 24 Hrs  T(C): 37.1 (24 Jan 2020 14:50), Max: 37.1 (23 Jan 2020 21:44)  T(F): 98.7 (24 Jan 2020 14:50), Max: 98.7 (23 Jan 2020 21:44)  HR: 60 (24 Jan 2020 15:30) (58 - 91)  BP: 132/67 (24 Jan 2020 15:30) (118/67 - 173/76)  RR: 17 (24 Jan 2020 14:50) (16 - 18)  SpO2: 96% (24 Jan 2020 14:50) (94% - 98%)    P/E:  Elderly, obese female comfortable in bed in no acute distress   Neuro: AAO x 3;   CVS: S1S2 present  Resp: BLAE+, No wheeze or Rhonchi  GI: Soft, BS+, NT, Obese  Extr; B/L LE Venous stasis with erythema and tenderness with superimposed eruptions    Labs:                        8.3    6.03  )-----------( 213      ( 24 Jan 2020 06:22 )             26.1   01-24    138  |  103  |  43<H>  ----------------------------<  97  3.5   |  31  |  1.38<H>    Ca    8.4      24 Jan 2020 06:22  Phos  3.0     01-24  Mg     2.0     01-24    TPro  6.2  /  Alb  2.8<L>  /  TBili  0.3  /  DBili  x   /  AST  14  /  ALT  19  /  AlkPhos  44  01-24                  D/D:  Cellulitis of B/L LE with superimposed possible drug eruptions resolving well/ much improved  Acute Kidney injury likely due to diuretic resolving well.  Peripheral edema partly could be related to Calcium channel blocker and partly venous stasis  Hx Aortic Stenosis    Plan:  D/C Tele; Holding Lasix due to SHAUNA  Will discharge plan on prn Lasix on discharge if leg swelling or worsening shortness of breath or weight gain  HOLD AMLODIPINE for now can contribute to edema; will not resume  If BP needs better control and more than 10/80 mm Hg can add low dose Hydralazine 25 mg q 8 hrs  IV Vanco; no need to repeat trough as Creatinine has improved and likely to discontinue Vanco after AM dose  Possible Doxy on discharge to complete course; requested ID follow up; d/w Dr. Kan  Taper off Prednisone    Discussed with patient  findings and plan of care; Plan for discharge tomorrow evening at 6.   Discussed with  Taisha discharge plan; Face to face completed  Discussed with PGY1 Dr. Fitzgerald

## 2020-01-24 NOTE — DISCHARGE NOTE NURSING/CASE MANAGEMENT/SOCIAL WORK - PATIENT PORTAL LINK FT
You can access the FollowMyHealth Patient Portal offered by [facility] by registering at the following website: [email or web address]. By joining [employer]’s FollowMyHealth portal, you will also be able to view your health information using other applications (apps) compatible with our system.

## 2020-01-24 NOTE — PROGRESS NOTE ADULT - ASSESSMENT
Patient is a 89y old  Female who presents with a chief complaint of Leg swelling admitted for cellulitis, and aortic stenosis hx for cardio workup to rule out chf.   Vital Signs Last 24 Hrs  T(C): 36.6 (22 Jan 2020 10:57), Max: 38.1 (21 Jan 2020 17:45)  T(F): 97.9 (22 Jan 2020 10:57), Max: 100.6 (21 Jan 2020 17:45)  HR: 79 (22 Jan 2020 10:57) (62 - 107)  BP: 127/72 (22 Jan 2020 10:57) (107/63 - 176/58)  BP(mean): --  RR: 17 (22 Jan 2020 10:57) (17 - 18)  SpO2: 96% (22 Jan 2020 10:57) (95% - 99%) Patient is a 89y old  Female who presents with a chief complaint of Leg swelling admitted for cellulitis, and aortic stenosis hx for cardio workup to rule out chf.

## 2020-01-24 NOTE — PROGRESS NOTE ADULT - ASSESSMENT
Patient is a 89yo Female with CKD-3, HTN, HLD, Severe AS, Arthritis presents with bilateral lower extremity edema. Pt a/w b/l cellulitis. Nephrology consulted for Elevated serum creatinine.    1. SHAUNA on CKD-3- SHAUNA likely due to overdiuresis; Renal function improving off Lasix; Baseline- SCr 1.1-1.3. Avoid Nephrotoxins. Monitor lytes.   2. b/L LE cellulitis- pt on Vanco. Monitor Vanco trough. ID following  3. LE edema- in the setting of cellulitis. Continue holding Lasix due to SHAUNA. Strict I/Os. Monitor urine o/p. Daily weights.   4. Aortic Stenosis-  Plan as per primary team/ Cardiology

## 2020-01-24 NOTE — PROGRESS NOTE ADULT - PROBLEM SELECTOR PLAN 4
SCr 1.21 base line  -Increased Cr to 1.68 most likely due to lasix which are on hold for now  -Avoid Nephrotoxic Meds/ Agents such as (NSAIDs, IV contrast, Aminoglycosides such as gentamicin, -Gadolinium contrast, Phosphate containing enemas, etc..)   Dr. Mai (Nephrology) following   strick IO   DAILY WEIGHT SCr 1.21 base line  -today cr improved to 1.38 from 1.69 will continue to hold lasix   -Avoid Nephrotoxic Meds/ Agents such as (NSAIDs, IV contrast, Aminoglycosides such as gentamicin, -Gadolinium contrast, Phosphate containing enemas, etc..)   Dr. Mai (Nephrology) following   nathanielk IO   DAILY WEIGHT

## 2020-01-25 VITALS
HEART RATE: 60 BPM | SYSTOLIC BLOOD PRESSURE: 150 MMHG | TEMPERATURE: 98 F | DIASTOLIC BLOOD PRESSURE: 59 MMHG | OXYGEN SATURATION: 98 % | RESPIRATION RATE: 18 BRPM

## 2020-01-25 LAB
ALBUMIN SERPL ELPH-MCNC: 3.1 G/DL — LOW (ref 3.5–5)
ALP SERPL-CCNC: 50 U/L — SIGNIFICANT CHANGE UP (ref 40–120)
ALT FLD-CCNC: 28 U/L DA — SIGNIFICANT CHANGE UP (ref 10–60)
ANION GAP SERPL CALC-SCNC: 7 MMOL/L — SIGNIFICANT CHANGE UP (ref 5–17)
AST SERPL-CCNC: 18 U/L — SIGNIFICANT CHANGE UP (ref 10–40)
BILIRUB SERPL-MCNC: 0.4 MG/DL — SIGNIFICANT CHANGE UP (ref 0.2–1.2)
BUN SERPL-MCNC: 35 MG/DL — HIGH (ref 7–18)
CALCIUM SERPL-MCNC: 8.6 MG/DL — SIGNIFICANT CHANGE UP (ref 8.4–10.5)
CHLORIDE SERPL-SCNC: 102 MMOL/L — SIGNIFICANT CHANGE UP (ref 96–108)
CO2 SERPL-SCNC: 28 MMOL/L — SIGNIFICANT CHANGE UP (ref 22–31)
CREAT SERPL-MCNC: 1.27 MG/DL — SIGNIFICANT CHANGE UP (ref 0.5–1.3)
GLUCOSE SERPL-MCNC: 146 MG/DL — HIGH (ref 70–99)
HCT VFR BLD CALC: 28.6 % — LOW (ref 34.5–45)
HGB BLD-MCNC: 9.2 G/DL — LOW (ref 11.5–15.5)
MCHC RBC-ENTMCNC: 32.2 GM/DL — SIGNIFICANT CHANGE UP (ref 32–36)
MCHC RBC-ENTMCNC: 32.4 PG — SIGNIFICANT CHANGE UP (ref 27–34)
MCV RBC AUTO: 100.7 FL — HIGH (ref 80–100)
NRBC # BLD: 0 /100 WBCS — SIGNIFICANT CHANGE UP (ref 0–0)
PLATELET # BLD AUTO: 226 K/UL — SIGNIFICANT CHANGE UP (ref 150–400)
POTASSIUM SERPL-MCNC: 4.1 MMOL/L — SIGNIFICANT CHANGE UP (ref 3.5–5.3)
POTASSIUM SERPL-SCNC: 4.1 MMOL/L — SIGNIFICANT CHANGE UP (ref 3.5–5.3)
PROT SERPL-MCNC: 7 G/DL — SIGNIFICANT CHANGE UP (ref 6–8.3)
RBC # BLD: 2.84 M/UL — LOW (ref 3.8–5.2)
RBC # FLD: 11.9 % — SIGNIFICANT CHANGE UP (ref 10.3–14.5)
SODIUM SERPL-SCNC: 137 MMOL/L — SIGNIFICANT CHANGE UP (ref 135–145)
WBC # BLD: 5.99 K/UL — SIGNIFICANT CHANGE UP (ref 3.8–10.5)
WBC # FLD AUTO: 5.99 K/UL — SIGNIFICANT CHANGE UP (ref 3.8–10.5)

## 2020-01-25 PROCEDURE — 93005 ELECTROCARDIOGRAM TRACING: CPT

## 2020-01-25 PROCEDURE — 96375 TX/PRO/DX INJ NEW DRUG ADDON: CPT

## 2020-01-25 PROCEDURE — 36415 COLL VENOUS BLD VENIPUNCTURE: CPT

## 2020-01-25 PROCEDURE — 82607 VITAMIN B-12: CPT

## 2020-01-25 PROCEDURE — 84166 PROTEIN E-PHORESIS/URINE/CSF: CPT

## 2020-01-25 PROCEDURE — 84155 ASSAY OF PROTEIN SERUM: CPT

## 2020-01-25 PROCEDURE — 99285 EMERGENCY DEPT VISIT HI MDM: CPT | Mod: 25

## 2020-01-25 PROCEDURE — 82728 ASSAY OF FERRITIN: CPT

## 2020-01-25 PROCEDURE — 85610 PROTHROMBIN TIME: CPT

## 2020-01-25 PROCEDURE — 93306 TTE W/DOPPLER COMPLETE: CPT

## 2020-01-25 PROCEDURE — 71045 X-RAY EXAM CHEST 1 VIEW: CPT

## 2020-01-25 PROCEDURE — 97161 PT EVAL LOW COMPLEX 20 MIN: CPT

## 2020-01-25 PROCEDURE — 83880 ASSAY OF NATRIURETIC PEPTIDE: CPT

## 2020-01-25 PROCEDURE — 80061 LIPID PANEL: CPT

## 2020-01-25 PROCEDURE — 83605 ASSAY OF LACTIC ACID: CPT

## 2020-01-25 PROCEDURE — 83036 HEMOGLOBIN GLYCOSYLATED A1C: CPT

## 2020-01-25 PROCEDURE — 84443 ASSAY THYROID STIM HORMONE: CPT

## 2020-01-25 PROCEDURE — 96374 THER/PROPH/DIAG INJ IV PUSH: CPT

## 2020-01-25 PROCEDURE — 84484 ASSAY OF TROPONIN QUANT: CPT

## 2020-01-25 PROCEDURE — 80202 ASSAY OF VANCOMYCIN: CPT

## 2020-01-25 PROCEDURE — 83550 IRON BINDING TEST: CPT

## 2020-01-25 PROCEDURE — 80053 COMPREHEN METABOLIC PANEL: CPT

## 2020-01-25 PROCEDURE — 85027 COMPLETE CBC AUTOMATED: CPT

## 2020-01-25 PROCEDURE — 85730 THROMBOPLASTIN TIME PARTIAL: CPT

## 2020-01-25 PROCEDURE — 82550 ASSAY OF CK (CPK): CPT

## 2020-01-25 PROCEDURE — 83540 ASSAY OF IRON: CPT

## 2020-01-25 PROCEDURE — 82009 KETONE BODYS QUAL: CPT

## 2020-01-25 PROCEDURE — 85652 RBC SED RATE AUTOMATED: CPT

## 2020-01-25 PROCEDURE — 84165 PROTEIN E-PHORESIS SERUM: CPT

## 2020-01-25 PROCEDURE — 87040 BLOOD CULTURE FOR BACTERIA: CPT

## 2020-01-25 PROCEDURE — 99239 HOSP IP/OBS DSCHRG MGMT >30: CPT | Mod: GC

## 2020-01-25 PROCEDURE — 83735 ASSAY OF MAGNESIUM: CPT

## 2020-01-25 PROCEDURE — 84100 ASSAY OF PHOSPHORUS: CPT

## 2020-01-25 PROCEDURE — 81001 URINALYSIS AUTO W/SCOPE: CPT

## 2020-01-25 PROCEDURE — 82746 ASSAY OF FOLIC ACID SERUM: CPT

## 2020-01-25 RX ORDER — HYDRALAZINE HCL 50 MG
1 TABLET ORAL
Qty: 90 | Refills: 0
Start: 2020-01-25 | End: 2020-02-23

## 2020-01-25 RX ORDER — FUROSEMIDE 40 MG
1 TABLET ORAL
Qty: 15 | Refills: 0
Start: 2020-01-25 | End: 2020-02-23

## 2020-01-25 RX ORDER — HYDRALAZINE HCL 50 MG
25 TABLET ORAL EVERY 8 HOURS
Refills: 0 | Status: DISCONTINUED | OUTPATIENT
Start: 2020-01-25 | End: 2020-01-25

## 2020-01-25 RX ADMIN — Medication 40 MILLIGRAM(S): at 06:23

## 2020-01-25 RX ADMIN — Medication 250 MILLIGRAM(S): at 13:00

## 2020-01-25 RX ADMIN — Medication 25 MILLIGRAM(S): at 13:00

## 2020-01-25 NOTE — CHART NOTE - NSCHARTNOTEFT_GEN_A_CORE
Patient lost her balance while reaching into a closet and fell. c/o right wrist pain.    Patient seen and examined earlier this afternoon; Agree with NP A/P above with editing as needed. My independent assessment, findings on exam, diagnosis and plan of care as listed below. Discussed with PGY1 Dr. Fitzgerald    Elderly 89 Female, admitted with worsening leg swelling for past 2 weeks with redness admitted with cellulitis. She has Hx of severe Aortic stenosis but asymptomatic in the past.     patient doing very  well; appears comfortable; ; Leg swelling much improved; some residual edema persist likely baseline;  No warmth or significant redness; Afebrile; rash resolved    Vital Signs Last 24 Hrs  T(C): 36.8 (25 Jan 2020 14:55), Max: 36.8 (25 Jan 2020 14:55)  T(F): 98.2 (25 Jan 2020 14:55), Max: 98.2 (25 Jan 2020 14:55)  HR: 63 (25 Jan 2020 14:55) (52 - 65)  BP: 144/74 (25 Jan 2020 14:55) (143/66 - 179/73)  BP(mean): 100 (25 Jan 2020 12:57) (100 - 100)  RR: 17 (25 Jan 2020 14:55) (16 - 18)  SpO2: 96% (25 Jan 2020 14:55) (96% - 98%)    P/E:  Elderly, obese female comfortable sitting in chair in no acute distress   Neuro: AAO x 3; No focal deficits  CVS: S1S2 present  Resp: BLAE+, No wheeze or Rhonchi  GI: Soft, BS+, NT, Obese  Extr; B/L LE Venous stasis and edema much improved; redness and drug eruptions resolved    Labs:                        9.2    5.99  )-----------( 226      ( 25 Jan 2020 12:04 )             28.6     01-25    137  |  102  |  35<H>  ----------------------------<  146<H>  4.1   |  28  |  1.27    Ca    8.6      25 Jan 2020 12:04  Phos  3.0     01-24  Mg     2.0     01-24    TPro  7.0  /  Alb  3.1<L>  /  TBili  0.4  /  DBili  x   /  AST  18  /  ALT  28  /  AlkPhos  50  01-25      D/D:  Cellulitis of B/L LE with superimposed possible drug eruptions (due to Augmentin) resolving well/ much improved  Acute Kidney injury likely due to diuretic resolved  Peripheral edema partly could be related to Calcium channel blocker and partly venous stasis  Hx Aortic Stenosis    Plan:  added low dose Hydralazine 25 mg q 8 hrs  Will discharge  on prn Lasix on discharge if leg swelling or worsening shortness of breath or weight gain  HOLD AMLODIPINE for now can contribute to edema; will not resume  ID Folow up appreciated; D/C on Doxy on discharge to complete course; d/w Dr. Kan  Taper off Prednisone in 3-4 days    Discussed with patient at length findings and plan of care; Verbalized understanding; AS per pt, she has appt with Dr. Lloyd on Feb. 3rd.   Discussed with ; Patient is stable for discharge;   Discussed with PGY1 Dr. Fitzgerald and on call PGY1 Dr. Marroquin.    (Spend 37 mins with >50% spend counseling and coordination of care and d/w Patient and providers)

## 2020-01-25 NOTE — PROGRESS NOTE ADULT - SUBJECTIVE AND OBJECTIVE BOX
[facility] NEPHROLOGY- PROGRESS NOTE    Patient is a 89yo Female with CKD-3, HTN, HLD, Severe AS, Arthritis presents with bilateral lower extremity edema. Pt a/w b/l cellulitis. Nephrology consulted for Elevated serum creatinine.    Hospital Medications: Medications reviewed.    REVIEW OF SYSTEMS:  CONSTITUTIONAL: No fevers or chills  RESPIRATORY: No shortness of breath  CARDIOVASCULAR: No chest pain.  GASTROINTESTINAL: No nausea, vomiting, diarrhea or abdominal pain. +constipation  VASCULAR: No bilateral lower extremity edema.       VITALS:  T(F): 98.2 (20 @ 14:55), Max: 98.2 (20 @ 14:55)  HR: 63 (20 @ 14:55)  BP: 144/74 (20 @ 14:55)  RR: 17 (20 @ 14:55)  SpO2: 96% (20 @ 14:55)  Wt(kg): --     @ 07:01  -   @ 07:00  --------------------------------------------------------  IN: 0 mL / OUT: 2 mL / NET: -2 mL        PHYSICAL EXAM:  Gen: NAD, calm  HEENT: MMM, [place]  Neck: no JVD  Cards: RRR, +S1/S2, +ROLLY  Resp: CTA b/l  GI: soft, NT/ND, NABS  : no CVA tenderness  Extremities: +non pitting LE edema B/L with erythema/ scaly skin  Derm: diffuse macular erythematous rash- resolving         LABS:      137  |  102  |  35<H>  ----------------------------<  146<H>  4.1   |  28  |  1.27    Ca    8.6      2020 12:04  Phos  3.0       Mg     2.0         TPro  7.0  /  Alb  3.1<L>  /  TBili  0.4  /  DBili      /  AST  18  /  ALT  28  /  AlkPhos  50      Creatinine Trend: 1.27 <--, 1.38 <--, 1.69 <--, 1.68 <--, 1.21 <--, 1.31 <--                        9.2    5.99  )-----------( 226      ( 2020 12:04 )             28.6     Urine Studies:  Urinalysis Basic - ( 2020 17:07 )    Color: Yellow / Appearance: Slightly Turbid / S.010 / pH:   Gluc:  / Ketone: Negative  / Bili: Negative / Urobili: Negative   Blood:  / Protein: 30 mg/dL / Nitrite: Negative   Leuk Esterase: Negative / RBC:  / WBC 0-2 /HPF   Sq Epi:  / Non Sq Epi: Moderate /HPF / Bacteria: Trace /HPF

## 2020-01-25 NOTE — PROGRESS NOTE ADULT - REASON FOR ADMISSION
Leg swelling

## 2020-01-25 NOTE — PROGRESS NOTE ADULT - ATTENDING COMMENTS
[facility] NEPHROLOGY  Joe Barnes M.D.  Ari Lawton D.O.  Michaela Mai M.D.  Tami Arshad, MSN, ANP-C  [phone]    [address] [place] [address]

## 2020-01-25 NOTE — PROGRESS NOTE ADULT - ASSESSMENT
Patient is a 89yo Female with CKD-3, HTN, HLD, Severe AS, Arthritis presents with bilateral lower extremity edema. Pt a/w b/l cellulitis. Nephrology consulted for Elevated serum creatinine.    1. SHAUNA on CKD-3- SHAUNA likely due to overdiuresis; Renal function improving off Lasix; Baseline- SCr 1.1-1.3. Avoid Nephrotoxins. Monitor lytes.   2. b/L LE cellulitis- pt on Vanco. Monitor Vanco trough. ID following  3. LE edema- in the setting of cellulitis. Lasix as per cardiology. Strict I/Os. Monitor urine o/p. Daily weights.   4. Aortic Stenosis-  Plan as per primary team/ Cardiology

## 2020-01-25 NOTE — PROGRESS NOTE ADULT - SUBJECTIVE AND OBJECTIVE BOX
PRESENTING CC:    SUBJ:       PMH -reviewed admission note, no change since admission  Heart failure: acute [ ] chronic [ ] acute or chronic [ ] diastolic [ ] systolic [ ] combined systolic and diastolic[ ]  SHAUNA: ATN[ ] renal medullary necrosis [ ] CKD I [ ]CKDII [ ]CKD III [ ]CKD IV [ ]CKD V [ ]Other pathological lesions [ ]    MEDICATIONS  (STANDING):  atorvastatin 20 milliGRAM(s) Oral at bedtime  heparin  Injectable 5000 Unit(s) SubCutaneous every 12 hours  hydrALAZINE 25 milliGRAM(s) Oral every 8 hours  loratadine 10 milliGRAM(s) Oral daily  predniSONE   Tablet 40 milliGRAM(s) Oral daily  senna 1 Tablet(s) Oral at bedtime  vancomycin  IVPB 1000 milliGRAM(s) IV Intermittent daily    MEDICATIONS  (PRN):  acetaminophen   Tablet .. 650 milliGRAM(s) Oral every 6 hours PRN Moderate Pain (4 - 6)              REVIEW OF SYSTEMS:  Constitutional: [ ] fever, [ ]weight loss,  [ ]fatigue  Eyes: [ ] visual changes  Respiratory: [ ]shortness of breath;  [ ] cough, [ ]wheezing, [ ]chills, [ ]hemoptysis  Cardiovascular: [ ] chest pain, [ ]palpitations, [ ]dizziness,  [ ]leg swelling[ ]orthopnea[ ]PND  Gastrointestinal: [ ] abdominal pain, [ ]nausea, [ ]vomiting,  [ ]diarrhea   Genitourinary: [ ] dysuria, [ ] hematuria  Neurologic: [ ] headaches [ ] tremors[ ]weakness  Skin: [ ] itching, [ ]burning, [ ] rashes  Endocrine: [ ] heat or cold intolerance  Musculoskeletal: [ ] joint pain or swelling; [ ] muscle, back, or extremity pain  Psychiatric: [ ] depression, [ ]anxiety, [ ]mood swings, or [ ]difficulty sleeping  Hematologic: [ ] easy bruising, [ ] bleeding gums    [x] All remaining systems negative except as per above.   [ ]Unable to obtain.    Vital Signs Last 24 Hrs  T(C): 36.5 (25 Jan 2020 10:45), Max: 37.1 (24 Jan 2020 14:50)  T(F): 97.7 (25 Jan 2020 10:45), Max: 98.7 (24 Jan 2020 14:50)  HR: 58 (25 Jan 2020 12:57) (52 - 65)  BP: 170/65 (25 Jan 2020 12:57) (132/67 - 179/73)  BP(mean): 100 (25 Jan 2020 12:57) (100 - 100)  RR: 18 (25 Jan 2020 10:45) (16 - 18)  SpO2: 96% (25 Jan 2020 10:45) (96% - 98%)  I&O's Summary    24 Jan 2020 07:01  -  25 Jan 2020 07:00  --------------------------------------------------------  IN: 0 mL / OUT: 2 mL / NET: -2 mL        PHYSICAL EXAM:  General: No acute distress BMI-  HEENT: EOMI, PERRL  Neck: Supple, [ ] JVD  Lungs: Equal air entry bilaterally; [ ] rales [ ] wheezing [ ] rhonchi  Heart: Regular rate and rhythm; [ ] murmur   /6 [ ] systolic [ ] diastolic [ ] radiation[ ] rubs [ ]  gallops  Abdomen: Nontender, bowel sounds present  Extremities: No clubbing, cyanosis, [ ] edema  Nervous system:  Alert & Oriented X3, no focal deficits  Psychiatric: Normal affect  Skin: No rashes or lesions    LABS:  01-25    137  |  102  |  35<H>  ----------------------------<  146<H>  4.1   |  28  |  1.27    Ca    8.6      25 Jan 2020 12:04  Phos  3.0     01-24  Mg     2.0     01-24    TPro  7.0  /  Alb  3.1<L>  /  TBili  0.4  /  DBili  x   /  AST  18  /  ALT  28  /  AlkPhos  50  01-25    Creatinine Trend: 1.27<--, 1.38<--, 1.69<--, 1.68<--, 1.21<--, 1.31<--                        9.2    5.99  )-----------( 226      ( 25 Jan 2020 12:04 )             28.6       Lipid Panel:   Cardiac Enzymes:           RADIOLOGY:    ECG [my interpretation]:    TELEMETRY:    ECHO:    STRESS TEST:    CATHETERIZATION:      IMPRESSION AND PLAN:

## 2020-03-12 ENCOUNTER — EMERGENCY (EMERGENCY)
Facility: HOSPITAL | Age: 85
LOS: 1 days | Discharge: HOME | End: 2020-03-12
Attending: EMERGENCY MEDICINE
Payer: MEDICARE

## 2020-03-12 VITALS
HEIGHT: 61 IN | TEMPERATURE: 99 F | DIASTOLIC BLOOD PRESSURE: 56 MMHG | WEIGHT: 184.97 LBS | RESPIRATION RATE: 20 BRPM | SYSTOLIC BLOOD PRESSURE: 92 MMHG | OXYGEN SATURATION: 92 % | HEART RATE: 66 BPM

## 2020-03-12 VITALS
DIASTOLIC BLOOD PRESSURE: 56 MMHG | TEMPERATURE: 98 F | HEART RATE: 86 BPM | RESPIRATION RATE: 18 BRPM | SYSTOLIC BLOOD PRESSURE: 110 MMHG | OXYGEN SATURATION: 95 %

## 2020-03-12 DIAGNOSIS — Z98.890 OTHER SPECIFIED POSTPROCEDURAL STATES: Chronic | ICD-10-CM

## 2020-03-12 LAB
ALBUMIN SERPL ELPH-MCNC: 2.9 G/DL — LOW (ref 3.5–5)
ALP SERPL-CCNC: 56 U/L — SIGNIFICANT CHANGE UP (ref 40–120)
ALT FLD-CCNC: 28 U/L DA — SIGNIFICANT CHANGE UP (ref 10–60)
ANION GAP SERPL CALC-SCNC: 8 MMOL/L — SIGNIFICANT CHANGE UP (ref 5–17)
AST SERPL-CCNC: 36 U/L — SIGNIFICANT CHANGE UP (ref 10–40)
BASOPHILS # BLD AUTO: 0.03 K/UL — SIGNIFICANT CHANGE UP (ref 0–0.2)
BASOPHILS NFR BLD AUTO: 0.4 % — SIGNIFICANT CHANGE UP (ref 0–2)
BILIRUB SERPL-MCNC: 0.8 MG/DL — SIGNIFICANT CHANGE UP (ref 0.2–1.2)
BUN SERPL-MCNC: 45 MG/DL — HIGH (ref 7–18)
CALCIUM SERPL-MCNC: 8.6 MG/DL — SIGNIFICANT CHANGE UP (ref 8.4–10.5)
CHLORIDE SERPL-SCNC: 107 MMOL/L — SIGNIFICANT CHANGE UP (ref 96–108)
CO2 SERPL-SCNC: 24 MMOL/L — SIGNIFICANT CHANGE UP (ref 22–31)
CREAT SERPL-MCNC: 1.76 MG/DL — HIGH (ref 0.5–1.3)
EOSINOPHIL # BLD AUTO: 0.26 K/UL — SIGNIFICANT CHANGE UP (ref 0–0.5)
EOSINOPHIL NFR BLD AUTO: 3.8 % — SIGNIFICANT CHANGE UP (ref 0–6)
GLUCOSE SERPL-MCNC: 108 MG/DL — HIGH (ref 70–99)
HCT VFR BLD CALC: 26.7 % — LOW (ref 34.5–45)
HGB BLD-MCNC: 8.7 G/DL — LOW (ref 11.5–15.5)
IMM GRANULOCYTES NFR BLD AUTO: 0.4 % — SIGNIFICANT CHANGE UP (ref 0–1.5)
LYMPHOCYTES # BLD AUTO: 1.26 K/UL — SIGNIFICANT CHANGE UP (ref 1–3.3)
LYMPHOCYTES # BLD AUTO: 18.4 % — SIGNIFICANT CHANGE UP (ref 13–44)
MCHC RBC-ENTMCNC: 32.6 GM/DL — SIGNIFICANT CHANGE UP (ref 32–36)
MCHC RBC-ENTMCNC: 33.1 PG — SIGNIFICANT CHANGE UP (ref 27–34)
MCV RBC AUTO: 101.5 FL — HIGH (ref 80–100)
MONOCYTES # BLD AUTO: 0.89 K/UL — SIGNIFICANT CHANGE UP (ref 0–0.9)
MONOCYTES NFR BLD AUTO: 13 % — SIGNIFICANT CHANGE UP (ref 2–14)
NEUTROPHILS # BLD AUTO: 4.38 K/UL — SIGNIFICANT CHANGE UP (ref 1.8–7.4)
NEUTROPHILS NFR BLD AUTO: 64 % — SIGNIFICANT CHANGE UP (ref 43–77)
NRBC # BLD: 0 /100 WBCS — SIGNIFICANT CHANGE UP (ref 0–0)
PLATELET # BLD AUTO: 228 K/UL — SIGNIFICANT CHANGE UP (ref 150–400)
POTASSIUM SERPL-MCNC: 4.8 MMOL/L — SIGNIFICANT CHANGE UP (ref 3.5–5.3)
POTASSIUM SERPL-SCNC: 4.8 MMOL/L — SIGNIFICANT CHANGE UP (ref 3.5–5.3)
PROT SERPL-MCNC: 7 G/DL — SIGNIFICANT CHANGE UP (ref 6–8.3)
RBC # BLD: 2.63 M/UL — LOW (ref 3.8–5.2)
RBC # FLD: 12.4 % — SIGNIFICANT CHANGE UP (ref 10.3–14.5)
SODIUM SERPL-SCNC: 139 MMOL/L — SIGNIFICANT CHANGE UP (ref 135–145)
TROPONIN I SERPL-MCNC: 0.03 NG/ML — SIGNIFICANT CHANGE UP (ref 0–0.04)
WBC # BLD: 6.85 K/UL — SIGNIFICANT CHANGE UP (ref 3.8–10.5)
WBC # FLD AUTO: 6.85 K/UL — SIGNIFICANT CHANGE UP (ref 3.8–10.5)

## 2020-03-12 PROCEDURE — 99284 EMERGENCY DEPT VISIT MOD MDM: CPT

## 2020-03-12 PROCEDURE — 36415 COLL VENOUS BLD VENIPUNCTURE: CPT

## 2020-03-12 PROCEDURE — 93010 ELECTROCARDIOGRAM REPORT: CPT

## 2020-03-12 PROCEDURE — 70450 CT HEAD/BRAIN W/O DYE: CPT

## 2020-03-12 PROCEDURE — 72125 CT NECK SPINE W/O DYE: CPT

## 2020-03-12 PROCEDURE — 85027 COMPLETE CBC AUTOMATED: CPT

## 2020-03-12 PROCEDURE — 70450 CT HEAD/BRAIN W/O DYE: CPT | Mod: 26

## 2020-03-12 PROCEDURE — 80053 COMPREHEN METABOLIC PANEL: CPT

## 2020-03-12 PROCEDURE — 99284 EMERGENCY DEPT VISIT MOD MDM: CPT | Mod: 25

## 2020-03-12 PROCEDURE — 71045 X-RAY EXAM CHEST 1 VIEW: CPT

## 2020-03-12 PROCEDURE — 84484 ASSAY OF TROPONIN QUANT: CPT

## 2020-03-12 PROCEDURE — 82962 GLUCOSE BLOOD TEST: CPT

## 2020-03-12 PROCEDURE — 71045 X-RAY EXAM CHEST 1 VIEW: CPT | Mod: 26

## 2020-03-12 PROCEDURE — 93005 ELECTROCARDIOGRAM TRACING: CPT

## 2020-03-12 PROCEDURE — 72125 CT NECK SPINE W/O DYE: CPT | Mod: 26

## 2020-03-12 NOTE — ED PROVIDER NOTE - CLINICAL SUMMARY MEDICAL DECISION MAKING FREE TEXT BOX
fall without LOC, now completely normal back to baseline per home aid and son, will ct head and discharge

## 2020-03-12 NOTE — ED ADULT NURSE NOTE - OBJECTIVE STATEMENT
Pt. c/o laceration to the top of head s/p fall. Pt. tripped in bathtub today. Pt. denies LOC. Pt. is on Eliquis.

## 2020-03-12 NOTE — ED ADULT TRIAGE NOTE - CHIEF COMPLAINT QUOTE
Slipped and fell in bath tub x 20 mins ago. Has small lac to top of head. No loc as per Aide. Pt on Eliquis.

## 2020-03-12 NOTE — ED PROVIDER NOTE - PATIENT PORTAL LINK FT
You can access the FollowMyHealth Patient Portal offered by [facility] by registering at the following website: [email or web address]. By joining [employer]’s FollowMyHealth portal, you will also be able to view your health information using other applications (apps) compatible with our system.

## 2020-03-12 NOTE — ED PROVIDER NOTE - OBJECTIVE STATEMENT
89 yo female on anticoagulation presents sp fall.  Pt was sitting in her chair in the hot shower, felt lightheaded and slumped to the side shower wall.  She denies losing consciousness and remembers the entire event.  Her home health aid also remembers the entire event.

## 2020-03-12 NOTE — ED PROVIDER NOTE - PROGRESS NOTE DETAILS
Already educated pt on results of CT, pt now at bedside. Educated both on results of labs showing anemia and CKD. Pt and son both refusing check of guiaic to assess for GI bleeding given pt history. Pt also recommended lasix which she did not take yet today. Pt refused lasix, son and pt both would like to go home. r/b/a explained. Both agreed to close f/u PMD Prema, return for worsening or persistent symtpoms.

## 2020-04-30 ENCOUNTER — INPATIENT (INPATIENT)
Facility: HOSPITAL | Age: 85
LOS: 3 days | Discharge: HOME | DRG: 300 | End: 2020-05-04
Attending: INTERNAL MEDICINE | Admitting: INTERNAL MEDICINE
Payer: MEDICARE

## 2020-04-30 VITALS
TEMPERATURE: 98 F | WEIGHT: 259.93 LBS | SYSTOLIC BLOOD PRESSURE: 146 MMHG | HEART RATE: 72 BPM | RESPIRATION RATE: 18 BRPM | DIASTOLIC BLOOD PRESSURE: 80 MMHG

## 2020-04-30 DIAGNOSIS — Z98.890 OTHER SPECIFIED POSTPROCEDURAL STATES: Chronic | ICD-10-CM

## 2020-04-30 DIAGNOSIS — J90 PLEURAL EFFUSION, NOT ELSEWHERE CLASSIFIED: ICD-10-CM

## 2020-04-30 DIAGNOSIS — L03.119 CELLULITIS OF UNSPECIFIED PART OF LIMB: ICD-10-CM

## 2020-04-30 DIAGNOSIS — M79.89 OTHER SPECIFIED SOFT TISSUE DISORDERS: ICD-10-CM

## 2020-04-30 DIAGNOSIS — L97.909 NON-PRESSURE CHRONIC ULCER OF UNSPECIFIED PART OF UNSPECIFIED LOWER LEG WITH UNSPECIFIED SEVERITY: ICD-10-CM

## 2020-04-30 DIAGNOSIS — Z29.9 ENCOUNTER FOR PROPHYLACTIC MEASURES, UNSPECIFIED: ICD-10-CM

## 2020-04-30 DIAGNOSIS — E78.5 HYPERLIPIDEMIA, UNSPECIFIED: ICD-10-CM

## 2020-04-30 DIAGNOSIS — I10 ESSENTIAL (PRIMARY) HYPERTENSION: ICD-10-CM

## 2020-04-30 DIAGNOSIS — M19.90 UNSPECIFIED OSTEOARTHRITIS, UNSPECIFIED SITE: ICD-10-CM

## 2020-04-30 LAB
ALBUMIN SERPL ELPH-MCNC: 2.8 G/DL — LOW (ref 3.5–5)
ALP SERPL-CCNC: 72 U/L — SIGNIFICANT CHANGE UP (ref 40–120)
ALT FLD-CCNC: 23 U/L DA — SIGNIFICANT CHANGE UP (ref 10–60)
ANION GAP SERPL CALC-SCNC: 4 MMOL/L — LOW (ref 5–17)
APPEARANCE UR: CLEAR — SIGNIFICANT CHANGE UP
APTT BLD: 37.5 SEC — HIGH (ref 27.5–36.3)
AST SERPL-CCNC: 25 U/L — SIGNIFICANT CHANGE UP (ref 10–40)
BACTERIA # UR AUTO: ABNORMAL /HPF
BILIRUB SERPL-MCNC: 0.8 MG/DL — SIGNIFICANT CHANGE UP (ref 0.2–1.2)
BILIRUB UR-MCNC: NEGATIVE — SIGNIFICANT CHANGE UP
BUN SERPL-MCNC: 25 MG/DL — HIGH (ref 7–18)
CALCIUM SERPL-MCNC: 8.8 MG/DL — SIGNIFICANT CHANGE UP (ref 8.4–10.5)
CHLORIDE SERPL-SCNC: 102 MMOL/L — SIGNIFICANT CHANGE UP (ref 96–108)
CO2 SERPL-SCNC: 30 MMOL/L — SIGNIFICANT CHANGE UP (ref 22–31)
COLOR SPEC: YELLOW — SIGNIFICANT CHANGE UP
CREAT SERPL-MCNC: 1.26 MG/DL — SIGNIFICANT CHANGE UP (ref 0.5–1.3)
DIFF PNL FLD: ABNORMAL
EPI CELLS # UR: SIGNIFICANT CHANGE UP /HPF
GLUCOSE SERPL-MCNC: 88 MG/DL — SIGNIFICANT CHANGE UP (ref 70–99)
GLUCOSE UR QL: NEGATIVE — SIGNIFICANT CHANGE UP
HCT VFR BLD CALC: 29.3 % — LOW (ref 34.5–45)
HGB BLD-MCNC: 9.1 G/DL — LOW (ref 11.5–15.5)
INR BLD: 1.12 RATIO — SIGNIFICANT CHANGE UP (ref 0.88–1.16)
KETONES UR-MCNC: NEGATIVE — SIGNIFICANT CHANGE UP
LACTATE SERPL-SCNC: 0.9 MMOL/L — SIGNIFICANT CHANGE UP (ref 0.7–2)
LEUKOCYTE ESTERASE UR-ACNC: NEGATIVE — SIGNIFICANT CHANGE UP
MCHC RBC-ENTMCNC: 31.1 GM/DL — LOW (ref 32–36)
MCHC RBC-ENTMCNC: 31.8 PG — SIGNIFICANT CHANGE UP (ref 27–34)
MCV RBC AUTO: 102.4 FL — HIGH (ref 80–100)
NITRITE UR-MCNC: NEGATIVE — SIGNIFICANT CHANGE UP
NRBC # BLD: 0 /100 WBCS — SIGNIFICANT CHANGE UP (ref 0–0)
NT-PROBNP SERPL-SCNC: 3230 PG/ML — HIGH (ref 0–450)
PH UR: 5 — SIGNIFICANT CHANGE UP (ref 5–8)
PLATELET # BLD AUTO: 184 K/UL — SIGNIFICANT CHANGE UP (ref 150–400)
POTASSIUM SERPL-MCNC: 4.9 MMOL/L — SIGNIFICANT CHANGE UP (ref 3.5–5.3)
POTASSIUM SERPL-SCNC: 4.9 MMOL/L — SIGNIFICANT CHANGE UP (ref 3.5–5.3)
PROT SERPL-MCNC: 7.2 G/DL — SIGNIFICANT CHANGE UP (ref 6–8.3)
PROT UR-MCNC: 100
PROTHROM AB SERPL-ACNC: 12.7 SEC — SIGNIFICANT CHANGE UP (ref 10–12.9)
RBC # BLD: 2.86 M/UL — LOW (ref 3.8–5.2)
RBC # FLD: 14 % — SIGNIFICANT CHANGE UP (ref 10.3–14.5)
RBC CASTS # UR COMP ASSIST: ABNORMAL /HPF (ref 0–2)
SODIUM SERPL-SCNC: 136 MMOL/L — SIGNIFICANT CHANGE UP (ref 135–145)
SP GR SPEC: 1.01 — SIGNIFICANT CHANGE UP (ref 1.01–1.02)
TROPONIN I SERPL-MCNC: 0.03 NG/ML — SIGNIFICANT CHANGE UP (ref 0–0.04)
UROBILINOGEN FLD QL: NEGATIVE — SIGNIFICANT CHANGE UP
WBC # BLD: 5.92 K/UL — SIGNIFICANT CHANGE UP (ref 3.8–10.5)
WBC # FLD AUTO: 5.92 K/UL — SIGNIFICANT CHANGE UP (ref 3.8–10.5)
WBC UR QL: SIGNIFICANT CHANGE UP /HPF (ref 0–5)

## 2020-04-30 PROCEDURE — 73590 X-RAY EXAM OF LOWER LEG: CPT | Mod: 26,RT

## 2020-04-30 PROCEDURE — 71045 X-RAY EXAM CHEST 1 VIEW: CPT | Mod: 26

## 2020-04-30 PROCEDURE — 99222 1ST HOSP IP/OBS MODERATE 55: CPT

## 2020-04-30 PROCEDURE — 93970 EXTREMITY STUDY: CPT | Mod: 26

## 2020-04-30 PROCEDURE — 73620 X-RAY EXAM OF FOOT: CPT | Mod: 26,RT

## 2020-04-30 PROCEDURE — 99285 EMERGENCY DEPT VISIT HI MDM: CPT

## 2020-04-30 RX ORDER — VANCOMYCIN HCL 1 G
1000 VIAL (EA) INTRAVENOUS ONCE
Refills: 0 | Status: COMPLETED | OUTPATIENT
Start: 2020-04-30 | End: 2020-04-30

## 2020-04-30 RX ORDER — VANCOMYCIN HCL 1 G
1250 VIAL (EA) INTRAVENOUS EVERY 24 HOURS
Refills: 0 | Status: DISCONTINUED | OUTPATIENT
Start: 2020-05-01 | End: 2020-05-03

## 2020-04-30 RX ORDER — FUROSEMIDE 40 MG
40 TABLET ORAL DAILY
Refills: 0 | Status: DISCONTINUED | OUTPATIENT
Start: 2020-04-30 | End: 2020-05-01

## 2020-04-30 RX ORDER — HYDRALAZINE HCL 50 MG
25 TABLET ORAL EVERY 8 HOURS
Refills: 0 | Status: DISCONTINUED | OUTPATIENT
Start: 2020-04-30 | End: 2020-05-01

## 2020-04-30 RX ORDER — ENOXAPARIN SODIUM 100 MG/ML
40 INJECTION SUBCUTANEOUS DAILY
Refills: 0 | Status: DISCONTINUED | OUTPATIENT
Start: 2020-04-30 | End: 2020-05-02

## 2020-04-30 RX ORDER — ATORVASTATIN CALCIUM 80 MG/1
10 TABLET, FILM COATED ORAL AT BEDTIME
Refills: 0 | Status: DISCONTINUED | OUTPATIENT
Start: 2020-04-30 | End: 2020-05-04

## 2020-04-30 RX ORDER — FUROSEMIDE 40 MG
20 TABLET ORAL ONCE
Refills: 0 | Status: DISCONTINUED | OUTPATIENT
Start: 2020-04-30 | End: 2020-04-30

## 2020-04-30 RX ORDER — SENNA PLUS 8.6 MG/1
1 TABLET ORAL AT BEDTIME
Refills: 0 | Status: DISCONTINUED | OUTPATIENT
Start: 2020-04-30 | End: 2020-05-03

## 2020-04-30 RX ADMIN — Medication 250 MILLIGRAM(S): at 22:05

## 2020-04-30 NOTE — H&P ADULT - ATTENDING COMMENTS
Pt seen and examined.  Case discussed with housestaff.  89 year old woman with PMH of HTN, HLD, CKD with long standing and prior admission for  b/l LE swelling and soft tissue infection.  She returns to the ED on account of worsening swelling and redness in both legs. There is no associated constitutional symptoms.    Vital Signs Last 24 Hrs  T(C): 34.7 (01 May 2020 00:14), Max: 36.7 (30 Apr 2020 17:35)  T(F): 94.5 (01 May 2020 00:14), Max: 98 (30 Apr 2020 17:35)  HR: 61 (01 May 2020 00:14) (56 - 72)  BP: 125/90 (01 May 2020 00:14) (125/90 - 154/65)  RR: 20 (01 May 2020 00:14) (18 - 20)  SpO2: 100% (01 May 2020 00:14) (95% - 100%)    Elderly woman , lying in bed, NAD AAO X3  CTA B/L RRR S1S2 only  Soft NTND BS +  Bauer catheter draining clear urine  B/L 4+ pitting pedal edema with scattered areas of erythema, warmth and mild tenderness. one 2 X 2 cm area of dry ulcer.    labs                        9.1    5.92  )-----------( 184      ( 30 Apr 2020 19:49 )             29.3     04-30    136  |  102  |  25<H>  ----------------------------<  88  4.9   |  30  |  1.26    Ca    8.8      30 Apr 2020 19:49    TPro  7.2  /  Alb  2.8<L>  /  TBili  0.8  /  DBili  x   /  AST  25  /  ALT  23  /  AlkPhos  72  04-30    US duplex vv LE -no dvt evidence    CXR  X ray right foot and leg    Old ECHO    Impression  Chronic bilateral LE swelling  B/L soft tissue infection /cellulitis  CKD 3; No evidence of CHF    A/P  Admit to Medicine  Empiric IV antibiotics with mrsa coverage- renally dosed IV vancomycin with vanc trough before 4th dose.  IV diuretics for LE swelling and LE elevation Pt seen and examined.  Case discussed with housestaff.  89 year old woman with PMH of HTN, HLD, CKD with long standing and prior admission for  b/l LE swelling and soft tissue infection.  She returns to the ED on account of worsening swelling and redness in both legs. There is no associated constitutional symptoms.    Vital Signs Last 24 Hrs  T(C): 34.7 (01 May 2020 00:14), Max: 36.7 (30 Apr 2020 17:35)  T(F): 94.5 (01 May 2020 00:14), Max: 98 (30 Apr 2020 17:35)  HR: 61 (01 May 2020 00:14) (56 - 72)  BP: 125/90 (01 May 2020 00:14) (125/90 - 154/65)  RR: 20 (01 May 2020 00:14) (18 - 20)  SpO2: 100% (01 May 2020 00:14) (95% - 100%)    Elderly woman , lying in bed, NAD AAO X3  CTA B/L RRR S1S2 only  Soft NTND BS +  Bauer catheter draining clear urine  B/L 4+ pitting pedal edema with scattered areas of erythema, warmth and mild tenderness. one 2 X 2 cm area of dry ulcer.    labs                        9.1    5.92  )-----------( 184      ( 30 Apr 2020 19:49 )             29.3     04-30    136  |  102  |  25<H>  ----------------------------<  88  4.9   |  30  |  1.26    Ca    8.8      30 Apr 2020 19:49    TPro  7.2  /  Alb  2.8<L>  /  TBili  0.8  /  DBili  x   /  AST  25  /  ALT  23  /  AlkPhos  72  04-30    US duplex vv LE -no dvt evidence    CXR - New small bilateral pleural effusions with adjacent airspace opacities. Mild   increased interstitial lung markings.   Chronic deformity both shoulders.   Heart size cannot be accurately assessed in this projection, but appear   enlarged.     - X ray right foot and leg  Lower leg and foot edema. Very advanced knee degeneration.   Significant degeneration at the first MTP joint.   ** unreported discontinuity in the cortex of the upper 1/3rd of the right fibula suggestive of a fibular fracture likely old.    Old ECHO - 01/22/2020  1. Trace mitral regurgitation.  2. Calcified aortic valve with decreased opening. Peak transaortic valve gradient equals 49 mm Hg, mean transaortic valve gradient equals 27 mm Hg, estimated aortic valve area equals 1.1 sq cm (by continuity equation),consistent with moderate aortic stenosis.  (dimensionless index 0.36)  3. Mildly dilated left atrium.  LA volume index = 41 cc/m2.  4. Normal left ventricular internal dimensions and wall thicknesses.  5. Endocardium not well visualized; grossly normal left ventricular systolic function.  6. Right ventricle not well visualized. Probably normal right ventricular size and systolic function.  7. RV systolic pressure is 44 mm Hg. Mild pulmonary hypertension.    Impression  - Chronic bilateral LE swelling  - B/L soft tissue infection / cellulitis  - Chest imaging finding suggestive of ventricular dysfunction  - CKD 3; stable  - Incidental finding of Right fibular fracture    A/P  Admit to Medicine  Empiric IV antibiotics with MRSA coverage- renally dosed IV vancomycin with vanc trough before 4th dose.  IV diuretics- Lasix  for LE swelling  Lower extremity elevation  Old ECHO noted  Ortho consult for recommendation regarding right fibular fracture; unable to reach Radiologist on the read.

## 2020-04-30 NOTE — ED ADULT NURSE NOTE - SKIN INTEGRITY
left foot toe nails bleeding, right lower medial leg ulcer and bilateral lower extremity cellulites/wound(s)/rash

## 2020-04-30 NOTE — ED PROVIDER NOTE - CLINICAL SUMMARY MEDICAL DECISION MAKING FREE TEXT BOX
89 yo F with LE edema. Patient with heel ulcer to right foot and medial right leg. Erythema from foot to knee. Pitting edema. Patient with pleural effusions on X-ray. US neg for DVT. Will admit for LE swelling. Will admit for further tx of LE swelling. Possible CHF hx. CXR with b/l opacities. Low suspicion for covid, but will place on isolation. Endorsed to Dr. Lee.

## 2020-04-30 NOTE — H&P ADULT - NSHPPHYSICALEXAM_GEN_ALL_CORE
Vital Signs Last 24 Hrs  T(C): 35.3 (30 Apr 2020 17:40), Max: 36.7 (30 Apr 2020 17:35)  T(F): 95.5 (30 Apr 2020 17:40), Max: 98 (30 Apr 2020 17:35)  HR: 56 (30 Apr 2020 20:26) (56 - 72)  BP: 154/65 (30 Apr 2020 20:26) (146/80 - 154/65)  RR: 18 (30 Apr 2020 20:26) (18 - 18)  SpO2: 95% (30 Apr 2020 20:26) (95% - 95%) Vital Signs Last 24 Hrs  T(C): 35.3 (30 Apr 2020 17:40), Max: 36.7 (30 Apr 2020 17:35)  T(F): 95.5 (30 Apr 2020 17:40), Max: 98 (30 Apr 2020 17:35)  HR: 56 (30 Apr 2020 20:26) (56 - 72)  BP: 154/65 (30 Apr 2020 20:26) (146/80 - 154/65)  RR: 18 (30 Apr 2020 20:26) (18 - 18)  SpO2: 95% (30 Apr 2020 20:26) (95% - 95%)    PHYSICAL EXAM:  GENERAL: female in bed, comfortable in bed   HEENT: Normocephalic;  conjunctivae and sclerae clear; moist mucous membranes;   NECK : supple  CHEST/LUNG: Clear to auscultation bilaterally with good air entry   HEART: S1 S2  regular; no murmurs, gallops or rubs  ABDOMEN: Soft, Nontender, Nondistended; Bowel sounds present  EXTREMITIES: bilateral pitting edema present   3 cm ulcer on R medial heel stage 1 , not open, not draining, not erythematous   diffused erythema from foot to knee, worse on left   SKIN: warm and dry; no rash  NERVOUS SYSTEM:  Awake and alert; Oriented  to place, person and time ; no new deficits

## 2020-04-30 NOTE — H&P ADULT - PROBLEM SELECTOR PLAN 1
- p/e Bilateral leg swelling, erythema, worst on left with venous stasis present bilaterally   - started on IV vanco, monitor vanco trough   - follow blood culture

## 2020-04-30 NOTE — H&P ADULT - PROBLEM SELECTOR PLAN 7
- home med: lovastatin   - started on equivalent lipitor 10 mg   follow Lipid profile in AM - c/w home med; hydralazine 25 mg TID   - home med: lasix 20 mg every other day, started on Lasix 40 mg IV daily as mentioned above   - monitor BP

## 2020-04-30 NOTE — H&P ADULT - NSHPREVIEWOFSYSTEMS_GEN_ALL_CORE
REVIEW OF SYSTEMS:  CONSTITUTIONAL: No fever,   EYES: no acute visual disturbances  NECK: No pain or stiffness  RESPIRATORY: No cough; No shortness of breath  CARDIOVASCULAR: No chest pain, no palpitations  GASTROINTESTINAL: No pain. No nausea or vomiting; No diarrhea   NEUROLOGICAL: No headache or numbness, no tremors  MUSCULOSKELETAL: No joint pain, no muscle pain, right heal pain and right medial pain   GENITOURINARY: no dysuria, no frequency, no hesitancy  PSYCHIATRY: no depression , no anxiety  ALL OTHER  ROS negative

## 2020-04-30 NOTE — ED PROVIDER NOTE - OBJECTIVE STATEMENT
89 y.o female with a PMHx of HTN, HLD, CKD, arthritis and no PSHx presents to the ED c/o last few days of worsening swelling of the lower extremities as well as chronic ulcer on the R heel and the R medial leg. Patient normally walks with a walker. Patient Denies fevers, nausea, emesis, chest pain, shortness of breath, abdominal pain, dysuria, hematuria, diarrhea, constipation, or any other acute complaints.

## 2020-04-30 NOTE — H&P ADULT - PROBLEM SELECTOR PLAN 2
- leg ulcer; stage one right heal ulcer   - vanco IV  started   - Primary team to consult podiatry in the morning - leg ulcer; stage one right heal ulcer   - Xray: lower leg and foot edema. Very advanced knee degeneration. Significant degeneration at the first MTP joint  - vanco IV  started   - Primary team to consult podiatry in the morning

## 2020-04-30 NOTE — ED ADULT NURSE NOTE - NSIMPLEMENTINTERV_GEN_ALL_ED
Implemented All Fall Risk Interventions:  [place] to call system. Call bell, personal items and telephone within reach. Instruct patient to call for assistance. Room bathroom lighting operational. Non-slip footwear when patient is off stretcher. Physically safe environment: no spills, clutter or unnecessary equipment. Stretcher in lowest position, wheels locked, appropriate side rails in place. Provide visual cue, wrist band, yellow gown, etc. Monitor gait and stability. Monitor for mental status changes and reorient to person, place, and time. Review medications for side effects contributing to fall risk. Reinforce activity limits and safety measures with patient and family.

## 2020-04-30 NOTE — H&P ADULT - HISTORY OF PRESENT ILLNESS
89 y.o female with a PMHx of HTN, HLD, CKD, arthritis and no PSHx presents to the ED c/o last few days of worsening swelling of the lower extremities as well as chronic ulcer on the R heel and the R medial leg. Patient normally walks with a walker. Patient Denies fevers, nausea, emesis, chest pain, shortness of breath, abdominal pain, dysuria, hematuria, diarrhea, constipation, or any other acute complaints. 89 y.o female from home, DMITRY x3, has HHA with a PMHx of HTN, HLD, arthritis presented with c/o last few days of worsening swelling of the lower extremities as well as chronic ulcer on the R heel and the R medial leg with associated pain. Patient normally walks with a walker. Patient Denies fevers, nausea, emesis, chest pain, shortness of breath, abdominal pain, dysuria, hematuria, diarrhea, constipation, or any other acute complaints. Pt lives by herself, has HHA, son lives near by and checks on the mother 2-3 times a day. She was admitted to [facility] in Jan with similar complains and was treated with vanco and lasix.     GOC; pt expressed her wishes for no resuscitation and no tubes but wants son to make the final decision.

## 2020-04-30 NOTE — ED ADULT NURSE NOTE - OBJECTIVE STATEMENT
patient came to the ED sent by her home health nurse because of left side swelling and wound on left foot. Patient states that she is not in pain at this time.

## 2020-04-30 NOTE — H&P ADULT - PROBLEM SELECTOR PLAN 5
- Tylenol PRN for pain Xray; New small bilateral pleural effusions with adjacent airspace opacities. Mild increased interstitial lung markings.  BNP: 3K   - ECHO : grossly normal EF in Jan 2020, with moderate AS.   - Will increase Lasix to 40 mg IV OD, ( home dose: Lasix 20 mg every other day)   - no need to repeat ECHo   - keeping pt age in mind she is not a candidate for cardiac work up

## 2020-04-30 NOTE — ED ADULT TRIAGE NOTE - CHIEF COMPLAINT QUOTE
send by the home health aid for left side swelling for 2 days , and for evaluation for wound in rt foot

## 2020-04-30 NOTE — H&P ADULT - ASSESSMENT
89 y.o female from home, AAO x3, has HHA with a PMHx of HTN, HLD, arthritis presented with c/o last few days of worsening swelling of the lower extremities as well as chronic ulcer on the R heel and the R medial leg with associated pain.    Admitted with LE swelling and ulcer

## 2020-04-30 NOTE — H&P ADULT - PROBLEM SELECTOR PLAN 6
- c/w home med; hydralazine 25 mg TID   - home med: lasix 20 mg every other day, started on Lasix 40 mg IV daily as mentioned above   - monitor BP - Tylenol PRN for pain

## 2020-04-30 NOTE — H&P ADULT - PROBLEM SELECTOR PLAN 8
IMPROVE VTE Individual Risk Assessment  RISK                                                                Points  [  ] Previous VTE                                                  3  [  ] Thrombophilia                                               2  [  ] Lower limb paralysis                                      2        (unable to hold up >15 seconds)    [  ] Current Cancer                                              2         (within 6 months)  [x  ] Immobilization > 24 hrs                                1  [  ] ICU/CCU stay > 24 hours                              1  [x  ] Age > 60                                                      1  IMPROVE VTE Score _________2, Lovenox  for DVT proph - home med: lovastatin   - started on equivalent lipitor 10 mg   follow Lipid profile in AM

## 2020-04-30 NOTE — ED PROVIDER NOTE - PHYSICAL EXAMINATION
3 cm ulcer on R medial heel stage 1 , not open, not draining, not erythematous   Ulcer to R medial leg with some purulence throughout  2+ pitting edema  diffused erythema from foot to knee  obese 3 cm ulcer on R medial heel stage 1 , not open, not draining, not erythematous   2+ pitting edema bilaterally   diffused erythema from foot to knee  obese

## 2020-04-30 NOTE — H&P ADULT - PROBLEM SELECTOR PLAN 4
Xray; New small bilateral pleural effusions with adjacent airspace opacities. Mild increased interstitial lung markings.  BNP: 3K   - ECHO : grossly normal EF in Jan 2020, with moderate AS.   - Will increase Lasix to 40 mg IV OD, ( home dose: Lasix 20 mg every other day)   - no need to repeat ECHo   - keeping pt age in mind she is not a candidate for cardiac work up - worsening leg swelling.   - most likely due to venous stasis   - will increase Lasix from dose of 20 mg every other day to IV Lasix 40 mg OD   -  pt is not a candidate for cardiac work up due to her age,  so wont proceed with any extensive cardiac work up  - ECHO : grossly normal EF in Jan 2020, with moderate AS.

## 2020-04-30 NOTE — H&P ADULT - PROBLEM SELECTOR PLAN 3
- worsening leg swelling.   - most likely due to venous stasis   - will increase Lasix from dose of 20 mg every other day to IV Lasix 40 mg OD   -  pt is not a candidate for cardiac work up due to her age,  so wont proceed with any extensive cardiac work up  - ECHO : grossly normal EF in Jan 2020, with moderate AS. right fibula fracture noted on Xray film   - PRIMARY TEAM TO CONSULT ORTHO  pain management

## 2020-04-30 NOTE — ED PROVIDER NOTE - CARE PLAN
Principal Discharge DX:	Cellulitis of leg  Secondary Diagnosis:	Edema  Secondary Diagnosis:	Pleural effusion

## 2020-05-01 DIAGNOSIS — S82.409A UNSPECIFIED FRACTURE OF SHAFT OF UNSPECIFIED FIBULA, INITIAL ENCOUNTER FOR CLOSED FRACTURE: ICD-10-CM

## 2020-05-01 LAB
24R-OH-CALCIDIOL SERPL-MCNC: 23.3 NG/ML — LOW (ref 30–80)
A1C WITH ESTIMATED AVERAGE GLUCOSE RESULT: 5.6 % — SIGNIFICANT CHANGE UP (ref 4–5.6)
ALBUMIN SERPL ELPH-MCNC: 2.7 G/DL — LOW (ref 3.5–5)
ALP SERPL-CCNC: 64 U/L — SIGNIFICANT CHANGE UP (ref 40–120)
ALT FLD-CCNC: 20 U/L DA — SIGNIFICANT CHANGE UP (ref 10–60)
ANION GAP SERPL CALC-SCNC: 6 MMOL/L — SIGNIFICANT CHANGE UP (ref 5–17)
AST SERPL-CCNC: 18 U/L — SIGNIFICANT CHANGE UP (ref 10–40)
BASOPHILS # BLD AUTO: 0.03 K/UL — SIGNIFICANT CHANGE UP (ref 0–0.2)
BASOPHILS NFR BLD AUTO: 0.5 % — SIGNIFICANT CHANGE UP (ref 0–2)
BILIRUB SERPL-MCNC: 0.7 MG/DL — SIGNIFICANT CHANGE UP (ref 0.2–1.2)
BUN SERPL-MCNC: 24 MG/DL — HIGH (ref 7–18)
CALCIUM SERPL-MCNC: 8.8 MG/DL — SIGNIFICANT CHANGE UP (ref 8.4–10.5)
CHLORIDE SERPL-SCNC: 100 MMOL/L — SIGNIFICANT CHANGE UP (ref 96–108)
CHOLEST SERPL-MCNC: 110 MG/DL — SIGNIFICANT CHANGE UP (ref 10–199)
CO2 SERPL-SCNC: 30 MMOL/L — SIGNIFICANT CHANGE UP (ref 22–31)
CREAT SERPL-MCNC: 1.25 MG/DL — SIGNIFICANT CHANGE UP (ref 0.5–1.3)
EOSINOPHIL # BLD AUTO: 0.25 K/UL — SIGNIFICANT CHANGE UP (ref 0–0.5)
EOSINOPHIL NFR BLD AUTO: 4.2 % — SIGNIFICANT CHANGE UP (ref 0–6)
ESTIMATED AVERAGE GLUCOSE: 114 MG/DL — SIGNIFICANT CHANGE UP (ref 68–114)
FERRITIN SERPL-MCNC: 236 NG/ML — HIGH (ref 15–150)
FOLATE SERPL-MCNC: 7.7 NG/ML — SIGNIFICANT CHANGE UP
GLUCOSE SERPL-MCNC: 74 MG/DL — SIGNIFICANT CHANGE UP (ref 70–99)
HCT VFR BLD CALC: 27.3 % — LOW (ref 34.5–45)
HCT VFR BLD CALC: 27.5 % — LOW (ref 34.5–45)
HDLC SERPL-MCNC: 56 MG/DL — SIGNIFICANT CHANGE UP
HGB BLD-MCNC: 8.4 G/DL — LOW (ref 11.5–15.5)
HGB BLD-MCNC: 8.6 G/DL — LOW (ref 11.5–15.5)
IMM GRANULOCYTES NFR BLD AUTO: 0.3 % — SIGNIFICANT CHANGE UP (ref 0–1.5)
IRON SATN MFR SERPL: 14 % — LOW (ref 15–50)
IRON SATN MFR SERPL: 39 UG/DL — LOW (ref 40–150)
LIPID PNL WITH DIRECT LDL SERPL: 39 MG/DL — SIGNIFICANT CHANGE UP
LYMPHOCYTES # BLD AUTO: 1.36 K/UL — SIGNIFICANT CHANGE UP (ref 1–3.3)
LYMPHOCYTES # BLD AUTO: 23 % — SIGNIFICANT CHANGE UP (ref 13–44)
MAGNESIUM SERPL-MCNC: 2.1 MG/DL — SIGNIFICANT CHANGE UP (ref 1.6–2.6)
MCHC RBC-ENTMCNC: 30.8 GM/DL — LOW (ref 32–36)
MCHC RBC-ENTMCNC: 31.9 PG — SIGNIFICANT CHANGE UP (ref 27–34)
MCV RBC AUTO: 103.8 FL — HIGH (ref 80–100)
MONOCYTES # BLD AUTO: 0.96 K/UL — HIGH (ref 0–0.9)
MONOCYTES NFR BLD AUTO: 16.2 % — HIGH (ref 2–14)
NEUTROPHILS # BLD AUTO: 3.3 K/UL — SIGNIFICANT CHANGE UP (ref 1.8–7.4)
NEUTROPHILS NFR BLD AUTO: 55.8 % — SIGNIFICANT CHANGE UP (ref 43–77)
NRBC # BLD: 0 /100 WBCS — SIGNIFICANT CHANGE UP (ref 0–0)
NT-PROBNP SERPL-SCNC: 4707 PG/ML — HIGH (ref 0–450)
PHOSPHATE SERPL-MCNC: 3.7 MG/DL — SIGNIFICANT CHANGE UP (ref 2.5–4.5)
PLATELET # BLD AUTO: 180 K/UL — SIGNIFICANT CHANGE UP (ref 150–400)
POTASSIUM SERPL-MCNC: 4.4 MMOL/L — SIGNIFICANT CHANGE UP (ref 3.5–5.3)
POTASSIUM SERPL-SCNC: 4.4 MMOL/L — SIGNIFICANT CHANGE UP (ref 3.5–5.3)
PROT SERPL-MCNC: 6.4 G/DL — SIGNIFICANT CHANGE UP (ref 6–8.3)
RBC # BLD: 2.63 M/UL — LOW (ref 3.8–5.2)
RBC # FLD: 13.9 % — SIGNIFICANT CHANGE UP (ref 10.3–14.5)
SARS-COV-2 RNA SPEC QL NAA+PROBE: SIGNIFICANT CHANGE UP
SODIUM SERPL-SCNC: 136 MMOL/L — SIGNIFICANT CHANGE UP (ref 135–145)
T3 SERPL-MCNC: 74 NG/DL — LOW (ref 80–200)
T4 AB SER-ACNC: 6.2 UG/DL — SIGNIFICANT CHANGE UP (ref 4.6–12)
TIBC SERPL-MCNC: 287 UG/DL — SIGNIFICANT CHANGE UP (ref 250–450)
TOTAL CHOLESTEROL/HDL RATIO MEASUREMENT: 2 RATIO — LOW (ref 3.3–7.1)
TRANSFERRIN SERPL-MCNC: 234 MG/DL — SIGNIFICANT CHANGE UP (ref 200–360)
TRIGL SERPL-MCNC: 76 MG/DL — SIGNIFICANT CHANGE UP (ref 10–149)
TSH SERPL-MCNC: 7.65 UU/ML — HIGH (ref 0.34–4.82)
UIBC SERPL-MCNC: 248 UG/DL — SIGNIFICANT CHANGE UP (ref 110–370)
VIT B12 SERPL-MCNC: 1167 PG/ML — SIGNIFICANT CHANGE UP (ref 232–1245)
WBC # BLD: 5.92 K/UL — SIGNIFICANT CHANGE UP (ref 3.8–10.5)
WBC # FLD AUTO: 5.92 K/UL — SIGNIFICANT CHANGE UP (ref 3.8–10.5)

## 2020-05-01 PROCEDURE — 99232 SBSQ HOSP IP/OBS MODERATE 35: CPT

## 2020-05-01 RX ORDER — FERROUS SULFATE 325(65) MG
325 TABLET ORAL DAILY
Refills: 0 | Status: DISCONTINUED | OUTPATIENT
Start: 2020-05-01 | End: 2020-05-04

## 2020-05-01 RX ORDER — ACETAMINOPHEN 500 MG
650 TABLET ORAL EVERY 6 HOURS
Refills: 0 | Status: DISCONTINUED | OUTPATIENT
Start: 2020-05-01 | End: 2020-05-04

## 2020-05-01 RX ORDER — FUROSEMIDE 40 MG
20 TABLET ORAL DAILY
Refills: 0 | Status: DISCONTINUED | OUTPATIENT
Start: 2020-05-01 | End: 2020-05-04

## 2020-05-01 RX ORDER — OXYCODONE AND ACETAMINOPHEN 5; 325 MG/1; MG/1
1 TABLET ORAL ONCE
Refills: 0 | Status: DISCONTINUED | OUTPATIENT
Start: 2020-05-01 | End: 2020-05-01

## 2020-05-01 RX ORDER — CHOLECALCIFEROL (VITAMIN D3) 125 MCG
1000 CAPSULE ORAL DAILY
Refills: 0 | Status: DISCONTINUED | OUTPATIENT
Start: 2020-05-01 | End: 2020-05-04

## 2020-05-01 RX ORDER — POVIDONE-IODINE 5 %
1 AEROSOL (ML) TOPICAL DAILY
Refills: 0 | Status: DISCONTINUED | OUTPATIENT
Start: 2020-05-01 | End: 2020-05-04

## 2020-05-01 RX ADMIN — Medication 325 MILLIGRAM(S): at 11:18

## 2020-05-01 RX ADMIN — ENOXAPARIN SODIUM 40 MILLIGRAM(S): 100 INJECTION SUBCUTANEOUS at 11:18

## 2020-05-01 RX ADMIN — Medication 40 MILLIGRAM(S): at 00:41

## 2020-05-01 RX ADMIN — ATORVASTATIN CALCIUM 10 MILLIGRAM(S): 80 TABLET, FILM COATED ORAL at 21:30

## 2020-05-01 RX ADMIN — OXYCODONE AND ACETAMINOPHEN 1 TABLET(S): 5; 325 TABLET ORAL at 06:40

## 2020-05-01 RX ADMIN — Medication 166.67 MILLIGRAM(S): at 07:45

## 2020-05-01 NOTE — PROGRESS NOTE ADULT - PROBLEM SELECTOR PLAN 2
- worsening leg swelling.   - most likely due to venous stasis   - early venous stasis dermatitis   - will increase Lasix from dose of 20 mg every other day to IV Lasix 40 mg OD   -  pt is not a candidate for cardiac work up due to her age,  so wont proceed with any extensive cardiac work up  - ECHO : grossly normal EF in Jan 2020, with moderate AS.

## 2020-05-01 NOTE — PROGRESS NOTE ADULT - PROBLEM SELECTOR PLAN 3
Xray; New small bilateral pleural effusions with adjacent airspace opacities. Mild increased interstitial lung markings.  BNP: 3K   - ECHO : grossly normal EF in Jan 2020, with moderate AS.   - Will increase Lasix to 40 mg IV OD, ( home dose: Lasix 20 mg every other day)   - no need to repeat ECHo   - keeping pt age in mind she is not a candidate for cardiac work up

## 2020-05-01 NOTE — PROGRESS NOTE ADULT - PROBLEM SELECTOR PLAN 5
- c/w home med; hydralazine 25 mg TID   - home med: lasix 20 mg every other day, started on Lasix 40 mg IV daily as mentioned above   - monitor BP

## 2020-05-01 NOTE — PHYSICAL THERAPY INITIAL EVALUATION ADULT - CRITERIA FOR SKILLED THERAPEUTIC INTERVENTIONS
impairments found/predicted duration of therapy intervention/therapy frequency/rehab potential/anticipated discharge recommendation

## 2020-05-01 NOTE — ADVANCED PRACTICE NURSE CONSULT - ASSESSMENT
This is a 89yr old female patient admitted for Cellulitis, presenting with a Venous Stasis Ulcer to the L. Lower Leg and an Ulcer to the R. Heel, to which the patient is being followed by Podiatry with a treatment plan to be formulated to address these issues. There is currently no further need for wound care specialist consultation at this time.

## 2020-05-01 NOTE — CONSULT NOTE ADULT - SUBJECTIVE AND OBJECTIVE BOX
Chief Complaint : Patient is a 89y old  Female who presents with a chief complaint of LE swelling and cellulitis (01 May 2020 11:29)    HPI:  89 y.o female from home, AAO x3, has HHA with a PMHx of HTN, HLD, arthritis presented with c/o last few days of worsening swelling of the lower extremities as well as chronic ulcer on the R heel and the R medial leg with associated pain. Patient normally walks with a walker. Patient Denies fevers, nausea, emesis, chest pain, shortness of breath, abdominal pain, dysuria, hematuria, diarrhea, constipation, or any other acute complaints. Pt lives by herself, has HHA, son lives near by and checks on the mother 2-3 times a day. She was admitted to [facility] in Jan with similar complains and was treated with vanco and lasix.     GOC; pt expressed her wishes for no resuscitation and no tubes but wants son to make the final decision. (30 Apr 2020 22:23)      Patient admits to  (-) Fevers, (-) Chills, (-) Nausea, (-) Vomiting, (-) Shortness of Breath    Podiatry HPI: 89y year old Female seen at bedside for Left foot and leg ulceration. History as above. Patient has significant b/l lower extremity edema, she relates she sees a doctor for leg swelling but unable to to provide details.  Pateint denies any pain to lower extremity, poor historian, unable to provide much history for ulcer time line. She has not been treated with compression. Questionable ambulation history, patient admits to having difficulty walking with walker. She denies f/c/n/v or SOB. No respiratory symptoms. Afebrile and normal wbc. X-rays done in ED without bony changes.     PMH: Arthritis  HLD (hyperlipidemia)  Chronic kidney disease (CKD)  HTN (hypertension)    PSH: H/O local excision of skin lesion  No significant past surgical history      Allergies: Augmentin (Rash; Urticaria)      Labs:                          8.4    5.92  )-----------( 180      ( 01 May 2020 06:32 )             27.3     WBC Trend  5.92 Date (05-01 @ 06:32)  5.92 Date (04-30 @ 19:49)      Chem  05-01    136  |  100  |  24<H>  ----------------------------<  74  4.4   |  30  |  1.25    Ca    8.8      01 May 2020 06:32  Phos  3.7     05-01  Mg     2.1     05-01    TPro  6.4  /  Alb  2.7<L>  /  TBili  0.7  /  DBili  x   /  AST  18  /  ALT  20  /  AlkPhos  64  05-01          T(F): 97.4 (05-01-20 @ 08:26), Max: 98 (04-30-20 @ 17:35)  HR: 73 (05-01-20 @ 08:26) (56 - 83)  BP: 90/51 (05-01-20 @ 08:26) (90/51 - 154/65)  RR: 24 (05-01-20 @ 08:26) (18 - 24)  SpO2: 98% (05-01-20 @ 08:26) (95% - 100%)  Wt(kg): --    O:   General: Pleasant  female NAD & AOX3.    Derm:  Skin warm, dry and supple bilateral.  Stasis skin changes to b/l lower extremity with dry, peeling plaques.   Small superficial ulceration noted to the left medial leg, fibrotic wound base with sloughing, minimal periwound erythema, measuring 0.6cm x 0.4cm x 0.1cm, no active discharge or drainage.  No purulence or fluctuance. No tracking or tunneling. Large pressure ulcer noted left posterolateral heel, blister with dried heme. minimal surrounding erythema, no bogginess, no fluctuance. No pain to palpation. Negative probe to bone.  Right lower extremity no open lesions.   Vascular: Dorsalis Pedis palpable b/l, and Posterior Tibial pulses nonpalpable secondary to edema.  CFT < 3 seconds digits 1-5 bilateral. Significant pitting edema noted b/l.   Neuro: Protective sensation diminished to the level of the digits bilateral.  MSK: Muscle strength 4/5 all major muscle groups bilateral. no gross deformity. No pain to palpation      xray:    EXAM:  FOOT 2VIEWS RT                          EXAM:  LEG AP&LAT - RIGHT                          PROCEDURE DATE:  04/30/2020      INTERPRETATION:  Right tib-fib and right foot. Patient has a medial ulcer.    Right tib-fib. 2 views.    The lower end of an intramedullary nikko in the right femur noted.    There is quite advanced knee degeneration.    There is extensive edema of the lower leg.    No bone destruction or fracture is evident.    Right foot. 3 views.    There is a small inferior calcaneal spurs.    There is advanced degeneration at the first MTP joint with a slight hallux valgus. No bone destruction or fracture is evident. Edema of the foot noted.    IMPRESSION: Lower leg and foot edema. Very advanced knee degeneration. Significant degeneration at the first MTP joint.      EVELYN HELMS M.D., ATTENDING RADIOLOGIST  This document has been electronically signed. Apr 30 2020  8:43PM      Deformity:  A: Left heel pressure ulceration  b/l venous stasis      P:   Chart reviewed and Patient evaluated  Discussed diagnosis and treatment with patient  Obtained wound culture to be sent to Lab  Excisional debridement with 15 blade down to including the epidermis left heel.   Applied dry sterile dressing  X-rays reviewed, no osseous changes.   afebrile, normal wbc  LUIS/PVR to assess vascular status.   WBAT to forefoot left foot  Full WB to right.   Offloading to bilateral Heels while in bed  continue elevation of both extremities   Patient needs continued care outpatient  Stable for outpatient follow up, may schedule appointment at [address] [place] [address], [phone] within 1 week of discharge  Discussed with Attending Dr. Whittaker Chief Complaint : Patient is a 89y old  Female who presents with a chief complaint of LE swelling and cellulitis (01 May 2020 11:29)    HPI:  89 y.o female from home, AAO x3, has HHA with a PMHx of HTN, HLD, arthritis presented with c/o last few days of worsening swelling of the lower extremities as well as chronic ulcer on the R heel and the R medial leg with associated pain. Patient normally walks with a walker. Patient Denies fevers, nausea, emesis, chest pain, shortness of breath, abdominal pain, dysuria, hematuria, diarrhea, constipation, or any other acute complaints. Pt lives by herself, has HHA, son lives near by and checks on the mother 2-3 times a day. She was admitted to [facility] in Jan with similar complains and was treated with vanco and lasix.     GOC; pt expressed her wishes for no resuscitation and no tubes but wants son to make the final decision. (30 Apr 2020 22:23)      Patient admits to  (-) Fevers, (-) Chills, (-) Nausea, (-) Vomiting, (-) Shortness of Breath    Podiatry HPI: 89y year old Female seen at bedside for Left foot and leg ulceration. History as above. Patient has significant b/l lower extremity edema, she relates she sees a doctor for leg swelling but unable to to provide details.  Pateint denies any pain to lower extremity, poor historian, unable to provide much history for ulcer time line. She has not been treated with compression. Questionable ambulation history, patient admits to having difficulty walking with walker. She denies f/c/n/v or SOB. No respiratory symptoms. Afebrile and normal wbc. X-rays done in ED without bony changes.     PMH: Arthritis  HLD (hyperlipidemia)  Chronic kidney disease (CKD)  HTN (hypertension)    PSH: H/O local excision of skin lesion  No significant past surgical history      Allergies: Augmentin (Rash; Urticaria)      Labs:                          8.4    5.92  )-----------( 180      ( 01 May 2020 06:32 )             27.3     WBC Trend  5.92 Date (05-01 @ 06:32)  5.92 Date (04-30 @ 19:49)      Chem  05-01    136  |  100  |  24<H>  ----------------------------<  74  4.4   |  30  |  1.25    Ca    8.8      01 May 2020 06:32  Phos  3.7     05-01  Mg     2.1     05-01    TPro  6.4  /  Alb  2.7<L>  /  TBili  0.7  /  DBili  x   /  AST  18  /  ALT  20  /  AlkPhos  64  05-01          T(F): 97.4 (05-01-20 @ 08:26), Max: 98 (04-30-20 @ 17:35)  HR: 73 (05-01-20 @ 08:26) (56 - 83)  BP: 90/51 (05-01-20 @ 08:26) (90/51 - 154/65)  RR: 24 (05-01-20 @ 08:26) (18 - 24)  SpO2: 98% (05-01-20 @ 08:26) (95% - 100%)  Wt(kg): --    O:   General: Pleasant  female NAD & AOX3.    Derm:  Skin warm, dry and supple bilateral.  Stasis skin changes to b/l lower extremity with dry, peeling plaques.   Small superficial ulceration noted to the left medial leg, fibrotic wound base with sloughing, minimal periwound erythema, measuring 0.6cm x 0.4cm x 0.1cm, no active discharge or drainage.  No purulence or fluctuance. No tracking or tunneling. Large pressure ulcer noted left posterolateral heel, blister, with lifting epidermis. Post-debridement, partial thickness ulcer, purple discoloration likely ischemic change. Minimal surrounding erythema, no bogginess, no fluctuance. No pain to palpation. Negative probe to bone.  Right lower extremity no open lesions.   Vascular: Dorsalis Pedis palpable b/l, and Posterior Tibial pulses nonpalpable secondary to edema.  CFT < 3 seconds digits 1-5 bilateral. Significant pitting edema noted b/l.   Neuro: Protective sensation diminished to the level of the digits bilateral.  MSK: Muscle strength 4/5 all major muscle groups bilateral. no gross deformity. No pain to palpation      xray:    EXAM:  FOOT 2VIEWS RT                          EXAM:  LEG AP&LAT - RIGHT                          PROCEDURE DATE:  04/30/2020      INTERPRETATION:  Right tib-fib and right foot. Patient has a medial ulcer.    Right tib-fib. 2 views.    The lower end of an intramedullary nikko in the right femur noted.    There is quite advanced knee degeneration.    There is extensive edema of the lower leg.    No bone destruction or fracture is evident.    Right foot. 3 views.    There is a small inferior calcaneal spurs.    There is advanced degeneration at the first MTP joint with a slight hallux valgus. No bone destruction or fracture is evident. Edema of the foot noted.    IMPRESSION: Lower leg and foot edema. Very advanced knee degeneration. Significant degeneration at the first MTP joint.      EVELYN HELMS M.D., ATTENDING RADIOLOGIST  This document has been electronically signed. Apr 30 2020  8:43PM      Deformity:  A: Left heel pressure ulceration  b/l venous stasis      P:   Chart reviewed and Patient evaluated  Discussed diagnosis and treatment with patient  Excisional debridement with 15 blade down to including the epidermis left heel, with necrotic tissue removed  Applied dry sterile dressing  X-rays reviewed, no osseous changes.   afebrile, normal wbc  LUIS/PVR to assess vascular status. Consider vascular consult pending result  WBAT to forefoot left foot  Full WB to right.   Offloading to bilateral Heels while in bed  continue elevation of both extremities   Patient needs continued care outpatient  Stable for outpatient follow up, may schedule appointment at [address] [place] [address], [phone] within 1 week of discharge  Discussed with Attending Dr. Whittaker Chief Complaint : Patient is a 89y old  Female who presents with a chief complaint of LE swelling and cellulitis (01 May 2020 11:29)    HPI:  89 y.o female from home, AAO x3, has HHA with a PMHx of HTN, HLD, arthritis presented with c/o last few days of worsening swelling of the lower extremities as well as chronic ulcer on the R heel and the R medial leg with associated pain. Patient normally walks with a walker. Patient Denies fevers, nausea, emesis, chest pain, shortness of breath, abdominal pain, dysuria, hematuria, diarrhea, constipation, or any other acute complaints. Pt lives by herself, has HHA, son lives near by and checks on the mother 2-3 times a day. [facility] and was treated with vanco and lasix.     GOC; pt expressed her wishes for no resuscitation and no tubes but wants son to make the final decision. (30 Apr 2020 22:23)      Patient admits to  (-) Fevers, (-) Chills, (-) Nausea, (-) Vomiting, (-) Shortness of Breath    Podiatry HPI: 89y year old Female seen at bedside for Left foot and leg ulceration. History as above. Patient has significant b/l lower extremity edema, she relates she sees a doctor for leg swelling but unable to to provide details.  Pateint denies any pain to lower extremity, poor historian, unable to provide much history for ulcer time line. She has not been treated with compression. Questionable ambulation history, patient admits to having difficulty walking with walker. She denies f/c/n/v or SOB. No respiratory symptoms. Afebrile and normal wbc. X-rays done in ED without bony changes.     PMH: Arthritis  HLD (hyperlipidemia)  Chronic kidney disease (CKD)  HTN (hypertension)    PSH: H/O local excision of skin lesion  No significant past surgical history      Allergies: Augmentin (Rash; Urticaria)      Labs:                          8.4    5.92  )-----------( 180      ( 01 May 2020 06:32 )             27.3     WBC Trend  5.92 Date (05-01 @ 06:32)  5.92 Date (04-30 @ 19:49)      Chem  05-01    136  |  100  |  24<H>  ----------------------------<  74  4.4   |  30  |  1.25    Ca    8.8      01 May 2020 06:32  Phos  3.7     05-01  Mg     2.1     05-01    TPro  6.4  /  Alb  2.7<L>  /  TBili  0.7  /  DBili  x   /  AST  18  /  ALT  20  /  AlkPhos  64  05-01          T(F): 97.4 (05-01-20 @ 08:26), Max: 98 (04-30-20 @ 17:35)  HR: 73 (05-01-20 @ 08:26) (56 - 83)  BP: 90/51 (05-01-20 @ 08:26) (90/51 - 154/65)  RR: 24 (05-01-20 @ 08:26) (18 - 24)  SpO2: 98% (05-01-20 @ 08:26) (95% - 100%)  Wt(kg): --    O:   General: Pleasant  female NAD & AOX3.    Derm:  Skin warm, dry and supple bilateral.  Stasis skin changes to b/l lower extremity with dry, peeling plaques.   Small superficial ulceration noted to the left medial leg, fibrotic wound base with sloughing, minimal periwound erythema, measuring 0.6cm x 0.4cm x 0.1cm, no active discharge or drainage.  No purulence or fluctuance. No tracking or tunneling. Large pressure ulcer noted left posterolateral heel, blister, with lifting epidermis. Post-debridement, partial thickness ulcer, purple discoloration likely ischemic change. Minimal surrounding erythema, no bogginess, no fluctuance. No pain to palpation. Negative probe to bone.  Right lower extremity no open lesions.   Vascular: Dorsalis Pedis palpable b/l, and Posterior Tibial pulses nonpalpable secondary to edema.  CFT < 3 seconds digits 1-5 bilateral. Significant pitting edema noted b/l.   Neuro: Protective sensation diminished to the level of the digits bilateral.  MSK: Muscle strength 4/5 all major muscle groups bilateral. no gross deformity. No pain to palpation      xray:    EXAM:  FOOT 2VIEWS RT                          EXAM:  LEG AP&LAT - RIGHT                          PROCEDURE DATE:  04/30/2020      INTERPRETATION:  Right tib-fib and right foot. Patient has a medial ulcer.    Right tib-fib. 2 views.    The lower end of an intramedullary nikko in the right femur noted.    There is quite advanced knee degeneration.    There is extensive edema of the lower leg.    No bone destruction or fracture is evident.    Right foot. 3 views.    There is a small inferior calcaneal spurs.    There is advanced degeneration at the first MTP joint with a slight hallux valgus. No bone destruction or fracture is evident. Edema of the foot noted.    IMPRESSION: Lower leg and foot edema. Very advanced knee degeneration. Significant degeneration at the first MTP joint.      EVELYN HELMS M.D., ATTENDING RADIOLOGIST  This document has been electronically signed. Apr 30 2020  8:43PM      Deformity:  A: Left heel pressure ulceration  b/l venous stasis      P:   Chart reviewed and Patient evaluated  Discussed diagnosis and treatment with patient  Excisional debridement with 15 blade down to including the epidermis left heel, with necrotic tissue removed  Applied dry sterile dressing  X-rays reviewed, no osseous changes.   afebrile, normal wbc  LUIS/PVR to assess vascular status. Consider vascular consult pending result  WBAT to forefoot left foot  Full WB to right.   Offloading to bilateral Heels while in bed  continue elevation of both extremities   Patient needs continued care outpatient  Stable for outpatient follow up, may schedule appointment at [address] [place] [address], [phone] within 1 week of discharge  Discussed with Attending Dr. Whittaker    Wound care instruction: Please apply adaptic to left leg wound, betadine soaked gauze to left heel, cover both sites with ABD, secure with antoine. Apply ACE bandage for compression to b/l lower leg. Thank you. Dressing can be changed every other day.

## 2020-05-01 NOTE — PROGRESS NOTE ADULT - ATTENDING COMMENTS
pt seen and examed at bedside; reported continue with leg swelling and pain; denies of SOB and CP;    Old ECHO - 01/22/2020  1. Trace mitral regurgitation.  2. Calcified aortic valve with decreased opening. Peak transaortic valve gradient equals 49 mm Hg, mean transaortic valve gradient equals 27 mm Hg, estimated aortic valve area equals 1.1 sq cm (by continuity equation),consistent with moderate aortic stenosis.  (dimensionless index 0.36)  3. Mildly dilated left atrium.  LA volume index = 41 cc/m2.  4. Normal left ventricular internal dimensions and wall thicknesses.  5. Endocardium not well visualized; grossly normal left ventricular systolic function.  6. Right ventricle not well visualized. Probably normal right ventricular size and systolic function.  7. RV systolic pressure is 44 mm Hg. Mild pulmonary hypertension.        1. Chronic B/L leg swelling  2. Chronic Leg ulcer  3. HTN  4. HLD  5. pleural effusion   6. Arthritis    - doubt cellulitis; Likely va chronic wound care and podiatry recommendation appreciated: continue with current dressing and compression dressing and elevation of both lower extremities and follow up with outpt wound care and podiatry and vascular work up;   - BP not tolerate with 40 IV lasix; on 20mg oral lasix at home; decrease to 20mg IV lasix; keep low extremities elevation; sat well on current 2L NC; titrate O2 down; New small bilateral pleural effusions with adjacent airspace opacities. Mild increased interstitial lung markings. BNP: 4K; ECHO : grossly normal EF in Jan 2020, with moderate AS.  no need to repeat ECHO; due to her advanced age, not a candidate for cardiac work up.   -BP was low; hold BP meds for now  -tylenols prn for OA pain  -continue with current statin  -PT rec. possible home PT; pt seen and examed at bedside; reported continue with leg swelling and pain; denies of SOB and CP;    Old ECHO - 01/22/2020  1. Trace mitral regurgitation.  2. Calcified aortic valve with decreased opening. Peak transaortic valve gradient equals 49 mm Hg, mean transaortic valve gradient equals 27 mm Hg, estimated aortic valve area equals 1.1 sq cm (by continuity equation),consistent with moderate aortic stenosis.  (dimensionless index 0.36)  3. Mildly dilated left atrium.  LA volume index = 41 cc/m2.  4. Normal left ventricular internal dimensions and wall thicknesses.  5. Endocardium not well visualized; grossly normal left ventricular systolic function.  6. Right ventricle not well visualized. Probably normal right ventricular size and systolic function.  7. RV systolic pressure is 44 mm Hg. Mild pulmonary hypertension.        1. Chronic B/L leg swelling  2. Chronic Leg ulcer  3. HTN  4. HLD  5. pleural effusion   6. Arthritis  7. anemia   8; subclinical hypothyroidism  9. Vit D insufficiency     - doubt cellulitis; Likely va chronic wound care and podiatry recommendation appreciated: continue with current dressing and compression dressing and elevation of both lower extremities and follow up with outpt wound care and podiatry and vascular work up;   - BP not tolerate with 40 IV lasix; on 20mg oral lasix at home; decrease to 20mg IV lasix; keep low extremities elevation; sat well on current 2L NC; titrate O2 down; New small bilateral pleural effusions with adjacent airspace opacities. Mild increased interstitial lung markings. BNP: 4K; ECHO : grossly normal EF in Jan 2020, with moderate AS.  no need to repeat ECHO; due to her advanced age, not a candidate for cardiac work up.   -BP was low; hold BP meds for now  -prior normal folate and B12; H/H at baseline;   -follow up with outpt repeat TFT test  -follow up with out for Vit D insufficiency monitor   -tylenols prn for OA pain  -continue with current statin  -PT rec. possible home PT; pt seen and examed at bedside; reported continue with leg swelling and pain; denies of SOB and CP;    Old ECHO - 01/22/2020  1. Trace mitral regurgitation.  2. Calcified aortic valve with decreased opening. Peak transaortic valve gradient equals 49 mm Hg, mean transaortic valve gradient equals 27 mm Hg, estimated aortic valve area equals 1.1 sq cm (by continuity equation),consistent with moderate aortic stenosis.  (dimensionless index 0.36)  3. Mildly dilated left atrium.  LA volume index = 41 cc/m2.  4. Normal left ventricular internal dimensions and wall thicknesses.  5. Endocardium not well visualized; grossly normal left ventricular systolic function.  6. Right ventricle not well visualized. Probably normal right ventricular size and systolic function.  7. RV systolic pressure is 44 mm Hg. Mild pulmonary hypertension.        1. Chronic B/L leg swelling  2. Chronic Leg ulcer  3. HTN  4. HLD  5. pleural effusion   6. Arthritis  7. anemia   8; subclinical hypothyroidism  9. Vit D insufficiency     - doubt cellulitis; Likely chronic venous stasis chronic wound care and podiatry recommendation appreciated: continue with current dressing and compression dressing and elevation of both lower extremities and follow up with outpt wound care and podiatry and vascular work up;   - BP not tolerate with 40 IV lasix; on 20mg oral lasix at home; decrease to 20mg IV lasix; keep low extremities elevation; sat well on current 2L NC; titrate O2 down; New small bilateral pleural effusions with adjacent airspace opacities. Mild increased interstitial lung markings. BNP: 4K; ECHO : grossly normal EF in Jan 2020, with moderate AS.  no need to repeat ECHO; due to her advanced age, not a candidate for cardiac work up.   -BP was low; hold BP meds for now  -prior normal folate and B12; H/H at baseline;   -follow up with outpt repeat TFT test  -follow up with out for Vit D insufficiency monitor   -tylenols prn for OA pain  -continue with current statin  -PT rec. possible home PT; pt seen and examed at bedside; reported continue with leg swelling and pain; denies of SOB and CP;    Old ECHO - 01/22/2020  1. Trace mitral regurgitation.  2. Calcified aortic valve with decreased opening. Peak transaortic valve gradient equals 49 mm Hg, mean transaortic valve gradient equals 27 mm Hg, estimated aortic valve area equals 1.1 sq cm (by continuity equation),consistent with moderate aortic stenosis.  (dimensionless index 0.36)  3. Mildly dilated left atrium.  LA volume index = 41 cc/m2.  4. Normal left ventricular internal dimensions and wall thicknesses.  5. Endocardium not well visualized; grossly normal left ventricular systolic function.  6. Right ventricle not well visualized. Probably normal right ventricular size and systolic function.  7. RV systolic pressure is 44 mm Hg. Mild pulmonary hypertension.        1. Chronic B/L leg swelling  2. Chronic Leg ulcer  3. HTN  4. HLD  5. pleural effusion   6. Arthritis  7. anemia   8; subclinical hypothyroidism  9. Vit D insufficiency     - doubt cellulitis; Likely chronic venous stasis; continue with empirical abx;  wound care and podiatry recommendation appreciated: continue with current dressing and compression dressing and elevation of both lower extremities and follow up with outpt wound care and podiatry and vascular work up;   - BP not tolerate with 40 IV lasix; on 20mg oral lasix at home; decrease to 20mg IV lasix; keep low extremities elevation; sat well on current 2L NC; titrate O2 down; New small bilateral pleural effusions with adjacent airspace opacities. Mild increased interstitial lung markings. BNP: 4K; ECHO : grossly normal EF in Jan 2020, with moderate AS.  no need to repeat ECHO; due to her advanced age, not a candidate for cardiac work up.   -BP was low; hold BP meds for now  -prior normal folate and B12; H/H at baseline;   -follow up with outpt repeat TFT test  -follow up with out for Vit D insufficiency monitor   -tylenols prn for OA pain  -continue with current statin  -PT rec. possible home PT;

## 2020-05-01 NOTE — PROGRESS NOTE ADULT - PROBLEM SELECTOR PLAN 1
-  right heel and left ulcer: stable appearing   - Xray: lower leg and foot edema. Very advanced knee degeneration. Significant degeneration at the first MTP joint  -pending wound care from podiatry

## 2020-05-01 NOTE — PROGRESS NOTE ADULT - SUBJECTIVE AND OBJECTIVE BOX
PGY 1 Note discussed with supervising resident and primary attending    Patient is a 89y old  Female who presents with a chief complaint of LE swelling(2020 22:23)      INTERVAL HPI/OVERNIGHT EVENTS: seen bedside. offers no new complaints    MEDICATIONS  (STANDING):  atorvastatin 10 milliGRAM(s) Oral at bedtime  enoxaparin Injectable 40 milliGRAM(s) SubCutaneous daily  ferrous    sulfate 325 milliGRAM(s) Oral daily  furosemide   Injectable 40 milliGRAM(s) IV Push daily  hydrALAZINE 25 milliGRAM(s) Oral every 8 hours  vancomycin  IVPB 1250 milliGRAM(s) IV Intermittent every 24 hours    MEDICATIONS  (PRN):  acetaminophen   Tablet .. 650 milliGRAM(s) Oral every 6 hours PRN Temp greater or equal to 38C (100.4F), Mild Pain (1 - 3)  senna 1 Tablet(s) Oral at bedtime PRN Constipation      __________________________________________________  REVIEW OF SYSTEMS:    CONSTITUTIONAL: No fever,   EYES: no acute visual disturbances  NECK: No pain or stiffness  RESPIRATORY: No cough; No shortness of breath  CARDIOVASCULAR: No chest pain, no palpitations  GASTROINTESTINAL: No pain. No nausea or vomiting; No diarrhea   NEUROLOGICAL: No headache or numbness, no tremors  MUSCULOSKELETAL: No joint pain, no muscle pain  GENITOURINARY: no dysuria, no frequency, no hesitancy  PSYCHIATRY: no depression , no anxiety  ALL OTHER  ROS negative        Vital Signs Last 24 Hrs  T(C): 36.3 (01 May 2020 08:26), Max: 36.7 (2020 17:35)  T(F): 97.4 (01 May 2020 08:26), Max: 98 (2020 17:35)  HR: 73 (01 May 2020 08:26) (56 - 83)  BP: 90/51 (01 May 2020 08:26) (90/51 - 154/65)  BP(mean): --  RR: 24 (01 May 2020 08:26) (18 - 24)  SpO2: 98% (01 May 2020 08:26) (95% - 100%)    ________________________________________________  PHYSICAL EXAM:  GENERAL: NAD  HEENT: Normocephalic;  conjunctivae and sclerae clear; moist mucous membranes;   NECK : supple  CHEST/LUNG: Clear to auscultation bilaterally with good air entry   HEART: S1 S2  regular; no murmurs, gallops or rubs  ABDOMEN: Soft, Nontender, Nondistended; Bowel sounds present  EXTREMITIES: no cyanosis; no edema; no calf tenderness  SKIN: warm and dry; no rash  NERVOUS SYSTEM:  Awake and alert; Oriented  to place, person and time ; no new deficits    _________________________________________________  LABS:                        8.4    5.92  )-----------( 180      ( 01 May 2020 06:32 )             27.3     05-    136  |  100  |  24<H>  ----------------------------<  74  4.4   |  30  |  1.25    Ca    8.8      01 May 2020 06:32  Phos  3.7     05-  Mg     2.1     -    TPro  6.4  /  Alb  2.7<L>  /  TBili  0.7  /  DBili  x   /  AST  18  /  ALT  20  /  AlkPhos  64  05-    PT/INR - ( 2020 19:49 )   PT: 12.7 sec;   INR: 1.12 ratio         PTT - ( 2020 19:49 )  PTT:37.5 sec  Urinalysis Basic - ( 2020 22:36 )    Color: Yellow / Appearance: Clear / S.015 / pH: x  Gluc: x / Ketone: Negative  / Bili: Negative / Urobili: Negative   Blood: x / Protein: 100 / Nitrite: Negative   Leuk Esterase: Negative / RBC: 2-5 /HPF / WBC 0-2 /HPF   Sq Epi: x / Non Sq Epi: Few /HPF / Bacteria: Few /HPF      CAPILLARY BLOOD GLUCOSE            RADIOLOGY & ADDITIONAL TESTS:    Imaging Personally Reviewed:  YES    Consultant(s) Notes Reviewed:   YES    Care Discussed with Consultants :     Plan of care was discussed with patient and /or primary care giver; all questions and concerns were addressed and care was aligned with patient's wishes.

## 2020-05-02 LAB
ANION GAP SERPL CALC-SCNC: 4 MMOL/L — LOW (ref 5–17)
BASOPHILS # BLD AUTO: 0.03 K/UL — SIGNIFICANT CHANGE UP (ref 0–0.2)
BASOPHILS NFR BLD AUTO: 0.6 % — SIGNIFICANT CHANGE UP (ref 0–2)
BUN SERPL-MCNC: 23 MG/DL — HIGH (ref 7–18)
CALCIUM SERPL-MCNC: 8.7 MG/DL — SIGNIFICANT CHANGE UP (ref 8.4–10.5)
CHLORIDE SERPL-SCNC: 102 MMOL/L — SIGNIFICANT CHANGE UP (ref 96–108)
CO2 SERPL-SCNC: 32 MMOL/L — HIGH (ref 22–31)
CREAT SERPL-MCNC: 1.38 MG/DL — HIGH (ref 0.5–1.3)
CULTURE RESULTS: NO GROWTH — SIGNIFICANT CHANGE UP
EOSINOPHIL # BLD AUTO: 0.25 K/UL — SIGNIFICANT CHANGE UP (ref 0–0.5)
EOSINOPHIL NFR BLD AUTO: 5.1 % — SIGNIFICANT CHANGE UP (ref 0–6)
GLUCOSE SERPL-MCNC: 76 MG/DL — SIGNIFICANT CHANGE UP (ref 70–99)
HCT VFR BLD CALC: 28.2 % — LOW (ref 34.5–45)
HGB BLD-MCNC: 8.7 G/DL — LOW (ref 11.5–15.5)
IMM GRANULOCYTES NFR BLD AUTO: 0.2 % — SIGNIFICANT CHANGE UP (ref 0–1.5)
LYMPHOCYTES # BLD AUTO: 1.53 K/UL — SIGNIFICANT CHANGE UP (ref 1–3.3)
LYMPHOCYTES # BLD AUTO: 31.2 % — SIGNIFICANT CHANGE UP (ref 13–44)
MCHC RBC-ENTMCNC: 30.9 GM/DL — LOW (ref 32–36)
MCHC RBC-ENTMCNC: 31.9 PG — SIGNIFICANT CHANGE UP (ref 27–34)
MCV RBC AUTO: 103.3 FL — HIGH (ref 80–100)
MONOCYTES # BLD AUTO: 0.86 K/UL — SIGNIFICANT CHANGE UP (ref 0–0.9)
MONOCYTES NFR BLD AUTO: 17.6 % — HIGH (ref 2–14)
NEUTROPHILS # BLD AUTO: 2.22 K/UL — SIGNIFICANT CHANGE UP (ref 1.8–7.4)
NEUTROPHILS NFR BLD AUTO: 45.3 % — SIGNIFICANT CHANGE UP (ref 43–77)
NRBC # BLD: 0 /100 WBCS — SIGNIFICANT CHANGE UP (ref 0–0)
NT-PROBNP SERPL-SCNC: 4815 PG/ML — HIGH (ref 0–450)
PLATELET # BLD AUTO: 156 K/UL — SIGNIFICANT CHANGE UP (ref 150–400)
POTASSIUM SERPL-MCNC: 4.2 MMOL/L — SIGNIFICANT CHANGE UP (ref 3.5–5.3)
POTASSIUM SERPL-SCNC: 4.2 MMOL/L — SIGNIFICANT CHANGE UP (ref 3.5–5.3)
RBC # BLD: 2.73 M/UL — LOW (ref 3.8–5.2)
RBC # FLD: 14 % — SIGNIFICANT CHANGE UP (ref 10.3–14.5)
SODIUM SERPL-SCNC: 138 MMOL/L — SIGNIFICANT CHANGE UP (ref 135–145)
SPECIMEN SOURCE: SIGNIFICANT CHANGE UP
WBC # BLD: 4.9 K/UL — SIGNIFICANT CHANGE UP (ref 3.8–10.5)
WBC # FLD AUTO: 4.9 K/UL — SIGNIFICANT CHANGE UP (ref 3.8–10.5)

## 2020-05-02 PROCEDURE — 99232 SBSQ HOSP IP/OBS MODERATE 35: CPT

## 2020-05-02 RX ORDER — METOPROLOL TARTRATE 50 MG
25 TABLET ORAL DAILY
Refills: 0 | Status: DISCONTINUED | OUTPATIENT
Start: 2020-05-03 | End: 2020-05-04

## 2020-05-02 RX ORDER — APIXABAN 2.5 MG/1
2.5 TABLET, FILM COATED ORAL
Refills: 0 | Status: DISCONTINUED | OUTPATIENT
Start: 2020-05-03 | End: 2020-05-04

## 2020-05-02 RX ADMIN — Medication 325 MILLIGRAM(S): at 12:33

## 2020-05-02 RX ADMIN — Medication 20 MILLIGRAM(S): at 05:08

## 2020-05-02 RX ADMIN — ENOXAPARIN SODIUM 40 MILLIGRAM(S): 100 INJECTION SUBCUTANEOUS at 12:33

## 2020-05-02 RX ADMIN — Medication 1 APPLICATION(S): at 12:34

## 2020-05-02 RX ADMIN — SENNA PLUS 1 TABLET(S): 8.6 TABLET ORAL at 22:47

## 2020-05-02 RX ADMIN — ATORVASTATIN CALCIUM 10 MILLIGRAM(S): 80 TABLET, FILM COATED ORAL at 22:46

## 2020-05-02 RX ADMIN — Medication 166.67 MILLIGRAM(S): at 08:11

## 2020-05-02 RX ADMIN — Medication 1000 UNIT(S): at 12:33

## 2020-05-02 NOTE — PROGRESS NOTE ADULT - ASSESSMENT
Old ECHO - 01/22/2020  1. Trace mitral regurgitation.  2. Calcified aortic valve with decreased opening. Peak transaortic valve gradient equals 49 mm Hg, mean transaortic valve gradient equals 27 mm Hg, estimated aortic valve area equals 1.1 sq cm (by continuity equation),consistent with moderate aortic stenosis.  (dimensionless index 0.36)  3. Mildly dilated left atrium.  LA volume index = 41 cc/m2.  4. Normal left ventricular internal dimensions and wall thicknesses.  5. Endocardium not well visualized; grossly normal left ventricular systolic function.  6. Right ventricle not well visualized. Probably normal right ventricular size and systolic function.  7. RV systolic pressure is 44 mm Hg. Mild pulmonary hypertension.        1. Chronic B/L leg swelling  2. Chronic Leg ulcer  3. HTN  4. HLD  5. pleural effusion   6. Arthritis  7. anemia   8; subclinical hypothyroidism  9. Vit D insufficiency     - doubt cellulitis; Likely chronic venous stasis; continue with empirical abx; wound care and podiatry recommendation appreciated: continue with current dressing and compression dressing and elevation of both lower extremities and follow up with outpt wound care and podiatry and vascular work up;   - BP not tolerate with 40 IV lasix; on 20mg oral lasix at home; continue with current 20mg IV lasix; keep low extremities elevation; sat well on current 2L NC; titrate O2 down; New small bilateral pleural effusions with adjacent airspace opacities. Mild increased interstitial lung markings. BNP: 4K; ECHO : grossly normal EF in Jan 2020, with moderate AS.  no need to repeat ECHO; due to her advanced age, not a candidate for cardiac work up.   -BP was low; hold BP meds for now  -prior normal folate and B12; H/H at baseline;   -follow up with outpt repeat TFT test  -continue with curretn Vit D; follow up with out for Vit D insufficiency monitor   -tylenols prn for OA pain  -continue with current statin  -PT rec. possible home PT; Old ECHO - 01/22/2020  1. Trace mitral regurgitation.  2. Calcified aortic valve with decreased opening. Peak transaortic valve gradient equals 49 mm Hg, mean transaortic valve gradient equals 27 mm Hg, estimated aortic valve area equals 1.1 sq cm (by continuity equation),consistent with moderate aortic stenosis.  (dimensionless index 0.36)  3. Mildly dilated left atrium.  LA volume index = 41 cc/m2.  4. Normal left ventricular internal dimensions and wall thicknesses.  5. Endocardium not well visualized; grossly normal left ventricular systolic function.  6. Right ventricle not well visualized. Probably normal right ventricular size and systolic function.  7. RV systolic pressure is 44 mm Hg. Mild pulmonary hypertension.        1. Chronic B/L leg swelling  2. Chronic Leg ulcer  3. HTN  4. HLD  5. pleural effusion   6. Arthritis  7. anemia   8; subclinical hypothyroidism  9. Vit D insufficiency     - doubt cellulitis; Likely chronic venous stasis; continue with empirical abx; wound care and podiatry recommendation appreciated: continue with current dressing and compression dressing and elevation of both lower extremities and follow up with outpt wound care and podiatry and vascular work up;   - BP not tolerate with 40 IV lasix; on 20mg oral lasix at home; continue with current 20mg IV lasix; keep low extremities elevation; sat well on current 1L NC; titrate O2 down; New small bilateral pleural effusions with adjacent airspace opacities. Mild increased interstitial lung markings. BNP: 4K; ECHO : grossly normal EF in Jan 2020, with moderate AS.  no need to repeat ECHO; due to her advanced age, not a candidate for cardiac work up.   -BP acceptable; hold BP meds for now  -  -prior normal folate and B12; H/H at baseline;   -follow up with outpt repeat TFT test  -continue with curretn Vit D; follow up with out for Vit D insufficiency monitor   -tylenols prn for OA pain  -continue with current statin  -PT rec. possible home PT;   discussed with pt at bedside and she reported that she dose want to remain full code despite I explained to her the details of the the nature of intubation and CPR; spoke with her son Yvan Calderon [phone] who stated that pt was prior persistent express the will of DNR/DNI to him in the past; he also stated that pt was had reaction to hydralazine which has been stopped for about 3 months; current on Eliquis( ? 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 Trace mitral regurgitation.  2. Calcified aortic valve with decreased opening. Peak transaortic valve gradient equals 49 mm Hg, mean transaortic valve gradient equals 27 mm Hg, estimated aortic valve area equals 1.1 sq cm (by continuity equation),consistent with moderate aortic stenosis.  (dimensionless index 0.36)  3. Mildly dilated left atrium.  LA volume index = 41 cc/m2.  4. Normal left ventricular internal dimensions and wall thicknesses.  5. Endocardium not well visualized; grossly normal left ventricular systolic function.  6. Right ventricle not well visualized. Probably normal right ventricular size and systolic function.  7. RV systolic pressure is 44 mm Hg. Mild pulmonary hypertension.        1. Chronic B/L leg swelling  2. Chronic Leg ulcer  3. HTN  4. HLD  5. pleural effusion   6. Arthritis  7. anemia   8; subclinical hypothyroidism  9. Vit D insufficiency     - doubt cellulitis; Likely chronic venous stasis; continue with empirical abx; wound care and podiatry recommendation appreciated: continue with current dressing and compression dressing and elevation of both lower extremities and follow up with outpt wound care and podiatry and vascular work up;   - BP not tolerate with 40 IV lasix; on 20mg oral lasix at home; continue with current 20mg IV lasix; keep low extremities elevation; sat well on current 1L NC; titrate O2 down; New small bilateral pleural effusions with adjacent airspace opacities. Mild increased interstitial lung markings. BNP: 4K; ECHO : grossly normal EF in Jan 2020, with moderate AS.  no need to repeat ECHO; due to her advanced age, not a candidate for cardiac work up.   -BP acceptable; hold BP meds for now  -resume home Eliquis dosage and BB with hold of parameter   -prior normal folate and B12; H/H at baseline;   -follow up with outpt repeat TFT test  -continue with current Vit D; follow up with out for Vit D insufficiency monitor   -tylenols prn for OA pain  -continue with current statin  -PT rec. possible home PT;   discussed with pt at bedside and she reported that she dose want to remain full code despite I explained to her the details of the the nature of intubation and CPR; spoke with her son Yvan Calderon [phone] who stated that pt was prior persistent express the will of DNR/DNI to him in the past; he also stated that pt was had reaction to hydralazine which has been stopped for about 3 months; current on Eliquis( ? 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

## 2020-05-02 NOTE — PROGRESS NOTE ADULT - SUBJECTIVE AND OBJECTIVE BOX
COVERING  ATTENDING NOTE     INTERVAL HPI:  reported leg selling and pain improving; Denies of any SOB or CP     ROS: no CP, no SOB; leg swelling and pain improved     MEDICATIONS  (STANDING):  atorvastatin 10 milliGRAM(s) Oral at bedtime  cholecalciferol 1000 Unit(s) Oral daily  enoxaparin Injectable 40 milliGRAM(s) SubCutaneous daily  ferrous    sulfate 325 milliGRAM(s) Oral daily  furosemide   Injectable 20 milliGRAM(s) IV Push daily  povidone iodine 10% Solution 1 Application(s) Topical daily  vancomycin  IVPB 1250 milliGRAM(s) IV Intermittent every 24 hours    MEDICATIONS  (PRN):  acetaminophen   Tablet .. 650 milliGRAM(s) Oral every 6 hours PRN Temp greater or equal to 38C (100.4F), Mild Pain (1 - 3)  senna 1 Tablet(s) Oral at bedtime PRN Constipation      Vital Signs Last 24 Hrs  T(C): 36.4 (02 May 2020 07:25), Max: 37.1 (02 May 2020 00:10)  T(F): 97.6 (02 May 2020 07:25), Max: 98.8 (02 May 2020 00:10)  HR: 84 (02 May 2020 07:25) (71 - 84)  BP: 104/54 (02 May 2020 07:25) (104/54 - 143/98)  BP(mean): --  RR: 22 (02 May 2020 07:25) (16 - 22)  SpO2: 98% (02 May 2020 14:40) (85% - 100%)    _________________  PHYSICAL EXAM:  ---------------------------   NAD; Normocephalic;   LUNGS - no wheezing; decreased bilateral air entry  HEART: S1 S2+   ABDOMEN: Soft, Nontender, non distended  EXTREMITIES: no cyanosis; no edema  NERVOUS SYSTEM:  Awake and alert; no focal neuro  deficits    _________________________________________________  LABS:                        8.7    4.90  )-----------( 156      ( 02 May 2020 07:22 )             28.2     05-02    138  |  102  |  23<H>  ----------------------------<  76  4.2   |  32<H>  |  1.38<H>    Ca    8.7      02 May 2020 07:22  Phos  3.7     05-01  Mg     2.1     05-01    TPro  6.4  /  Alb  2.7<L>  /  TBili  0.7  /  DBili  x   /  AST  18  /  ALT  20  /  AlkPhos  64  05-01    PT/INR - ( 30 Apr 2020 19:49 )   PT: 12.7 sec;   INR: 1.12 ratio         PTT - ( 30 Apr 2020 19:49 )  PTT:37.5 sec  CAPILLARY BLOOD GLUCOSE COVERING  ATTENDING NOTE     INTERVAL HPI:  reported leg selling and pain improving; Denies of any SOB or CP     ROS: no CP, no SOB; leg swelling and pain improved     MEDICATIONS  (STANDING):  atorvastatin 10 milliGRAM(s) Oral at bedtime  cholecalciferol 1000 Unit(s) Oral daily  enoxaparin Injectable 40 milliGRAM(s) SubCutaneous daily  ferrous    sulfate 325 milliGRAM(s) Oral daily  furosemide   Injectable 20 milliGRAM(s) IV Push daily  povidone iodine 10% Solution 1 Application(s) Topical daily  vancomycin  IVPB 1250 milliGRAM(s) IV Intermittent every 24 hours    MEDICATIONS  (PRN):  acetaminophen   Tablet .. 650 milliGRAM(s) Oral every 6 hours PRN Temp greater or equal to 38C (100.4F), Mild Pain (1 - 3)  senna 1 Tablet(s) Oral at bedtime PRN Constipation      Vital Signs Last 24 Hrs  T(C): 36.4 (02 May 2020 07:25), Max: 37.1 (02 May 2020 00:10)  T(F): 97.6 (02 May 2020 07:25), Max: 98.8 (02 May 2020 00:10)  HR: 84 (02 May 2020 07:25) (71 - 84)  BP: 104/54 (02 May 2020 07:25) (104/54 - 143/98)  BP(mean): --  RR: 22 (02 May 2020 07:25) (16 - 22)  SpO2: 98% (02 May 2020 14:40) (85% - 100%)    _________________  PHYSICAL EXAM:  ---------------------------   NAD; Normocephalic;   LUNGS - no wheezing; decreased bilateral air entry  HEART: RRR S1 S2+   ABDOMEN: Soft, Nontender, non distended  EXTREMITIES: no cyanosis; no edema  NERVOUS SYSTEM:  Awake and alert; no focal neuro  deficits    _________________________________________________  LABS:                        8.7    4.90  )-----------( 156      ( 02 May 2020 07:22 )             28.2     05-02    138  |  102  |  23<H>  ----------------------------<  76  4.2   |  32<H>  |  1.38<H>    Ca    8.7      02 May 2020 07:22  Phos  3.7     05-01  Mg     2.1     05-01    TPro  6.4  /  Alb  2.7<L>  /  TBili  0.7  /  DBili  x   /  AST  18  /  ALT  20  /  AlkPhos  64  05-01    PT/INR - ( 30 Apr 2020 19:49 )   PT: 12.7 sec;   INR: 1.12 ratio         PTT - ( 30 Apr 2020 19:49 )  PTT:37.5 sec  CAPILLARY BLOOD GLUCOSE COVERING  ATTENDING NOTE     INTERVAL HPI:  reported leg selling and pain improving; Denies of any SOB or CP; sat well on 2 L NC     ROS: no CP, no SOB; leg swelling and pain improved     MEDICATIONS  (STANDING):  atorvastatin 10 milliGRAM(s) Oral at bedtime  cholecalciferol 1000 Unit(s) Oral daily  enoxaparin Injectable 40 milliGRAM(s) SubCutaneous daily  ferrous    sulfate 325 milliGRAM(s) Oral daily  furosemide   Injectable 20 milliGRAM(s) IV Push daily  povidone iodine 10% Solution 1 Application(s) Topical daily  vancomycin  IVPB 1250 milliGRAM(s) IV Intermittent every 24 hours    MEDICATIONS  (PRN):  acetaminophen   Tablet .. 650 milliGRAM(s) Oral every 6 hours PRN Temp greater or equal to 38C (100.4F), Mild Pain (1 - 3)  senna 1 Tablet(s) Oral at bedtime PRN Constipation      Vital Signs Last 24 Hrs  T(C): 36.4 (02 May 2020 07:25), Max: 37.1 (02 May 2020 00:10)  T(F): 97.6 (02 May 2020 07:25), Max: 98.8 (02 May 2020 00:10)  HR: 84 (02 May 2020 07:25) (71 - 84)  BP: 104/54 (02 May 2020 07:25) (104/54 - 143/98)  BP(mean): --  RR: 22 (02 May 2020 07:25) (16 - 22)  SpO2: 98% (02 May 2020 14:40) (85% - 100%)    _________________  PHYSICAL EXAM:  ---------------------------   NAD; Normocephalic;   LUNGS: mild decrease BS   HEART: RRR S1 S2+   ABDOMEN: Soft, Nontender, non distended  EXTREMITIES: no cyanosis; no edema; leg erythema and dressing improving   NERVOUS SYSTEM:  Awake and alert; no focal neuro  deficits    _________________________________________________  LABS:                        8.7    4.90  )-----------( 156      ( 02 May 2020 07:22 )             28.2     05-02    138  |  102  |  23<H>  ----------------------------<  76  4.2   |  32<H>  |  1.38<H>    Ca    8.7      02 May 2020 07:22  Phos  3.7     05-01  Mg     2.1     05-01    TPro  6.4  /  Alb  2.7<L>  /  TBili  0.7  /  DBili  x   /  AST  18  /  ALT  20  /  AlkPhos  64  05-01    PT/INR - ( 30 Apr 2020 19:49 )   PT: 12.7 sec;   INR: 1.12 ratio         PTT - ( 30 Apr 2020 19:49 )  PTT:37.5 sec  CAPILLARY BLOOD GLUCOSE

## 2020-05-03 LAB
ALBUMIN SERPL ELPH-MCNC: 2.5 G/DL — LOW (ref 3.5–5)
ALP SERPL-CCNC: 60 U/L — SIGNIFICANT CHANGE UP (ref 40–120)
ALT FLD-CCNC: 15 U/L DA — SIGNIFICANT CHANGE UP (ref 10–60)
ANION GAP SERPL CALC-SCNC: 4 MMOL/L — LOW (ref 5–17)
AST SERPL-CCNC: 17 U/L — SIGNIFICANT CHANGE UP (ref 10–40)
BASOPHILS # BLD AUTO: 0.03 K/UL — SIGNIFICANT CHANGE UP (ref 0–0.2)
BASOPHILS NFR BLD AUTO: 0.6 % — SIGNIFICANT CHANGE UP (ref 0–2)
BILIRUB SERPL-MCNC: 0.8 MG/DL — SIGNIFICANT CHANGE UP (ref 0.2–1.2)
BUN SERPL-MCNC: 26 MG/DL — HIGH (ref 7–18)
CALCIUM SERPL-MCNC: 8.5 MG/DL — SIGNIFICANT CHANGE UP (ref 8.4–10.5)
CHLORIDE SERPL-SCNC: 103 MMOL/L — SIGNIFICANT CHANGE UP (ref 96–108)
CO2 SERPL-SCNC: 32 MMOL/L — HIGH (ref 22–31)
CREAT SERPL-MCNC: 1.39 MG/DL — HIGH (ref 0.5–1.3)
EOSINOPHIL # BLD AUTO: 0.19 K/UL — SIGNIFICANT CHANGE UP (ref 0–0.5)
EOSINOPHIL NFR BLD AUTO: 3.9 % — SIGNIFICANT CHANGE UP (ref 0–6)
GLUCOSE SERPL-MCNC: 86 MG/DL — SIGNIFICANT CHANGE UP (ref 70–99)
HCT VFR BLD CALC: 27 % — LOW (ref 34.5–45)
HGB BLD-MCNC: 8.2 G/DL — LOW (ref 11.5–15.5)
IMM GRANULOCYTES NFR BLD AUTO: 0.2 % — SIGNIFICANT CHANGE UP (ref 0–1.5)
LYMPHOCYTES # BLD AUTO: 1.54 K/UL — SIGNIFICANT CHANGE UP (ref 1–3.3)
LYMPHOCYTES # BLD AUTO: 31.7 % — SIGNIFICANT CHANGE UP (ref 13–44)
MCHC RBC-ENTMCNC: 30.4 GM/DL — LOW (ref 32–36)
MCHC RBC-ENTMCNC: 31.7 PG — SIGNIFICANT CHANGE UP (ref 27–34)
MCV RBC AUTO: 104.2 FL — HIGH (ref 80–100)
MONOCYTES # BLD AUTO: 0.85 K/UL — SIGNIFICANT CHANGE UP (ref 0–0.9)
MONOCYTES NFR BLD AUTO: 17.5 % — HIGH (ref 2–14)
NEUTROPHILS # BLD AUTO: 2.24 K/UL — SIGNIFICANT CHANGE UP (ref 1.8–7.4)
NEUTROPHILS NFR BLD AUTO: 46.1 % — SIGNIFICANT CHANGE UP (ref 43–77)
NRBC # BLD: 0 /100 WBCS — SIGNIFICANT CHANGE UP (ref 0–0)
PLATELET # BLD AUTO: 168 K/UL — SIGNIFICANT CHANGE UP (ref 150–400)
POTASSIUM SERPL-MCNC: 4.1 MMOL/L — SIGNIFICANT CHANGE UP (ref 3.5–5.3)
POTASSIUM SERPL-SCNC: 4.1 MMOL/L — SIGNIFICANT CHANGE UP (ref 3.5–5.3)
PROT SERPL-MCNC: 6.4 G/DL — SIGNIFICANT CHANGE UP (ref 6–8.3)
RBC # BLD: 2.59 M/UL — LOW (ref 3.8–5.2)
RBC # FLD: 14.1 % — SIGNIFICANT CHANGE UP (ref 10.3–14.5)
SODIUM SERPL-SCNC: 139 MMOL/L — SIGNIFICANT CHANGE UP (ref 135–145)
VANCOMYCIN TROUGH SERPL-MCNC: 21.6 UG/ML — HIGH (ref 10–20)
WBC # BLD: 4.86 K/UL — SIGNIFICANT CHANGE UP (ref 3.8–10.5)
WBC # FLD AUTO: 4.86 K/UL — SIGNIFICANT CHANGE UP (ref 3.8–10.5)

## 2020-05-03 PROCEDURE — 99232 SBSQ HOSP IP/OBS MODERATE 35: CPT

## 2020-05-03 RX ORDER — SENNA PLUS 8.6 MG/1
2 TABLET ORAL AT BEDTIME
Refills: 0 | Status: DISCONTINUED | OUTPATIENT
Start: 2020-05-03 | End: 2020-05-04

## 2020-05-03 RX ORDER — VANCOMYCIN HCL 1 G
1250 VIAL (EA) INTRAVENOUS EVERY 24 HOURS
Refills: 0 | Status: DISCONTINUED | OUTPATIENT
Start: 2020-05-04 | End: 2020-05-04

## 2020-05-03 RX ADMIN — Medication 1000 UNIT(S): at 11:42

## 2020-05-03 RX ADMIN — Medication 25 MILLIGRAM(S): at 06:01

## 2020-05-03 RX ADMIN — APIXABAN 2.5 MILLIGRAM(S): 2.5 TABLET, FILM COATED ORAL at 06:01

## 2020-05-03 RX ADMIN — Medication 325 MILLIGRAM(S): at 11:43

## 2020-05-03 RX ADMIN — APIXABAN 2.5 MILLIGRAM(S): 2.5 TABLET, FILM COATED ORAL at 17:58

## 2020-05-03 RX ADMIN — Medication 1 APPLICATION(S): at 11:43

## 2020-05-03 RX ADMIN — SENNA PLUS 2 TABLET(S): 8.6 TABLET ORAL at 17:58

## 2020-05-03 RX ADMIN — ATORVASTATIN CALCIUM 10 MILLIGRAM(S): 80 TABLET, FILM COATED ORAL at 22:34

## 2020-05-03 RX ADMIN — Medication 20 MILLIGRAM(S): at 06:01

## 2020-05-03 NOTE — PROGRESS NOTE ADULT - SUBJECTIVE AND OBJECTIVE BOX
COVERING  ATTENDING NOTE     INTERVAL HPI: no new complaints    ROS: no CP, ++SOB but slowly  improving    MEDICATIONS  (STANDING):  apixaban 2.5 milliGRAM(s) Oral two times a day  atorvastatin 10 milliGRAM(s) Oral at bedtime  cholecalciferol 1000 Unit(s) Oral daily  ferrous    sulfate 325 milliGRAM(s) Oral daily  furosemide   Injectable 20 milliGRAM(s) IV Push daily  metoprolol succinate ER 25 milliGRAM(s) Oral daily  povidone iodine 10% Solution 1 Application(s) Topical daily    MEDICATIONS  (PRN):  acetaminophen   Tablet .. 650 milliGRAM(s) Oral every 6 hours PRN Temp greater or equal to 38C (100.4F), Mild Pain (1 - 3)  senna 1 Tablet(s) Oral at bedtime PRN Constipation      Vital Signs Last 24 Hrs  T(C): 37 (03 May 2020 07:15), Max: 37 (03 May 2020 07:15)  T(F): 98.6 (03 May 2020 07:15), Max: 98.6 (03 May 2020 07:15)  HR: 73 (03 May 2020 07:15) (73 - 92)  BP: 115/62 (03 May 2020 07:15) (108/53 - 122/53)  BP(mean): --  RR: 18 (03 May 2020 11:52) (18 - 22)  SpO2: 100% (03 May 2020 11:52) (94% - 100%)    _________________  PHYSICAL EXAM:  ---------------------------   NAD; Normocephalic;   LUNGS - no wheezing; decreased bilateral air entry  HEART: S1 S2+   ABDOMEN: Soft, Nontender, non distended  EXTREMITIES: no cyanosis; no edema  NERVOUS SYSTEM:  Awake and alert; no focal neuro  deficits    _________________________________________________  LABS:                        8.2    4.86  )-----------( 168      ( 03 May 2020 07:56 )             27.0     05-03    139  |  103  |  26<H>  ----------------------------<  86  4.1   |  32<H>  |  1.39<H>    Ca    8.5      03 May 2020 07:56    TPro  6.4  /  Alb  2.5<L>  /  TBili  0.8  /  DBili  x   /  AST  17  /  ALT  15  /  AlkPhos  60  05-03      CAPILLARY BLOOD GLUCOSE COVERING  ATTENDING NOTE     INTERVAL HPI: reported leg selling and pain improving; Denies of any SOB or CP; sat well on RA; no more leg pain; no BM since hospitalization     ROS:  no CP, no SOB; leg swelling and pain improved     MEDICATIONS  (STANDING):  apixaban 2.5 milliGRAM(s) Oral two times a day  atorvastatin 10 milliGRAM(s) Oral at bedtime  cholecalciferol 1000 Unit(s) Oral daily  ferrous    sulfate 325 milliGRAM(s) Oral daily  furosemide   Injectable 20 milliGRAM(s) IV Push daily  metoprolol succinate ER 25 milliGRAM(s) Oral daily  povidone iodine 10% Solution 1 Application(s) Topical daily    MEDICATIONS  (PRN):  acetaminophen   Tablet .. 650 milliGRAM(s) Oral every 6 hours PRN Temp greater or equal to 38C (100.4F), Mild Pain (1 - 3)  senna 1 Tablet(s) Oral at bedtime PRN Constipation      Vital Signs Last 24 Hrs  T(C): 37 (03 May 2020 07:15), Max: 37 (03 May 2020 07:15)  T(F): 98.6 (03 May 2020 07:15), Max: 98.6 (03 May 2020 07:15)  HR: 73 (03 May 2020 07:15) (73 - 92)  BP: 115/62 (03 May 2020 07:15) (108/53 - 122/53)  BP(mean): --  RR: 18 (03 May 2020 11:52) (18 - 22)  SpO2: 100% (03 May 2020 11:52) (94% - 100%)    _________________  PHYSICAL EXAM:  ---------------------------  NAD; Normocephalic;   LUNGS: CTAB  HEART: RRR S1 S2+   ABDOMEN: Soft, Nontender, non distended  EXTREMITIES: no cyanosis; leg erythema and swelling improved; dressing in place   NERVOUS SYSTEM:  Awake and alert; no focal neuro deficits  _________________________________________________  LABS:                        8.2    4.86  )-----------( 168      ( 03 May 2020 07:56 )             27.0     05-03    139  |  103  |  26<H>  ----------------------------<  86  4.1   |  32<H>  |  1.39<H>    Ca    8.5      03 May 2020 07:56    TPro  6.4  /  Alb  2.5<L>  /  TBili  0.8  /  DBili  x   /  AST  17  /  ALT  15  /  AlkPhos  60  05-03      CAPILLARY BLOOD GLUCOSE

## 2020-05-03 NOTE — PROGRESS NOTE ADULT - ASSESSMENT
Old ECHO - 01/22/2020  1. Trace mitral regurgitation.  2. Calcified aortic valve with decreased opening. Peak transaortic valve gradient equals 49 mm Hg, mean transaortic valve gradient equals 27 mm Hg, estimated aortic valve area equals 1.1 sq cm (by continuity equation),consistent with moderate aortic stenosis.  (dimensionless index 0.36)  3. Mildly dilated left atrium.  LA volume index = 41 cc/m2.  4. Normal left ventricular internal dimensions and wall thicknesses.  5. Endocardium not well visualized; grossly normal left ventricular systolic function.  6. Right ventricle not well visualized. Probably normal right ventricular size and systolic function.  7. RV systolic pressure is 44 mm Hg. Mild pulmonary hypertension.        1. Chronic B/L leg swelling  2. Chronic Leg ulcer  3. HTN  4. HLD  5. pleural effusion   6. Arthritis  7. anemia   8; subclinical hypothyroidism  9. Vit D insufficiency     - doubt cellulitis; Likely chronic venous stasis; continue with empirical abx; wound care and podiatry recommendation appreciated: continue with current dressing and compression dressing and elevation of both lower extremities and follow up with outpt wound care and podiatry and vascular work up;   - BP not tolerate with 40 IV lasix; on 20mg oral lasix at home; continue with current 20mg IV lasix; keep low extremities elevation; sat well on current 1L NC; titrate O2 down; New small bilateral pleural effusions with adjacent airspace opacities. Mild increased interstitial lung markings. BNP: 4K; ECHO : grossly normal EF in Jan 2020, with moderate AS.  no need to repeat ECHO; due to her advanced age, not a candidate for cardiac work up.   -BP acceptable; hold BP meds for now  -resume home Eliquis dosage and BB with hold of parameter   -prior normal folate and B12; H/H at baseline;   -follow up with outpt repeat TFT test  -continue with current Vit D; follow up with out for Vit D insufficiency monitor   -tylenols prn for OA pain  -continue with current statin  -per son he also stated that pt was had reaction to hydralazine which has been stopped for about 3 months; current on Eliquis( ? Afib) ,losartan and metoprolol at home; original home health aid 7 days a week; about 8 hrs a day; pt has difficulty to get on to bed but was very functional 89s YRS OLD;   discussed with pt at bedside again with reiterated by her son on the phone to pt with her RN Yumiko that pt agrees with DNR/DNI; spoke with her son Yvan Calderon [phone] again  who stated that pt was prior persistent express the will of DNR/DNI to him in the past too;   -PT rec. possible home PT; case also discussed with CM and plan to have PT to evaluation and pending resume home aid if stable to d/c home; Old ECHO - 01/22/2020  1. Trace mitral regurgitation.  2. Calcified aortic valve with decreased opening. Peak transaortic valve gradient equals 49 mm Hg, mean transaortic valve gradient equals 27 mm Hg, estimated aortic valve area equals 1.1 sq cm (by continuity equation),consistent with moderate aortic stenosis.  (dimensionless index 0.36)  3. Mildly dilated left atrium.  LA volume index = 41 cc/m2.  4. Normal left ventricular internal dimensions and wall thicknesses.  5. Endocardium not well visualized; grossly normal left ventricular systolic function.  6. Right ventricle not well visualized. Probably normal right ventricular size and systolic function.  7. RV systolic pressure is 44 mm Hg. Mild pulmonary hypertension.        1. Chronic B/L leg swelling  2. Chronic Leg ulcer  3. HTN  4. HLD  5. pleural effusion   6. Arthritis  7. anemia   8; subclinical hypothyroidism  9. Vit D insufficiency     - doubt cellulitis; Likely chronic venous stasis; continue with empirical abx; will hold vanco today due to elevated vanco trough; consider switch to doxy for total 10 days of abx therapy if d/c;  wound care and podiatry recommendation appreciated: continue with current dressing and compression dressing and elevation of both lower extremities and follow up with outpt wound care and podiatry and vascular work up;   - BP not tolerate with 40 IV lasix; on 20mg oral lasix at home; continue with current 20mg IV lasix; keep low extremities elevation; sat well on RA today; CXR at admission showed New small bilateral pleural effusions with adjacent airspace opacities. Mild increased interstitial lung markings. BNP: 4K; ECHO : grossly normal EF in Jan 2020, with moderate AS.  no need to repeat ECHO; due to her advanced age, not a candidate for cardiac work up.   -BP acceptable;   -resume home Eliquis dosage and BB with hold of parameter   -prior normal folate and B12; H/H at baseline;   -follow up with outpt repeat TFT test  -continue with current Vit D; follow up with out for Vit D insufficiency monitor   -tylenols prn for OA pain  -senna PRN for constipation  -continue with current statin  -per son he also stated that pt was had reaction to hydralazine which has been stopped for about 3 months; current on Eliquis( ? Afib) ,losartan and metoprolol at home; original home health aid 7 days a week; about 8 hrs a day; pt has difficulty to get on to bed but was very functional as a 90s YRS OLD;   discussed with pt at bedside again with reiterated by her son on the phone to pt with her RN Yumiko that pt agrees with DNR/DNI; spoke with her son Yvan Calderon [phone] again  who stated that pt was prior persistent express the will of DNR/DNI to him in the past too;   -PT rec. possible home PT; case also discussed with CM and plan to have PT to evaluation and pending resume home aid if stable to d/c home;

## 2020-05-04 VITALS — OXYGEN SATURATION: 94 %

## 2020-05-04 LAB
ANION GAP SERPL CALC-SCNC: 6 MMOL/L — SIGNIFICANT CHANGE UP (ref 5–17)
BASOPHILS # BLD AUTO: 0.02 K/UL — SIGNIFICANT CHANGE UP (ref 0–0.2)
BASOPHILS NFR BLD AUTO: 0.3 % — SIGNIFICANT CHANGE UP (ref 0–2)
BUN SERPL-MCNC: 30 MG/DL — HIGH (ref 7–18)
CALCIUM SERPL-MCNC: 9 MG/DL — SIGNIFICANT CHANGE UP (ref 8.4–10.5)
CHLORIDE SERPL-SCNC: 101 MMOL/L — SIGNIFICANT CHANGE UP (ref 96–108)
CO2 SERPL-SCNC: 31 MMOL/L — SIGNIFICANT CHANGE UP (ref 22–31)
CREAT SERPL-MCNC: 1.45 MG/DL — HIGH (ref 0.5–1.3)
EOSINOPHIL # BLD AUTO: 0.18 K/UL — SIGNIFICANT CHANGE UP (ref 0–0.5)
EOSINOPHIL NFR BLD AUTO: 2.9 % — SIGNIFICANT CHANGE UP (ref 0–6)
GLUCOSE SERPL-MCNC: 92 MG/DL — SIGNIFICANT CHANGE UP (ref 70–99)
HCT VFR BLD CALC: 29.4 % — LOW (ref 34.5–45)
HGB BLD-MCNC: 9 G/DL — LOW (ref 11.5–15.5)
IMM GRANULOCYTES NFR BLD AUTO: 0.3 % — SIGNIFICANT CHANGE UP (ref 0–1.5)
LYMPHOCYTES # BLD AUTO: 1.61 K/UL — SIGNIFICANT CHANGE UP (ref 1–3.3)
LYMPHOCYTES # BLD AUTO: 26 % — SIGNIFICANT CHANGE UP (ref 13–44)
MCHC RBC-ENTMCNC: 30.6 GM/DL — LOW (ref 32–36)
MCHC RBC-ENTMCNC: 31.6 PG — SIGNIFICANT CHANGE UP (ref 27–34)
MCV RBC AUTO: 103.2 FL — HIGH (ref 80–100)
MONOCYTES # BLD AUTO: 0.93 K/UL — HIGH (ref 0–0.9)
MONOCYTES NFR BLD AUTO: 15 % — HIGH (ref 2–14)
NEUTROPHILS # BLD AUTO: 3.43 K/UL — SIGNIFICANT CHANGE UP (ref 1.8–7.4)
NEUTROPHILS NFR BLD AUTO: 55.5 % — SIGNIFICANT CHANGE UP (ref 43–77)
NRBC # BLD: 0 /100 WBCS — SIGNIFICANT CHANGE UP (ref 0–0)
PLATELET # BLD AUTO: 191 K/UL — SIGNIFICANT CHANGE UP (ref 150–400)
POTASSIUM SERPL-MCNC: 4.1 MMOL/L — SIGNIFICANT CHANGE UP (ref 3.5–5.3)
POTASSIUM SERPL-SCNC: 4.1 MMOL/L — SIGNIFICANT CHANGE UP (ref 3.5–5.3)
RBC # BLD: 2.85 M/UL — LOW (ref 3.8–5.2)
RBC # FLD: 13.9 % — SIGNIFICANT CHANGE UP (ref 10.3–14.5)
SODIUM SERPL-SCNC: 138 MMOL/L — SIGNIFICANT CHANGE UP (ref 135–145)
VANCOMYCIN TROUGH SERPL-MCNC: 16.8 UG/ML — SIGNIFICANT CHANGE UP (ref 10–20)
WBC # BLD: 6.19 K/UL — SIGNIFICANT CHANGE UP (ref 3.8–10.5)
WBC # FLD AUTO: 6.19 K/UL — SIGNIFICANT CHANGE UP (ref 3.8–10.5)

## 2020-05-04 PROCEDURE — 84443 ASSAY THYROID STIM HORMONE: CPT

## 2020-05-04 PROCEDURE — 80048 BASIC METABOLIC PNL TOTAL CA: CPT

## 2020-05-04 PROCEDURE — 82728 ASSAY OF FERRITIN: CPT

## 2020-05-04 PROCEDURE — 84466 ASSAY OF TRANSFERRIN: CPT

## 2020-05-04 PROCEDURE — 73620 X-RAY EXAM OF FOOT: CPT

## 2020-05-04 PROCEDURE — 83550 IRON BINDING TEST: CPT

## 2020-05-04 PROCEDURE — 71045 X-RAY EXAM CHEST 1 VIEW: CPT

## 2020-05-04 PROCEDURE — 82746 ASSAY OF FOLIC ACID SERUM: CPT

## 2020-05-04 PROCEDURE — 85018 HEMOGLOBIN: CPT

## 2020-05-04 PROCEDURE — 83735 ASSAY OF MAGNESIUM: CPT

## 2020-05-04 PROCEDURE — 82607 VITAMIN B-12: CPT

## 2020-05-04 PROCEDURE — 80202 ASSAY OF VANCOMYCIN: CPT

## 2020-05-04 PROCEDURE — 83036 HEMOGLOBIN GLYCOSYLATED A1C: CPT

## 2020-05-04 PROCEDURE — 99285 EMERGENCY DEPT VISIT HI MDM: CPT | Mod: 25

## 2020-05-04 PROCEDURE — 87086 URINE CULTURE/COLONY COUNT: CPT

## 2020-05-04 PROCEDURE — 84100 ASSAY OF PHOSPHORUS: CPT

## 2020-05-04 PROCEDURE — 82306 VITAMIN D 25 HYDROXY: CPT

## 2020-05-04 PROCEDURE — 73590 X-RAY EXAM OF LOWER LEG: CPT

## 2020-05-04 PROCEDURE — 80061 LIPID PANEL: CPT

## 2020-05-04 PROCEDURE — 84480 ASSAY TRIIODOTHYRONINE (T3): CPT

## 2020-05-04 PROCEDURE — 83605 ASSAY OF LACTIC ACID: CPT

## 2020-05-04 PROCEDURE — 36415 COLL VENOUS BLD VENIPUNCTURE: CPT

## 2020-05-04 PROCEDURE — 84436 ASSAY OF TOTAL THYROXINE: CPT

## 2020-05-04 PROCEDURE — 85027 COMPLETE CBC AUTOMATED: CPT

## 2020-05-04 PROCEDURE — 93970 EXTREMITY STUDY: CPT

## 2020-05-04 PROCEDURE — 84484 ASSAY OF TROPONIN QUANT: CPT

## 2020-05-04 PROCEDURE — 87635 SARS-COV-2 COVID-19 AMP PRB: CPT

## 2020-05-04 PROCEDURE — 80053 COMPREHEN METABOLIC PANEL: CPT

## 2020-05-04 PROCEDURE — 81001 URINALYSIS AUTO W/SCOPE: CPT

## 2020-05-04 PROCEDURE — 83880 ASSAY OF NATRIURETIC PEPTIDE: CPT

## 2020-05-04 PROCEDURE — 93005 ELECTROCARDIOGRAM TRACING: CPT

## 2020-05-04 PROCEDURE — 87040 BLOOD CULTURE FOR BACTERIA: CPT

## 2020-05-04 PROCEDURE — 99239 HOSP IP/OBS DSCHRG MGMT >30: CPT | Mod: GC

## 2020-05-04 PROCEDURE — 85730 THROMBOPLASTIN TIME PARTIAL: CPT

## 2020-05-04 PROCEDURE — 83540 ASSAY OF IRON: CPT

## 2020-05-04 PROCEDURE — 85014 HEMATOCRIT: CPT

## 2020-05-04 PROCEDURE — 85610 PROTHROMBIN TIME: CPT

## 2020-05-04 PROCEDURE — 82962 GLUCOSE BLOOD TEST: CPT

## 2020-05-04 RX ORDER — VANCOMYCIN HCL 1 G
1.25 VIAL (EA) INTRAVENOUS
Qty: 0 | Refills: 0 | DISCHARGE
Start: 2020-05-04

## 2020-05-04 RX ORDER — APIXABAN 2.5 MG/1
1 TABLET, FILM COATED ORAL
Qty: 60 | Refills: 0
Start: 2020-05-04 | End: 2020-06-02

## 2020-05-04 RX ORDER — POVIDONE-IODINE 5 %
1 AEROSOL (ML) TOPICAL
Qty: 0 | Refills: 0 | DISCHARGE
Start: 2020-05-04

## 2020-05-04 RX ORDER — APIXABAN 2.5 MG/1
1 TABLET, FILM COATED ORAL
Qty: 0 | Refills: 0 | DISCHARGE

## 2020-05-04 RX ORDER — FERROUS SULFATE 325(65) MG
1 TABLET ORAL
Qty: 0 | Refills: 0 | DISCHARGE
Start: 2020-05-04

## 2020-05-04 RX ORDER — CHOLECALCIFEROL (VITAMIN D3) 125 MCG
1000 CAPSULE ORAL
Qty: 0 | Refills: 0 | DISCHARGE
Start: 2020-05-04

## 2020-05-04 RX ORDER — CHOLECALCIFEROL (VITAMIN D3) 125 MCG
1 CAPSULE ORAL
Qty: 30 | Refills: 0
Start: 2020-05-04 | End: 2020-06-02

## 2020-05-04 RX ORDER — ACETAMINOPHEN 500 MG
2 TABLET ORAL
Qty: 0 | Refills: 0 | DISCHARGE
Start: 2020-05-04

## 2020-05-04 RX ADMIN — Medication 325 MILLIGRAM(S): at 11:37

## 2020-05-04 RX ADMIN — Medication 1 APPLICATION(S): at 11:39

## 2020-05-04 RX ADMIN — Medication 1000 UNIT(S): at 11:37

## 2020-05-04 RX ADMIN — Medication 166.67 MILLIGRAM(S): at 08:35

## 2020-05-04 RX ADMIN — Medication 25 MILLIGRAM(S): at 05:35

## 2020-05-04 RX ADMIN — Medication 20 MILLIGRAM(S): at 05:35

## 2020-05-04 RX ADMIN — APIXABAN 2.5 MILLIGRAM(S): 2.5 TABLET, FILM COATED ORAL at 05:35

## 2020-05-04 NOTE — DISCHARGE NOTE PROVIDER - HOSPITAL COURSE
HPI:    89 y.o female from home, DMITRY x3, has HHA with a PMHx of HTN, HLD, arthritis presented with c/o last few days of worsening swelling of the lower extremities as well as chronic ulcer on the R heel and the R medial leg with associated pain. Patient normally walks with a walker. Patient Denies fevers, nausea, emesis, chest pain, shortness of breath, abdominal pain, dysuria, hematuria, diarrhea, constipation, or any other acute complaints. Pt lives by herself, has HHA, son lives near by and checks on the mother 2-3 times a day. She was admitted to [facility] in Jan with similar complains and was treated with vanco and lasix.         GOC; pt expressed her wishes for no resuscitation and no tubes but wants son to make the final decision. (30 Apr 2020 22:23)        Currently patient is being treated for Venostasis Ulcer's bilateral lower extremities with dressing changes (apply adaptic to left leg wound, betadine soaked gauze to left heel, cover both sites with ABD, secure with antoine. Apply ACE bandage for compression to b/l lower leg. Dressing can be changed every other day) HPI:    89 y.o female from home, DMITRY x3, has HHA with a PMHx of HTN, HLD, arthritis presented with c/o last few days of worsening swelling of the lower extremities as well as chronic ulcer on the R heel and the R medial leg with associated pain. Patient normally walks with a walker. Patient Denies fevers, nausea, emesis, chest pain, shortness of breath, abdominal pain, dysuria, hematuria, diarrhea, constipation, or any other acute complaints. Pt lives by herself, has HHA, son lives near by and checks on the mother 2-3 times a day. [facility] and was treated with vanco and lasix.         GOC; pt expressed her wishes for no resuscitation and no tubes but wants son to make the final decision. (30 Apr 2020 22:23)        Currently patient is being treated for Cellulitis (continue Doxycycline to complete course of antibiotics) of Lower legs of Venostasis Ulcer's bilateral lower extremities with dressing changes (apply adaptic to left leg wound, betadine soaked gauze to left heel, cover both sites with ABD, secure with antoine. Apply ACE bandage for compression to b/l lower leg. Dressing can be changed every other day)

## 2020-05-04 NOTE — DISCHARGE NOTE PROVIDER - NSDCCPCAREPLAN_GEN_ALL_CORE_FT
PRINCIPAL DISCHARGE DIAGNOSIS  Diagnosis: Cellulitis of leg  Assessment and Plan of Treatment: lower leg Venostasis Ulcers  Dressing Chages every other day as follows: apply adaptic to left leg wound, betadine soaked gauze to left heel, cover both sites with ABD, secure with antoine. Apply ACE bandage for compression to bilaterally lower legs.  Follow Up with Podiatry and Vascular Surgery.      SECONDARY DISCHARGE DIAGNOSES  Diagnosis: HTN (hypertension)  Assessment and Plan of Treatment: HTN (hypertension)    Diagnosis: HLD (hyperlipidemia)  Assessment and Plan of Treatment: HLD (hyperlipidemia)    Diagnosis: Pleural effusion  Assessment and Plan of Treatment:     Diagnosis: Edema  Assessment and Plan of Treatment: PRINCIPAL DISCHARGE DIAGNOSIS  Diagnosis: Cellulitis of leg  Assessment and Plan of Treatment: You have a known hisotry of lower extremity swelling due to Chronic venous stasis. You was treated for suspected supeimposed Cellulitis with Vancomycin. You are switched to oral Doxycycline tocomplete 10 days total Antibiotics.   Youmay takeLasix as needed for worsening leg swelling.   You was evalauted by Podiatry and Wound care.   Follow up Wound care recommendations as below.   Dressing Chages every other day as follows: apply adaptic to left leg wound, betadine soaked gauze to left heel, cover both sites with ABD, secure with antonie. Apply ACE bandage for compression to bilaterally lower legs.  Follow Up with Podiatry and Vascular Surgery as outpatient.      SECONDARY DISCHARGE DIAGNOSES  Diagnosis: HLD (hyperlipidemia)  Assessment and Plan of Treatment: HLD (hyperlipidemia)    Diagnosis: HTN (hypertension)  Assessment and Plan of Treatment: HTN (hypertension)    Diagnosis: Pleural effusion  Assessment and Plan of Treatment:     Diagnosis: Edema  Assessment and Plan of Treatment:

## 2020-05-04 NOTE — DISCHARGE NOTE PROVIDER - NSDCHHNEEDSERVICE_GEN_ALL_CORE
Rehabilitation services/Wound care and assessment Medication teaching and assessment/Rehabilitation services/Wound care and assessment

## 2020-05-04 NOTE — DISCHARGE NOTE PROVIDER - NSDCFUADDAPPT_GEN_ALL_CORE_FT
Follow Up with Primary Care Provider Regarding Maintenance Medications. Follow Up with Dr. Aníbal Lloyd - Primary Care Provider Regarding Maintenance Medications.

## 2020-05-04 NOTE — DISCHARGE NOTE PROVIDER - CARE PROVIDER_API CALL
Jarrett Hernandez)  Vascular Surgery  [address], [address] [place] [address]  Phone: [phone]  Fax: [phone]  Follow Up Time: 1 week    Frederick Whittaker (DPM)  Foot Surgery; Recon Rearfoot[facility]kle Surgery  [address] [place] [address]  Phone: [phone]  Fax: [phone]  Follow Up Time: 1 week

## 2020-05-04 NOTE — DISCHARGE NOTE NURSING/CASE MANAGEMENT/SOCIAL WORK - PATIENT PORTAL LINK FT
You can access the FollowMyHealth Patient Portal offered by [facility] by registering at the following website: [email or web address]. By joining [email or web address] FollowMyHealth portal, you will also be able to view your health information using other applications (apps) compatible with our system.

## 2020-05-04 NOTE — PROGRESS NOTE ADULT - SUBJECTIVE AND OBJECTIVE BOX
Patient is a 89y old  Female who presents with a chief complaint of LE swelling and cellulitis (04 May 2020 13:40)      SUBJECTIVE / OVERNIGHT EVENTS:  patient with history of venostasis of bilateral extremities, now with cellulitis  no overnight events, awaiting discharge.    MEDICATIONS  (STANDING):  apixaban 2.5 milliGRAM(s) Oral two times a day  atorvastatin 10 milliGRAM(s) Oral at bedtime  cholecalciferol 1000 Unit(s) Oral daily  ferrous    sulfate 325 milliGRAM(s) Oral daily  furosemide   Injectable 20 milliGRAM(s) IV Push daily  metoprolol succinate ER 25 milliGRAM(s) Oral daily  povidone iodine 10% Solution 1 Application(s) Topical daily  vancomycin  IVPB 1250 milliGRAM(s) IV Intermittent every 24 hours    MEDICATIONS  (PRN):  acetaminophen   Tablet .. 650 milliGRAM(s) Oral every 6 hours PRN Temp greater or equal to 38C (100.4F), Mild Pain (1 - 3)  senna 2 Tablet(s) Oral at bedtime PRN Constipation      Vital Signs Last 24 Hrs  T(C): 36.8 (04 May 2020 08:15), Max: 36.8 (04 May 2020 08:15)  T(F): 98.3 (04 May 2020 08:15), Max: 98.3 (04 May 2020 08:15)  HR: 83 (04 May 2020 08:15) (71 - 83)  BP: 126/51 (04 May 2020 08:15) (112/76 - 142/60)  BP(mean): --  RR: 18 (04 May 2020 13:17) (17 - 18)  SpO2: 94% (04 May 2020 14:35) (92% - 100%) on Room Air  CAPILLARY BLOOD GLUCOSE        I&O's Summary      PHYSICAL EXAM:  GENERAL: NAD  EYES: EOMI, PERRLA, conjunctiva and sclera clear  CHEST/LUNG: Clear to auscultation bilaterally, no accessory muscle use.  HEART: Regular rate and rhythm  ABDOMEN: Soft, Nontender, Nondistended  EXTREMITIES:  1+ Peripheral Pulses  NEUROLOGY: non-focal  SKIN: bilateral lower leg dressing intact, clean and dry.    LABS:                        9.0    6.19  )-----------( 191      ( 04 May 2020 06:45 )             29.4     05-04    138  |  101  |  30<H>  ----------------------------<  92  4.1   |  31  |  1.45<H>    Ca    9.0      04 May 2020 06:45    TPro  6.4  /  Alb  2.5<L>  /  TBili  0.8  /  DBili  x   /  AST  17  /  ALT  15  /  AlkPhos  60  05-03                    Consultant(s) Notes Reviewed:    Podiatry and Vascular Surgery.    Care Discussed with Consultants/Other Providers:  Dr. Mora - Medicine Attending

## 2020-05-04 NOTE — DISCHARGE NOTE PROVIDER - CARE PROVIDERS DIRECT ADDRESSES
[email or web address]

## 2020-05-04 NOTE — PROGRESS NOTE ADULT - ATTENDING COMMENTS
Patient seen and examined 5/4/20 around 1 PM; Agree with PA A/P above with editing as needed. My independent assessment, findings on exam, diagnosis and plan of care as listed below    Vital Signs Last 24 Hrs  T(C): 36.8 (04 May 2020 08:15), Max: 36.8 (04 May 2020 08:15)  T(F): 98.3 (04 May 2020 08:15), Max: 98.3 (04 May 2020 08:15)  HR: 83 (04 May 2020 08:15) (83 - 83)  BP: 126/51 (04 May 2020 08:15) (126/51 - 126/51)  RR: 18 (04 May 2020 13:17) (18 - 18)  SpO2: 94% (04 May 2020 14:35) (92% - 100%)    P/E:  Elderly female, mildly impaired hearing  Neuro: AAO x3  CVS: S1S2 present  Resp: BLAE+  GI: Soft, BS+, NT  Extr; B/L LE venous stasis edema with resolving Cellulitis    Labs:                        9.0    6.19  )-----------( 191      ( 04 May 2020 06:45 )             29.4   05-04    138  |  101  |  30<H>  ----------------------------<  92  4.1   |  31  |  1.45<H>    Ca    9.0      04 May 2020 06:45    TPro  6.4  /  Alb  2.5<L>  /  TBili  0.8  /  DBili  x   /  AST  17  /  ALT  15  /  AlkPhos  60  05-03    D/D:  1. Chronic B/L leg swelling   2. Chronic Leg ulcer (venostatis)  3. HTN  4. HLD  5. pleural effusion   6. Arthritis  7. anemia   8; subclinical hypothyroidism  9. Vit D insufficiency     Likely chronic venous stasis with superimposed superficial Cellulitis; switch to Doxycycline 100mg PO BID total 10 days of abx therapy (6 more days).  Wound care and podiatry recommendation appreciated: continue with current dressing and compression dressing and elevation of both lower extremities and follow up with outpt wound care and podiatry and Vascular Sx.  on 20mg oral lasix at home, keep low extremities elevation; sat well on RA today; CXR at admission showed New small bilateral pleural effusions with adjacent airspace opacities. Mild increased interstitial lung markings. BNP: 4K; ECHO : grossly normal EF in Jan 2020, with moderate AS.  no need to repeat ECHO; due to her advanced age, not a candidate for cardiac work up.   BP acceptable;   Resume home Eliquis dosage and BB with hold of parameter    Continue with current Vit D; follow up with out for Vit D insufficiency monitor   -tylenols prn for OA pain  -senna for constipation    GOC: DNR status as per d/w Son and patients prior expressed wishes  Patient stable for discharge home with resumption of Home Care services.   Prognosis poor

## 2020-05-04 NOTE — PROGRESS NOTE ADULT - REASON FOR ADMISSION
LE swelling and cellulitis

## 2020-05-04 NOTE — DISCHARGE NOTE PROVIDER - NSDCMRMEDTOKEN_GEN_ALL_CORE_FT
Actamin 325 mg oral tablet: 2 tab(s) orally every 6 hours, As needed, Temp greater or equal to 38C (100.4F), Mild Pain (1 - 3)  cholecalciferol 1000 intl units (25 mcg) oral tablet: 1 tab(s) orally once a day MDD:1  cholecalciferol oral tablet: 1000 unit(s) orally once a day  doxycycline hyclate 100 mg oral capsule: 1 cap(s) orally every 12 hours MDD:2  Eliquis 2.5 mg oral tablet: 1 tab(s) orally 2 times a day MDD:2  FeroSul 325 mg (65 mg elemental iron) oral tablet: 1 tab(s) orally 3 times a day  Lasix 20 mg oral tablet: 1 tab(s) orally every 48 hours, As Needed in case leg swelling worsens  losartan 50 mg oral tablet: 1 tab(s) orally once a day  lovastatin 20 mg oral tablet: 1 tab(s) orally once a day  metoprolol succinate 25 mg oral tablet, extended release: 1 tab(s) orally once a day  povidone iodine 10% topical solution: 1 application topically once a day  senna oral tablet: 1 tab(s) orally once a day (at bedtime)

## 2020-05-04 NOTE — PROGRESS NOTE ADULT - ASSESSMENT
Old ECHO - 01/22/2020  1. Trace mitral regurgitation.  2. Calcified aortic valve with decreased opening. Peak transaortic valve gradient equals 49 mm Hg, mean transaortic valve gradient equals 27 mm Hg, estimated aortic valve area equals 1.1 sq cm (by continuity equation),consistent with moderate aortic stenosis.  (dimensionless index 0.36)  3. Mildly dilated left atrium.  LA volume index = 41 cc/m2.  4. Normal left ventricular internal dimensions and wall thicknesses.  5. Endocardium not well visualized; grossly normal left ventricular systolic function.  6. Right ventricle not well visualized. Probably normal right ventricular size and systolic function.  7. RV systolic pressure is 44 mm Hg. Mild pulmonary hypertension.        1. Chronic B/L leg swelling   2. Chronic Leg ulcer (venostatis)  3. HTN  4. HLD  5. pleural effusion   6. Arthritis  7. anemia   8; subclinical hypothyroidism  9. Vit D insufficiency     - doubt cellulitis; Likely chronic venous stasis; continue with empirical abx; Vanco on hold due to elevated vanco trough; will switch to Doxycycline 100mg PO BID total 10 days of abx therapy if d/c;  wound care and podiatry recommendation appreciated: continue with current dressing and compression dressing and elevation of both lower extremities and follow up with outpt wound care and podiatry and vascular work up;   - BP not tolerate with 40 IV lasix; on 20mg oral lasix at home, keep low extremities elevation; sat well on RA today; CXR at admission showed New small bilateral pleural effusions with adjacent airspace opacities. Mild increased interstitial lung markings. BNP: 4K; ECHO : grossly normal EF in Jan 2020, with moderate AS.  no need to repeat ECHO; due to her advanced age, not a candidate for cardiac work up.   -BP acceptable;   -resume home Eliquis dosage and BB with hold of parameter   -prior normal folate and B12; H/H at baseline;   -follow up with outpt repeat TFT test  -continue with current Vit D; follow up with out for Vit D insufficiency monitor   -tylenols prn for OA pain  -senna PRN for constipation  -continue with current statin  -per son he also stated that pt was had reaction to hydralazine which has been stopped for about 3 months; current on Eliquis( ? Afib) ,losartan and metoprolol at home; original home health aid 7 days a week; about 8 hrs a day; pt has difficulty to get on to bed but was very functional as a 89s YRS OLD;   discussed with pt at bedside again with reiterated by her son on the phone to pt with her RN Yumiko that pt agrees with DNR/DNI; spoke with her son Yvan Calderon [phone] again  who stated that pt was prior persistent express the will of DNR/DNI to him in the past too;   -PT rec. home PT; case also discussed with CM and plan to have PT to evaluation and set-up resume home aid if stable to d/c home;

## 2020-05-07 LAB — GLUCOSE BLDC GLUCOMTR-MCNC: 112 MG/DL — HIGH (ref 70–99)

## 2020-07-03 ENCOUNTER — INPATIENT (INPATIENT)
Facility: HOSPITAL | Age: 85
LOS: 9 days | Discharge: HOME | DRG: 291 | End: 2020-07-13
Attending: INTERNAL MEDICINE | Admitting: INTERNAL MEDICINE
Payer: MEDICARE

## 2020-07-03 VITALS
WEIGHT: 145.06 LBS | TEMPERATURE: 100 F | OXYGEN SATURATION: 90 % | RESPIRATION RATE: 17 BRPM | DIASTOLIC BLOOD PRESSURE: 94 MMHG | SYSTOLIC BLOOD PRESSURE: 193 MMHG | HEART RATE: 87 BPM

## 2020-07-03 DIAGNOSIS — J18.9 PNEUMONIA, UNSPECIFIED ORGANISM: ICD-10-CM

## 2020-07-03 DIAGNOSIS — Z29.9 ENCOUNTER FOR PROPHYLACTIC MEASURES, UNSPECIFIED: ICD-10-CM

## 2020-07-03 DIAGNOSIS — E78.5 HYPERLIPIDEMIA, UNSPECIFIED: ICD-10-CM

## 2020-07-03 DIAGNOSIS — N18.9 CHRONIC KIDNEY DISEASE, UNSPECIFIED: ICD-10-CM

## 2020-07-03 DIAGNOSIS — I10 ESSENTIAL (PRIMARY) HYPERTENSION: ICD-10-CM

## 2020-07-03 DIAGNOSIS — I50.9 HEART FAILURE, UNSPECIFIED: ICD-10-CM

## 2020-07-03 DIAGNOSIS — R79.89 OTHER SPECIFIED ABNORMAL FINDINGS OF BLOOD CHEMISTRY: ICD-10-CM

## 2020-07-03 DIAGNOSIS — Z98.890 OTHER SPECIFIED POSTPROCEDURAL STATES: Chronic | ICD-10-CM

## 2020-07-03 LAB
ALBUMIN SERPL ELPH-MCNC: 3.7 G/DL — SIGNIFICANT CHANGE UP (ref 3.5–5)
ALP SERPL-CCNC: 55 U/L — SIGNIFICANT CHANGE UP (ref 40–120)
ALT FLD-CCNC: 28 U/L DA — SIGNIFICANT CHANGE UP (ref 10–60)
ANION GAP SERPL CALC-SCNC: 5 MMOL/L — SIGNIFICANT CHANGE UP (ref 5–17)
APPEARANCE UR: CLEAR — SIGNIFICANT CHANGE UP
AST SERPL-CCNC: 22 U/L — SIGNIFICANT CHANGE UP (ref 10–40)
BASOPHILS # BLD AUTO: 0.03 K/UL — SIGNIFICANT CHANGE UP (ref 0–0.2)
BASOPHILS NFR BLD AUTO: 0.4 % — SIGNIFICANT CHANGE UP (ref 0–2)
BILIRUB SERPL-MCNC: 0.9 MG/DL — SIGNIFICANT CHANGE UP (ref 0.2–1.2)
BILIRUB UR-MCNC: NEGATIVE — SIGNIFICANT CHANGE UP
BUN SERPL-MCNC: 35 MG/DL — HIGH (ref 7–18)
CALCIUM SERPL-MCNC: 9.3 MG/DL — SIGNIFICANT CHANGE UP (ref 8.4–10.5)
CHLORIDE SERPL-SCNC: 101 MMOL/L — SIGNIFICANT CHANGE UP (ref 96–108)
CK MB BLD-MCNC: 10 % — HIGH (ref 0–3.5)
CK MB CFR SERPL CALC: 2.3 NG/ML — SIGNIFICANT CHANGE UP (ref 0–3.6)
CK SERPL-CCNC: 23 U/L — SIGNIFICANT CHANGE UP (ref 21–215)
CO2 SERPL-SCNC: 30 MMOL/L — SIGNIFICANT CHANGE UP (ref 22–31)
COLOR SPEC: YELLOW — SIGNIFICANT CHANGE UP
CREAT SERPL-MCNC: 1.37 MG/DL — HIGH (ref 0.5–1.3)
CRP SERPL-MCNC: 3.15 MG/DL — HIGH (ref 0–0.4)
D DIMER BLD IA.RAPID-MCNC: 701 NG/ML DDU — HIGH
DIFF PNL FLD: ABNORMAL
EOSINOPHIL # BLD AUTO: 0.09 K/UL — SIGNIFICANT CHANGE UP (ref 0–0.5)
EOSINOPHIL NFR BLD AUTO: 1.1 % — SIGNIFICANT CHANGE UP (ref 0–6)
GLUCOSE SERPL-MCNC: 120 MG/DL — HIGH (ref 70–99)
GLUCOSE UR QL: NEGATIVE — SIGNIFICANT CHANGE UP
HCT VFR BLD CALC: 32.3 % — LOW (ref 34.5–45)
HGB BLD-MCNC: 10 G/DL — LOW (ref 11.5–15.5)
IMM GRANULOCYTES NFR BLD AUTO: 0.4 % — SIGNIFICANT CHANGE UP (ref 0–1.5)
KETONES UR-MCNC: NEGATIVE — SIGNIFICANT CHANGE UP
LACTATE SERPL-SCNC: 1.5 MMOL/L — SIGNIFICANT CHANGE UP (ref 0.7–2)
LEUKOCYTE ESTERASE UR-ACNC: NEGATIVE — SIGNIFICANT CHANGE UP
LYMPHOCYTES # BLD AUTO: 1.21 K/UL — SIGNIFICANT CHANGE UP (ref 1–3.3)
LYMPHOCYTES # BLD AUTO: 14.9 % — SIGNIFICANT CHANGE UP (ref 13–44)
MCHC RBC-ENTMCNC: 31 GM/DL — LOW (ref 32–36)
MCHC RBC-ENTMCNC: 31.3 PG — SIGNIFICANT CHANGE UP (ref 27–34)
MCV RBC AUTO: 101.3 FL — HIGH (ref 80–100)
MONOCYTES # BLD AUTO: 0.94 K/UL — HIGH (ref 0–0.9)
MONOCYTES NFR BLD AUTO: 11.6 % — SIGNIFICANT CHANGE UP (ref 2–14)
NEUTROPHILS # BLD AUTO: 5.83 K/UL — SIGNIFICANT CHANGE UP (ref 1.8–7.4)
NEUTROPHILS NFR BLD AUTO: 71.6 % — SIGNIFICANT CHANGE UP (ref 43–77)
NITRITE UR-MCNC: NEGATIVE — SIGNIFICANT CHANGE UP
NRBC # BLD: 0 /100 WBCS — SIGNIFICANT CHANGE UP (ref 0–0)
NT-PROBNP SERPL-SCNC: 6728 PG/ML — HIGH (ref 0–450)
PH UR: 5 — SIGNIFICANT CHANGE UP (ref 5–8)
PLATELET # BLD AUTO: 252 K/UL — SIGNIFICANT CHANGE UP (ref 150–400)
POTASSIUM SERPL-MCNC: 4.6 MMOL/L — SIGNIFICANT CHANGE UP (ref 3.5–5.3)
POTASSIUM SERPL-SCNC: 4.6 MMOL/L — SIGNIFICANT CHANGE UP (ref 3.5–5.3)
PROT SERPL-MCNC: 8.8 G/DL — HIGH (ref 6–8.3)
PROT UR-MCNC: 500 MG/DL
RBC # BLD: 3.19 M/UL — LOW (ref 3.8–5.2)
RBC # FLD: 12.4 % — SIGNIFICANT CHANGE UP (ref 10.3–14.5)
SODIUM SERPL-SCNC: 136 MMOL/L — SIGNIFICANT CHANGE UP (ref 135–145)
SP GR SPEC: 1.02 — SIGNIFICANT CHANGE UP (ref 1.01–1.02)
TROPONIN I SERPL-MCNC: 0.08 NG/ML — HIGH (ref 0–0.04)
TROPONIN I SERPL-MCNC: 0.1 NG/ML — HIGH (ref 0–0.04)
UROBILINOGEN FLD QL: NEGATIVE — SIGNIFICANT CHANGE UP
WBC # BLD: 8.13 K/UL — SIGNIFICANT CHANGE UP (ref 3.8–10.5)
WBC # FLD AUTO: 8.13 K/UL — SIGNIFICANT CHANGE UP (ref 3.8–10.5)

## 2020-07-03 PROCEDURE — 99291 CRITICAL CARE FIRST HOUR: CPT

## 2020-07-03 PROCEDURE — 71275 CT ANGIOGRAPHY CHEST: CPT | Mod: 26

## 2020-07-03 PROCEDURE — 71045 X-RAY EXAM CHEST 1 VIEW: CPT | Mod: 26

## 2020-07-03 PROCEDURE — 93010 ELECTROCARDIOGRAM REPORT: CPT | Mod: 76

## 2020-07-03 RX ORDER — ATORVASTATIN CALCIUM 80 MG/1
40 TABLET, FILM COATED ORAL AT BEDTIME
Refills: 0 | Status: DISCONTINUED | OUTPATIENT
Start: 2020-07-03 | End: 2020-07-13

## 2020-07-03 RX ORDER — AZITHROMYCIN 500 MG/1
500 TABLET, FILM COATED ORAL EVERY 24 HOURS
Refills: 0 | Status: DISCONTINUED | OUTPATIENT
Start: 2020-07-04 | End: 2020-07-08

## 2020-07-03 RX ORDER — AMLODIPINE BESYLATE 2.5 MG/1
5 TABLET ORAL DAILY
Refills: 0 | Status: DISCONTINUED | OUTPATIENT
Start: 2020-07-03 | End: 2020-07-04

## 2020-07-03 RX ORDER — AZITHROMYCIN 500 MG/1
TABLET, FILM COATED ORAL
Refills: 0 | Status: DISCONTINUED | OUTPATIENT
Start: 2020-07-03 | End: 2020-07-08

## 2020-07-03 RX ORDER — MIRABEGRON 50 MG/1
1 TABLET, EXTENDED RELEASE ORAL
Qty: 0 | Refills: 0 | DISCHARGE

## 2020-07-03 RX ORDER — AZITHROMYCIN 500 MG/1
500 TABLET, FILM COATED ORAL ONCE
Refills: 0 | Status: COMPLETED | OUTPATIENT
Start: 2020-07-03 | End: 2020-07-03

## 2020-07-03 RX ORDER — OXYBUTYNIN CHLORIDE 5 MG
1 TABLET ORAL
Qty: 0 | Refills: 0 | DISCHARGE

## 2020-07-03 RX ORDER — ALBUTEROL 90 UG/1
2 AEROSOL, METERED ORAL EVERY 6 HOURS
Refills: 0 | Status: DISCONTINUED | OUTPATIENT
Start: 2020-07-03 | End: 2020-07-13

## 2020-07-03 RX ORDER — LOSARTAN POTASSIUM 100 MG/1
50 TABLET, FILM COATED ORAL DAILY
Refills: 0 | Status: DISCONTINUED | OUTPATIENT
Start: 2020-07-03 | End: 2020-07-03

## 2020-07-03 RX ORDER — METOPROLOL TARTRATE 50 MG
1 TABLET ORAL
Qty: 0 | Refills: 0 | DISCHARGE

## 2020-07-03 RX ORDER — ASPIRIN/CALCIUM CARB/MAGNESIUM 324 MG
81 TABLET ORAL DAILY
Refills: 0 | Status: DISCONTINUED | OUTPATIENT
Start: 2020-07-03 | End: 2020-07-13

## 2020-07-03 RX ORDER — AZTREONAM 2 G
1000 VIAL (EA) INJECTION EVERY 6 HOURS
Refills: 0 | Status: DISCONTINUED | OUTPATIENT
Start: 2020-07-03 | End: 2020-07-04

## 2020-07-03 RX ORDER — FUROSEMIDE 40 MG
40 TABLET ORAL
Refills: 0 | Status: DISCONTINUED | OUTPATIENT
Start: 2020-07-03 | End: 2020-07-03

## 2020-07-03 RX ORDER — HEPARIN SODIUM 5000 [USP'U]/ML
5000 INJECTION INTRAVENOUS; SUBCUTANEOUS EVERY 12 HOURS
Refills: 0 | Status: DISCONTINUED | OUTPATIENT
Start: 2020-07-03 | End: 2020-07-03

## 2020-07-03 RX ORDER — FUROSEMIDE 40 MG
40 TABLET ORAL DAILY
Refills: 0 | Status: DISCONTINUED | OUTPATIENT
Start: 2020-07-03 | End: 2020-07-04

## 2020-07-03 RX ORDER — METOPROLOL TARTRATE 50 MG
50 TABLET ORAL
Refills: 0 | Status: DISCONTINUED | OUTPATIENT
Start: 2020-07-03 | End: 2020-07-04

## 2020-07-03 RX ORDER — APIXABAN 2.5 MG/1
2.5 TABLET, FILM COATED ORAL
Refills: 0 | Status: DISCONTINUED | OUTPATIENT
Start: 2020-07-04 | End: 2020-07-13

## 2020-07-03 RX ORDER — LOVASTATIN 20 MG
1 TABLET ORAL
Qty: 0 | Refills: 0 | DISCHARGE

## 2020-07-03 RX ADMIN — Medication 50 MILLIGRAM(S): at 15:11

## 2020-07-03 RX ADMIN — Medication 50 MILLIGRAM(S): at 22:45

## 2020-07-03 RX ADMIN — Medication 40 MILLIGRAM(S): at 22:39

## 2020-07-03 RX ADMIN — ATORVASTATIN CALCIUM 40 MILLIGRAM(S): 80 TABLET, FILM COATED ORAL at 22:26

## 2020-07-03 RX ADMIN — AZITHROMYCIN 255 MILLIGRAM(S): 500 TABLET, FILM COATED ORAL at 23:12

## 2020-07-03 NOTE — H&P ADULT - PROBLEM SELECTOR PLAN 1
Patient admitted for SOB, denies fever.  -Has chest congestion on exam.  X ray showed pulmonary vascular congestion.  -started the patient on Zithromax IV .  -Albuterol inhaler.  -Check procalcitonin, Covid PCR  saturating > 95 % on 3 L Nasal cannula

## 2020-07-03 NOTE — H&P ADULT - PROBLEM SELECTOR PLAN 8
IMPROVE VTE Individual Risk Assessment  RISK                                                                Points  [  ] Previous VTE                                                  3  [  ] Thrombophilia                                               2  [  ] Lower limb paralysis                                      2        (unable to hold up >15 seconds)    [  ] Current Cancer                                              2         (within 6 months)  [  ] Immobilization > 24 hrs                                1  [  ] ICU/CCU stay > 24 hours                              1  [x  ] Age > 60                                                      1  IMPROVE VTE Score : 2  Patient atkes Eliquis at home , continue with Eliquis

## 2020-07-03 NOTE — ED PROVIDER NOTE - CONSTITUTIONAL DEVELOPMENT, MLM
Called Reyna again and she states she still is not feeling well.  She states her son and best friend are there now and her son is going to stay overnight with her.  She does not want any services at this time including us coming out to check on her/safety.  She states, \"I don't want to be here alone.\"  Asked if she still wanted to stay in her house and she states her son is going to stay with her for now and then will re-evaluate.  Gave her phone number of  senior resource if she wanted to talk again and she wrote it down.  
Unable to get in touch with patient after a few phone calls, called Santosh, who is also her caregiver.  He states they were gone most of the day yesterday having tests at [facility] and when they got home, Reyna was very exhausted.  He states that she felt a little better yesterday.  He states that she could use some services, but is hesitant to let anyone in her home.  Left a message on Reyna's phone to call the  Sen[facility] nurse.  
well developed

## 2020-07-03 NOTE — H&P ADULT - PROBLEM SELECTOR PLAN 5
Patient takes Losartan, Amlodipine and Metoprolol at home.  -continue with Amlodipine and metoprolol.  -holding losartan because Blood pressure is stable, restart if clinically indicated.

## 2020-07-03 NOTE — ED ADULT NURSE NOTE - OBJECTIVE STATEMENT
pt answers orientation questions appropriately, however, confused at times.  Comes in w/ SOB for several days, tachypneic RR high 30's, 72% on room air, now high 90's on 3 ltr n/c.  endorses cough.

## 2020-07-03 NOTE — ED ADULT NURSE NOTE - BREATHING, MLM
What Type Of Note Output Would You Prefer (Optional)?: Standard Output How Severe Is Your Skin Lesion?: mild Has Your Skin Lesion Been Treated?: not been treated Is This A New Presentation, Or A Follow-Up?: Growth Spontaneous, unlabored and symmetrical

## 2020-07-03 NOTE — H&P ADULT - HISTORY OF PRESENT ILLNESS
89 year old female lives alone with HHA with medical history of HTN, HLD, arthritis , Afib ? ( patient is on eliquis and b blocker)presented to ED with complains of difficulty breathing. Patient AAO X 3 at baseline states that she was having difficulty in breathing and was sent to hospital. Patient denies any fever and chills and was saying that she was having difficulty  breathing . At present Patient is saturating well on 3 Lr nasal cannula. Patient does not remember her medications but states that her home health aid gives her meds regularly. Patient denies contact to any sock patient and denies chest pain, body ache , Bowel or Urinary problems. 89 year old female lives alone with HHA with medical history of HTN, HLD, arthritis , Afib ? ( patient is on eliquis and b blocker)presented to ED with complains of difficulty breathing. Patient AAO X 3 at baseline states that she was having difficulty in breathing and was sent to hospital. Patient denies any fever and chills and was saying that she was having difficulty  breathing . At present Patient is saturating well on 3 Lr nasal cannula. Patient does not remember her medications but states that her home health aid gives her meds regularly. Patient denies contact to any sock patient and denies chest pain, body ache , Bowel or Urinary problems.    GOC : Discussed with Son, Patient is DNR/DNI has to be signed by attending.

## 2020-07-03 NOTE — H&P ADULT - ASSESSMENT
89 year old female lives alone with HHA with medical history of HTN, HLD, arthritis , Afib ? ( patient is on eliquis and b blocker)presented to ED with complains of difficulty breathing. Patient AAO X 3 at baseline states that she was having difficulty in breathing and was sent to hospital. Patient denies any fever and chills and was saying that she was having difficulty  breathing .    Patient is being admitted for CHF exacerbation/ CAP.    GOC: Patients wanted to defer to son to make the decision, called son , he wants DNR/DNI.

## 2020-07-03 NOTE — H&P ADULT - PROBLEM SELECTOR PLAN 3
Troponin 0.077. follow T2  Patient denies any chest pain.  -EKG NSR.  -check ECHO  -doppler Leg  Cardiology consult Troponin 0.077. follow T2, T3  Patient denies any chest pain.  -EKG NSR.  -check ECHO  -doppler Leg  Cardiology consult Dr Gama

## 2020-07-03 NOTE — ED PROVIDER NOTE - OBJECTIVE STATEMENT
Patient reports she has been short of breath for a couple of days. Reports a fever recently but does not recall number.

## 2020-07-03 NOTE — H&P ADULT - PROBLEM SELECTOR PLAN 2
Patients presented after SOB.  -has mild B/L leg edema.  -Pro BNP 6728.  -ECHO from Jan 2020 showed grossly normal ejection fraction   Takes 20 mg Lasix at home.  will give 40 IV lasix daily . taper to PO as indicated clinically.

## 2020-07-03 NOTE — ED ADULT NURSE NOTE - NSIMPLEMENTINTERV_GEN_ALL_ED
Implemented All Fall with Harm Risk Interventions:  [place] to call system. Call bell, personal items and telephone within reach. Instruct patient to call for assistance. Room bathroom lighting operational. Non-slip footwear when patient is off stretcher. Physically safe environment: no spills, clutter or unnecessary equipment. Stretcher in lowest position, wheels locked, appropriate side rails in place. Provide visual cue, wrist band, yellow gown, etc. Monitor gait and stability. Monitor for mental status changes and reorient to person, place, and time. Review medications for side effects contributing to fall risk. Reinforce activity limits and safety measures with patient and family. Provide visual clues: red socks.

## 2020-07-03 NOTE — H&P ADULT - NSHPPHYSICALEXAM_GEN_ALL_CORE
Vital Signs Last 24 Hrs  T(C): 37.2 (03 Jul 2020 19:30), Max: 37.9 (03 Jul 2020 13:14)  T(F): 99 (03 Jul 2020 19:30), Max: 100.3 (03 Jul 2020 13:14)  HR: 74 (03 Jul 2020 19:30) (74 - 87)  BP: 147/80 (03 Jul 2020 19:30) (147/80 - 193/94)  BP(mean): --  RR: 22 (03 Jul 2020 19:30) (17 - 38)  SpO2: 95% (03 Jul 2020 19:30) (74% - 99%)    CONSTITUTIONAL: Elderly female in NAD.  ENMT: Airway patent, Nasal mucosa clear. Mouth with normal mucosa.   EYES: Clear bilaterally, pupils equal, round and reactive to light. EOMI.  CARDIAC: Normal rate, regular rhythm.  Heart sounds S1, S2.  No murmurs, rubs or gallops   RESPIRATORY: Decreased braeth sounds, Rales B/l  ABDOMEN: soft, non tener, non distended  MUSCULOSKELETAL: Spine appears normal, range of motion is not limited, no muscle or joint tenderness  EXTREMITIES: No edema, cyanosis or deformity   NEUROLOGICAL: Alert and oriented, no focal deficits, no motor or sensory deficits.  SKIN: No rash, skin turgor Vital Signs Last 24 Hrs  T(C): 37.2 (03 Jul 2020 19:30), Max: 37.9 (03 Jul 2020 13:14)  T(F): 99 (03 Jul 2020 19:30), Max: 100.3 (03 Jul 2020 13:14)  HR: 74 (03 Jul 2020 19:30) (74 - 87)  BP: 147/80 (03 Jul 2020 19:30) (147/80 - 193/94)  BP(mean): --  RR: 22 (03 Jul 2020 19:30) (17 - 38)  SpO2: 95% (03 Jul 2020 19:30) (74% - 99%)    CONSTITUTIONAL: Elderly female in NAD.  ENMT: Airway patent, Nasal mucosa clear. Mouth with normal mucosa.   EYES: Clear bilaterally, pupils equal, round and reactive to light. EOMI.  CARDIAC: Normal rate, regular rhythm.  Heart sounds S1, S2.  No murmurs, rubs or gallops   RESPIRATORY: Decreased breath sounds, Rales B/l  ABDOMEN: soft, non tender, non distended  MUSCULOSKELETAL: Spine appears normal, range of motion is not limited, no muscle or joint tenderness  EXTREMITIES: No edema, cyanosis or deformity   NEUROLOGICAL: Alert and oriented, no focal deficits, no motor or sensory deficits.  SKIN: No rash, skin turgor

## 2020-07-03 NOTE — ED PROVIDER NOTE - PROGRESS NOTE DETAILS
CXR suspicious for pna. No code sepsis called because patient did not meet SIRS criteria. High suspicion for COVID. Patient appears markedly improved. Respiratory distress resolved. resting comfortably, NAD. Spoke with patient's who reports that pt had fever to 100.7F 2 days ago. He was called by patient's HHA today who said patient felt short of breath and was requesting to go to the hospital, which was unusual for her. D-dimer elevated. Will get CTA chest. I spoke with Dr. Barnes. patient will go to Dr. Bennett.

## 2020-07-04 LAB
A1C WITH ESTIMATED AVERAGE GLUCOSE RESULT: 5.6 % — SIGNIFICANT CHANGE UP (ref 4–5.6)
ALBUMIN SERPL ELPH-MCNC: 3 G/DL — LOW (ref 3.5–5)
ALP SERPL-CCNC: 50 U/L — SIGNIFICANT CHANGE UP (ref 40–120)
ALT FLD-CCNC: 23 U/L DA — SIGNIFICANT CHANGE UP (ref 10–60)
ANION GAP SERPL CALC-SCNC: 6 MMOL/L — SIGNIFICANT CHANGE UP (ref 5–17)
AST SERPL-CCNC: 14 U/L — SIGNIFICANT CHANGE UP (ref 10–40)
BASOPHILS # BLD AUTO: 0.02 K/UL — SIGNIFICANT CHANGE UP (ref 0–0.2)
BASOPHILS NFR BLD AUTO: 0.3 % — SIGNIFICANT CHANGE UP (ref 0–2)
BILIRUB SERPL-MCNC: 0.8 MG/DL — SIGNIFICANT CHANGE UP (ref 0.2–1.2)
BUN SERPL-MCNC: 34 MG/DL — HIGH (ref 7–18)
CALCIUM SERPL-MCNC: 9.2 MG/DL — SIGNIFICANT CHANGE UP (ref 8.4–10.5)
CHLORIDE SERPL-SCNC: 99 MMOL/L — SIGNIFICANT CHANGE UP (ref 96–108)
CHOLEST SERPL-MCNC: 130 MG/DL — SIGNIFICANT CHANGE UP (ref 10–199)
CK MB BLD-MCNC: 12.9 % — HIGH (ref 0–3.5)
CK MB CFR SERPL CALC: 3.1 NG/ML — SIGNIFICANT CHANGE UP (ref 0–3.6)
CK SERPL-CCNC: 24 U/L — SIGNIFICANT CHANGE UP (ref 21–215)
CO2 SERPL-SCNC: 28 MMOL/L — SIGNIFICANT CHANGE UP (ref 22–31)
CREAT SERPL-MCNC: 1.36 MG/DL — HIGH (ref 0.5–1.3)
CULTURE RESULTS: SIGNIFICANT CHANGE UP
EOSINOPHIL # BLD AUTO: 0 K/UL — SIGNIFICANT CHANGE UP (ref 0–0.5)
EOSINOPHIL NFR BLD AUTO: 0 % — SIGNIFICANT CHANGE UP (ref 0–6)
ESTIMATED AVERAGE GLUCOSE: 114 MG/DL — SIGNIFICANT CHANGE UP (ref 68–114)
FERRITIN SERPL-MCNC: 309 NG/ML — HIGH (ref 15–150)
FOLATE SERPL-MCNC: 13.6 NG/ML — SIGNIFICANT CHANGE UP
GLUCOSE SERPL-MCNC: 96 MG/DL — SIGNIFICANT CHANGE UP (ref 70–99)
HCT VFR BLD CALC: 30.8 % — LOW (ref 34.5–45)
HDLC SERPL-MCNC: 54 MG/DL — SIGNIFICANT CHANGE UP
HGB BLD-MCNC: 9.5 G/DL — LOW (ref 11.5–15.5)
IMM GRANULOCYTES NFR BLD AUTO: 0.4 % — SIGNIFICANT CHANGE UP (ref 0–1.5)
INR BLD: 1.32 RATIO — HIGH (ref 0.88–1.16)
LIPID PNL WITH DIRECT LDL SERPL: 63 MG/DL — SIGNIFICANT CHANGE UP
LYMPHOCYTES # BLD AUTO: 0.88 K/UL — LOW (ref 1–3.3)
LYMPHOCYTES # BLD AUTO: 11 % — LOW (ref 13–44)
MAGNESIUM SERPL-MCNC: 2.3 MG/DL — SIGNIFICANT CHANGE UP (ref 1.6–2.6)
MCHC RBC-ENTMCNC: 30.8 GM/DL — LOW (ref 32–36)
MCHC RBC-ENTMCNC: 31.3 PG — SIGNIFICANT CHANGE UP (ref 27–34)
MCV RBC AUTO: 101.3 FL — HIGH (ref 80–100)
MONOCYTES # BLD AUTO: 1.1 K/UL — HIGH (ref 0–0.9)
MONOCYTES NFR BLD AUTO: 13.8 % — SIGNIFICANT CHANGE UP (ref 2–14)
NEUTROPHILS # BLD AUTO: 5.95 K/UL — SIGNIFICANT CHANGE UP (ref 1.8–7.4)
NEUTROPHILS NFR BLD AUTO: 74.5 % — SIGNIFICANT CHANGE UP (ref 43–77)
NRBC # BLD: 0 /100 WBCS — SIGNIFICANT CHANGE UP (ref 0–0)
PHOSPHATE SERPL-MCNC: 3.6 MG/DL — SIGNIFICANT CHANGE UP (ref 2.5–4.5)
PLATELET # BLD AUTO: 204 K/UL — SIGNIFICANT CHANGE UP (ref 150–400)
POTASSIUM SERPL-MCNC: 5.1 MMOL/L — SIGNIFICANT CHANGE UP (ref 3.5–5.3)
POTASSIUM SERPL-SCNC: 5.1 MMOL/L — SIGNIFICANT CHANGE UP (ref 3.5–5.3)
PROCALCITONIN SERPL-MCNC: 0.14 NG/ML — HIGH (ref 0.02–0.1)
PROCALCITONIN SERPL-MCNC: 0.61 NG/ML — HIGH (ref 0.02–0.1)
PROT SERPL-MCNC: 7.9 G/DL — SIGNIFICANT CHANGE UP (ref 6–8.3)
PROTHROM AB SERPL-ACNC: 15.2 SEC — HIGH (ref 10.6–13.6)
RBC # BLD: 3.04 M/UL — LOW (ref 3.8–5.2)
RBC # FLD: 12.4 % — SIGNIFICANT CHANGE UP (ref 10.3–14.5)
SARS-COV-2 IGG SERPL QL IA: NEGATIVE — SIGNIFICANT CHANGE UP
SARS-COV-2 IGM SERPL IA-ACNC: <0.1 INDEX — SIGNIFICANT CHANGE UP
SARS-COV-2 RNA SPEC QL NAA+PROBE: SIGNIFICANT CHANGE UP
SODIUM SERPL-SCNC: 133 MMOL/L — LOW (ref 135–145)
SPECIMEN SOURCE: SIGNIFICANT CHANGE UP
TOTAL CHOLESTEROL/HDL RATIO MEASUREMENT: 2.4 RATIO — LOW (ref 3.3–7.1)
TRIGL SERPL-MCNC: 64 MG/DL — SIGNIFICANT CHANGE UP (ref 10–149)
TROPONIN I SERPL-MCNC: 0.07 NG/ML — HIGH (ref 0–0.04)
TSH SERPL-MCNC: 3.48 UU/ML — SIGNIFICANT CHANGE UP (ref 0.34–4.82)
VIT B12 SERPL-MCNC: 1093 PG/ML — SIGNIFICANT CHANGE UP (ref 232–1245)
WBC # BLD: 7.98 K/UL — SIGNIFICANT CHANGE UP (ref 3.8–10.5)
WBC # FLD AUTO: 7.98 K/UL — SIGNIFICANT CHANGE UP (ref 3.8–10.5)

## 2020-07-04 PROCEDURE — 93970 EXTREMITY STUDY: CPT | Mod: 26

## 2020-07-04 PROCEDURE — 71045 X-RAY EXAM CHEST 1 VIEW: CPT | Mod: 26

## 2020-07-04 PROCEDURE — 93306 TTE W/DOPPLER COMPLETE: CPT | Mod: 26

## 2020-07-04 RX ORDER — METOPROLOL TARTRATE 50 MG
12.5 TABLET ORAL
Refills: 0 | Status: DISCONTINUED | OUTPATIENT
Start: 2020-07-04 | End: 2020-07-13

## 2020-07-04 RX ORDER — FUROSEMIDE 40 MG
20 TABLET ORAL DAILY
Refills: 0 | Status: DISCONTINUED | OUTPATIENT
Start: 2020-07-04 | End: 2020-07-06

## 2020-07-04 RX ORDER — CEFTRIAXONE 500 MG/1
1000 INJECTION, POWDER, FOR SOLUTION INTRAMUSCULAR; INTRAVENOUS EVERY 24 HOURS
Refills: 0 | Status: DISCONTINUED | OUTPATIENT
Start: 2020-07-04 | End: 2020-07-08

## 2020-07-04 RX ORDER — METOPROLOL TARTRATE 50 MG
2.5 TABLET ORAL ONCE
Refills: 0 | Status: DISCONTINUED | OUTPATIENT
Start: 2020-07-04 | End: 2020-07-04

## 2020-07-04 RX ADMIN — APIXABAN 2.5 MILLIGRAM(S): 2.5 TABLET, FILM COATED ORAL at 17:15

## 2020-07-04 RX ADMIN — Medication 20 MILLIGRAM(S): at 12:46

## 2020-07-04 RX ADMIN — AZITHROMYCIN 255 MILLIGRAM(S): 500 TABLET, FILM COATED ORAL at 17:16

## 2020-07-04 RX ADMIN — APIXABAN 2.5 MILLIGRAM(S): 2.5 TABLET, FILM COATED ORAL at 07:07

## 2020-07-04 RX ADMIN — Medication 50 MILLIGRAM(S): at 04:00

## 2020-07-04 RX ADMIN — ATORVASTATIN CALCIUM 40 MILLIGRAM(S): 80 TABLET, FILM COATED ORAL at 22:02

## 2020-07-04 RX ADMIN — CEFTRIAXONE 100 MILLIGRAM(S): 500 INJECTION, POWDER, FOR SOLUTION INTRAMUSCULAR; INTRAVENOUS at 12:46

## 2020-07-04 RX ADMIN — Medication 12.5 MILLIGRAM(S): at 17:50

## 2020-07-04 RX ADMIN — Medication 81 MILLIGRAM(S): at 12:47

## 2020-07-04 NOTE — PROGRESS NOTE ADULT - SUBJECTIVE AND OBJECTIVE BOX
PGY 3 Note discussed with primary attending    Patient is a 89y old  Female who presents with a chief complaint of SOB (2020 21:33)      INTERVAL HPI/OVERNIGHT EVENTS:   Pt seen and examined at  the bedside.   Tele : afib with RVR, BP: 100s. DC amlodipine, Metoprolol dose decreased to 12.5 BID   Lasix decreased to 20 mg Po OD       MEDICATIONS  (STANDING):  apixaban 2.5 milliGRAM(s) Oral two times a day  aspirin  chewable 81 milliGRAM(s) Oral daily  atorvastatin 40 milliGRAM(s) Oral at bedtime  azithromycin  IVPB 500 milliGRAM(s) IV Intermittent every 24 hours  azithromycin  IVPB      cefTRIAXone   IVPB 1000 milliGRAM(s) IV Intermittent every 24 hours  furosemide    Tablet 20 milliGRAM(s) Oral daily  metoprolol tartrate 12.5 milliGRAM(s) Oral two times a day    MEDICATIONS  (PRN):  ALBUTerol    90 MICROgram(s) HFA Inhaler 2 Puff(s) Inhalation every 6 hours PRN Shortness of Breath and/or Wheezing      __________________________________________________  REVIEW OF SYSTEMS:    CONSTITUTIONAL: No fever,   EYES: no acute visual disturbances  NECK: No pain or stiffness  RESPIRATORY: No cough; No shortness of breath  CARDIOVASCULAR: No chest pain, no palpitations  GASTROINTESTINAL: No pain. No nausea or vomiting; No diarrhea   NEUROLOGICAL: No headache or numbness, no tremors  MUSCULOSKELETAL: No joint pain, no muscle pain  GENITOURINARY: no dysuria, no frequency, no hesitancy  PSYCHIATRY: no depression , no anxiety  ALL OTHER  ROS negative        Vital Signs Last 24 Hrs  T(C): 37.1 (2020 07:02), Max: 37.9 (2020 13:14)  T(F): 98.7 (2020 07:02), Max: 100.3 (2020 13:14)  HR: 106 (2020 07:02) (74 - 126)  BP: 102/77 (2020 07:02) (102/77 - 193/94)  RR: 17 (2020 07:02) (17 - 38)  SpO2: 99% (2020 07:02) (74% - 99%)    ________________________________________________  PHYSICAL EXAM:  GENERAL: female in bed   HEENT: Normocephalic;  conjunctivae and sclerae clear; moist mucous membranes;   NECK : supple  CHEST/LUNG: Clear to auscultation bilaterally with good air entry   HEART: S1 S2  regular; no murmurs, gallops or rubs  ABDOMEN: Soft, Nontender, Nondistended; Bowel sounds present  EXTREMITIES: no cyanosis;; no calf tenderness; bilateral edema present   SKIN: warm and dry; no rash  NERVOUS SYSTEM:  Awake and alert;   _________________________________________________  LABS:                        9.5    7.98  )-----------( 204      ( 2020 05:53 )             30.8     07-04    133<L>  |  99  |  34<H>  ----------------------------<  96  5.1   |  28  |  1.36<H>    Ca    9.2      2020 05:53  Phos  3.6     07-04  Mg     2.3     07-04    TPro  7.9  /  Alb  3.0<L>  /  TBili  0.8  /  DBili  x   /  AST  14  /  ALT  23  /  AlkPhos  50  07-04    PT/INR - ( 2020 05:53 )   PT: 15.2 sec;   INR: 1.32 ratio           Urinalysis Basic - ( 2020 14:49 )    Color: Yellow / Appearance: Clear / S.020 / pH: x  Gluc: x / Ketone: Negative  / Bili: Negative / Urobili: Negative   Blood: x / Protein: 500 mg/dL / Nitrite: Negative   Leuk Esterase: Negative / RBC: 0-2 /HPF / WBC 0-2 /HPF   Sq Epi: x / Non Sq Epi: Moderate /HPF / Bacteria: Many /HPF      CAPILLARY BLOOD GLUCOSE      POCT Blood Glucose.: 126 mg/dL (2020 13:37)        RADIOLOGY & ADDITIONAL TESTS:    Imaging Personally Reviewed:  YES    Consultant(s) Notes Reviewed:   YES    Care Discussed with Consultants :     Plan of care was discussed with patient and /or primary care giver; all questions and concerns were addressed and care was aligned with patient's wishes.

## 2020-07-04 NOTE — PHYSICAL THERAPY INITIAL EVALUATION ADULT - DIAGNOSIS, PT EVAL
Patient presents w/ baseline of being able to perform transfers, bed mobility (I); patient able to ambulate w/ RW

## 2020-07-04 NOTE — ED ADULT NURSE REASSESSMENT NOTE - GENERAL PATIENT STATE
comfortable appearance/resting/sleeping
comfortable appearance
comfortable appearance/resting/sleeping

## 2020-07-04 NOTE — PHYSICAL THERAPY INITIAL EVALUATION ADULT - GENERAL OBSERVATIONS, REHAB EVAL
PAtient received in semi-supine; AxOX3; IV line and O2 @3 li NC in place; edema noted on both ankles/feet.

## 2020-07-04 NOTE — PHYSICAL THERAPY INITIAL EVALUATION ADULT - PERTINENT HX OF CURRENT PROBLEM, REHAB EVAL
Patient admitted from home due to difficutly in breathing. Patient admitted from home due to difficutly in breathing. Patient w/ h/o Arthritis, HTN, Afib and HLD.

## 2020-07-04 NOTE — PHYSICAL THERAPY INITIAL EVALUATION ADULT - MANUAL MUSCLE TESTING RESULTS, REHAB EVAL
grossly graded 3-/5 to both UE within available range; grossly graded 3/5 to both LE; 3-/5 both ankles

## 2020-07-04 NOTE — CONSULT NOTE ADULT - SUBJECTIVE AND OBJECTIVE BOX
DATE OF SERVICE:Patient was seen,examined and evaluated on-07/04/2020    CHIEF COMPLAINT:Dyspnea    HPI:89 year old female lives alone with HHA with medical history of HTN, HLD, arthritis ,PAF ( patient is on eliquis and b blocker)presented to ED with complains of difficulty breathing. Patient AAO X 3 at baseline states that she was having difficulty in breathing and was sent to hospital. Patient denies any fever and chills and was saying that she was having difficulty  breathing . At present Patient is saturating well on 3 Lr nasal cannula. Patient does not remember her medications but states that her home health aid gives her meds regularly. Patient denies contact to any sock patient and denies chest pain, body ache , Bowel or Urinary problems.    GOC : Discussed with Son, Patient is DNR/DNI has to be signed by attending. (03 Jul 2020 21:33)      PAST MEDICAL & SURGICAL HISTORY:  Arthritis  HLD (hyperlipidemia)  Chronic kidney disease (CKD)  HTN (hypertension)  H/O local excision of skin lesion      MEDICATIONS  (STANDING):  apixaban 2.5 milliGRAM(s) Oral two times a day  aspirin  chewable 81 milliGRAM(s) Oral daily  atorvastatin 40 milliGRAM(s) Oral at bedtime  azithromycin  IVPB 500 milliGRAM(s) IV Intermittent every 24 hours  azithromycin  IVPB      cefTRIAXone   IVPB 1000 milliGRAM(s) IV Intermittent every 24 hours  furosemide    Tablet 20 milliGRAM(s) Oral daily  metoprolol tartrate 12.5 milliGRAM(s) Oral two times a day    MEDICATIONS  (PRN):  ALBUTerol    90 MICROgram(s) HFA Inhaler 2 Puff(s) Inhalation every 6 hours PRN Shortness of Breath and/or Wheezing      FAMILY HISTORY:    No family history of premature coronary artery disease or sudden cardiac death    SOCIAL HISTORY:  Smoking-  Alcohol-  Ilicit Drug use-    REVIEW OF SYSTEMS:  Constitutional: [ ] fever, [ ]weight loss, [ ]fatigue Activity [ ] Bedbound,[ ] Ambulates [ ] Unassisted[ ] Cane/Walker [ ] Assistence.  Eyes: [ ] visual changes  Respiratory: [ ]shortness of breath;  [ ] cough, [ ]wheezing, [ ]chills, [ ]hemoptysis  Cardiovascular: [ ] chest pain, [ ]palpitations, [ ]dizziness,  [ ]leg swelling[ ]orthopnea [ ]PND  Gastrointestinal: [ ] abdominal pain, [ ]nausea, [ ]vomiting,  [ ]diarrhea,[ ]constipation  Genitourinary: [ ] dysuria, [ ] hematuria  Neurologic: [ ] headaches [ ] tremors[ ] weakness  Skin: [ ] itching, [ ]burning, [ ] rashes  Endocrine: [ ] heat or cold intolerance  Musculoskeletal: [ ] joint pain or swelling; [ ] muscle, back, or extremity pain  Psychiatric: [ ] depression, [ ]anxiety, [ ]mood swings, or [ ]difficulty sleeping  Hematologic: [ ] easy bruising, [ ] bleeding gums       [ x] All others negative	  [ ] Unable to obtain    Vital Signs Last 24 Hrs  T(C): 36.7 (05 Jul 2020 05:21), Max: 37.1 (04 Jul 2020 14:12)  T(F): 98.1 (05 Jul 2020 05:21), Max: 98.7 (04 Jul 2020 14:12)  HR: 153 (05 Jul 2020 05:21) (66 - 153)  BP: 137/80 (05 Jul 2020 05:21) (111/66 - 157/70)  BP(mean): --  RR: 16 (05 Jul 2020 05:21) (16 - 18)  SpO2: 98% (05 Jul 2020 05:21) (92% - 100%)  I&O's Summary      PHYSICAL EXAM:  General: No acute distress BMI-  HEENT: EOMI, PERRL[ ] Icteric  Neck: Supple, No JVD  Lungs: Equal air entry bilaterally; [ ] Rales [ ] Rhonchi [ ] Wheezing  Heart: Regular rate and rhythm;[ ] Murmurs-   /6 [ ] Systolic [ ] Diastolic [ ] Radiation,No rubs, or gallops  Abdomen: Nontender, bowel sounds present  Extremities: No clubbing, cyanosis, or edema[ ] Calf tenderness  Nervous system:  Alert & Oriented X3, no focal deficits  Psychiatric: Normal affect  Skin: No rashes or lesions      LABS:  07-05    136  |  99  |  41<H>  ----------------------------<  97  5.0   |  29  |  1.64<H>    Ca    8.8      05 Jul 2020 05:58  Phos  4.5     07-05  Mg     2.3     07-05    TPro  7.9  /  Alb  3.0<L>  /  TBili  0.8  /  DBili  x   /  AST  14  /  ALT  23  /  AlkPhos  50  07-04    Creatinine Trend: 1.64<--, 1.36<--, 1.37<--                        9.3    8.12  )-----------( 229      ( 05 Jul 2020 05:58 )             30.2     PT/INR - ( 04 Jul 2020 05:53 )   PT: 15.2 sec;   INR: 1.32 ratio             Lipid Panel:   Cardiac Enzymes: CARDIAC MARKERS ( 04 Jul 2020 05:53 )  0.074 ng/mL / x     / 24 U/L / x     / 3.1 ng/mL  CARDIAC MARKERS ( 03 Jul 2020 21:07 )  0.095 ng/mL / x     / 23 U/L / x     / 2.3 ng/mL  CARDIAC MARKERS ( 03 Jul 2020 14:00 )  0.077 ng/mL / x     / x     / x     / x          Serum Pro-Brain Natriuretic Peptide: 6728 pg/mL (07-03-20 @ 14:00)        RADIOLOGY:    ECG [my interpretation]:    TELEMETRY:    ECHO:    STRESS TEST:    CATHETERIZATION: DATE OF SERVICE:Patient was seen,examined and evaluated on-07/04/2020    CHIEF COMPLAINT:Dyspnea    HPI:89 year old female lives alone with HHA with medical history of HTN, HLD, arthritis ,PAF ( patient is on eliquis and b blocker) Severe Aortic Stenosis presented to ED with complains of difficulty breathing. Patient AAO X 3 at baseline states that she was having difficulty in breathing and was sent to hospital. Patient denies any fever and chills and was saying that she was having difficulty  breathing . At present Patient is saturating well on 3 Lr nasal cannula. Patient does not remember her medications but states that her home health aid gives her meds regularly. Patient denies contact to any sick patient and denies chest pain, body ache , Bowel or Urinary problems.IS having periods Atrial fibrillation    GOC : Discussed with Son, Patient is DNR/DNI has to be signed by attending.     PAST MEDICAL & SURGICAL HISTORY:  Arthritis  HLD (hyperlipidemia)  Chronic kidney disease (CKD)  HTN (hypertension)  H/O local excision of skin lesion      MEDICATIONS  (STANDING):  apixaban 2.5 milliGRAM(s) Oral two times a day  aspirin  chewable 81 milliGRAM(s) Oral daily  atorvastatin 40 milliGRAM(s) Oral at bedtime  azithromycin  IVPB 500 milliGRAM(s) IV Intermittent every 24 hours  azithromycin  IVPB      cefTRIAXone   IVPB 1000 milliGRAM(s) IV Intermittent every 24 hours  furosemide    Tablet 20 milliGRAM(s) Oral daily  metoprolol tartrate 12.5 milliGRAM(s) Oral two times a day    MEDICATIONS  (PRN):  ALBUTerol    90 MICROgram(s) HFA Inhaler 2 Puff(s) Inhalation every 6 hours PRN Shortness of Breath and/or Wheezing      FAMILY HISTORY:  No family history of premature coronary artery disease or sudden cardiac death    SOCIAL HISTORY:  Smoking-Non Smoker  Alcohol-Denies  Ilicit Drug use-Denies    REVIEW OF SYSTEMS:  Constitutional: [ ] fever, [ ]weight loss, [ ]fatigue Activity [ ] Bedbound,[ ] Ambulates [ ] Unassisted[ ] Cane/Walker [ ] Assistence.  Eyes: [ ] visual changes  Respiratory: [x ]shortness of breath;  [ ] cough, [ ]wheezing, [ ]chills, [ ]hemoptysis  Cardiovascular: [ ] chest pain, [ ]palpitations, [ ]dizziness,  [ ]leg swelling[ ]orthopnea [ ]PND  Gastrointestinal: [ ] abdominal pain, [ ]nausea, [ ]vomiting,  [ ]diarrhea,[ ]constipation  Genitourinary: [ ] dysuria, [ ] hematuria  Neurologic: [ ] headaches [ ] tremors[ ] weakness  Skin: [ ] itching, [ ]burning, [ ] rashes  Endocrine: [ ] heat or cold intolerance  Musculoskeletal: [ ] joint pain or swelling; [ ] muscle, back, or extremity pain  Psychiatric: [ ] depression, [ ]anxiety, [ ]mood swings, or [ ]difficulty sleeping  Hematologic: [ ] easy bruising, [ ] bleeding gums       [ x] All others negative	  [ ] Unable to obtain    Vital Signs Last 24 Hrs  T(C): 37.1 (04 Jul 2020 07:02), Max: 37.9 (03 Jul 2020 13:14)  T(F): 98.7 (04 Jul 2020 07:02), Max: 100.3 (03 Jul 2020 13:14)  HR: 106 (04 Jul 2020 07:02) (74 - 126)  BP: 102/77 (04 Jul 2020 07:02) (102/77 - 193/94)  RR: 17 (04 Jul 2020 07:02) (17 - 38)  SpO2: 99% (04 Jul 2020 07:02) (74% - 99%)    PHYSICAL EXAM:  General: No acute distress BMI-21  HEENT: EOMI, PERRL[ ] Icteric  Neck: Supple, No JVD  Lungs: Equal air entry bilaterally; [ ] Rales [ ] Rhonchi [ ] Wheezing  Heart: Regular rate and rhythm;[x ] Murmurs-   3/6 [x ] Systolic [ ] Diastolic [x ] Radiation,No rubs, or gallops  Abdomen: Nontender, bowel sounds present  Extremities: No clubbing, cyanosis, or edema[ ] Calf tenderness  Nervous system:  Alert & Oriented X3, no focal deficits  Psychiatric: Normal affect  Skin: No rashes or lesions      LABS:                  9.5    7.98  )-----------( 204      ( 04 Jul 2020 05:53 )             30.8     07-04    133<L>  |  99  |  34<H>  ----------------------------<  96  5.1   |  28  |  1.36<H>    Ca    9.2      04 Jul 2020 05:53  Phos  3.6     07-04  Mg     2.3     07-04    TPro  7.9  /  Alb  3.0<L>  /  TBili  0.8  /  DBili  x   /  AST  14  /  ALT  23  /  AlkPhos  50  07-04    PT/INR - ( 04 Jul 2020 05:53 )   PT: 15.2 sec;   INR: 1.32 ratio       Cardiac Enzymes: CARDIAC MARKERS ( 04 Jul 2020 05:53 )  0.074 ng/mL / x     / 24 U/L / x     / 3.1 ng/mL  CARDIAC MARKERS ( 03 Jul 2020 21:07 )  0.095 ng/mL / x     / 23 U/L / x     / 2.3 ng/mL  CARDIAC MARKERS ( 03 Jul 2020 14:00 )  0.077 ng/mL / x     / x     / x     / x          Serum Pro-Brain Natriuretic Peptide: 6728 pg/mL (07-03-20 @ 14:00)        RADIOLOGY: XR CHEST PORTABLE ROUTINE 1V     IMPRESSION:  Small bilateral pleural effusions. Cardiomegaly.      ECG [my interpretation]:    TELEMETRY:    ECHO:Study Date: 7/4/2020  CONCLUSIONS:  1. Normal mitral valve. Mild to moderate mitral regurgitation.  2. Calcified aortic valve not well visualized. Peak transaortic valve gradient equals 62 mm Hg, mean transaortic valve gradient equals 43 mm Hg, estimated aortic valve area equals 0.8 sqcm (by continuity equation), consistent with severe aortic stenosis.   No aortic valve regurgitation seen.  3. Normal aortic root.  4. Normal left atrium.  5. Normal left ventricular internal dimensions and wall thicknesses.  6. Normal Left Ventricular Systolic Function,  (EF = 60 to 65% by visual estimation)  7. Grade II diastolic dysfunction.  8. Right ventricle not well visualized. Normal RV systolic function (TAPSE 2.4 cm).  9. RV systolic pressure is moderately increased at  55 mm Hg.  10. Tricuspid valve not well visualized, probably normal.    Mild to moderate tricuspid regurgitation.  11. Pulmonic valve not well seen. Trace pulmonic  insufficiency is noted.  12. No pericardial effusion.  13. Bilateral pleural effusions.

## 2020-07-04 NOTE — ED ADULT NURSE REASSESSMENT NOTE - COMFORT CARE
darkened lights/repositioned/side rails up
darkened lights/side rails up/warm blanket provided
repositioned/side rails up

## 2020-07-05 LAB
ANION GAP SERPL CALC-SCNC: 8 MMOL/L — SIGNIFICANT CHANGE UP (ref 5–17)
BASOPHILS # BLD AUTO: 0.02 K/UL — SIGNIFICANT CHANGE UP (ref 0–0.2)
BASOPHILS NFR BLD AUTO: 0.2 % — SIGNIFICANT CHANGE UP (ref 0–2)
BUN SERPL-MCNC: 41 MG/DL — HIGH (ref 7–18)
CALCIUM SERPL-MCNC: 8.8 MG/DL — SIGNIFICANT CHANGE UP (ref 8.4–10.5)
CHLORIDE SERPL-SCNC: 99 MMOL/L — SIGNIFICANT CHANGE UP (ref 96–108)
CO2 SERPL-SCNC: 29 MMOL/L — SIGNIFICANT CHANGE UP (ref 22–31)
CREAT SERPL-MCNC: 1.64 MG/DL — HIGH (ref 0.5–1.3)
EOSINOPHIL # BLD AUTO: 0.03 K/UL — SIGNIFICANT CHANGE UP (ref 0–0.5)
EOSINOPHIL NFR BLD AUTO: 0.4 % — SIGNIFICANT CHANGE UP (ref 0–6)
GLUCOSE SERPL-MCNC: 97 MG/DL — SIGNIFICANT CHANGE UP (ref 70–99)
HCT VFR BLD CALC: 30.2 % — LOW (ref 34.5–45)
HGB BLD-MCNC: 9.3 G/DL — LOW (ref 11.5–15.5)
IMM GRANULOCYTES NFR BLD AUTO: 0.2 % — SIGNIFICANT CHANGE UP (ref 0–1.5)
LYMPHOCYTES # BLD AUTO: 1.31 K/UL — SIGNIFICANT CHANGE UP (ref 1–3.3)
LYMPHOCYTES # BLD AUTO: 16.1 % — SIGNIFICANT CHANGE UP (ref 13–44)
MAGNESIUM SERPL-MCNC: 2.3 MG/DL — SIGNIFICANT CHANGE UP (ref 1.6–2.6)
MCHC RBC-ENTMCNC: 30.8 GM/DL — LOW (ref 32–36)
MCHC RBC-ENTMCNC: 31 PG — SIGNIFICANT CHANGE UP (ref 27–34)
MCV RBC AUTO: 100.7 FL — HIGH (ref 80–100)
MONOCYTES # BLD AUTO: 1.08 K/UL — HIGH (ref 0–0.9)
MONOCYTES NFR BLD AUTO: 13.3 % — SIGNIFICANT CHANGE UP (ref 2–14)
NEUTROPHILS # BLD AUTO: 5.66 K/UL — SIGNIFICANT CHANGE UP (ref 1.8–7.4)
NEUTROPHILS NFR BLD AUTO: 69.8 % — SIGNIFICANT CHANGE UP (ref 43–77)
NRBC # BLD: 0 /100 WBCS — SIGNIFICANT CHANGE UP (ref 0–0)
PHOSPHATE SERPL-MCNC: 4.5 MG/DL — SIGNIFICANT CHANGE UP (ref 2.5–4.5)
PLATELET # BLD AUTO: 229 K/UL — SIGNIFICANT CHANGE UP (ref 150–400)
POTASSIUM SERPL-MCNC: 5 MMOL/L — SIGNIFICANT CHANGE UP (ref 3.5–5.3)
POTASSIUM SERPL-SCNC: 5 MMOL/L — SIGNIFICANT CHANGE UP (ref 3.5–5.3)
RBC # BLD: 3 M/UL — LOW (ref 3.8–5.2)
RBC # FLD: 12.5 % — SIGNIFICANT CHANGE UP (ref 10.3–14.5)
SODIUM SERPL-SCNC: 136 MMOL/L — SIGNIFICANT CHANGE UP (ref 135–145)
WBC # BLD: 8.12 K/UL — SIGNIFICANT CHANGE UP (ref 3.8–10.5)
WBC # FLD AUTO: 8.12 K/UL — SIGNIFICANT CHANGE UP (ref 3.8–10.5)

## 2020-07-05 RX ADMIN — Medication 81 MILLIGRAM(S): at 11:19

## 2020-07-05 RX ADMIN — ATORVASTATIN CALCIUM 40 MILLIGRAM(S): 80 TABLET, FILM COATED ORAL at 21:38

## 2020-07-05 RX ADMIN — Medication 12.5 MILLIGRAM(S): at 17:19

## 2020-07-05 RX ADMIN — CEFTRIAXONE 100 MILLIGRAM(S): 500 INJECTION, POWDER, FOR SOLUTION INTRAMUSCULAR; INTRAVENOUS at 11:20

## 2020-07-05 RX ADMIN — APIXABAN 2.5 MILLIGRAM(S): 2.5 TABLET, FILM COATED ORAL at 05:56

## 2020-07-05 RX ADMIN — Medication 20 MILLIGRAM(S): at 05:56

## 2020-07-05 RX ADMIN — APIXABAN 2.5 MILLIGRAM(S): 2.5 TABLET, FILM COATED ORAL at 17:19

## 2020-07-05 RX ADMIN — AZITHROMYCIN 255 MILLIGRAM(S): 500 TABLET, FILM COATED ORAL at 21:38

## 2020-07-05 RX ADMIN — Medication 12.5 MILLIGRAM(S): at 05:55

## 2020-07-05 NOTE — PROGRESS NOTE ADULT - SUBJECTIVE AND OBJECTIVE BOX
Patient is a 89y old  Female who presents with a chief complaint of SOB (2020 21:33)      INTERVAL HPI/OVERNIGHT EVENTS:   Pt seen and examined at  the bedside.     MEDICATIONS  (STANDING):  apixaban 2.5 milliGRAM(s) Oral two times a day  aspirin  chewable 81 milliGRAM(s) Oral daily  atorvastatin 40 milliGRAM(s) Oral at bedtime  azithromycin  IVPB 500 milliGRAM(s) IV Intermittent every 24 hours  azithromycin  IVPB      cefTRIAXone   IVPB 1000 milliGRAM(s) IV Intermittent every 24 hours  furosemide    Tablet 20 milliGRAM(s) Oral daily  metoprolol tartrate 12.5 milliGRAM(s) Oral two times a day    MEDICATIONS  (PRN):  ALBUTerol    90 MICROgram(s) HFA Inhaler 2 Puff(s) Inhalation every 6 hours PRN Shortness of Breath and/or Wheezing  __________________________________________________  REVIEW OF SYSTEMS:    CONSTITUTIONAL: No fever,   EYES: no acute visual disturbances  NECK: No pain or stiffness  RESPIRATORY: No cough; No shortness of breath  CARDIOVASCULAR: No chest pain, no palpitations  GASTROINTESTINAL: No pain. No nausea or vomiting; No diarrhea   NEUROLOGICAL: No headache or numbness, no tremors  MUSCULOSKELETAL: No joint pain, no muscle pain  GENITOURINARY: no dysuria, no frequency, no hesitancy  PSYCHIATRY: no depression , no anxiety  ALL OTHER  ROS negative      Vital Signs Last 24 Hrs  T(C): 36.9 (2020 10:50), Max: 37.1 (2020 14:12)  T(F): 98.5 (2020 10:50), Max: 98.7 (2020 14:12)  HR: 51 (2020 10:50) (51 - 153)  BP: 117/72 (2020 10:50) (111/66 - 146/91)  BP(mean): --  RR: 16 (2020 10:50) (16 - 16)  SpO2: 99% (2020 10:50) (95% - 100%)  ________________________________________________  PHYSICAL EXAM:  GENERAL: female in bed   HEENT: Normocephalic;  conjunctivae and sclerae clear; moist mucous membranes;   NECK : supple  CHEST/LUNG: Clear to auscultation bilaterally with good air entry   HEART: S1 S2  regular; no murmurs, gallops or rubs  ABDOMEN: Soft, Nontender, Nondistended; Bowel sounds present  EXTREMITIES: no cyanosis;; no calf tenderness; bilateral edema present   SKIN: warm and dry; no rash  NERVOUS SYSTEM:  Awake and alert;   _________________________________________________  LABS:                        9.5    7.98  )-----------( 204      ( 2020 05:53 )             30.8   LABS:  -    136  |  99  |  41<H>  ----------------------------<  97  5.0   |  29  |  1.64<H>    Ca    8.8      2020 05:58  Phos  4.5     07-05  Mg     2.3     07-05    TPro  7.9  /  Alb  3.0<L>  /  TBili  0.8  /  DBili      /  AST  14  /  ALT  23  /  AlkPhos  50  07-04    Creatinine Trend: 1.64 <--, 1.36 <--, 1.37 <--                        9.3    8.12  )-----------( 229      ( 2020 05:58 )             30.2     Urine Studies:  Urinalysis Basic - ( 2020 14:49 )    Color: Yellow / Appearance: Clear / S.020 / pH:   Gluc:  / Ketone: Negative  / Bili: Negative / Urobili: Negative   Blood:  / Protein: 500 mg/dL / Nitrite: Negative   Leuk Esterase: Negative / RBC: 0-2 /HPF / WBC 0-2 /HPF   Sq Epi:  / Non Sq Epi: Moderate /HPF / Bacteria: Many /HPF        CARDIAC MARKERS ( 2020 05:53 )  0.074 ng/mL / x     / 24 U/L / x     / 3.1 ng/mL  CARDIAC MARKERS ( 2020 21:07 )  0.095 ng/mL / x     / 23 U/L / x     / 2.3 ng/mL  CARDIAC MARKERS ( 2020 14:00 )  0.077 ng/mL / x     / x     / x     / x            LIVER FUNCTIONS - ( 2020 05:53 )  Alb: 3.0 g/dL / Pro: 7.9 g/dL / ALK PHOS: 50 U/L / ALT: 23 U/L DA / AST: 14 U/L / GGT: x           PT/INR - ( 2020 05:53 )   PT: 15.2 sec;   INR: 1.32 ratio

## 2020-07-05 NOTE — PROGRESS NOTE ADULT - ASSESSMENT
89 year old female lives alone with HHA with medical history of HTN, HLD, arthritis , Afib ? ( patient is on eliquis and b blocker)presented to ED with complains of difficulty breathing. Patient AAO X 3 at baseline states that she was having difficulty in breathing and was sent to hospital. Patient denies any fever and chills and was saying that she was having difficulty  breathing .

## 2020-07-05 NOTE — CONSULT NOTE ADULT - SUBJECTIVE AND OBJECTIVE BOX
[facility] NEPHROLOGY- CONSULTATION NOTE    Patient is a 89yo Female with CKD-3, HTN, HLD, Severe AS, Arthritis p/w SOB. Patient a/w b/l PNA and CHF exacerbation.  Nephrology consulted for Elevated serum creatinine.  Pt was diuresed with IV lasix and then switched to Lasix 20mg PO qd on . Patient states her SOB has improved. Denies any chest pain, n/v/d or urinary complaints.         PAST MEDICAL & SURGICAL HISTORY:  Arthritis  HLD (hyperlipidemia)  Chronic kidney disease (CKD)  HTN (hypertension)  H/O local excision of skin lesion    Augmentin (Rash; Urticaria)    Home Medications Reviewed  Hospital Medications:   MEDICATIONS  (STANDING): Reviewed    SOCIAL HISTORY:  no toxic habits  FAMILY HISTORY:  No pertinent family history in first degree relatives      REVIEW OF SYSTEMS:  Gen: no changes in weight  HEENT: no rhinorrhea  Neck: no sore throat  Cards: no chest pain  Resp: no dyspnea- resolved  GI: no nausea or vomiting or diarrhea  : no dysuria or hematuria  Vascular: + LE edema   Derm: no rashes  Neuro: no numbness/tingling  All other review of systems is negative unless indicated above.    VITALS:  T(F): 98.5 (20 @ 10:50), Max: 98.7 (20 @ 01:27)  HR: 51 (20 @ 10:50)  BP: 117/72 (20 @ 10:50)  RR: 16 (20 @ 10:50)  SpO2: 99% (20 @ 10:50)  Wt(kg): --      PHYSICAL EXAM:  Gen: NAD, calm  HEENT: MMM, [place]  Neck: no JVD  Cards: RRR, +S1/S2, +ROLLY  Resp: decreased BS at bases  GI: soft, NT/ND, NABS  : no CVA tenderness  Extremities: +1 LE edema B/L   Derm: see above  Neuro: non-focal    LABS:      136  |  99  |  41<H>  ----------------------------<  97  5.0   |  29  |  1.64<H>    Ca    8.8      2020 05:58  Phos  4.5     -  Mg     2.3     -05    TPro  7.9  /  Alb  3.0<L>  /  TBili  0.8  /  DBili      /  AST  14  /  ALT  23  /  AlkPhos  50  07-04    Creatinine Trend: 1.64 <--, 1.36 <--, 1.37 <--                        9.3    8.12  )-----------( 229      ( 2020 05:58 )             30.2     Urine Studies:  Urinalysis Basic - ( 2020 14:49 )    Color: Yellow / Appearance: Clear / S.020 / pH:   Gluc:  / Ketone: Negative  / Bili: Negative / Urobili: Negative   Blood:  / Protein: 500 mg/dL / Nitrite: Negative   Leuk Esterase: Negative / RBC: 0-2 /HPF / WBC 0-2 /HPF   Sq Epi:  / Non Sq Epi: Moderate /HPF / Bacteria: Many /HPF      < from: Xray Chest 1 View- PORTABLE-Routine (20 @ 11:15) >    EXAM:  XR CHEST PORTABLE ROUTINE 1V                            PROCEDURE DATE:  2020          INTERPRETATION:  CLINICAL INDICATION: 89 years  Female with pulmonary edema.    COMPARISON: 7/3/2020    AP view of the chest demonstrates small bilateral pleural effusions. The lung fields are otherwise clear.. There is no pleural effusion. There is no pneumothorax.    The heart is mildly enlarged. The aorta is atherosclerotic. There is no mediastinal or hilar mass.     The pulmonary vasculature is normal.     Mild thoracic degenerative changes are present. There are severe degenerative changes of both shoulders and probable posttraumatic deformity on the right.    IMPRESSION:    Small bilateral pleural effusions. Cardiomegaly.    < end of copied text >

## 2020-07-05 NOTE — PROGRESS NOTE ADULT - SUBJECTIVE AND OBJECTIVE BOX
DATE OF SERVICE:Patient was seen and examined :07/05/2020    PRESENTING CC:Dyspnea    SUBJ: 89 year old female lives alone with HHA with medical history of HTN, HLD, arthritis , Afib ? ( patient is on eliquis and b blocker)presented to ED with complains of difficulty breathing.      PMH -reviewed admission note, no change since admission  Heart failure: acute [ ] chronic [ ] acute or chronic [x ] diastolic [ ] systolic [ ] combined systolic and diastolic[ ]  SHAUNA: ATN[x ] renal medullary necrosis [ ] CKD I [ ]CKDII [ ]CKD III [ ]CKD IV [ ]CKD V [ ]Other pathological lesions [ ]    MEDICATIONS  (STANDING):  apixaban 2.5 milliGRAM(s) Oral two times a day  aspirin  chewable 81 milliGRAM(s) Oral daily  atorvastatin 40 milliGRAM(s) Oral at bedtime  azithromycin  IVPB 500 milliGRAM(s) IV Intermittent every 24 hours  azithromycin  IVPB      cefTRIAXone   IVPB 1000 milliGRAM(s) IV Intermittent every 24 hours  furosemide    Tablet 20 milliGRAM(s) Oral daily  metoprolol tartrate 12.5 milliGRAM(s) Oral two times a day    MEDICATIONS  (PRN):  ALBUTerol    90 MICROgram(s) HFA Inhaler 2 Puff(s) Inhalation every 6 hours PRN Shortness of Breath and/or Wheezing              REVIEW OF SYSTEMS:  Constitutional: [ ] fever, [ ]weight loss,  [x ]fatigue  Eyes: [ ] visual changes  Respiratory: [ ]shortness of breath;  [ ] cough, [ ]wheezing, [ ]chills, [ ]hemoptysis  Cardiovascular: [ ] chest pain, [ ]palpitations, [ ]dizziness,  [ ]leg swelling[ ]orthopnea[ ]PND  Gastrointestinal: [ ] abdominal pain, [ ]nausea, [ ]vomiting,  [ ]diarrhea   Genitourinary: [ ] dysuria, [ ] hematuria  Neurologic: [ ] headaches [ ] tremors[ ]weakness  Skin: [ ] itching, [ ]burning, [ ] rashes  Endocrine: [ ] heat or cold intolerance  Musculoskeletal: [ ] joint pain or swelling; [ ] muscle, back, or extremity pain  Psychiatric: [ ] depression, [ ]anxiety, [ ]mood swings, or [ ]difficulty sleeping  Hematologic: [ ] easy bruising, [ ] bleeding gums    [x] All remaining systems negative except as per above.   [ ]Unable to obtain.    Vital Signs Last 24 Hrs  T(C): 36.9 (05 Jul 2020 10:50), Max: 37.1 (04 Jul 2020 14:12)  T(F): 98.5 (05 Jul 2020 10:50), Max: 98.7 (04 Jul 2020 14:12)  HR: 51 (05 Jul 2020 10:50) (51 - 153)  BP: 117/72 (05 Jul 2020 10:50) (111/66 - 146/91)  RR: 16 (05 Jul 2020 10:50) (16 - 16)  SpO2: 99% (05 Jul 2020 10:50) (95% - 100%)    PHYSICAL EXAM:  General: No acute distress BMI-31  HEENT: EOMI, PERRL  Neck: Supple, [ ] JVD  Lungs: Equal air entry bilaterally; [ ] rales [ ] wheezing [ ] rhonchi  Heart: Irregular rate and rhythm; [ ] murmur   3/6 [x ] systolic [ ] diastolic [ ] radiation[ ] rubs [ ]  gallops  Abdomen: Nontender, bowel sounds present  Extremities: No clubbing, cyanosis, [ ] edema  Nervous system:  Alert & Oriented X3, no focal deficits  Psychiatric: Normal affect  Skin: No rashes or lesions    LABS:              9.5    7.98  )-----------( 204      ( 04 Jul 2020 05:53 )             30.8   LABS:  07-05    136  |  99  |  41<H>  ----------------------------<  97  5.0   |  29  |  1.64<H>    Ca    8.8      05 Jul 2020 05:58  Phos  4.5     07-05  Mg     2.3     07-05    TPro  7.9  /  Alb  3.0<L>  /  TBili  0.8  /  DBili      /  AST  14  /  ALT  23  /  AlkPhos  50  07-04    Creatinine Trend: 1.64 <--, 1.36 <--, 1.37 <--                        9.3    8.12  )-----------( 229      ( 05 Jul 2020 05:58 )             30.2           TELEMETRY:Atrial Fibrillation RVR    IMPRESSION AND PLAN:    Problem/Plan - 1:  ·  Problem: Pneumonia of both lower lobes due to infectious organism.  Plan: Patient admitted for SOB, denies fever.  CX ray on admission showed pulmonary vascular congestion.  c/w Rocephin and Azithro for CAP   -Albuterol inhaler.  -Covid PCR negative ,Problem/Plan - 1:  ·  Problem: Pneumonia of both lower lobes due to infectious organism.  Plan: Patient admitted for SOB, denies fever.  CX ray on admission showed pulmonary vascular congestion.  c/w Rocephin and Azithro for CAP   -Albuterol inhaler.  -Covid PCR negative ,Problem/Plan - 2:  ·  Problem: Congestive heart failure, unspecified HF chronicity, unspecified heart failure type.  Plan: diuresis.     Problem/Plan - 3:  ·  Problem: Elevated troponin I level.  Plan: mild trop elevation   Patient denies any chest pain.  -EKG NSR.  - most likely demand ischemia       Problem/Plan - 2:  ·  Problem: Congestive heart failure, Diastolic with severe AS  Plan: diuresis.     Problem/Plan - 3:  ·  Problem: Elevated troponin I level.  Plan: mild trop elevation   Patient denies any chest pain.  -EKG NSR.  - most likely demand ischemia       Problem/Plan - 4:  ·  Problem: HLD (hyperlipidemia).  Plan: Patient takes Lipitor 40 at home .     Problem/Plan - 5:  ·  Problem: HTN (hypertension).  Plan: BP on softer side   DC Amlodipine,    Metoprolol dose to 12.5 BID         Problem/Plan - 6:  Problem: Chronic kidney disease (CKD). Plan: cr went up likely sec to diuresis.  Monitor may need to hold    Problem/Plan - 7:  ·  Problem: Elevated d-dimer.  Plan: -Patient has elevated D-dimers.  presented with SOB.  CTA -ve for Pulmonary Emboli.  follow LE doppler.     Problem/Plan - 8:  ·  Problem: Prophylactic measure.  Plan: IMPROVE VTE Individual Risk Assessment  RISK                                                                Points  [  ] Previous VTE                                                  3  [  ] Thrombophilia                                               2  [  ] Lower limb paralysis                                      2        (unable to hold up >15 seconds)    [  ] Current Cancer                                              2         (within 6 months)  [  ] Immobilization > 24 hrs                                1  [  ] ICU/CCU stay > 24 hours                              1  [x  ] Age > 60                                                      1  IMPROVE VTE Score : 2  Patient atkes Eliquis at home , continue with Eliquis.

## 2020-07-06 DIAGNOSIS — D64.9 ANEMIA, UNSPECIFIED: ICD-10-CM

## 2020-07-06 DIAGNOSIS — J96.01 ACUTE RESPIRATORY FAILURE WITH HYPOXIA: ICD-10-CM

## 2020-07-06 DIAGNOSIS — N17.9 ACUTE KIDNEY FAILURE, UNSPECIFIED: ICD-10-CM

## 2020-07-06 LAB
ANION GAP SERPL CALC-SCNC: 8 MMOL/L — SIGNIFICANT CHANGE UP (ref 5–17)
BASOPHILS # BLD AUTO: 0.02 K/UL — SIGNIFICANT CHANGE UP (ref 0–0.2)
BASOPHILS NFR BLD AUTO: 0.3 % — SIGNIFICANT CHANGE UP (ref 0–2)
BUN SERPL-MCNC: 59 MG/DL — HIGH (ref 7–18)
CALCIUM SERPL-MCNC: 8.5 MG/DL — SIGNIFICANT CHANGE UP (ref 8.4–10.5)
CHLORIDE SERPL-SCNC: 99 MMOL/L — SIGNIFICANT CHANGE UP (ref 96–108)
CHLORIDE UR-SCNC: 24 MMOL/L — SIGNIFICANT CHANGE UP
CO2 SERPL-SCNC: 28 MMOL/L — SIGNIFICANT CHANGE UP (ref 22–31)
CREAT ?TM UR-MCNC: 86 MG/DL — SIGNIFICANT CHANGE UP
CREAT SERPL-MCNC: 2.32 MG/DL — HIGH (ref 0.5–1.3)
EOSINOPHIL # BLD AUTO: 0.15 K/UL — SIGNIFICANT CHANGE UP (ref 0–0.5)
EOSINOPHIL NFR BLD AUTO: 2.1 % — SIGNIFICANT CHANGE UP (ref 0–6)
GLUCOSE SERPL-MCNC: 93 MG/DL — SIGNIFICANT CHANGE UP (ref 70–99)
HCT VFR BLD CALC: 28.5 % — LOW (ref 34.5–45)
HGB BLD-MCNC: 8.6 G/DL — LOW (ref 11.5–15.5)
IMM GRANULOCYTES NFR BLD AUTO: 0.6 % — SIGNIFICANT CHANGE UP (ref 0–1.5)
LYMPHOCYTES # BLD AUTO: 1.94 K/UL — SIGNIFICANT CHANGE UP (ref 1–3.3)
LYMPHOCYTES # BLD AUTO: 27.4 % — SIGNIFICANT CHANGE UP (ref 13–44)
MAGNESIUM SERPL-MCNC: 2.4 MG/DL — SIGNIFICANT CHANGE UP (ref 1.6–2.6)
MCHC RBC-ENTMCNC: 30.2 GM/DL — LOW (ref 32–36)
MCHC RBC-ENTMCNC: 31.3 PG — SIGNIFICANT CHANGE UP (ref 27–34)
MCV RBC AUTO: 103.6 FL — HIGH (ref 80–100)
MONOCYTES # BLD AUTO: 0.95 K/UL — HIGH (ref 0–0.9)
MONOCYTES NFR BLD AUTO: 13.4 % — SIGNIFICANT CHANGE UP (ref 2–14)
NEUTROPHILS # BLD AUTO: 3.98 K/UL — SIGNIFICANT CHANGE UP (ref 1.8–7.4)
NEUTROPHILS NFR BLD AUTO: 56.2 % — SIGNIFICANT CHANGE UP (ref 43–77)
NRBC # BLD: 0 /100 WBCS — SIGNIFICANT CHANGE UP (ref 0–0)
PHOSPHATE SERPL-MCNC: 5.6 MG/DL — HIGH (ref 2.5–4.5)
PLATELET # BLD AUTO: 225 K/UL — SIGNIFICANT CHANGE UP (ref 150–400)
POTASSIUM SERPL-MCNC: 4.9 MMOL/L — SIGNIFICANT CHANGE UP (ref 3.5–5.3)
POTASSIUM SERPL-SCNC: 4.9 MMOL/L — SIGNIFICANT CHANGE UP (ref 3.5–5.3)
RBC # BLD: 2.75 M/UL — LOW (ref 3.8–5.2)
RBC # FLD: 12.7 % — SIGNIFICANT CHANGE UP (ref 10.3–14.5)
SODIUM SERPL-SCNC: 135 MMOL/L — SIGNIFICANT CHANGE UP (ref 135–145)
SODIUM UR-SCNC: 14 MMOL/L — SIGNIFICANT CHANGE UP
WBC # BLD: 7.08 K/UL — SIGNIFICANT CHANGE UP (ref 3.8–10.5)
WBC # FLD AUTO: 7.08 K/UL — SIGNIFICANT CHANGE UP (ref 3.8–10.5)

## 2020-07-06 PROCEDURE — 99223 1ST HOSP IP/OBS HIGH 75: CPT

## 2020-07-06 RX ORDER — IPRATROPIUM/ALBUTEROL SULFATE 18-103MCG
3 AEROSOL WITH ADAPTER (GRAM) INHALATION EVERY 8 HOURS
Refills: 0 | Status: DISCONTINUED | OUTPATIENT
Start: 2020-07-06 | End: 2020-07-13

## 2020-07-06 RX ORDER — FUROSEMIDE 40 MG
20 TABLET ORAL
Refills: 0 | Status: DISCONTINUED | OUTPATIENT
Start: 2020-07-06 | End: 2020-07-06

## 2020-07-06 RX ADMIN — ATORVASTATIN CALCIUM 40 MILLIGRAM(S): 80 TABLET, FILM COATED ORAL at 21:55

## 2020-07-06 RX ADMIN — Medication 81 MILLIGRAM(S): at 12:10

## 2020-07-06 RX ADMIN — Medication 20 MILLIGRAM(S): at 05:15

## 2020-07-06 RX ADMIN — ALBUTEROL 2 PUFF(S): 90 AEROSOL, METERED ORAL at 12:10

## 2020-07-06 RX ADMIN — Medication 12.5 MILLIGRAM(S): at 17:15

## 2020-07-06 RX ADMIN — APIXABAN 2.5 MILLIGRAM(S): 2.5 TABLET, FILM COATED ORAL at 05:15

## 2020-07-06 RX ADMIN — AZITHROMYCIN 255 MILLIGRAM(S): 500 TABLET, FILM COATED ORAL at 19:51

## 2020-07-06 RX ADMIN — CEFTRIAXONE 100 MILLIGRAM(S): 500 INJECTION, POWDER, FOR SOLUTION INTRAMUSCULAR; INTRAVENOUS at 12:09

## 2020-07-06 RX ADMIN — APIXABAN 2.5 MILLIGRAM(S): 2.5 TABLET, FILM COATED ORAL at 17:15

## 2020-07-06 RX ADMIN — Medication 12.5 MILLIGRAM(S): at 05:15

## 2020-07-06 NOTE — PROGRESS NOTE ADULT - SUBJECTIVE AND OBJECTIVE BOX
DATE OF SERVICE:Patient was seen and examined :07/06/2020    PRESENTING CC:Dyspnea    SUBJ: 89 year old female lives alone with HHA with medical history of HTN, HLD, arthritis ,PAF ( patient is on eliquis and b blocker) Severe Aortic Stenosis presented to ED with complains of difficulty breathing. Dyspnea has improved still in Atrial Fibrillation ~~90"s      PMH -reviewed admission note, no change since admission  Heart failure: acute [ ] chronic [x ] acute or chronic [ ] diastolic [x ] systolic [ ] combined systolic and diastolic[ ]  SHAUNA][x]: ATN[ ] renal medullary necrosis [ ] CKD I [ ]CKDII [ ]CKD III [ ]CKD IV [ ]CKD V [ ]Other pathological lesions [ ]    MEDICATIONS  (STANDING):  apixaban 2.5 milliGRAM(s) Oral two times a day  aspirin  chewable 81 milliGRAM(s) Oral daily  atorvastatin 40 milliGRAM(s) Oral at bedtime  azithromycin  IVPB 500 milliGRAM(s) IV Intermittent every 24 hours  azithromycin  IVPB      cefTRIAXone   IVPB 1000 milliGRAM(s) IV Intermittent every 24 hours  furosemide    Tablet 20 milliGRAM(s) Oral daily  metoprolol tartrate 12.5 milliGRAM(s) Oral two times a day    MEDICATIONS  (PRN):  ALBUTerol    90 MICROgram(s) HFA Inhaler 2 Puff(s) Inhalation every 6 hours PRN Shortness of Breath and/or Wheezing              REVIEW OF SYSTEMS:  Constitutional: [ ] fever, [ ]weight loss,  [x ]fatigue  Eyes: [ ] visual changes  Respiratory: [ ]shortness of breath;  [ ] cough, [ ]wheezing, [ ]chills, [ ]hemoptysis  Cardiovascular: [ ] chest pain, [x ]palpitations, [ ]dizziness,  [ ]leg swelling[ ]orthopnea[ ]PND  Gastrointestinal: [ ] abdominal pain, [ ]nausea, [ ]vomiting,  [ ]diarrhea   Genitourinary: [ ] dysuria, [ ] hematuria  Neurologic: [ ] headaches [ ] tremors[ ]weakness  Skin: [ ] itching, [ ]burning, [ ] rashes  Endocrine: [ ] heat or cold intolerance  Musculoskeletal: [ ] joint pain or swelling; [ ] muscle, back, or extremity pain  Psychiatric: [ ] depression, [ ]anxiety, [ ]mood swings, or [ ]difficulty sleeping  Hematologic: [ ] easy bruising, [ ] bleeding gums    [x] All remaining systems negative except as per above.   [ ]Unable to obtain.    Vital Signs Last 24 Hrs  T(C): 36.4 (06 Jul 2020 04:57), Max: 36.9 (05 Jul 2020 10:50)  T(F): 97.6 (06 Jul 2020 04:57), Max: 98.5 (05 Jul 2020 10:50)  HR: 60 (06 Jul 2020 04:57) (51 - 66)  BP: 129/74 (06 Jul 2020 04:57) (100/57 - 129/74)  RR: 16 (06 Jul 2020 04:57) (16 - 18)  SpO2: 95% (06 Jul 2020 09:10) (80% - 100%)  I&O's Summary      PHYSICAL EXAM:  General: No acute distress BMI-31  HEENT: EOMI, PERRL  Neck: Supple, [ ] JVD  Lungs: Equal air entry bilaterally; [ ] rales [ ] wheezing [ ] rhonchi  Heart: Irregular rate and rhythm; [x ] murmur  3 /6 [x ] systolic [ ] diastolic [ ] radiation[ ] rubs [ ]  gallops  Abdomen: Nontender, bowel sounds present  Extremities: No clubbing, cyanosis, [ ] edema  Nervous system:  Alert & Oriented X3, no focal deficits  Psychiatric: Normal affect  Skin: No rashes or lesions    LABS:  07-06    135  |  99  |  59<H>  ----------------------------<  93  4.9   |  28  |  2.32<H>    Ca    8.5      06 Jul 2020 07:17  Phos  5.6     07-06  Mg     2.4     07-06      Creatinine Trend: 2.32<--, 1.64<--, 1.36<--, 1.37<--                        8.6    7.08  )-----------( 225      ( 06 Jul 2020 07:17 )             28.5       RADIOLOGY:    ECG [my interpretation]:    TELEMETRY:Atrial fibrillation    ECHO:Study Date: 7/4/2020  CONCLUSIONS:  1. Normal mitral valve. Mild to moderate mitral regurgitation.  2. Calcified aortic valve not well visualized. Peak transaortic valve gradient equals 62 mm Hg, mean transaortic valve gradient equals 43 mm Hg, estimated aortic valve area equals 0.8 sqcm (by continuity equation), consistent with severe aortic stenosis.   No aortic valve regurgitation seen.  3. Normal aortic root.  4. Normal left atrium.  5. Normal left ventricular internal dimensions and wall thicknesses.  6. Normal Left Ventricular Systolic Function,  (EF = 60 to 65% by visual estimation)  7. Grade II diastolic dysfunction.  8. Right ventricle not well visualized. Normal RV systolic function (TAPSE 2.4 cm).  9. RV systolic pressure is moderately increased at  55 mm Hg.  10. Tricuspid valve not well visualized, probably normal.    Mild to moderate tricuspid regurgitation.  11. Pulmonic valve not well seen. Trace pulmonic  insufficiency is noted.  12. No pericardial effusion.  13. Bilateral pleural effusions.    IMPRESSION AND PLAN:    89 year old female lives alone with A with medical history of HTN, HLD, arthritis , PAF ( patient is on eliquis and b blocker) Severe Aortic Stenosis presented to ED with complains of difficulty breathing.         Problem/Plan - 1:  ·  Problem: Pneumonia of both lower lobes due to infectious organism.  Plan: Patient admitted for SOB, denies fever.  CX ray on admission showed pulmonary vascular congestion.  c/w Rocephin and Azithro for CAP   -Albuterol inhaler.  -Covid PCR negative , follow repeat COVID   saturation improved, 99% on 2L.         Problem/Plan - 2:  ·  Problem: Congestive heart failure,Diastolic.  Plan: Patients presented after SOB.  -has mild B/L leg edema.  -Pro BNP 6728.  -ECHO EF63% Severe Aortic stenosis  -Noted SHAUNA hold Lasix        Problem/Plan - 3:  ·  Problem: Elevated troponin I level.  Plan: mild trop elevation Patient denies any chest pain.  -EKG NSR.  - most likely demand ischemia       Problem/Plan - 4:  ·  Problem: HLD (hyperlipidemia).  Plan: Patient takes Lipitor 40 at home .   Lipid Profile (07.04.20 @ 05:53)    Total Cholesterol/HDL Ratio Measurement: 2.4 RATIO    Cholesterol, Serum: 130 mg/dL    Triglycerides, Serum: 64 mg/dL    HDL Cholesterol, Serum: 54  mg/dL        Problem/Plan - 5:  ·  Problem: Paroxysmal Atrial Fibrillation.  Plan: CHADS2-4 BP on softer side   DC Amlodipine,   Decrease Metoprolol dose to 12.5 BID   - Continue Eliquis    Problem/Plan - 6:  Problem: Chronic kidney disease (CKD). Plan: Patient has CKD, stage 4 creatinine elevated    Problem/Plan - 7:  ·  Problem: Elevated d-dimer.  Plan: -Patient has elevated D-dimers.  presented with SOB.  CTA -ve for Pulmonary Emboli.-  - LE doppler--No DVT.

## 2020-07-06 NOTE — CONSULT NOTE ADULT - SUBJECTIVE AND OBJECTIVE BOX
DATE OF SERVICE:Patient was seen,examined and evaluated on-07/04/2020    CHIEF COMPLAINT:Dyspnea    HPI:89 year old female lives alone with HHA with medical history of HTN, HLD, arthritis ,PAF ( patient is on eliquis and b blocker) Severe Aortic Stenosis presented to ED with complains of difficulty breathing. Patient AAO X 3 at baseline states that she was having difficulty in breathing for about a month as well as leg swelling and was sent to hospital. Currently no sob on 3l n/c.  She reports remote h/o asthma, but has no needed inhalers in years. No fevers or cough, no phlegm. Reports fatigue when eating but no chocking and no cough. Denies PND or orthopnea.        PAST MEDICAL & SURGICAL HISTORY:  Arthritis  HLD (hyperlipidemia)  Chronic kidney disease (CKD)  HTN (hypertension)  H/O local excision of skin lesion      MEDICATIONS  (STANDING):  apixaban 2.5 milliGRAM(s) Oral two times a day  aspirin  chewable 81 milliGRAM(s) Oral daily  atorvastatin 40 milliGRAM(s) Oral at bedtime  azithromycin  IVPB 500 milliGRAM(s) IV Intermittent every 24 hours  azithromycin  IVPB      cefTRIAXone   IVPB 1000 milliGRAM(s) IV Intermittent every 24 hours  furosemide    Tablet 20 milliGRAM(s) Oral daily  metoprolol tartrate 12.5 milliGRAM(s) Oral two times a day    MEDICATIONS  (PRN):  ALBUTerol    90 MICROgram(s) HFA Inhaler 2 Puff(s) Inhalation every 6 hours PRN Shortness of Breath and/or Wheezing      FAMILY HISTORY:  No family history of premature coronary artery disease or sudden cardiac death    SOCIAL HISTORY:  Smoking-Non Smoker  Alcohol-Denies  Ilicit Drug use-Denies

## 2020-07-06 NOTE — PROGRESS NOTE ADULT - ASSESSMENT
Patient is a 89yo Female with CKD-3, HTN, HLD, Severe AS, Arthritis p/w SOB. Patient a/w b/l PNA and CHF exacerbation.  Nephrology consulted for Elevated serum creatinine.    1. SHAUNA- likely pre-renal in the setting of overdiuresis for which IV Lasix was switched to 20mg PO daily 7/4. Pt now with worsening renal function, please hold further Lasix. Check urine lytes. Strict I/Os. Avoid nephrotoxins/ NSAIDs/ RCA. Monitor BMP.  2. CKD-3- in the setting of HTN. Baseline renal function SCr 1.2-1.4. Avoid Nephrotoxins. Monitor lytes.   3. LE edema- mild, Please hold further diuretics.  Avoid overdiuresis.  Strict I/Os. Monitor urine o/p. Daily weights.   4. Aortic Stenosis- Severe based on TTE. avoid overdiuresis. Plan as per Cardiology.

## 2020-07-06 NOTE — PROGRESS NOTE ADULT - SUBJECTIVE AND OBJECTIVE BOX
[facility] NEPHROLOGY- PROGRESS NOTE    Patient is a 89yo Female with CKD-3, HTN, HLD, Severe AS, Arthritis p/w SOB. Patient a/w b/l PNA and CHF exacerbation.  Nephrology consulted for Elevated serum creatinine.    Hospital Medications: Medications reviewed.  REVIEW OF SYSTEMS:  CONSTITUTIONAL: No fevers or chills  RESPIRATORY: No shortness of breath-improved  CARDIOVASCULAR: No chest pain.  GASTROINTESTINAL: No nausea, vomiting, diarrhea or abdominal pain.   VASCULAR: +bilateral lower extremity edema.     VITALS:  T(F): 97.7 (20 @ 10:20), Max: 98.1 (20 @ 21:08)  HR: 55 (20 @ 10:20)  BP: 111/60 (20 @ 10:20)  RR: 18 (20 @ 10:20)  SpO2: 100% (20 @ 10:20)  Wt(kg): --    Weight (kg): 65.8 ( @ 13:14)    PHYSICAL EXAM:  Gen: NAD, calm  HEENT: anicteric  Neck: no JVD  Cards: RRR, +S1/S2, +ROLLY  Resp: decreased BS at bases  GI: soft, NT/ND, NABS  Extremities: +1 LE edema B/L   tender to palpation of LE    LABS:      135  |  99  |  59<H>  ----------------------------<  93  4.9   |  28  |  2.32<H>    Ca    8.5      2020 07:17  Phos  5.6     07-06  Mg     2.4     07-06      Creatinine Trend: 2.32 <--, 1.64 <--, 1.36 <--, 1.37 <--                        8.6    7.08  )-----------( 225      ( 2020 07:17 )             28.5     Urine Studies:  Urinalysis Basic - ( 2020 14:49 )    Color: Yellow / Appearance: Clear / S.020 / pH:   Gluc:  / Ketone: Negative  / Bili: Negative / Urobili: Negative   Blood:  / Protein: 500 mg/dL / Nitrite: Negative   Leuk Esterase: Negative / RBC: 0-2 /HPF / WBC 0-2 /HPF   Sq Epi:  / Non Sq Epi: Moderate /HPF / Bacteria: Many /HPF        RADIOLOGY & ADDITIONAL STUDIES:

## 2020-07-06 NOTE — PROGRESS NOTE ADULT - SUBJECTIVE AND OBJECTIVE BOX
PGY 3 Note discussed with primary attending    Patient is a 89y old  Female who presents with a chief complaint of SOB (06 Jul 2020 09:39)      INTERVAL HPI/OVERNIGHT EVENTS:   Pt seen and examined at the bedside.   creatinine getting worse, Bladder scan: 190cc, will decrease Lasix to home dose: 20 every other day   not in fluid overload.   Pt desaturating n RA to 80s. Dr Perez consulted.       MEDICATIONS  (STANDING):  apixaban 2.5 milliGRAM(s) Oral two times a day  aspirin  chewable 81 milliGRAM(s) Oral daily  atorvastatin 40 milliGRAM(s) Oral at bedtime  azithromycin  IVPB 500 milliGRAM(s) IV Intermittent every 24 hours  azithromycin  IVPB      cefTRIAXone   IVPB 1000 milliGRAM(s) IV Intermittent every 24 hours  furosemide    Tablet 20 milliGRAM(s) Oral daily  metoprolol tartrate 12.5 milliGRAM(s) Oral two times a day    MEDICATIONS  (PRN):  ALBUTerol    90 MICROgram(s) HFA Inhaler 2 Puff(s) Inhalation every 6 hours PRN Shortness of Breath and/or Wheezing      __________________________________________________  REVIEW OF SYSTEMS:    CONSTITUTIONAL: No fever,   EYES: no acute visual disturbances  NECK: No pain or stiffness  RESPIRATORY: No cough; No shortness of breath  CARDIOVASCULAR: No chest pain, no palpitations  GASTROINTESTINAL: No pain. No nausea or vomiting; No diarrhea   NEUROLOGICAL: No headache or numbness, no tremors  MUSCULOSKELETAL: No joint pain, no muscle pain  GENITOURINARY: no dysuria, no frequency, no hesitancy  PSYCHIATRY: no depression , no anxiety  ALL OTHER  ROS negative        Vital Signs Last 24 Hrs  T(C): 36.5 (06 Jul 2020 10:20), Max: 36.7 (05 Jul 2020 21:08)  T(F): 97.7 (06 Jul 2020 10:20), Max: 98.1 (05 Jul 2020 21:08)  HR: 55 (06 Jul 2020 10:20) (52 - 66)  BP: 111/60 (06 Jul 2020 10:20) (100/57 - 129/74)  RR: 18 (06 Jul 2020 10:20) (16 - 18)  SpO2: 100% (06 Jul 2020 10:20) (80% - 100%)    ________________________________________________  PHYSICAL EXAM:  GENERAL:pt comfortable in bed,   HEENT: Normocephalic;  conjunctivae and sclerae clear; moist mucous membranes;   NECK : supple  CHEST/LUNG: bilateral god air entry, bilateral lower lobe crackles present   HEART: S1 S2  regular; no murmurs, gallops or rubs  ABDOMEN: Soft, Nontender, Nondistended; Bowel sounds present  EXTREMITIES: no cyanosis; no edema; no calf tenderness  SKIN: warm and dry; no rash  NERVOUS SYSTEM:  Awake and alert; Oriented  to place, person and time ; no new deficits    _________________________________________________  LABS:                        8.6    7.08  )-----------( 225      ( 06 Jul 2020 07:17 )             28.5     07-06    135  |  99  |  59<H>  ----------------------------<  93  4.9   |  28  |  2.32<H>    Ca    8.5      06 Jul 2020 07:17  Phos  5.6     07-06  Mg     2.4     07-06          CAPILLARY BLOOD GLUCOSE            RADIOLOGY & ADDITIONAL TESTS:    Imaging Personally Reviewed:  YES/  < from: Transthoracic Echocardiogram (07.04.20 @ 06:52) >    Patient name: HOLLIE MICHELLE  YOB: 1923   Age: 89 (F)   MR#: [id number]  Study Date: 7/4/2020  Location: [address] [place], [place] [address]Sonographer: Jessica Neil RDCS  Study quality: Fair  Referring Physician: Shakeel Bennett MD  Blood Pressure: 102/77 mmHg  Height: 152 cm  Weight: 66 kg  BSA: 1.6 m2  ------------------------------------------------------------------------    PROCEDURE: Transthoracic echocardiogram with 2-D, M-Mode  and complete spectral and color flow Doppler.  INDICATION: Chronic ischemic heart disease, unspecified  (I25.9)  HISTORY:  ------------------------------------------------------------------------  DIMENSIONS:  Dimensions:     Normal Values:  LA:     4.2 cm    2.0 - 4.0 cm  Ao:     3.1 cm    2.0 - 3.8 cm  SEPTUM: 0.9 cm    0.6 - 1.2 cm  PWT:    0.9 cm    0.6 - 1.1 cm  LVIDd:  4.6 cm    3.0 - 5.6 cm  LVIDs:  3.3 cm    1.8 - 4.0 cm      Derived Variables:  LVMI: 84 g/m2  RWT: 0.39  Ejection Fraction Visual Estimate: 60-65 %  Peak Velocity (m/sec): AoV=3.9  ------------------------------------------------------------------------  OBSERVATIONS:  Mitral Valve: Normal mitral valve. Mild to moderate mitral  regurgitation.  Aortic Root: Normal aortic root.  Aortic Valve: Calcified aortic valve not well visualized.  Peak transaortic valve gradient equals 62 mm Hg, mean  transaortic valve gradient equals 43 mm Hg, estimated  aortic valve area equals 0.8 sqcm (by continuity equation),  consistent with severe aortic stenosis. No aortic valve  regurgitation seen.  Left Atrium: Normal left atrium.  LA volume index = 28  cc/m2.  Left Ventricle: Normal Left Ventricular Systolic Function,  (EF = 60 to 65% by visual estimation) Not all LV wall  segments were seen. Normal left ventricular internal  dimensions and wall thicknesses. Grade II diastolic  dysfunction.  Right Heart: Right atrium not well visualized. Right  ventricle not well visualized. Normal RV systolic function  (TAPSE 2.4 cm). Tricuspid valve not well visualized,  probably normal. Mild to moderate tricuspid regurgitation.  Pulmonic valve not well seen. Trace pulmonic insufficiency  is noted.  Pericardium/PleuraNo pericardial effusion. Bilateral  pleural effusions.  Hemodynamic: RA Pressure is 8 mm Hg. RV systolic pressure  is moderately increased at  55 mm Hg.  ------------------------------------------------------------------------  CONCLUSIONS:  1. Normal mitral valve. Mild to moderate mitral  regurgitation.  2. Calcified aortic valve not well visualized. Peak  transaortic valve gradient equals 62 mm Hg, mean  transaortic valve gradient equals 43 mm Hg, estimated  aortic valve area equals 0.8 sqcm (by continuity equation),  consistent with severe aortic stenosis. No aortic valve  regurgitation seen.  3. Normal aortic root.  4. Normal left atrium.  5. Normal left ventricular internal dimensions and wall  thicknesses.  6. Normal Left Ventricular Systolic Function,  (EF = 60 to  65% by visual estimation)  7. Grade II diastolic dysfunction.  8. Right ventricle not well visualized. Normal RV systolic  function (TAPSE 2.4 cm).  9. RV systolic pressure is moderately increased at  55 mm  Hg.  10. Tricuspid valve not well visualized, probably normal.  Mild to moderate tricuspid regurgitation.  11. Pulmonic valve not well seen. Trace pulmonic  insufficiency is noted.  12. No pericardial effusion.  13. Bilateral pleural effusions.    *** Compared with echocardiogram report  of 1/22/2020,  there appears to be progression of aortic stenosis, though  it was noted to be severe on echo from 03/18/2019.    < end of copied text >      Consultant(s) Notes Reviewed:   YES/    Care Discussed with Consultants :     Plan of care was discussed with patient and /or primary care giver; all questions and concerns were addressed and care was aligned with patient's wishes.

## 2020-07-07 DIAGNOSIS — R53.81 OTHER MALAISE: ICD-10-CM

## 2020-07-07 DIAGNOSIS — E43 UNSPECIFIED SEVERE PROTEIN-CALORIE MALNUTRITION: ICD-10-CM

## 2020-07-07 DIAGNOSIS — Z51.5 ENCOUNTER FOR PALLIATIVE CARE: ICD-10-CM

## 2020-07-07 DIAGNOSIS — N18.9 CHRONIC KIDNEY DISEASE, UNSPECIFIED: ICD-10-CM

## 2020-07-07 LAB
ALBUMIN SERPL ELPH-MCNC: 2.6 G/DL — LOW (ref 3.5–5)
ALP SERPL-CCNC: 51 U/L — SIGNIFICANT CHANGE UP (ref 40–120)
ALT FLD-CCNC: 25 U/L DA — SIGNIFICANT CHANGE UP (ref 10–60)
ANION GAP SERPL CALC-SCNC: 6 MMOL/L — SIGNIFICANT CHANGE UP (ref 5–17)
AST SERPL-CCNC: 24 U/L — SIGNIFICANT CHANGE UP (ref 10–40)
BASOPHILS # BLD AUTO: 0 K/UL — SIGNIFICANT CHANGE UP (ref 0–0.2)
BASOPHILS NFR BLD AUTO: 0 % — SIGNIFICANT CHANGE UP (ref 0–2)
BILIRUB SERPL-MCNC: 0.3 MG/DL — SIGNIFICANT CHANGE UP (ref 0.2–1.2)
BUN SERPL-MCNC: 55 MG/DL — HIGH (ref 7–18)
CALCIUM SERPL-MCNC: 8.8 MG/DL — SIGNIFICANT CHANGE UP (ref 8.4–10.5)
CHLORIDE SERPL-SCNC: 101 MMOL/L — SIGNIFICANT CHANGE UP (ref 96–108)
CO2 SERPL-SCNC: 30 MMOL/L — SIGNIFICANT CHANGE UP (ref 22–31)
CREAT SERPL-MCNC: 1.96 MG/DL — HIGH (ref 0.5–1.3)
EOSINOPHIL # BLD AUTO: 0.06 K/UL — SIGNIFICANT CHANGE UP (ref 0–0.5)
EOSINOPHIL NFR BLD AUTO: 1 % — SIGNIFICANT CHANGE UP (ref 0–6)
GLUCOSE SERPL-MCNC: 100 MG/DL — HIGH (ref 70–99)
HCT VFR BLD CALC: 31.1 % — LOW (ref 34.5–45)
HGB BLD-MCNC: 9.4 G/DL — LOW (ref 11.5–15.5)
LYMPHOCYTES # BLD AUTO: 1.21 K/UL — SIGNIFICANT CHANGE UP (ref 1–3.3)
LYMPHOCYTES # BLD AUTO: 20 % — SIGNIFICANT CHANGE UP (ref 13–44)
MAGNESIUM SERPL-MCNC: 2.6 MG/DL — SIGNIFICANT CHANGE UP (ref 1.6–2.6)
MCHC RBC-ENTMCNC: 30.2 GM/DL — LOW (ref 32–36)
MCHC RBC-ENTMCNC: 31.1 PG — SIGNIFICANT CHANGE UP (ref 27–34)
MCV RBC AUTO: 103 FL — HIGH (ref 80–100)
MONOCYTES # BLD AUTO: 0.18 K/UL — SIGNIFICANT CHANGE UP (ref 0–0.9)
MONOCYTES NFR BLD AUTO: 3 % — SIGNIFICANT CHANGE UP (ref 2–14)
NEUTROPHILS # BLD AUTO: 4.55 K/UL — SIGNIFICANT CHANGE UP (ref 1.8–7.4)
NEUTROPHILS NFR BLD AUTO: 72 % — SIGNIFICANT CHANGE UP (ref 43–77)
PHOSPHATE SERPL-MCNC: 5.3 MG/DL — HIGH (ref 2.5–4.5)
PLATELET # BLD AUTO: 257 K/UL — SIGNIFICANT CHANGE UP (ref 150–400)
POTASSIUM SERPL-MCNC: 4.7 MMOL/L — SIGNIFICANT CHANGE UP (ref 3.5–5.3)
POTASSIUM SERPL-SCNC: 4.7 MMOL/L — SIGNIFICANT CHANGE UP (ref 3.5–5.3)
PROT SERPL-MCNC: 7.3 G/DL — SIGNIFICANT CHANGE UP (ref 6–8.3)
RBC # BLD: 3.02 M/UL — LOW (ref 3.8–5.2)
RBC # FLD: 12.3 % — SIGNIFICANT CHANGE UP (ref 10.3–14.5)
SODIUM SERPL-SCNC: 137 MMOL/L — SIGNIFICANT CHANGE UP (ref 135–145)
UUN UR-MCNC: 569 MG/DL — SIGNIFICANT CHANGE UP
WBC # BLD: 6.07 K/UL — SIGNIFICANT CHANGE UP (ref 3.8–10.5)
WBC # FLD AUTO: 6.07 K/UL — SIGNIFICANT CHANGE UP (ref 3.8–10.5)

## 2020-07-07 PROCEDURE — 99223 1ST HOSP IP/OBS HIGH 75: CPT

## 2020-07-07 PROCEDURE — 99232 SBSQ HOSP IP/OBS MODERATE 35: CPT

## 2020-07-07 PROCEDURE — 99497 ADVNCD CARE PLAN 30 MIN: CPT | Mod: 25

## 2020-07-07 PROCEDURE — 71045 X-RAY EXAM CHEST 1 VIEW: CPT | Mod: 26

## 2020-07-07 RX ORDER — SODIUM CHLORIDE 0.65 %
1 AEROSOL, SPRAY (ML) NASAL
Refills: 0 | Status: DISCONTINUED | OUTPATIENT
Start: 2020-07-07 | End: 2020-07-13

## 2020-07-07 RX ORDER — ACETAMINOPHEN 500 MG
650 TABLET ORAL ONCE
Refills: 0 | Status: COMPLETED | OUTPATIENT
Start: 2020-07-07 | End: 2020-07-07

## 2020-07-07 RX ORDER — POLYETHYLENE GLYCOL 3350 17 G/17G
17 POWDER, FOR SOLUTION ORAL DAILY
Refills: 0 | Status: DISCONTINUED | OUTPATIENT
Start: 2020-07-07 | End: 2020-07-08

## 2020-07-07 RX ORDER — POLYETHYLENE GLYCOL 3350 17 G/17G
17 POWDER, FOR SOLUTION ORAL ONCE
Refills: 0 | Status: DISCONTINUED | OUTPATIENT
Start: 2020-07-07 | End: 2020-07-07

## 2020-07-07 RX ORDER — FUROSEMIDE 40 MG
20 TABLET ORAL ONCE
Refills: 0 | Status: COMPLETED | OUTPATIENT
Start: 2020-07-07 | End: 2020-07-07

## 2020-07-07 RX ORDER — SENNA PLUS 8.6 MG/1
2 TABLET ORAL AT BEDTIME
Refills: 0 | Status: DISCONTINUED | OUTPATIENT
Start: 2020-07-07 | End: 2020-07-13

## 2020-07-07 RX ADMIN — SENNA PLUS 2 TABLET(S): 8.6 TABLET ORAL at 22:41

## 2020-07-07 RX ADMIN — Medication 650 MILLIGRAM(S): at 14:53

## 2020-07-07 RX ADMIN — Medication 1 SPRAY(S): at 12:45

## 2020-07-07 RX ADMIN — Medication 12.5 MILLIGRAM(S): at 05:19

## 2020-07-07 RX ADMIN — APIXABAN 2.5 MILLIGRAM(S): 2.5 TABLET, FILM COATED ORAL at 17:27

## 2020-07-07 RX ADMIN — CEFTRIAXONE 100 MILLIGRAM(S): 500 INJECTION, POWDER, FOR SOLUTION INTRAMUSCULAR; INTRAVENOUS at 12:45

## 2020-07-07 RX ADMIN — POLYETHYLENE GLYCOL 3350 17 GRAM(S): 17 POWDER, FOR SOLUTION ORAL at 22:41

## 2020-07-07 RX ADMIN — ATORVASTATIN CALCIUM 40 MILLIGRAM(S): 80 TABLET, FILM COATED ORAL at 21:02

## 2020-07-07 RX ADMIN — Medication 12.5 MILLIGRAM(S): at 17:26

## 2020-07-07 RX ADMIN — Medication 81 MILLIGRAM(S): at 12:45

## 2020-07-07 RX ADMIN — Medication 20 MILLIGRAM(S): at 12:44

## 2020-07-07 RX ADMIN — APIXABAN 2.5 MILLIGRAM(S): 2.5 TABLET, FILM COATED ORAL at 05:19

## 2020-07-07 RX ADMIN — AZITHROMYCIN 255 MILLIGRAM(S): 500 TABLET, FILM COATED ORAL at 19:35

## 2020-07-07 RX ADMIN — ALBUTEROL 2 PUFF(S): 90 AEROSOL, METERED ORAL at 06:15

## 2020-07-07 NOTE — ADVANCED PRACTICE NURSE CONSULT - RECOMMEDATIONS
-Clean all wounds with normal saline and apply skin prep to the surrounding skin  -Apply Collagenase ointment to the eschar areas of the R. Heel, apply saline moistened gauze to the wound bed, and cover with a foam dressing Daily PRN  -Apply Zinc Oxide Moisture Barrier Cream to the Bilateral Gluteus b.i.d. PRN  -Frequent toileting  -Elevate/float the patients heels using heel protectors and reposition the patient Q 2hrs using wedges or pillows

## 2020-07-07 NOTE — CONSULT NOTE ADULT - PROBLEM SELECTOR RECOMMENDATION 4
2/2 comorbidities. CHF, and CKD. Albumin 2.6.  Has unstageable to R heel and  Stage 1 Bilateral Gluteus, continue local wound care.    Per son, pt does not want artificial nutrition/PEG.

## 2020-07-07 NOTE — CONSULT NOTE ADULT - PROBLEM SELECTOR RECOMMENDATION 3
Acute on Chronic.  Likely pre-renal.  Baseline renal function SCr 1.2-1.4,  worsening renal function today Scr 1.96.  Nephrology following    Avoid NSAIDs

## 2020-07-07 NOTE — CONSULT NOTE ADULT - SUBJECTIVE AND OBJECTIVE BOX
HPI:  89 year old female lives alone with HHA with medical history of HTN, HLD, arthritis , Afib ? ( patient is on eliquis and b blocker)presented to ED with complains of difficulty breathing. Patient AAO X 3 at baseline states that she was having difficulty in breathing and was sent to hospital. Patient denies any fever and chills and was saying that she was having difficulty  breathing . At present Patient is saturating well on 3 Lr nasal cannula. Patient does not remember her medications but states that her home health aid gives her meds regularly. Patient denies contact to any sock patient and denies chest pain, body ache , Bowel or Urinary problems.    Interval hx: Pt seen and examined at the bedside,       PAST MEDICAL & SURGICAL HISTORY:  Arthritis  HLD (hyperlipidemia)  Chronic kidney disease (CKD)  HTN (hypertension)  H/O local excision of skin lesion      SOCIAL HISTORY:    Admitted from:  Home, with HHA 9 hours daily  Substance abuse history:              Tobacco hx:                  Alcohol hx:              Home Opioid hx:  Yazdanism:  Hindu                                  Preferred Language: English     Surrogate/HCP/Guardian:  Yvan Calderon          Phone#: [phone]    FAMILY HISTORY:    Baseline ADLs (prior to admission): ambulatory with walker     Allergies    Augmentin (Rash; Urticaria)    Intolerances      Present Symptoms:   Dyspnea: yes  Nausea/Vomiting: denies  Fatigue: yes  Loss of appetite: good   Pain:   denies                                   Review of Systems: [All others negative     MEDICATIONS  (STANDING):  albuterol/ipratropium for Nebulization 3 milliLiter(s) Nebulizer every 8 hours  apixaban 2.5 milliGRAM(s) Oral two times a day  aspirin  chewable 81 milliGRAM(s) Oral daily  atorvastatin 40 milliGRAM(s) Oral at bedtime  azithromycin  IVPB 500 milliGRAM(s) IV Intermittent every 24 hours  azithromycin  IVPB      cefTRIAXone   IVPB 1000 milliGRAM(s) IV Intermittent every 24 hours  furosemide   Injectable 20 milliGRAM(s) IV Push once  metoprolol tartrate 12.5 milliGRAM(s) Oral two times a day    MEDICATIONS  (PRN):  ALBUTerol    90 MICROgram(s) HFA Inhaler 2 Puff(s) Inhalation every 6 hours PRN Shortness of Breath and/or Wheezing  sodium chloride 0.65% Nasal 1 Spray(s) Both Nostrils two times a day PRN Nasal Congestion      PHYSICAL EXAM:    Vital Signs Last 24 Hrs  T(C): 37.1 (07 Jul 2020 10:22), Max: 37.4 (07 Jul 2020 04:51)  T(F): 98.8 (07 Jul 2020 10:22), Max: 99.3 (07 Jul 2020 04:51)  HR: 61 (07 Jul 2020 10:22) (48 - 68)  BP: 125/65 (07 Jul 2020 10:22) (102/56 - 146/68)  BP(mean): --  RR: 17 (07 Jul 2020 10:22) (16 - 18)  SpO2: 95% (07 Jul 2020 10:22) (94% - 100%)    General: AOx2, forgetful. Dyspneic on NC  Karnofsky Performance Score/Palliative Performance Status Version2: 40    %    HEENT: Atraumatic, moist mucous membranes  Lungs: labored on NC  CV: S1S2, RRR  GI: soft, nontender on palpation  : urinating   Musculoskeletal: b/l U/L edema   Skin: unstageable Right heel, Stage 1 Bilateral Gluteus  Neuro: able to follow commands  Oral intake ability: moderate po intake   Diet: regular    LABS:                        9.4    6.07  )-----------( 257      ( 07 Jul 2020 07:10 )             31.1     07-07    137  |  101  |  55<H>  ----------------------------<  100<H>  4.7   |  30  |  1.96<H>    Ca    8.8      07 Jul 2020 07:10  Phos  5.3     07-07  Mg     2.6     07-07    TPro  7.3  /  Alb  2.6<L>  /  TBili  0.3  /  DBili  x   /  AST  24  /  ALT  25  /  AlkPhos  51  07-07        RADIOLOGY & ADDITIONAL STUDIES: Reviewed    ADVANCE DIRECTIVES: DNR/DNI HPI:  89 year old female lives alone with HHA with medical history of HTN, HLD, arthritis , Afib ? ( patient is on eliquis and b blocker)presented to ED with complains of difficulty breathing. Patient AAO X 3 at baseline states that she was having difficulty in breathing and was sent to hospital. Patient denies any fever and chills and was saying that she was having difficulty  breathing . At present Patient is saturating well on 3 Lr nasal cannula. Patient does not remember her medications but states that her home health aid gives her meds regularly. Patient denies contact to any sock patient and denies chest pain, body ache , Bowel or Urinary problems.    Interval hx: Pt seen and examined at the bedside, Aox2, forgetful.  Labored on NC. Palliative care to assess hospice eligibility.       PAST MEDICAL & SURGICAL HISTORY:  Arthritis  HLD (hyperlipidemia)  Chronic kidney disease (CKD)  HTN (hypertension)  H/O local excision of skin lesion      SOCIAL HISTORY:    Admitted from:  Home alone, has  HHA 9 hours daily  Substance abuse history:  none            Tobacco hx: none                 Alcohol hx:none              Home Opioid hx:none  Yarsani:  Restoration                                  Preferred Language: English     Surrogate/HCP/Guardian:  Yvan Calderon          Phone#: [phone]    FAMILY HISTORY:    Baseline ADLs (prior to admission): ambulatory with walker     Allergies    Augmentin (Rash; Urticaria)    Intolerances      Present Symptoms:   Dyspnea: yes  Nausea/Vomiting: denies  Fatigue: yes  Loss of appetite: good   Pain:   denies                                   Review of Systems: [All others negative     MEDICATIONS  (STANDING):  albuterol/ipratropium for Nebulization 3 milliLiter(s) Nebulizer every 8 hours  apixaban 2.5 milliGRAM(s) Oral two times a day  aspirin  chewable 81 milliGRAM(s) Oral daily  atorvastatin 40 milliGRAM(s) Oral at bedtime  azithromycin  IVPB 500 milliGRAM(s) IV Intermittent every 24 hours  azithromycin  IVPB      cefTRIAXone   IVPB 1000 milliGRAM(s) IV Intermittent every 24 hours  furosemide   Injectable 20 milliGRAM(s) IV Push once  metoprolol tartrate 12.5 milliGRAM(s) Oral two times a day    MEDICATIONS  (PRN):  ALBUTerol    90 MICROgram(s) HFA Inhaler 2 Puff(s) Inhalation every 6 hours PRN Shortness of Breath and/or Wheezing  sodium chloride 0.65% Nasal 1 Spray(s) Both Nostrils two times a day PRN Nasal Congestion      PHYSICAL EXAM:    Vital Signs Last 24 Hrs  T(C): 37.1 (07 Jul 2020 10:22), Max: 37.4 (07 Jul 2020 04:51)  T(F): 98.8 (07 Jul 2020 10:22), Max: 99.3 (07 Jul 2020 04:51)  HR: 61 (07 Jul 2020 10:22) (48 - 68)  BP: 125/65 (07 Jul 2020 10:22) (102/56 - 146/68)  BP(mean): --  RR: 17 (07 Jul 2020 10:22) (16 - 18)  SpO2: 95% (07 Jul 2020 10:22) (94% - 100%)    General: AOx2, forgetful. Dyspneic on NC  Karnofsky Performance Score/Palliative Performance Status Version2: 40    %    HEENT: Atraumatic, moist mucous membranes  Lungs: labored on NC  CV: S1S2, RRR  GI: soft, nontender on palpation  : urinating   Musculoskeletal: b/l U/L edema   Skin: unstageable Right heel, Stage 1 Bilateral Gluteus  Neuro: able to follow commands  Oral intake ability: moderate po intake   Diet: regular    LABS:                        9.4    6.07  )-----------( 257      ( 07 Jul 2020 07:10 )             31.1     07-07    137  |  101  |  55<H>  ----------------------------<  100<H>  4.7   |  30  |  1.96<H>    Ca    8.8      07 Jul 2020 07:10  Phos  5.3     07-07  Mg     2.6     07-07    TPro  7.3  /  Alb  2.6<L>  /  TBili  0.3  /  DBili  x   /  AST  24  /  ALT  25  /  AlkPhos  51  07-07        RADIOLOGY & ADDITIONAL STUDIES: Reviewed    ADVANCE DIRECTIVES: DNR/DNI

## 2020-07-07 NOTE — CONSULT NOTE ADULT - PROBLEM SELECTOR RECOMMENDATION 5
2/2 comorbidities. CHF, RA, and CKD.  Prior to admission pt was able to ambulate with a walker.  Currently bedbound.  Supportive care.  Frequent positioning.

## 2020-07-07 NOTE — CONSULT NOTE ADULT - PROBLEM SELECTOR RECOMMENDATION 9
2/2 CHF exacerbation, and pneumonia.  Labored on NC.  Cardiology and pulmonary following    Son would like to continue with current medical management.  He is open for comfort measures should pt clinical status declines. 2/2 CHF exacerbation, and pneumonia.  Labored on NC.  Cardiology and pulmonary following    Son would like to continue with current medical management.  He is open for comfort measures should pt clinical status decline.

## 2020-07-07 NOTE — PROGRESS NOTE ADULT - SUBJECTIVE AND OBJECTIVE BOX
DATE OF SERVICE:Patient was seen and examined :    PRESENTING CC:    SUBJ:       PMH -reviewed admission note, no change since admission  Heart failure: acute [ ] chronic [ ] acute or chronic [ ] diastolic [ ] systolic [ ] combined systolic and diastolic[ ]  SHAUNA: ATN[ ] renal medullary necrosis [ ] CKD I [ ]CKDII [ ]CKD III [ ]CKD IV [ ]CKD V [ ]Other pathological lesions [ ]    MEDICATIONS  (STANDING):  albuterol/ipratropium for Nebulization 3 milliLiter(s) Nebulizer every 8 hours  apixaban 2.5 milliGRAM(s) Oral two times a day  aspirin  chewable 81 milliGRAM(s) Oral daily  atorvastatin 40 milliGRAM(s) Oral at bedtime  azithromycin  IVPB 500 milliGRAM(s) IV Intermittent every 24 hours  azithromycin  IVPB      cefTRIAXone   IVPB 1000 milliGRAM(s) IV Intermittent every 24 hours  metoprolol tartrate 12.5 milliGRAM(s) Oral two times a day    MEDICATIONS  (PRN):  ALBUTerol    90 MICROgram(s) HFA Inhaler 2 Puff(s) Inhalation every 6 hours PRN Shortness of Breath and/or Wheezing  sodium chloride 0.65% Nasal 1 Spray(s) Both Nostrils two times a day PRN Nasal Congestion              REVIEW OF SYSTEMS:  Constitutional: [ ] fever, [ ]weight loss,  [ ]fatigue  Eyes: [ ] visual changes  Respiratory: [ ]shortness of breath;  [ ] cough, [ ]wheezing, [ ]chills, [ ]hemoptysis  Cardiovascular: [ ] chest pain, [ ]palpitations, [ ]dizziness,  [ ]leg swelling[ ]orthopnea[ ]PND  Gastrointestinal: [ ] abdominal pain, [ ]nausea, [ ]vomiting,  [ ]diarrhea   Genitourinary: [ ] dysuria, [ ] hematuria  Neurologic: [ ] headaches [ ] tremors[ ]weakness  Skin: [ ] itching, [ ]burning, [ ] rashes  Endocrine: [ ] heat or cold intolerance  Musculoskeletal: [ ] joint pain or swelling; [ ] muscle, back, or extremity pain  Psychiatric: [ ] depression, [ ]anxiety, [ ]mood swings, or [ ]difficulty sleeping  Hematologic: [ ] easy bruising, [ ] bleeding gums    [x] All remaining systems negative except as per above.   [ ]Unable to obtain.    Vital Signs Last 24 Hrs  T(C): 37.4 (07 Jul 2020 15:07), Max: 38.2 (07 Jul 2020 13:16)  T(F): 99.4 (07 Jul 2020 15:07), Max: 100.8 (07 Jul 2020 13:16)  HR: 60 (07 Jul 2020 15:00) (58 - 120)  BP: 135/67 (07 Jul 2020 13:16) (121/64 - 146/68)  BP(mean): --  RR: 19 (07 Jul 2020 13:16) (17 - 19)  SpO2: 97% (07 Jul 2020 13:16) (94% - 100%)  I&O's Summary      PHYSICAL EXAM:  General: No acute distress BMI-  HEENT: EOMI, PERRL  Neck: Supple, [ ] JVD  Lungs: Equal air entry bilaterally; [ ] rales [ ] wheezing [ ] rhonchi  Heart: Regular rate and rhythm; [ ] murmur   /6 [ ] systolic [ ] diastolic [ ] radiation[ ] rubs [ ]  gallops  Abdomen: Nontender, bowel sounds present  Extremities: No clubbing, cyanosis, [ ] edema  Nervous system:  Alert & Oriented X3, no focal deficits  Psychiatric: Normal affect  Skin: No rashes or lesions    LABS:  07-07    137  |  101  |  55<H>  ----------------------------<  100<H>  4.7   |  30  |  1.96<H>    Ca    8.8      07 Jul 2020 07:10  Phos  5.3     07-07  Mg     2.6     07-07    TPro  7.3  /  Alb  2.6<L>  /  TBili  0.3  /  DBili  x   /  AST  24  /  ALT  25  /  AlkPhos  51  07-07    Creatinine Trend: 1.96<--, 2.32<--, 1.64<--, 1.36<--, 1.37<--                        9.4    6.07  )-----------( 257      ( 07 Jul 2020 07:10 )             31.1       Lipid Panel:   Cardiac Enzymes:           RADIOLOGY:    ECG [my interpretation]:    TELEMETRY:    ECHO:    STRESS TEST:    CATHETERIZATION:      IMPRESSION AND PLAN:

## 2020-07-07 NOTE — CONSULT NOTE ADULT - ATTENDING COMMENTS
[facility] NEPHROLOGY  Joe Barnes M.D.  Ari Lawton D.O.  Michaela Mai M.D.  Tami Arshad, MSN, ANP-C  [phone]    [address] [place] [address]
Patient was seen and examined by me on 07/04/2020,interim events noted,labs and radiology studies reviewed.  Eleazar Gama MD,FACC.  [address] [place] [address].  [facility].  [phone]
89 y debilitated F with SHAUNA on CKD, CHF, severe AS, severe pulm HTN adm for SOB, CHF exacerbation. Lives alone, has HHA, son involved in her care. DNR/DNI. Palliative will follow for further goals of care pending clinical course.

## 2020-07-07 NOTE — CONSULT NOTE ADULT - PROBLEM SELECTOR RECOMMENDATION 6
Met with the patient at the bedside, AOx2, forgetful.  She expressed feeling tired and deferred to her son. Spoke with her son Yvan, discussed her clinical status and goals of care.  He states she has been doing well at home until she got sick. He has been taking care of her wounds himself.    Explained that his mother's clinical status is guarded, high risk for mortality. Discussed hospice philosophy and locations of care.  He said he was happy to hear about hospice as an option.  He is hopeful she will recover, however, if she declines he is open to hospice/comfort measures.    Reviewed all aspects of MOLST; DNR / DNI / no feeding tube / trial IV fluids / use abx. Continue current medical treatments.      All questions answered; supportive counseling provided Met with the patient at the bedside, AOx2, forgetful.  She expressed feeling tired and deferred to her son.   Met with her son Yvan at the bedside, discussed her clinical status and goals of care.  He states she has been doing well at home until she got sick. He has been taking care of her wounds himself.    Explained that his mother's clinical status is guarded, high risk for mortality. Discussed hospice philosophy and locations of care.  He said he was happy to hear about hospice as an option.  He is hopeful she will recover, however, if she declines he is open to hospice/comfort measures.    Reviewed all aspects of MOLST; DNR / DNI / no feeding tube / trial IV fluids / use abx. Continue current medical treatments.    All questions answered; supportive counseling provided    Advanced care planning time spent 20 minutes.

## 2020-07-07 NOTE — PROGRESS NOTE ADULT - ASSESSMENT
Vital Signs Last 24 Hrs  T(C): 37.1 (07 Jul 2020 10:22), Max: 37.4 (07 Jul 2020 04:51)  T(F): 98.8 (07 Jul 2020 10:22), Max: 99.3 (07 Jul 2020 04:51)  HR: 61 (07 Jul 2020 10:22) (48 - 68)  BP: 125/65 (07 Jul 2020 10:22) (102/56 - 146/68)  BP(mean): --  RR: 17 (07 Jul 2020 10:22) (16 - 18)  SpO2: 95% (07 Jul 2020 10:22) (94% - 100%)                          9.4    6.07  )-----------( 257      ( 07 Jul 2020 07:10 )             31.1   07-07    137  |  101  |  55<H>  ----------------------------<  100<H>  4.7   |  30  |  1.96<H>    Ca    8.8      07 Jul 2020 07:10  Phos  5.3     07-07  Mg     2.6     07-07    TPro  7.3  /  Alb  2.6<L>  /  TBili  0.3  /  DBili  x   /  AST  24  /  ALT  25  /  AlkPhos  51  07-07      89F with severe AS, CKD, and pulmonary edema with bilateral effusions. On exam today, has extensive rales bilaterally without wheezing c/w pulmonary edema. O2 requirement stable at 2-3L n/c. Recommend diuresis as tolerated.   Thank you for this consult, will follow with you.

## 2020-07-07 NOTE — PROGRESS NOTE ADULT - SUBJECTIVE AND OBJECTIVE BOX
[facility] NEPHROLOGY- PROGRESS NOTE    Patient is a 89yo Female with CKD-3, HTN, HLD, Severe AS, Arthritis p/w SOB. Patient a/w b/l PNA and CHF exacerbation.  Nephrology consulted for Elevated serum creatinine.    Hospital Medications: Medications reviewed.  REVIEW OF SYSTEMS:  CONSTITUTIONAL: No fevers or chills  RESPIRATORY: No shortness of breath  CARDIOVASCULAR: No chest pain.  GASTROINTESTINAL: No nausea, vomiting, diarrhea or abdominal pain.   VASCULAR: +bilateral lower extremity edema.     VITALS:  T(F): 98.8 (20 @ 10:22), Max: 99.3 (20 @ 04:51)  HR: 61 (20 @ 10:22)  BP: 125/65 (20 @ 10:22)  RR: 17 (20 @ 10:22)  SpO2: 95% (20 @ 10:22)  Wt(kg): --      PHYSICAL EXAM:  Gen: NAD, calm  HEENT: anicteric  Neck: no JVD  Cards: RRR, +S1/S2, +ROLLY  Resp: decreased BS at bases  GI: soft, NT/ND, NABS  Extremities: +trace to +1 LE edema B/L   tender to palpation of LE    LABS:      137  |  101  |  55<H>  ----------------------------<  100<H>  4.7   |  30  |  1.96<H>    Ca    8.8      2020 07:10  Phos  5.3       Mg     2.6         TPro  7.3  /  Alb  2.6<L>  /  TBili  0.3  /  DBili      /  AST  24  /  ALT  25  /  AlkPhos  51  07-07    Creatinine Trend: 1.96 <--, 2.32 <--, 1.64 <--, 1.36 <--, 1.37 <--                        9.4    6.07  )-----------( 257      ( 2020 07:10 )             31.1     Urine Studies:  Urinalysis Basic - ( 2020 14:49 )    Color: Yellow / Appearance: Clear / S.020 / pH:   Gluc:  / Ketone: Negative  / Bili: Negative / Urobili: Negative   Blood:  / Protein: 500 mg/dL / Nitrite: Negative   Leuk Esterase: Negative / RBC: 0-2 /HPF / WBC 0-2 /HPF   Sq Epi:  / Non Sq Epi: Moderate /HPF / Bacteria: Many /HPF      Sodium, Random Urine: 14 mmol/L ( @ 14:16)  Chloride, Random Urine: 24 mmol/L ( @ 14:16)  Creatinine, Random Urine: 86 mg/dL ( @ 14:16)

## 2020-07-07 NOTE — ADVANCED PRACTICE NURSE CONSULT - ASSESSMENT
This is a 89yr old female patient admitted for Pneumonia, presenting with the following:  -There is an Unstageable Pressure Injury to the R. Heel (2cm x 1.5cm) with eschar and drainage drainage  -There is a Stage 1 Pressure Injury to the Bilateral Gluteus, as evident by non-blanchable erythema

## 2020-07-07 NOTE — CONSULT NOTE ADULT - PROBLEM SELECTOR RECOMMENDATION 2
ECHO shows severe pulm HTN, with Severe AS.   Pt appears labored on NC.  Diuresis as tolerated, currently renal function worsening.  Cardiology following.    CXR shows Mild bilateral pleural effusions.

## 2020-07-07 NOTE — PROGRESS NOTE ADULT - ASSESSMENT
Patient is a 89yo Female with CKD-3, HTN, HLD, Severe AS, Arthritis p/w SOB. Patient a/w b/l PNA and CHF exacerbation.  Nephrology consulted for Elevated serum creatinine.    1. SHAUNA- likely pre-renal in the setting of overdiuresis for which IV Lasix was held. Pt now with improving renal function, however CXR with small b/l pleural effusions and has high oxygen requirements. Recc Lasix 20mg IV x1. Will serum Cr remains stable will consider Lasix 20mg PO TTS (3 times a week). FeUrea 26% consistent with pre-renal SHAUNA. Strict I/Os. Avoid nephrotoxins/ NSAIDs/ RCA. Monitor BMP.  2. CKD-3- in the setting of HTN. Baseline renal function SCr 1.2-1.4. Avoid Nephrotoxins. Monitor lytes.   3. LE edema- mild, Plan as above.  Strict I/Os. Monitor urine o/p. Daily weights.   4. Aortic Stenosis- Severe based on TTE. avoid overdiuresis. Plan as per Cardiology.

## 2020-07-07 NOTE — PROGRESS NOTE ADULT - SUBJECTIVE AND OBJECTIVE BOX
PGY 3 Note discussed with primary attending    Patient is a 89y old  Female who presents with a chief complaint of SOB (07 Jul 2020 12:07)      INTERVAL HPI/OVERNIGHT EVENTS:   Pt seen and examined at the bedside.   worsening of SOB noted. Requiring 4L NC support.   Pt has worsening b/l pulmonary edema   Will give one dose of Lasix 20 mg   - Palliative consulted.   Spoke to the son and discussed the treatment plan and prognosis in details       MEDICATIONS  (STANDING):  albuterol/ipratropium for Nebulization 3 milliLiter(s) Nebulizer every 8 hours  apixaban 2.5 milliGRAM(s) Oral two times a day  aspirin  chewable 81 milliGRAM(s) Oral daily  atorvastatin 40 milliGRAM(s) Oral at bedtime  azithromycin  IVPB 500 milliGRAM(s) IV Intermittent every 24 hours  azithromycin  IVPB      cefTRIAXone   IVPB 1000 milliGRAM(s) IV Intermittent every 24 hours  metoprolol tartrate 12.5 milliGRAM(s) Oral two times a day    MEDICATIONS  (PRN):  ALBUTerol    90 MICROgram(s) HFA Inhaler 2 Puff(s) Inhalation every 6 hours PRN Shortness of Breath and/or Wheezing  sodium chloride 0.65% Nasal 1 Spray(s) Both Nostrils two times a day PRN Nasal Congestion      __________________________________________________  REVIEW OF SYSTEMS:    CONSTITUTIONAL: No fever,   EYES: no acute visual disturbances  NECK: No pain or stiffness  RESPIRATORY: SOB   CARDIOVASCULAR: No chest pain, no palpitations  GASTROINTESTINAL: No pain. No nausea or vomiting; No diarrhea   NEUROLOGICAL: No headache or numbness, no tremors  MUSCULOSKELETAL: No joint pain, no muscle pain  GENITOURINARY: no dysuria, no frequency, no hesitancy  PSYCHIATRY: no depression , no anxiety  ALL OTHER  ROS negative        Vital Signs Last 24 Hrs  T(C): 37.1 (07 Jul 2020 10:22), Max: 37.4 (07 Jul 2020 04:51)  T(F): 98.8 (07 Jul 2020 10:22), Max: 99.3 (07 Jul 2020 04:51)  HR: 61 (07 Jul 2020 10:22) (48 - 68)  BP: 125/65 (07 Jul 2020 10:22) (102/56 - 146/68)  BP(mean): --  RR: 17 (07 Jul 2020 10:22) (16 - 18)  SpO2: 95% (07 Jul 2020 10:22) (94% - 100%)    ________________________________________________  PHYSICAL EXAM:  GENERAL: female in bed, in mild distress   HEENT: Normocephalic;  conjunctivae and sclerae clear; moist mucous membranes;   NECK : supple  CHEST/LUNG: bilateral breath sounds present. bilateral crackles present   HEART: S1 S2  regular; no murmurs, gallops or rubs  ABDOMEN: Soft, Nontender, Nondistended; Bowel sounds present  EXTREMITIES: no cyanosis; no edema; no calf tenderness  SKIN: warm and dry; no rash  NERVOUS SYSTEM:  Awake and alert; Oriented  to place, person and time ; no new deficits    _________________________________________________  LABS:                        9.4    6.07  )-----------( 257      ( 07 Jul 2020 07:10 )             31.1     07-07    137  |  101  |  55<H>  ----------------------------<  100<H>  4.7   |  30  |  1.96<H>    Ca    8.8      07 Jul 2020 07:10  Phos  5.3     07-07  Mg     2.6     07-07    TPro  7.3  /  Alb  2.6<L>  /  TBili  0.3  /  DBili  x   /  AST  24  /  ALT  25  /  AlkPhos  51  07-07        CAPILLARY BLOOD GLUCOSE            RADIOLOGY & ADDITIONAL TESTS:    Imaging Personally Reviewed:  YES/  < from: Xray Chest 1 View- PORTABLE-Urgent (07.07.20 @ 10:48) >  EXAM:  XR CHEST PORTABLE URGENT 1V                            PROCEDURE DATE:  07/07/2020          INTERPRETATION:  Portable chest x-ray    Indication: Shortness of breath.    Portable chest x-ray is compared to a previous examination dated 7/4/2020.    Impression: The lung apices are obscured by the patient's chin. No gross evidence for pneumothorax. Skinfold on the right.    Mild bilateral pleural effusions.    New airspace opacifications in both lungs.    Stable heart silhouette.     Stable chronic deformity of the right humeral head.      Consultant(s) Notes Reviewed:   YES    Care Discussed with Consultants :     Plan of care was discussed with patient and /or primary care giver; all questions and concerns were addressed and care was aligned with patient's wishes.

## 2020-07-08 LAB
ALBUMIN SERPL ELPH-MCNC: 2.4 G/DL — LOW (ref 3.5–5)
ALP SERPL-CCNC: 41 U/L — SIGNIFICANT CHANGE UP (ref 40–120)
ALT FLD-CCNC: 22 U/L DA — SIGNIFICANT CHANGE UP (ref 10–60)
ANION GAP SERPL CALC-SCNC: 6 MMOL/L — SIGNIFICANT CHANGE UP (ref 5–17)
AST SERPL-CCNC: 20 U/L — SIGNIFICANT CHANGE UP (ref 10–40)
BASOPHILS # BLD AUTO: 0.03 K/UL — SIGNIFICANT CHANGE UP (ref 0–0.2)
BASOPHILS NFR BLD AUTO: 0.5 % — SIGNIFICANT CHANGE UP (ref 0–2)
BILIRUB SERPL-MCNC: 0.3 MG/DL — SIGNIFICANT CHANGE UP (ref 0.2–1.2)
BUN SERPL-MCNC: 56 MG/DL — HIGH (ref 7–18)
CALCIUM SERPL-MCNC: 8.6 MG/DL — SIGNIFICANT CHANGE UP (ref 8.4–10.5)
CHLORIDE SERPL-SCNC: 101 MMOL/L — SIGNIFICANT CHANGE UP (ref 96–108)
CO2 SERPL-SCNC: 31 MMOL/L — SIGNIFICANT CHANGE UP (ref 22–31)
CREAT SERPL-MCNC: 1.82 MG/DL — HIGH (ref 0.5–1.3)
CULTURE RESULTS: SIGNIFICANT CHANGE UP
CULTURE RESULTS: SIGNIFICANT CHANGE UP
EOSINOPHIL # BLD AUTO: 0.17 K/UL — SIGNIFICANT CHANGE UP (ref 0–0.5)
EOSINOPHIL NFR BLD AUTO: 2.9 % — SIGNIFICANT CHANGE UP (ref 0–6)
GLUCOSE SERPL-MCNC: 110 MG/DL — HIGH (ref 70–99)
HCT VFR BLD CALC: 28.2 % — LOW (ref 34.5–45)
HGB BLD-MCNC: 8.5 G/DL — LOW (ref 11.5–15.5)
IMM GRANULOCYTES NFR BLD AUTO: 0.7 % — SIGNIFICANT CHANGE UP (ref 0–1.5)
LYMPHOCYTES # BLD AUTO: 1.83 K/UL — SIGNIFICANT CHANGE UP (ref 1–3.3)
LYMPHOCYTES # BLD AUTO: 31.6 % — SIGNIFICANT CHANGE UP (ref 13–44)
MAGNESIUM SERPL-MCNC: 2.4 MG/DL — SIGNIFICANT CHANGE UP (ref 1.6–2.6)
MCHC RBC-ENTMCNC: 30.1 GM/DL — LOW (ref 32–36)
MCHC RBC-ENTMCNC: 31.3 PG — SIGNIFICANT CHANGE UP (ref 27–34)
MCV RBC AUTO: 103.7 FL — HIGH (ref 80–100)
MONOCYTES # BLD AUTO: 0.78 K/UL — SIGNIFICANT CHANGE UP (ref 0–0.9)
MONOCYTES NFR BLD AUTO: 13.4 % — SIGNIFICANT CHANGE UP (ref 2–14)
NEUTROPHILS # BLD AUTO: 2.95 K/UL — SIGNIFICANT CHANGE UP (ref 1.8–7.4)
NEUTROPHILS NFR BLD AUTO: 50.9 % — SIGNIFICANT CHANGE UP (ref 43–77)
NRBC # BLD: 0 /100 WBCS — SIGNIFICANT CHANGE UP (ref 0–0)
PHOSPHATE SERPL-MCNC: 4.5 MG/DL — SIGNIFICANT CHANGE UP (ref 2.5–4.5)
PLATELET # BLD AUTO: 238 K/UL — SIGNIFICANT CHANGE UP (ref 150–400)
POTASSIUM SERPL-MCNC: 4.6 MMOL/L — SIGNIFICANT CHANGE UP (ref 3.5–5.3)
POTASSIUM SERPL-SCNC: 4.6 MMOL/L — SIGNIFICANT CHANGE UP (ref 3.5–5.3)
PROT SERPL-MCNC: 6.5 G/DL — SIGNIFICANT CHANGE UP (ref 6–8.3)
RBC # BLD: 2.72 M/UL — LOW (ref 3.8–5.2)
RBC # FLD: 12.3 % — SIGNIFICANT CHANGE UP (ref 10.3–14.5)
SODIUM SERPL-SCNC: 138 MMOL/L — SIGNIFICANT CHANGE UP (ref 135–145)
SPECIMEN SOURCE: SIGNIFICANT CHANGE UP
SPECIMEN SOURCE: SIGNIFICANT CHANGE UP
WBC # BLD: 5.8 K/UL — SIGNIFICANT CHANGE UP (ref 3.8–10.5)
WBC # FLD AUTO: 5.8 K/UL — SIGNIFICANT CHANGE UP (ref 3.8–10.5)

## 2020-07-08 PROCEDURE — 71045 X-RAY EXAM CHEST 1 VIEW: CPT | Mod: 26

## 2020-07-08 PROCEDURE — 99232 SBSQ HOSP IP/OBS MODERATE 35: CPT

## 2020-07-08 RX ORDER — PIPERACILLIN AND TAZOBACTAM 4; .5 G/20ML; G/20ML
3.38 INJECTION, POWDER, LYOPHILIZED, FOR SOLUTION INTRAVENOUS ONCE
Refills: 0 | Status: COMPLETED | OUTPATIENT
Start: 2020-07-08 | End: 2020-07-08

## 2020-07-08 RX ORDER — POLYETHYLENE GLYCOL 3350 17 G/17G
17 POWDER, FOR SOLUTION ORAL DAILY
Refills: 0 | Status: DISCONTINUED | OUTPATIENT
Start: 2020-07-08 | End: 2020-07-13

## 2020-07-08 RX ORDER — PIPERACILLIN AND TAZOBACTAM 4; .5 G/20ML; G/20ML
3.38 INJECTION, POWDER, LYOPHILIZED, FOR SOLUTION INTRAVENOUS EVERY 12 HOURS
Refills: 0 | Status: DISCONTINUED | OUTPATIENT
Start: 2020-07-08 | End: 2020-07-11

## 2020-07-08 RX ORDER — FUROSEMIDE 40 MG
20 TABLET ORAL ONCE
Refills: 0 | Status: COMPLETED | OUTPATIENT
Start: 2020-07-08 | End: 2020-07-08

## 2020-07-08 RX ADMIN — POLYETHYLENE GLYCOL 3350 17 GRAM(S): 17 POWDER, FOR SOLUTION ORAL at 14:20

## 2020-07-08 RX ADMIN — Medication 20 MILLIGRAM(S): at 14:39

## 2020-07-08 RX ADMIN — Medication 12.5 MILLIGRAM(S): at 17:11

## 2020-07-08 RX ADMIN — Medication 81 MILLIGRAM(S): at 12:47

## 2020-07-08 RX ADMIN — PIPERACILLIN AND TAZOBACTAM 25 GRAM(S): 4; .5 INJECTION, POWDER, LYOPHILIZED, FOR SOLUTION INTRAVENOUS at 17:12

## 2020-07-08 RX ADMIN — APIXABAN 2.5 MILLIGRAM(S): 2.5 TABLET, FILM COATED ORAL at 05:23

## 2020-07-08 RX ADMIN — APIXABAN 2.5 MILLIGRAM(S): 2.5 TABLET, FILM COATED ORAL at 17:12

## 2020-07-08 RX ADMIN — PIPERACILLIN AND TAZOBACTAM 200 GRAM(S): 4; .5 INJECTION, POWDER, LYOPHILIZED, FOR SOLUTION INTRAVENOUS at 12:47

## 2020-07-08 RX ADMIN — ATORVASTATIN CALCIUM 40 MILLIGRAM(S): 80 TABLET, FILM COATED ORAL at 21:27

## 2020-07-08 NOTE — PROGRESS NOTE ADULT - ASSESSMENT
Vital Signs Last 24 Hrs  T(C): 37 (08 Jul 2020 14:42), Max: 37 (08 Jul 2020 14:42)  T(F): 98.6 (08 Jul 2020 14:42), Max: 98.6 (08 Jul 2020 14:42)  HR: 67 (08 Jul 2020 14:42) (47 - 105)  BP: 137/66 (08 Jul 2020 14:42) (85/57 - 137/66)  BP(mean): --  RR: 18 (08 Jul 2020 14:42) (18 - 18)  SpO2: 98% (08 Jul 2020 14:42) (96% - 100%)                 8.5    5.80  )-----------( 238      ( 08 Jul 2020 05:52 )             28.2   07-08    138  |  101  |  56<H>  ----------------------------<  110<H>  4.6   |  31  |  1.82<H>    Ca    8.6      08 Jul 2020 05:52  Phos  4.5     07-08  Mg     2.4     07-08    TPro  6.5  /  Alb  2.4<L>  /  TBili  0.3  /  DBili  x   /  AST  20  /  ALT  22  /  AlkPhos  41  07-08      89F with severe AS, CKD, and pulmonary edema with bilateral effusions. On exam today, has extensive rales bilaterally without wheezing c/w pulmonary edema. O2 requirement stable at 2-3L n/c. Had one episode of fever and hypotension last night and abx broadened, CXR with bilateral vascular congestion and effusions, slightly denser in left mid lung zone. Recommend diuresis as tolerated. High aspiration risk, consider swallow evaluation.    Thank you for this consult, will follow with you.

## 2020-07-08 NOTE — CHART NOTE - NSCHARTNOTEFT_GEN_A_CORE
Paged by RN, BP 85/57 at 20:49, . Patient assessed at bedside, no apparent distress, denies chest pain/sob/palpitations/dizziness. Patient with known CHF, bilateral pleural effusions as per CXR, treated with lassix, DNR/DNI. No intervention need it at this time.   Patient complained of constipation. senna and miralax ordered. Will continue to monitor and provide comfort measures as needed.     Case discussed with senior resident. Paged by RN, BP 85/57 at 20:49, . Patient assessed at bedside, no apparent distress, denies chest pain/sob/palpitations/dizziness. Patient with known CHF, bilateral pleural effusions as per CXR, being treated with lassix, hence will avoid IV fluids at this time. No intervention need it at this time.   Patient complained of constipation. senna and miralax ordered. Will continue to monitor.      Case discussed with senior resident.

## 2020-07-08 NOTE — PROGRESS NOTE ADULT - SUBJECTIVE AND OBJECTIVE BOX
PGY 3 Note discussed with primary attending    Patient is a 89y old  Female who presents with a chief complaint of SOB (07 Jul 2020 16:47)      INTERVAL HPI/OVERNIGHT EVENTS:   Pt seen and examined at the bedside.   Spiked a fever of 100.8 yesterday. Will order Zosyn and consulted Dr lerma for ID   Also, ordered IV lasix 20 mg One dose, requiring 2L O2 NC   Follow up with Palliative   Pt denies any new complains.        MEDICATIONS  (STANDING):  albuterol/ipratropium for Nebulization 3 milliLiter(s) Nebulizer every 8 hours  apixaban 2.5 milliGRAM(s) Oral two times a day  aspirin  chewable 81 milliGRAM(s) Oral daily  atorvastatin 40 milliGRAM(s) Oral at bedtime  furosemide   Injectable 20 milliGRAM(s) IV Push once  metoprolol tartrate 12.5 milliGRAM(s) Oral two times a day  piperacillin/tazobactam IVPB. 3.375 Gram(s) IV Intermittent once  piperacillin/tazobactam IVPB.. 3.375 Gram(s) IV Intermittent every 8 hours  polyethylene glycol 3350 17 Gram(s) Oral daily  senna 2 Tablet(s) Oral at bedtime    MEDICATIONS  (PRN):  ALBUTerol    90 MICROgram(s) HFA Inhaler 2 Puff(s) Inhalation every 6 hours PRN Shortness of Breath and/or Wheezing  sodium chloride 0.65% Nasal 1 Spray(s) Both Nostrils two times a day PRN Nasal Congestion      __________________________________________________  REVIEW OF SYSTEMS:    CONSTITUTIONAL: No fever,   EYES: no acute visual disturbances  NECK: No pain or stiffness  RESPIRATORY: No cough;  CARDIOVASCULAR: No chest pain, no palpitations  GASTROINTESTINAL: No pain. No nausea or vomiting; No diarrhea   NEUROLOGICAL: No headache or numbness, no tremors  MUSCULOSKELETAL: No joint pain, no muscle pain  GENITOURINARY: no dysuria, no frequency, no hesitancy  PSYCHIATRY: no depression , no anxiety  ALL OTHER  ROS negative        Vital Signs Last 24 Hrs  T(C): 36.7 (08 Jul 2020 05:01), Max: 38.2 (07 Jul 2020 13:16)  T(F): 98 (08 Jul 2020 05:01), Max: 100.8 (07 Jul 2020 13:16)  HR: 47 (08 Jul 2020 05:01) (47 - 120)  BP: 133/46 (08 Jul 2020 05:01) (85/57 - 135/67)  RR: 18 (08 Jul 2020 05:01) (18 - 19)  SpO2: 100% (08 Jul 2020 05:01) (96% - 100%)    ________________________________________________  PHYSICAL EXAM:  GENERAL: female in bed   HEENT: Normocephalic;  conjunctivae and sclerae clear; moist mucous membranes;   NECK : supple  CHEST/LUNG: bilateral breath sounds present, bilateral crackles present   HeART: S1 S2  regular; no murmurs, gallops or rubs  ABDOMEN: Soft, Nontender, Nondistended; Bowel sounds present  EXTREMITIES: no cyanosis; no edema; no calf tenderness  SKIN: warm and dry; no rash  NERVOUS SYSTEM:  Awake and alert;   _________________________________________________  LABS:                        8.5    5.80  )-----------( 238      ( 08 Jul 2020 05:52 )             28.2     07-08    138  |  101  |  56<H>  ----------------------------<  110<H>  4.6   |  31  |  1.82<H>    Ca    8.6      08 Jul 2020 05:52  Phos  4.5     07-08  Mg     2.4     07-08    TPro  6.5  /  Alb  2.4<L>  /  TBili  0.3  /  DBili  x   /  AST  20  /  ALT  22  /  AlkPhos  41  07-08        CAPILLARY BLOOD GLUCOSE            RADIOLOGY & ADDITIONAL TESTS:    Imaging Personally Reviewed:  YEs      < from: Xray Chest 1 View- PORTABLE-Urgent (07.07.20 @ 10:48) >    EXAM:  XR CHEST PORTABLE URGENT 1V                            PROCEDURE DATE:  07/07/2020          INTERPRETATION:  Portable chest x-ray    Indication: Shortness of breath.    Portable chest x-ray is compared to a previous examination dated 7/4/2020.    Impression: The lung apices are obscured by the patient's chin. No gross evidence for pneumothorax. Skinfold on the right.    Mild bilateral pleural effusions.    New airspace opacifications in both lungs.    Stable heart silhouette.     Stable chronic deformity of the right humeral head.    < end of copied text >    Consultant(s) Notes Reviewed:   YES    Care Discussed with Consultants :     Plan of care was discussed with patient and /or primary care giver; all questions and concerns were addressed and care was aligned with patient's wishes.

## 2020-07-08 NOTE — PROGRESS NOTE ADULT - SUBJECTIVE AND OBJECTIVE BOX
[facility] NEPHROLOGY- PROGRESS NOTE    Patient is a 89yo Female with CKD-3, HTN, HLD, Severe AS, Arthritis p/w SOB. Patient a/w b/l PNA and CHF exacerbation.  Nephrology consulted for Elevated serum creatinine.    Hospital Medications: Medications reviewed.  REVIEW OF SYSTEMS:  CONSTITUTIONAL: No fevers or chills  RESPIRATORY: No shortness of breath  CARDIOVASCULAR: No chest pain.  GASTROINTESTINAL: No nausea, vomiting, diarrhea or abdominal pain.   VASCULAR: +bilateral lower extremity edema.     VITALS:  T(F): 98 (20 @ 05:01), Max: 99.4 (20 @ 15:07)  HR: 47 (20 @ 05:01)  BP: 133/46 (20 @ 05:01)  RR: 18 (20 @ 05:01)  SpO2: 100% (20 @ 05:01)  Wt(kg): --     @ 07:01  -   @ 07:00  --------------------------------------------------------  IN: 0 mL / OUT: 150 mL / NET: -150 mL        PHYSICAL EXAM:  Gen: NAD, calm  HEENT: anicteric  Neck: no JVD  Cards: RRR, +S1/S2, +ROLLY  Resp: decreased BS at bases  GI: soft, NT/ND, NABS  Extremities: +trace  LE edema B/L - b/l leg protectors    LABS:      138  |  101  |  56<H>  ----------------------------<  110<H>  4.6   |  31  |  1.82<H>    Ca    8.6      2020 05:52  Phos  4.5     -  Mg     2.4         TPro  6.5  /  Alb  2.4<L>  /  TBili  0.3  /  DBili      /  AST  20  /  ALT  22  /  AlkPhos  41      Creatinine Trend: 1.82 <--, 1.96 <--, 2.32 <--, 1.64 <--, 1.36 <--, 1.37 <--                        8.5    5.80  )-----------( 238      ( 2020 05:52 )             28.2     Urine Studies:  Urinalysis Basic - ( 2020 14:49 )    Color: Yellow / Appearance: Clear / S.020 / pH:   Gluc:  / Ketone: Negative  / Bili: Negative / Urobili: Negative   Blood:  / Protein: 500 mg/dL / Nitrite: Negative   Leuk Esterase: Negative / RBC: 0-2 /HPF / WBC 0-2 /HPF   Sq Epi:  / Non Sq Epi: Moderate /HPF / Bacteria: Many /HPF      Sodium, Random Urine: 14 mmol/L ( @ 14:16)  Chloride, Random Urine: 24 mmol/L ( @ 14:16)  Creatinine, Random Urine: 86 mg/dL ( @ 14:16)    < from: Xray Chest 1 View- PORTABLE-Routine (20 @ 11:21) >    EXAM:  XR CHEST PORTABLE ROUTINE 1V                            PROCEDURE DATE:  2020          INTERPRETATION:  CLINICAL INDICATION: 89 years  Female with sob.    COMPARISON: 2020    The patient's chin obscures the superior mediastinum.    AP view of the chest demonstrate small bilateral pleural effusions. There is a left midlung infiltrate. Mild pulmonary vascular congestion is present.    The heart is mildly enlarged. The aorta is atherosclerotic. The mediastinum and aamir cannot be assessed due to projection.    Mild thoracic degenerative changes are present. There are severe  degenerative changes of both shoulders and probable posttraumatic deformity  on the right.    IMPRESSION:    Mild pulmonary vascular congestion and small bilateral pleural effusions secondary to pulmonary edema. Cardiomegaly.    Left midlung infiltrate.    < end of copied text >

## 2020-07-08 NOTE — PROGRESS NOTE ADULT - ASSESSMENT
Patient is a 89yo Female with CKD-3, HTN, HLD, Severe AS, Arthritis p/w SOB. Patient a/w b/l PNA and CHF exacerbation.  Nephrology consulted for Elevated serum creatinine.    1. SHAUNA- likely pre-renal in the setting of overdiuresis for which IV Lasix was held. Pt now with improving renal function, however CXR with mild pulmonary vascular congestion and small b/l pleural effusion. s/p Lasix 20mg IV x1 7/7. Recc to give another Lasix 20mg IV x1 today. FeUrea 26% consistent with pre-renal SHAUNA. Strict I/Os. Avoid nephrotoxins/ NSAIDs/ RCA. Monitor BMP.  2. CKD-3- in the setting of HTN. Baseline renal function SCr 1.2-1.4. Avoid Nephrotoxins. Monitor lytes.   3. LE edema- mild, Plan as above.  Strict I/Os. Monitor urine o/p. Daily weights.   4. Aortic Stenosis- Severe based on TTE. avoid overdiuresis. Plan as per Cardiology.

## 2020-07-09 LAB
ALBUMIN SERPL ELPH-MCNC: 2.5 G/DL — LOW (ref 3.5–5)
ALP SERPL-CCNC: 44 U/L — SIGNIFICANT CHANGE UP (ref 40–120)
ALT FLD-CCNC: 24 U/L DA — SIGNIFICANT CHANGE UP (ref 10–60)
ANION GAP SERPL CALC-SCNC: 4 MMOL/L — LOW (ref 5–17)
AST SERPL-CCNC: 22 U/L — SIGNIFICANT CHANGE UP (ref 10–40)
BASOPHILS # BLD AUTO: 0.04 K/UL — SIGNIFICANT CHANGE UP (ref 0–0.2)
BASOPHILS NFR BLD AUTO: 0.6 % — SIGNIFICANT CHANGE UP (ref 0–2)
BILIRUB SERPL-MCNC: 0.4 MG/DL — SIGNIFICANT CHANGE UP (ref 0.2–1.2)
BUN SERPL-MCNC: 43 MG/DL — HIGH (ref 7–18)
CALCIUM SERPL-MCNC: 8.7 MG/DL — SIGNIFICANT CHANGE UP (ref 8.4–10.5)
CHLORIDE SERPL-SCNC: 101 MMOL/L — SIGNIFICANT CHANGE UP (ref 96–108)
CO2 SERPL-SCNC: 36 MMOL/L — HIGH (ref 22–31)
CREAT SERPL-MCNC: 1.51 MG/DL — HIGH (ref 0.5–1.3)
EOSINOPHIL # BLD AUTO: 0.19 K/UL — SIGNIFICANT CHANGE UP (ref 0–0.5)
EOSINOPHIL NFR BLD AUTO: 2.6 % — SIGNIFICANT CHANGE UP (ref 0–6)
GLUCOSE SERPL-MCNC: 107 MG/DL — HIGH (ref 70–99)
HCT VFR BLD CALC: 32.6 % — LOW (ref 34.5–45)
HGB BLD-MCNC: 9.6 G/DL — LOW (ref 11.5–15.5)
IMM GRANULOCYTES NFR BLD AUTO: 0.6 % — SIGNIFICANT CHANGE UP (ref 0–1.5)
LYMPHOCYTES # BLD AUTO: 1.9 K/UL — SIGNIFICANT CHANGE UP (ref 1–3.3)
LYMPHOCYTES # BLD AUTO: 26.3 % — SIGNIFICANT CHANGE UP (ref 13–44)
MAGNESIUM SERPL-MCNC: 2.3 MG/DL — SIGNIFICANT CHANGE UP (ref 1.6–2.6)
MCHC RBC-ENTMCNC: 29.4 GM/DL — LOW (ref 32–36)
MCHC RBC-ENTMCNC: 30.8 PG — SIGNIFICANT CHANGE UP (ref 27–34)
MCV RBC AUTO: 104.5 FL — HIGH (ref 80–100)
MONOCYTES # BLD AUTO: 0.79 K/UL — SIGNIFICANT CHANGE UP (ref 0–0.9)
MONOCYTES NFR BLD AUTO: 10.9 % — SIGNIFICANT CHANGE UP (ref 2–14)
NEUTROPHILS # BLD AUTO: 4.27 K/UL — SIGNIFICANT CHANGE UP (ref 1.8–7.4)
NEUTROPHILS NFR BLD AUTO: 59 % — SIGNIFICANT CHANGE UP (ref 43–77)
NRBC # BLD: 0 /100 WBCS — SIGNIFICANT CHANGE UP (ref 0–0)
PHOSPHATE SERPL-MCNC: 3.9 MG/DL — SIGNIFICANT CHANGE UP (ref 2.5–4.5)
PLATELET # BLD AUTO: 268 K/UL — SIGNIFICANT CHANGE UP (ref 150–400)
POTASSIUM SERPL-MCNC: 4 MMOL/L — SIGNIFICANT CHANGE UP (ref 3.5–5.3)
POTASSIUM SERPL-SCNC: 4 MMOL/L — SIGNIFICANT CHANGE UP (ref 3.5–5.3)
PROT SERPL-MCNC: 7 G/DL — SIGNIFICANT CHANGE UP (ref 6–8.3)
RBC # BLD: 3.12 M/UL — LOW (ref 3.8–5.2)
RBC # FLD: 12.2 % — SIGNIFICANT CHANGE UP (ref 10.3–14.5)
SODIUM SERPL-SCNC: 141 MMOL/L — SIGNIFICANT CHANGE UP (ref 135–145)
WBC # BLD: 7.23 K/UL — SIGNIFICANT CHANGE UP (ref 3.8–10.5)
WBC # FLD AUTO: 7.23 K/UL — SIGNIFICANT CHANGE UP (ref 3.8–10.5)

## 2020-07-09 RX ORDER — FUROSEMIDE 40 MG
20 TABLET ORAL
Refills: 0 | Status: DISCONTINUED | OUTPATIENT
Start: 2020-07-09 | End: 2020-07-12

## 2020-07-09 RX ADMIN — Medication 12.5 MILLIGRAM(S): at 17:32

## 2020-07-09 RX ADMIN — APIXABAN 2.5 MILLIGRAM(S): 2.5 TABLET, FILM COATED ORAL at 17:32

## 2020-07-09 RX ADMIN — PIPERACILLIN AND TAZOBACTAM 25 GRAM(S): 4; .5 INJECTION, POWDER, LYOPHILIZED, FOR SOLUTION INTRAVENOUS at 17:33

## 2020-07-09 RX ADMIN — Medication 81 MILLIGRAM(S): at 11:57

## 2020-07-09 RX ADMIN — APIXABAN 2.5 MILLIGRAM(S): 2.5 TABLET, FILM COATED ORAL at 05:18

## 2020-07-09 RX ADMIN — ATORVASTATIN CALCIUM 40 MILLIGRAM(S): 80 TABLET, FILM COATED ORAL at 21:38

## 2020-07-09 RX ADMIN — POLYETHYLENE GLYCOL 3350 17 GRAM(S): 17 POWDER, FOR SOLUTION ORAL at 11:58

## 2020-07-09 RX ADMIN — PIPERACILLIN AND TAZOBACTAM 25 GRAM(S): 4; .5 INJECTION, POWDER, LYOPHILIZED, FOR SOLUTION INTRAVENOUS at 05:18

## 2020-07-09 RX ADMIN — Medication 12.5 MILLIGRAM(S): at 05:16

## 2020-07-09 NOTE — CONSULT NOTE ADULT - SUBJECTIVE AND OBJECTIVE BOX
HPI:  89 year old female lives alone with HHA with medical history of HTN, HLD, arthritis , Afib ? ( patient is on eliquis and b blocker)presented to ED with complains of difficulty breathing. Patient AAO X 3 at baseline states that she was having difficulty in breathing and was sent to hospital. Patient denies any fever and chills and was saying that she was having difficulty  breathing . At present Patient is saturating well on 3 Lr nasal cannula. Patient does not remember her medications but states that her home health aid gives her meds regularly. Patient denies contact to any sock patient and denies chest pain, body ache , Bowel or Urinary problems.    GOC : Discussed with Son, Patient is DNR/DNI has to be signed by attending. (03 Jul 2020 21:33)      PAST MEDICAL & SURGICAL HISTORY:  Arthritis  HLD (hyperlipidemia)  Chronic kidney disease (CKD)  HTN (hypertension)  H/O local excision of skin lesion      Augmentin (Rash; Urticaria)      Meds:  ALBUTerol    90 MICROgram(s) HFA Inhaler 2 Puff(s) Inhalation every 6 hours PRN  albuterol/ipratropium for Nebulization 3 milliLiter(s) Nebulizer every 8 hours  apixaban 2.5 milliGRAM(s) Oral two times a day  aspirin  chewable 81 milliGRAM(s) Oral daily  atorvastatin 40 milliGRAM(s) Oral at bedtime  furosemide    Tablet 20 milliGRAM(s) Oral <User Schedule>  metoprolol tartrate 12.5 milliGRAM(s) Oral two times a day  piperacillin/tazobactam IVPB.. 3.375 Gram(s) IV Intermittent every 12 hours  polyethylene glycol 3350 17 Gram(s) Oral daily  senna 2 Tablet(s) Oral at bedtime  sodium chloride 0.65% Nasal 1 Spray(s) Both Nostrils two times a day PRN      SOCIAL HISTORY:  Smoker:  YES / NO        PACK YEARS:                         WHEN QUIT?  ETOH use:  YES / NO               FREQUENCY / QUANTITY:  Ilicit Drug use:  YES / NO  Occupation:  Assisted device use (Cane / Walker):  Live with:    FAMILY HISTORY:      VITALS:  Vital Signs Last 24 Hrs  T(C): 36.6 (09 Jul 2020 13:54), Max: 36.9 (09 Jul 2020 04:56)  T(F): 97.8 (09 Jul 2020 13:54), Max: 98.5 (09 Jul 2020 04:56)  HR: 58 (09 Jul 2020 13:54) (58 - 101)  BP: 124/57 (09 Jul 2020 13:54) (114/79 - 146/69)  BP(mean): --  RR: 17 (09 Jul 2020 13:54) (16 - 20)  SpO2: 100% (09 Jul 2020 13:54) (89% - 100%)    LABS/DIAGNOSTIC TESTS:                          9.6    7.23  )-----------( 268      ( 09 Jul 2020 06:35 )             32.6     WBC Count: 7.23 K/uL (07-09 @ 06:35)  WBC Count: 5.80 K/uL (07-08 @ 05:52)  WBC Count: 6.07 K/uL (07-07 @ 07:10)      07-09    141  |  101  |  43<H>  ----------------------------<  107<H>  4.0   |  36<H>  |  1.51<H>    Ca    8.7      09 Jul 2020 06:35  Phos  3.9     07-09  Mg     2.3     07-09    TPro  7.0  /  Alb  2.5<L>  /  TBili  0.4  /  DBili  x   /  AST  22  /  ALT  24  /  AlkPhos  44  07-09          LIVER FUNCTIONS - ( 09 Jul 2020 06:35 )  Alb: 2.5 g/dL / Pro: 7.0 g/dL / ALK PHOS: 44 U/L / ALT: 24 U/L DA / AST: 22 U/L / GGT: x                 LACTATE:    ABG -     CULTURES:   .Urine Clean Catch (Midstream)  07-03 @ 23:21   <10,000 CFU/mL Normal Urogenital Jany  --  --      .Blood Blood-Peripheral  07-03 @ 17:29   No Growth Final  --  --      .Blood Blood-Peripheral  07-03 @ 17:28   No Growth Final  --  --              RADIOLOGY:< from: Xray Chest 1 View- PORTABLE-Routine (07.08.20 @ 11:21) >  EXAM:  XR CHEST PORTABLE ROUTINE 1V                            PROCEDURE DATE:  07/08/2020          INTERPRETATION:  CLINICAL INDICATION: 89 years  Female with sob.    COMPARISON: 7/7/2020    The patient's chin obscures the superior mediastinum.    AP view of the chest demonstrate small bilateral pleural effusions. There is a left midlung infiltrate. Mild pulmonary vascular congestion is present.    The heart is mildly enlarged. The aorta is atherosclerotic. The mediastinum and aamir cannot be assessed due to projection.    Mild thoracic degenerative changes are present. There are severe  degenerative changes of both shoulders and probable posttraumatic deformity  on the right.    IMPRESSION:    Mild pulmonary vascular congestion and small bilateral pleural effusions secondary to pulmonary edema. Cardiomegaly.    Left midlung infiltrate.        HENRRY FARLEY M.D., ATTENDING RADIOLOGIST  This document has been electronically signed. Jul 8 2020 11:46AM        < end of copied text >        ROS  [  ] UNABLE TO ELICIT HPI:  89 year old female lives alone with HHA with medical history of HTN, HLD, arthritis , Afib ? ( patient is on eliquis and b blocker)presented to ED with complains of difficulty breathing. Patient AAO X 3 at baseline states that she was having difficulty in breathing and was sent to hospital. Patient denies any fever and chills and was saying that she was having difficulty  breathing . At present Patient is saturating well on 3 Lr nasal cannula. Patient does not remember her medications but states that her home health aid gives her meds regularly. Patient denies contact to any sock patient and denies chest pain, body ache , Bowel or Urinary problems.  GOC : Discussed with Son, Patient is DNR/DNI has to be signed by attending. (03 Jul 2020 21:33)      History as above , asked to see this pleasant patient who was admitted approx 6 days ago with sob and was being treated for pneumonia with Rocephin and azithromycin, she spiked a single temp to 100.8 and so her abxs were switched to zosyn. She currently feels well and has no cough, sob or chest pain, she denies any N,V or diarrhea either. She has had no more fevers, has no leukocytosis either.        PAST MEDICAL & SURGICAL HISTORY:  Arthritis  HLD (hyperlipidemia)  Chronic kidney disease (CKD)  HTN (hypertension)  H/O local excision of skin lesion      Augmentin (Rash; Urticaria)      Meds:  ALBUTerol    90 MICROgram(s) HFA Inhaler 2 Puff(s) Inhalation every 6 hours PRN  albuterol/ipratropium for Nebulization 3 milliLiter(s) Nebulizer every 8 hours  apixaban 2.5 milliGRAM(s) Oral two times a day  aspirin  chewable 81 milliGRAM(s) Oral daily  atorvastatin 40 milliGRAM(s) Oral at bedtime  furosemide    Tablet 20 milliGRAM(s) Oral <User Schedule>  metoprolol tartrate 12.5 milliGRAM(s) Oral two times a day  piperacillin/tazobactam IVPB.. 3.375 Gram(s) IV Intermittent every 12 hours  polyethylene glycol 3350 17 Gram(s) Oral daily  senna 2 Tablet(s) Oral at bedtime  sodium chloride 0.65% Nasal 1 Spray(s) Both Nostrils two times a day PRN      SOCIAL HISTORY:  Smoker: no  ETOH use: no    FAMILY HISTORY: not contributory      VITALS:  Vital Signs Last 24 Hrs  T(C): 36.6 (09 Jul 2020 13:54), Max: 36.9 (09 Jul 2020 04:56)  T(F): 97.8 (09 Jul 2020 13:54), Max: 98.5 (09 Jul 2020 04:56)  HR: 58 (09 Jul 2020 13:54) (58 - 101)  BP: 124/57 (09 Jul 2020 13:54) (114/79 - 146/69)  BP(mean): --  RR: 17 (09 Jul 2020 13:54) (16 - 20)  SpO2: 100% (09 Jul 2020 13:54) (89% - 100%)    LABS/DIAGNOSTIC TESTS:                          9.6    7.23  )-----------( 268      ( 09 Jul 2020 06:35 )             32.6     WBC Count: 7.23 K/uL (07-09 @ 06:35)  WBC Count: 5.80 K/uL (07-08 @ 05:52)  WBC Count: 6.07 K/uL (07-07 @ 07:10)      07-09    141  |  101  |  43<H>  ----------------------------<  107<H>  4.0   |  36<H>  |  1.51<H>    Ca    8.7      09 Jul 2020 06:35  Phos  3.9     07-09  Mg     2.3     07-09    TPro  7.0  /  Alb  2.5<L>  /  TBili  0.4  /  DBili  x   /  AST  22  /  ALT  24  /  AlkPhos  44  07-09          LIVER FUNCTIONS - ( 09 Jul 2020 06:35 )  Alb: 2.5 g/dL / Pro: 7.0 g/dL / ALK PHOS: 44 U/L / ALT: 24 U/L DA / AST: 22 U/L / GGT: x                 LACTATE:    ABG -     CULTURES:   .Urine Clean Catch (Midstream)  07-03 @ 23:21   <10,000 CFU/mL Normal Urogenital Jany  --  --      .Blood Blood-Peripheral  07-03 @ 17:29   No Growth Final  --  --      .Blood Blood-Peripheral  07-03 @ 17:28   No Growth Final  --  --              RADIOLOGY:< from: Xray Chest 1 View- PORTABLE-Routine (07.08.20 @ 11:21) >  EXAM:  XR CHEST PORTABLE ROUTINE 1V                            PROCEDURE DATE:  07/08/2020          INTERPRETATION:  CLINICAL INDICATION: 89 years  Female with sob.    COMPARISON: 7/7/2020    The patient's chin obscures the superior mediastinum.    AP view of the chest demonstrate small bilateral pleural effusions. There is a left midlung infiltrate. Mild pulmonary vascular congestion is present.    The heart is mildly enlarged. The aorta is atherosclerotic. The mediastinum and aamir cannot be assessed due to projection.    Mild thoracic degenerative changes are present. There are severe  degenerative changes of both shoulders and probable posttraumatic deformity  on the right.    IMPRESSION:    Mild pulmonary vascular congestion and small bilateral pleural effusions secondary to pulmonary edema. Cardiomegaly.    Left midlung infiltrate.        HENRRY FARLEY M.D., ATTENDING RADIOLOGIST  This document has been electronically signed. Jul 8 2020 11:46AM        < end of copied text >        ROS  [  ] UNABLE TO ELICIT

## 2020-07-09 NOTE — PROGRESS NOTE ADULT - ASSESSMENT
Patient is a 89yo Female with CKD-3, HTN, HLD, Severe AS, Arthritis p/w SOB. Patient a/w b/l PNA and CHF exacerbation.  Nephrology consulted for Elevated serum creatinine.    1. SHAUNA- likely pre-renal in the setting of overdiuresis for which IV Lasix was held. Pt with improving renal function, however CXR with mild pulmonary vascular congestion and small b/l pleural effusion. s/p Lasix 20mg IV on 7/7 & 7/8. Recc to restart Lasix 20mg PO qod (home dose). FeUrea 26% consistent with pre-renal SHAUNA. Strict I/Os. Avoid nephrotoxins/ NSAIDs/ RCA. Monitor BMP.  2. CKD-3- in the setting of HTN. Baseline renal function SCr 1.2-1.4. Avoid Nephrotoxins. Monitor lytes.   3. LE edema- mild, Plan as above.  Strict I/Os. Monitor urine o/p. Daily weights.   4. Aortic Stenosis- Severe based on TTE. avoid overdiuresis. Plan as per Cardiology.

## 2020-07-09 NOTE — DIETITIAN INITIAL EVALUATION ADULT. - PROBLEM SELECTOR PLAN 3
Troponin 0.077. follow T2, T3  Patient denies any chest pain.  -EKG NSR.  -check ECHO  -doppler Leg  Cardiology consult Dr Gama

## 2020-07-09 NOTE — DISCHARGE NOTE PROVIDER - HOSPITAL COURSE
89 year old female lives alone with HHA with medical history of HTN, HLD, arthritis , Afib ? ( patient is on eliquis and b blocker)presented to ED with complains of difficulty breathing. Patient AAO X 3 at baseline states that she was having difficulty in breathing and was sent to hospital. Patient denies any fever and chills and was saying that she was having difficulty  breathing .        Pt was admitted with acute respiratory failure 2/2 CHF exacerbation and concern for PNA. Pt takes Lasix 20 mg every other day at home. While in the hospital, she received IV LAsix based on her fluid status every day. She required NC O2 support. ALso, initially she was Rocephin and Azithro for CAP, then she spiked a temp of 100.8 in the hosiptal, so her antibiotic coverage was broadened to Zosyn to cover for aspiration PNA. S/S was consulted as pt is high risk for aspiration. ALso for SOB in the setting of elevated Ddimer,  she got CT angio chest and LE doppler and it was negative for Pe/DVT. She also had mild trop elevation most likely due to demand ischemia. HEr ECHO showed grossly normal EF, with G II DD.     For HTN, her home medication: Amlodipine and ACEI were discontinued as her BP was on the lower side. Home dose of metoprolol was decreased. Lasix was continued.     SHe developed SHAUNA while in the hospital, her baseline creat is 1.3, it got elevated to 2.3, creatinine improved with decreasing the dose of lasix.             Also, palliative team was consulted. Son refused hospice services at this point and would like to take her home.  was consulted for safe discharge planning.         incomplete------------------------ 89 year old female lives alone with HHA with medical history of HTN, HLD, arthritis , Afib. who presented to ED with complains of difficulty breathing. Patient AAO X 3 at baseline states that she was having difficulty in breathing and was sent to hospital. Patient denies any fever and chills and was saying that she was having difficulty  breathing .        Pt was admitted with acute respiratory failure 2/2 CHF exacerbation and concern for PNA. Pt takes Lasix 20 mg every other day at home. While in the hospital, she received IV LAsix based on her fluid status every day. She required NC O2 support. Also, initially she was on Rocephin and Azithro for CAP, then she spiked a temp of 100.8 in the hosiptal, so her antibiotic coverage was broadened to Zosyn to cover for aspiration PNA. S/S was consulted as pt is high risk for aspiration. Also for SOB in the setting of elevated Ddimer,  she got CT angio chest and LE doppler and it was negative for Pe/DVT. She also had mild trop elevation most likely due to demand ischemia. Her ECHO showed grossly normal EF, with G II DD.     For HTN, her home medication: Amlodipine and ACEI were discontinued as her BP was on the lower side. Home dose of metoprolol was decreased. Lasix was continued.     SHe developed SHAUNA while in the hospital, her baseline creat is 1.3, it got elevated to 2.3, creatinine improved with decreasing the dose of lasix.             Also, palliative team was consulted. Son refused hospice services at this point and would like to take her home.  was consulted for safe discharge planning.         Patient to be discharged with home O2. Patient is medically stable for discharge. Case was discussed with attending.

## 2020-07-09 NOTE — DIETITIAN INITIAL EVALUATION ADULT. - CONTINUE CURRENT NUTRITION CARE PLAN
provide diet as per swallow, In the interim, change diet to Mechanical soft , DASH/TLC as not receiving TPN

## 2020-07-09 NOTE — PROGRESS NOTE ADULT - SUBJECTIVE AND OBJECTIVE BOX
PGY 3 Note discussed with primary attending    Patient is a 89y old  Female who presents with a chief complaint of SOB (08 Jul 2020 13:30)      INTERVAL HPI/OVERNIGHT EVENTS:   Pt seen and examined at the bedside.   No overnight events.   Will start on Lasix 20 every other day   Son refused Palliative care and wanted to take her home.   pt lives by herself.  consulted for safety at home concerns.   Pt denies any new complains         MEDICATIONS  (STANDING):  albuterol/ipratropium for Nebulization 3 milliLiter(s) Nebulizer every 8 hours  apixaban 2.5 milliGRAM(s) Oral two times a day  aspirin  chewable 81 milliGRAM(s) Oral daily  atorvastatin 40 milliGRAM(s) Oral at bedtime  furosemide    Tablet 20 milliGRAM(s) Oral <User Schedule>  metoprolol tartrate 12.5 milliGRAM(s) Oral two times a day  piperacillin/tazobactam IVPB.. 3.375 Gram(s) IV Intermittent every 12 hours  polyethylene glycol 3350 17 Gram(s) Oral daily  senna 2 Tablet(s) Oral at bedtime    MEDICATIONS  (PRN):  ALBUTerol    90 MICROgram(s) HFA Inhaler 2 Puff(s) Inhalation every 6 hours PRN Shortness of Breath and/or Wheezing  sodium chloride 0.65% Nasal 1 Spray(s) Both Nostrils two times a day PRN Nasal Congestion      __________________________________________________  REVIEW OF SYSTEMS:    CONSTITUTIONAL: No fever, generalized weakness   EYES: no acute visual disturbances  NECK: No pain or stiffness  RESPIRATORY: No cough;   CARDIOVASCULAR: No chest pain, no palpitations  GASTROINTESTINAL: No pain. No nausea or vomiting; No diarrhea   NEUROLOGICAL: No headache or numbness, no tremors  MUSCULOSKELETAL: No joint pain, no muscle pain  GENITOURINARY: no dysuria, no frequency, no hesitancy  PSYCHIATRY: no depression , no anxiety  ALL OTHER  ROS negative        Vital Signs Last 24 Hrs  T(C): 36.9 (09 Jul 2020 04:56), Max: 37 (08 Jul 2020 14:42)  T(F): 98.5 (09 Jul 2020 04:56), Max: 98.6 (08 Jul 2020 14:42)  HR: 101 (09 Jul 2020 04:56) (67 - 101)  BP: 133/78 (09 Jul 2020 04:56) (114/79 - 137/66)  RR: 18 (09 Jul 2020 04:56) (16 - 18)  SpO2: 100% (09 Jul 2020 04:56) (93% - 100%)    ________________________________________________  PHYSICAL EXAM:  GENERAL: female in bed. No acute distress   HEENT: Normocephalic;  conjunctivae and sclerae clear; moist mucous membranes;   NECK : supple  CHEST/LUNG: bilateral lower lobe crackles present   HEART: S1 S2  regular; no murmurs, gallops or rubs  ABDOMEN: Soft, Nontender, Nondistended; Bowel sounds present  EXTREMITIES: no cyanosis; no edema; no calf tenderness  SKIN: warm and dry; no rash  NERVOUS SYSTEM:  Awake and alert; AAo x3     _________________________________________________  LABS:                        9.6    7.23  )-----------( 268      ( 09 Jul 2020 06:35 )             32.6     07-09    141  |  101  |  43<H>  ----------------------------<  107<H>  4.0   |  36<H>  |  1.51<H>    Ca    8.7      09 Jul 2020 06:35  Phos  3.9     07-09  Mg     2.3     07-09    TPro  7.0  /  Alb  2.5<L>  /  TBili  0.4  /  DBili  x   /  AST  22  /  ALT  24  /  AlkPhos  44  07-09        CAPILLARY BLOOD GLUCOSE            RADIOLOGY & ADDITIONAL TESTS:    Imaging Personally Reviewed:  YES/    Consultant(s) Notes Reviewed:   YES/    Care Discussed with Consultants :     Plan of care was discussed with patient and /or primary care giver; all questions and concerns were addressed and care was aligned with patient's wishes.

## 2020-07-09 NOTE — DISCHARGE NOTE PROVIDER - NSDCMRMEDTOKEN_GEN_ALL_CORE_FT
amLODIPine 5 mg oral tablet: 1 tab(s) orally once a day  cholecalciferol 1000 intl units (25 mcg) oral tablet: 1 tab(s) orally once a day MDD:1  Eliquis 2.5 mg oral tablet: 1 tab(s) orally 2 times a day MDD:2  Lasix 20 mg oral tablet: 1 tab(s) orally every 48 hours, As Needed in case leg swelling worsens  losartan 50 mg oral tablet: 1 tab(s) orally once a day  lovastatin 20 mg oral tablet: 1 tab(s) orally once a day  metoprolol succinate 50 mg oral tablet, extended release: 1 tab(s) orally once a day  Myrbetriq 25 mg oral tablet, extended release: 1 tab(s) orally once a day  oxybutynin 5 mg oral tablet: 1 tab(s) orally 3 times a day cholecalciferol 1000 intl units (25 mcg) oral tablet: 1 tab(s) orally once a day MDD:1  Eliquis 2.5 mg oral tablet: 1 tab(s) orally 2 times a day MDD:2  Lasix 20 mg oral tablet: 1 tab(s) orally every 48 hours   lovastatin 20 mg oral tablet: 1 tab(s) orally once a day  metoprolol tartrate 25 mg oral tablet: 0.5 tab(s) orally 2 times a day   Myrbetriq 25 mg oral tablet, extended release: 1 tab(s) orally once a day  oxybutynin 5 mg oral tablet: 1 tab(s) orally 3 times a day

## 2020-07-09 NOTE — DIETITIAN INITIAL EVALUATION ADULT. - ADD RECOMMEND
spoke with RN . add multivitamin/minerals 1 tab/d and vitamin C 500mg bid to assist with healing if not contraindicated spoke with RN and MD  . add multivitamin/minerals 1 tab/d and vitamin C 500mg bid to assist with healing if not contraindicated

## 2020-07-09 NOTE — CONSULT NOTE ADULT - GASTROINTESTINAL DETAILS
nontender/no organomegaly/no rigidity/bowel sounds normal/no guarding/no masses palpable/soft/no distention

## 2020-07-09 NOTE — CONSULT NOTE ADULT - RS GEN PE MLT RESP DETAILS PC
basilar rales and diffuse expiratory wheezes
breath sounds equal/no wheezes/no rales/good air movement/clear to auscultation bilaterally/no rhonchi

## 2020-07-09 NOTE — DIETITIAN INITIAL EVALUATION ADULT. - PERTINENT LABORATORY DATA
07-09 Na141 mmol/L Glu 107 mg/dL<H> K+ 4.0 mmol/L Cr  1.51 mg/dL<H> BUN 43 mg/dL<H> 07-09 Phos 3.9 mg/dL 07-09 Alb 2.5 g/dL<L> 07-04 Chol 130 mg/dL LDL 63 mg/dL HDL 54 mg/dL Trig 64 mg/dL

## 2020-07-09 NOTE — DISCHARGE NOTE PROVIDER - NSDCCPCAREPLAN_GEN_ALL_CORE_FT
PRINCIPAL DISCHARGE DIAGNOSIS  Diagnosis: Acute respiratory failure with hypoxia  Assessment and Plan of Treatment: Admitted with acute respiratory failure secondary to CHF exacerbation and concern for PNA. While in the hospital, receieved IV lasix. She required nasal cannula oxygen support. Also, treated with Rocephin and Azithro for concern of pneumonia. Underwent CT angio of chest and lower extremity doppler and it was negative for PE/DVT blood clots. She also had mild trop elevation most likely due to demand ischemia. Her ECHO showed grossly normal ejection fraction with grade II diastolic dysfunction.   For HTN, home medication: Amlodipine and ACEI were discontinued as BP was on the lower side. Home dose of metoprolol was decreased. Lasix was continued.   Developed SHAUNA while in the hospital, her baseline creat is 1.3, it got elevated to 2.3, creatinine improved with decreasing the dose of lasix.   To be discharged with home O2. Recommend to hold all blood pressure medications for now due to having normal blood pressure during admission. Recommend to take metoprolol 12.5mg twice a day now. Follow up with PCP for further management in 1 week.      SECONDARY DISCHARGE DIAGNOSES  Diagnosis: HTN (hypertension)  Assessment and Plan of Treatment: Recommend to hold all blood pressure medications for now due to having normal blood pressure during admission. Recommend to take metoprolol 12.5mg twice a day now. Recommended to have a low salt/DASH diet. Follow up with PCP for management of hypertension.    Diagnosis: Chronic kidney disease  Assessment and Plan of Treatment: As per nephrology recommendations, take lasix 20mg every other day (about 3 times a week) for management of your CHF. Follow up with your PCP for further evaluation and management.    Diagnosis: HLD (hyperlipidemia)  Assessment and Plan of Treatment: Continue home medication of statin for hyperlipidemia and follow up with PCP to have repeat lipid panel lab test done in 3 months.

## 2020-07-09 NOTE — DISCHARGE NOTE PROVIDER - CARE PROVIDER_API CALL
Aníbal Lloyd  Internal Medicine  [address] [place] [address]  Phone: [phone]  Fax: [phone]  Follow Up Time:

## 2020-07-09 NOTE — PROGRESS NOTE ADULT - SUBJECTIVE AND OBJECTIVE BOX
[facility] NEPHROLOGY- PROGRESS NOTE    Patient is a 89yo Female with CKD-3, HTN, HLD, Severe AS, Arthritis p/w SOB. Patient a/w b/l PNA and CHF exacerbation.  Nephrology consulted for Elevated serum creatinine.    Hospital Medications: Medications reviewed.  REVIEW OF SYSTEMS:  CONSTITUTIONAL: No fevers or chills  RESPIRATORY: No shortness of breath  CARDIOVASCULAR: No chest pain.  GASTROINTESTINAL: No nausea, vomiting, diarrhea or abdominal pain.   VASCULAR: +bilateral lower extremity edema.     VITALS:  T(F): 98.5 (20 @ 04:56), Max: 98.6 (20 @ 14:42)  HR: 63 (20 @ 09:33)  BP: 146/69 (20 @ 09:33)  RR: 20 (20 @ 11:11)  SpO2: 89% (20 @ 11:11)  Wt(kg): --     @ 07:01  -   @ 07:00  --------------------------------------------------------  IN: 0 mL / OUT: 30 mL / NET: -30 mL      PHYSICAL EXAM:  Gen: NAD, calm  HEENT: anicteric  Neck: no JVD  Cards: RRR, +S1/S2, +ROLLY  Resp: decreased BS at bases  GI: soft, NT/ND, NABS  Extremities: +trace  LE edema B/L - b/l leg protectors      LABS:      141  |  101  |  43<H>  ----------------------------<  107<H>  4.0   |  36<H>  |  1.51<H>    Ca    8.7      2020 06:35  Phos  3.9       Mg     2.3         TPro  7.0  /  Alb  2.5<L>  /  TBili  0.4  /  DBili      /  AST  22  /  ALT  24  /  AlkPhos  44      Creatinine Trend: 1.51 <--, 1.82 <--, 1.96 <--, 2.32 <--, 1.64 <--, 1.36 <--, 1.37 <--                        9.6    7.23  )-----------( 268      ( 2020 06:35 )             32.6     Urine Studies:  Urinalysis Basic - ( 2020 14:49 )    Color: Yellow / Appearance: Clear / S.020 / pH:   Gluc:  / Ketone: Negative  / Bili: Negative / Urobili: Negative   Blood:  / Protein: 500 mg/dL / Nitrite: Negative   Leuk Esterase: Negative / RBC: 0-2 /HPF / WBC 0-2 /HPF   Sq Epi:  / Non Sq Epi: Moderate /HPF / Bacteria: Many /HPF      Sodium, Random Urine: 14 mmol/L ( @ 14:16)  Chloride, Random Urine: 24 mmol/L ( @ 14:16)  Creatinine, Random Urine: 86 mg/dL ( @ 14:16)

## 2020-07-09 NOTE — DIETITIAN INITIAL EVALUATION ADULT. - OTHER INFO
attempted to interview patient but Unable to answer questions. Estimated ht: 5'2", BMI=26. no information on wt loss available

## 2020-07-09 NOTE — CONSULT NOTE ADULT - ASSESSMENT
Patient is a 89yo Female with CKD-3, HTN, HLD, Severe AS, Arthritis p/w SOB. Patient a/w b/l PNA and CHF exacerbation.  Nephrology consulted for Elevated serum creatinine.    1. SHAUNA- likely pre-renal in the setting of overdiuresis for which IV Lasix was switched to 20mg PO daily 7/4. Check urine lytes. If SCr continues to worsening, recc to hold further Lasix. Strict I/Os. Avoid nephrotoxins/ NSAIDs/ RCA. Monitor BMP.  2. CKD-3- in the setting of HTN. Baseline renal function SCr 1.2-1.4. Avoid Nephrotoxins. Monitor lytes.   3. LE edema- mild, if worsening renal function, recc to hold further diuretics.  Avoid overdiuresis.  Strict I/Os. Monitor urine o/p. Daily weights.   4. Aortic Stenosis- Severe based on TTE. avoid overdiuresis. Plan as per Cardiology.
89 year old female lives alone with [facility] with medical history of HTN, HLD, arthritis , PAF ( patient is on eliquis and b blocker) Severe Aortic Stenosis presented to ED with complains of difficulty breathing.         Problem/Plan - 1:  ·  Problem: Pneumonia of both lower lobes due to infectious organism.  Plan: Patient admitted for SOB, denies fever.  CX ray on admission showed pulmonary vascular congestion.  c/w Rocephin and Azithro for CAP   -Albuterol inhaler.  -Covid PCR negative , follow repeat COVID   saturation improved, 99% on 2L.         Problem/Plan - 2:  ·  Problem: Congestive heart failure,Diastolic.  Plan: Patients presented after SOB.  -has mild B/L leg edema.  -Pro BNP 6728.  -ECHO EF63% Severe Aortic stenosis        Problem/Plan - 3:  ·  Problem: Elevated troponin I level.  Plan: mild trop elevation   Patient denies any chest pain.  -EKG NSR.  - most likely demand ischemia       Problem/Plan - 4:  ·  Problem: HLD (hyperlipidemia).  Plan: Patient takes Lipitor 40 at home .   Lipid Profile (07.04.20 @ 05:53)    Total Cholesterol/HDL Ratio Measurement: 2.4 RATIO    Cholesterol, Serum: 130 mg/dL    Triglycerides, Serum: 64 mg/dL    HDL Cholesterol, Serum: 54  mg/dL        Problem/Plan - 5:  ·  Problem: Paroxysmal Atrial Fibrillation.  Plan: CHADS2-4 BP on softer side   DC Amlodipine,   Decrease Metoprolol dose to 12.5 BID   - Continue Eliquis    Problem/Plan - 6:  Problem: Chronic kidney disease (CKD). Plan: Patient has CKD, stage 3 creatinine around base line.    Problem/Plan - 7:  ·  Problem: Elevated d-dimer.  Plan: -Patient has elevated D-dimers.  presented with SOB.  CTA -ve for Pulmonary Emboli.  follow LE doppler.
Vital Signs Last 24 Hrs  T(C): 36.8 (06 Jul 2020 14:15), Max: 36.8 (06 Jul 2020 14:15)  T(F): 98.3 (06 Jul 2020 14:15), Max: 98.3 (06 Jul 2020 14:15)  HR: 48 (06 Jul 2020 14:15) (48 - 66)  BP: 102/56 (06 Jul 2020 14:15) (100/57 - 129/74)  BP(mean): --  RR: 16 (06 Jul 2020 14:15) (16 - 18)  SpO2: 99% (06 Jul 2020 14:15) (80% - 100%)                          8.6    7.08  )-----------( 225      ( 06 Jul 2020 07:17 )             28.5   07-06    135  |  99  |  59<H>  ----------------------------<  93  4.9   |  28  |  2.32<H>    Ca    8.5      06 Jul 2020 07:17  Phos  5.6     07-06  Mg     2.4     07-06    < from: CT Angio Chest w/ IV Cont (07.03.20 @ 18:29) >  EXAM:  CT ANGIO CHEST (W)AW IC                            PROCEDURE DATE:  07/03/2020          INTERPRETATION:  CLINICAL INFORMATION: Fever and shortness of breath    COMPARISON: None.    PROCEDURE:   CT Angiography of the Chest.  47 ml of Omnipaque 350 was injected intravenously. 53 ml were discarded.  Sagittal and coronal reformats were performed as well as 3D (MIP) reconstructions.    FINDINGS: Respiratory motion artifact.    LUNGS AND AIRWAYS: Patent central airways.  Bilateral lower lobe compressive atelectasis adjacent to pleural effusions..  PLEURA: Small bilateral pleural effusions.  MEDIASTINUM AND RENEE: No lymphadenopathy.  VESSELS: No pulmonary embolus. Main pulmonary artery is of normal caliber. Atherosclerotic aortic and coronary artery calcifications  HEART: Mild cardiomegaly. Trace pericardial effusion.  CHEST WALL AND LOWER NECK: 3.5 x 2.5 cm soft tissue nodule posterior to the axillary tail of the right pectoralis muscle. Ill-defined soft tissue mass in the upper outer quadrant of the right breast measures 3.3 x 2.7 cm..  VISUALIZED UPPER ABDOMEN: Left renal cyst.  BONES: Flattening of the left humeral head with degenerative change at the glenohumeral joint. Chronic deformity of the right humeral head with degenerative change at the glenohumeral joint. Degenerative changes of the spine.    IMPRESSION:   No pulmonary embolus.    Small bilateral pleural effusions.    < end of copied text >      Impression: 89F with severe AS and CKD, admitted with sob, hypoxia, leg swelling, BNP>6000 and imaging compatible with pulmonary edema and volume overload. I personally reviewed CT scan imaging: There is significant motion artifact but no major PEs. There are bilateral small pleural effusions with underlying compressive atelectasis. No clinical of radiographic evidence of pneumonia. Pt has remote h/o asthma and is wheezing currently but I suspect this is due to pulmonary edema primarily. Can try bronchodilators but needs diuresis to optimize fluid status, if pt can tolerate. Also consider swallow evaluation as pts in this age group can have microaspiration and chronic airway inflammation leading to wheezing as well.    Thank you for the consultation.  Will follow with you.
Pneumonia - has been on treatment for approx 6 days now  Fever - single episode that appears to have resolved  Pt is doing well overall and is relatively asymptomatic    Plan - Cont Zosyn 3.375 gms iv Q12hrs till Friday then DC all abxs  DC planning.  Reconsult prn.

## 2020-07-10 LAB
ALBUMIN SERPL ELPH-MCNC: 2.5 G/DL — LOW (ref 3.5–5)
ALP SERPL-CCNC: 41 U/L — SIGNIFICANT CHANGE UP (ref 40–120)
ALT FLD-CCNC: 21 U/L DA — SIGNIFICANT CHANGE UP (ref 10–60)
ANION GAP SERPL CALC-SCNC: 4 MMOL/L — LOW (ref 5–17)
AST SERPL-CCNC: 23 U/L — SIGNIFICANT CHANGE UP (ref 10–40)
BASOPHILS # BLD AUTO: 0.05 K/UL — SIGNIFICANT CHANGE UP (ref 0–0.2)
BASOPHILS NFR BLD AUTO: 0.7 % — SIGNIFICANT CHANGE UP (ref 0–2)
BILIRUB SERPL-MCNC: 0.5 MG/DL — SIGNIFICANT CHANGE UP (ref 0.2–1.2)
BUN SERPL-MCNC: 38 MG/DL — HIGH (ref 7–18)
CALCIUM SERPL-MCNC: 8.9 MG/DL — SIGNIFICANT CHANGE UP (ref 8.4–10.5)
CHLORIDE SERPL-SCNC: 102 MMOL/L — SIGNIFICANT CHANGE UP (ref 96–108)
CO2 SERPL-SCNC: 35 MMOL/L — HIGH (ref 22–31)
CREAT SERPL-MCNC: 1.4 MG/DL — HIGH (ref 0.5–1.3)
EOSINOPHIL # BLD AUTO: 0.21 K/UL — SIGNIFICANT CHANGE UP (ref 0–0.5)
EOSINOPHIL NFR BLD AUTO: 2.9 % — SIGNIFICANT CHANGE UP (ref 0–6)
GLUCOSE SERPL-MCNC: 105 MG/DL — HIGH (ref 70–99)
HCT VFR BLD CALC: 31.6 % — LOW (ref 34.5–45)
HGB BLD-MCNC: 9.8 G/DL — LOW (ref 11.5–15.5)
IMM GRANULOCYTES NFR BLD AUTO: 0.3 % — SIGNIFICANT CHANGE UP (ref 0–1.5)
LYMPHOCYTES # BLD AUTO: 1.6 K/UL — SIGNIFICANT CHANGE UP (ref 1–3.3)
LYMPHOCYTES # BLD AUTO: 21.9 % — SIGNIFICANT CHANGE UP (ref 13–44)
MAGNESIUM SERPL-MCNC: 2.3 MG/DL — SIGNIFICANT CHANGE UP (ref 1.6–2.6)
MCHC RBC-ENTMCNC: 31 GM/DL — LOW (ref 32–36)
MCHC RBC-ENTMCNC: 31.6 PG — SIGNIFICANT CHANGE UP (ref 27–34)
MCV RBC AUTO: 101.9 FL — HIGH (ref 80–100)
MONOCYTES # BLD AUTO: 0.64 K/UL — SIGNIFICANT CHANGE UP (ref 0–0.9)
MONOCYTES NFR BLD AUTO: 8.8 % — SIGNIFICANT CHANGE UP (ref 2–14)
NEUTROPHILS # BLD AUTO: 4.79 K/UL — SIGNIFICANT CHANGE UP (ref 1.8–7.4)
NEUTROPHILS NFR BLD AUTO: 65.4 % — SIGNIFICANT CHANGE UP (ref 43–77)
NRBC # BLD: 0 /100 WBCS — SIGNIFICANT CHANGE UP (ref 0–0)
PHOSPHATE SERPL-MCNC: 3.1 MG/DL — SIGNIFICANT CHANGE UP (ref 2.5–4.5)
PLATELET # BLD AUTO: 240 K/UL — SIGNIFICANT CHANGE UP (ref 150–400)
POTASSIUM SERPL-MCNC: 3.9 MMOL/L — SIGNIFICANT CHANGE UP (ref 3.5–5.3)
POTASSIUM SERPL-SCNC: 3.9 MMOL/L — SIGNIFICANT CHANGE UP (ref 3.5–5.3)
PROT SERPL-MCNC: 6.8 G/DL — SIGNIFICANT CHANGE UP (ref 6–8.3)
RBC # BLD: 3.1 M/UL — LOW (ref 3.8–5.2)
RBC # FLD: 12.1 % — SIGNIFICANT CHANGE UP (ref 10.3–14.5)
SODIUM SERPL-SCNC: 141 MMOL/L — SIGNIFICANT CHANGE UP (ref 135–145)
WBC # BLD: 7.31 K/UL — SIGNIFICANT CHANGE UP (ref 3.8–10.5)
WBC # FLD AUTO: 7.31 K/UL — SIGNIFICANT CHANGE UP (ref 3.8–10.5)

## 2020-07-10 PROCEDURE — 99232 SBSQ HOSP IP/OBS MODERATE 35: CPT

## 2020-07-10 RX ADMIN — Medication 20 MILLIGRAM(S): at 12:16

## 2020-07-10 RX ADMIN — Medication 12.5 MILLIGRAM(S): at 18:13

## 2020-07-10 RX ADMIN — APIXABAN 2.5 MILLIGRAM(S): 2.5 TABLET, FILM COATED ORAL at 18:13

## 2020-07-10 RX ADMIN — POLYETHYLENE GLYCOL 3350 17 GRAM(S): 17 POWDER, FOR SOLUTION ORAL at 12:14

## 2020-07-10 RX ADMIN — PIPERACILLIN AND TAZOBACTAM 25 GRAM(S): 4; .5 INJECTION, POWDER, LYOPHILIZED, FOR SOLUTION INTRAVENOUS at 18:13

## 2020-07-10 RX ADMIN — PIPERACILLIN AND TAZOBACTAM 25 GRAM(S): 4; .5 INJECTION, POWDER, LYOPHILIZED, FOR SOLUTION INTRAVENOUS at 05:32

## 2020-07-10 RX ADMIN — Medication 12.5 MILLIGRAM(S): at 05:31

## 2020-07-10 RX ADMIN — Medication 81 MILLIGRAM(S): at 12:16

## 2020-07-10 RX ADMIN — APIXABAN 2.5 MILLIGRAM(S): 2.5 TABLET, FILM COATED ORAL at 05:31

## 2020-07-10 RX ADMIN — ATORVASTATIN CALCIUM 40 MILLIGRAM(S): 80 TABLET, FILM COATED ORAL at 21:37

## 2020-07-10 NOTE — SWALLOW BEDSIDE ASSESSMENT ADULT - COMMENTS
Consult received, EMR, labs, radiology reviewed. Pt seen for bedside swallow evaluation, AA+Ox1, HOB elevated to 90°. Pt unable to feed self, req's total assist at this time.

## 2020-07-10 NOTE — SWALLOW BEDSIDE ASSESSMENT ADULT - ASR SWALLOW ASPIRATION MONITOR
position upright (90Y)/change of breathing pattern/cough/pneumonia/oral hygiene/fever/throat clearing/upper respiratory infection/gurgly voice

## 2020-07-10 NOTE — PROGRESS NOTE ADULT - ASSESSMENT
Patient is a 89yo Female with CKD-3, HTN, HLD, Severe AS, Arthritis p/w SOB. Patient a/w b/l PNA and CHF exacerbation.  Nephrology consulted for Elevated serum creatinine.    1. SHAUNA- likely pre-renal in the setting of overdiuresis for which IV Lasix was held. Pt with improving renal function, however CXR with mild pulmonary vascular congestion and small b/l pleural effusion. s/p Lasix 20mg IV on 7/7 & 7/8. Now back on home dose of restart Lasix 20mg PO q other day. FeUrea 26% consistent with pre-renal SHAUNA. Strict I/Os. Avoid nephrotoxins/ NSAIDs/ RCA. Monitor BMP.  2. CKD-3- in the setting of HTN. Baseline renal function SCr 1.2-1.4. Avoid Nephrotoxins. Monitor lytes.   3. LE edema- mild, Plan as above.  Strict I/Os. Monitor urine o/p. Daily weights.   4. Aortic Stenosis- Severe based on TTE. avoid overdiuresis. Plan as per Cardiology.

## 2020-07-10 NOTE — SWALLOW BEDSIDE ASSESSMENT ADULT - SWALLOW EVAL: DIAGNOSIS
Pt p/w oral dysphagia c/b prolonged mastication 2/2 lower edentition, and slow A-P transport; However, oropharyngeal swallow was intact and timely with no overt s/s of laryngeal penetration or aspiration at this exam.

## 2020-07-10 NOTE — CHART NOTE - NSCHARTNOTEFT_GEN_A_CORE
Pt CHF exacerbation and PNA has improved. Pt is not requiring IV lasix any more. She is still dependent on NC O2 support 2L. Pt is desaturating to 86% on room air at rest. pt will benefit from home O2. Pt CHF exacerbation and PNA has resolved. Pt is not requiring IV lasix any more. She is still dependent on NC O2 support 2L. Pt is desaturating to 86% on room air at rest. pt will benefit from home O2.

## 2020-07-10 NOTE — SWALLOW BEDSIDE ASSESSMENT ADULT - SWALLOW EVAL: RECOMMENDED FEEDING/EATING TECHNIQUES
position upright (90 degrees)/oral hygiene/allow for swallow between intakes/alternate food with liquid

## 2020-07-10 NOTE — CHART NOTE - NSCHARTNOTEFT_GEN_A_CORE
PC  contacted patient's son Lazaro Calderon [phone] to follow up subsequent to his discussion with the PC NP two days prior. PC Sw provided emotional support as Mr. Calderon shared his mother's recent illness has been stressful, however he expressed frustration that the concept of hospice had been introduced.  Mr. Calderon stated his mother has improved and will be d/c'ed home to resume home attendant services and physical therapy.  Therefore, he stated hospice should not have been introduced.  JARRED DELA CRUZ apologized for Mr. Calderon feeling upset and educated him regarding hospice has an insurance benefit which is voluntary.  Mr. Calderon requested this  document that he is NOT interested in hospice for his mother and will continue to supplement care when the home attendant is not working.  PC SW verbalized understanding and recognized a patient can receive home hospice without the home health aide services so as not to disrupt existing services.  Mr. Calderon acknowledged this and stated he is hopeful that his mother will continue to improve post discharge and does not have any addl. needs at this time.  JARRED DELA CRUZ thanked Mr. Calderon for his time and encouraged him to make her aware should any PC needs arise prior to discharge.

## 2020-07-10 NOTE — PROGRESS NOTE ADULT - ASSESSMENT
Chart, imaging and labs reviewed in detail.    89F with severe AS, CKD, and pulmonary edema with bilateral effusions. On exam today, has improved rales bilaterally without wheezing. O2 requirement improved, 93% on 1L n/c. Swallow eval and recommendations noted.   Thank you for this consult, please call with any further questions.

## 2020-07-10 NOTE — PROGRESS NOTE ADULT - SUBJECTIVE AND OBJECTIVE BOX
[facility] NEPHROLOGY- PROGRESS NOTE    Patient is a 89yo Female with CKD-3, HTN, HLD, Severe AS, Arthritis p/w SOB. Patient a/w b/l PNA and CHF exacerbation.  Nephrology consulted for Elevated serum creatinine.    Hospital Medications: Medications reviewed.  REVIEW OF SYSTEMS:  CONSTITUTIONAL: No fevers or chills  RESPIRATORY: No shortness of breath  CARDIOVASCULAR: No chest pain.  GASTROINTESTINAL: No nausea, vomiting, diarrhea or abdominal pain.   VASCULAR: +bilateral lower extremity edema.     VITALS:  T(F): 98.7 (07-10-20 @ 05:13), Max: 98.7 (07-10-20 @ 05:13)  HR: 83 (07-10-20 @ 05:13)  BP: 115/62 (07-10-20 @ 05:13)  RR: 16 (07-10-20 @ 05:13)  SpO2: 96% (07-10-20 @ 05:13)  Wt(kg): --      PHYSICAL EXAM:  Gen: NAD, calm  HEENT: anicteric  Neck: no JVD  Cards: RRR, +S1/S2, +ROLLY  Resp: decreased BS at bases  GI: soft, NT/ND, NABS  Extremities: +trace  LE edema B/L - b/l leg protectors      LABS:  07-10    Comprehensive Metabolic Panel (20 @ 06:35)    Sodium, Serum: 141 mmol/L    Potassium, Serum: 4.0 mmol/L    Chloride, Serum: 101 mmol/L    Carbon Dioxide, Serum: 36 mmol/L    Anion Gap, Serum: 4 mmol/L    Blood Urea Nitrogen, Serum: 43 mg/dL    Creatinine, Serum: 1.51 mg/dL    Glucose, Serum: 107 mg/dL    Calcium, Total Serum: 8.7 mg/dL    Protein Total, Serum: 7.0 g/dL    Albumin, Serum: 2.5 g/dL    Bilirubin Total, Serum: 0.4 mg/dL    Alkaline Phosphatase, Serum: 44 U/L    Aspartate Aminotransferase (AST/SGOT): 22 U/L    Alanine Aminotransferase (ALT/SGPT): 24 U/L DA    eGFR if Non : 29: Interpretative comment      Ca    8.7      2020 06:35  Phos  3.9     07-  Mg     2.3     -    TPro  7.0  /  Alb  2.5<L>  /  TBili  0.4  /  DBili      /  AST  22  /  ALT  24  /  AlkPhos  44      Creatinine Trend: 1.51 <--, 1.82 <--, 1.96 <--, 2.32 <--, 1.64 <--, 1.36 <--, 1.37 <--                        9.8    7.31  )-----------( 240      ( 10 Jul 2020 07:21 )             31.6     Urine Studies:  Urinalysis Basic - ( 2020 14:49 )    Color: Yellow / Appearance: Clear / S.020 / pH:   Gluc:  / Ketone: Negative  / Bili: Negative / Urobili: Negative   Blood:  / Protein: 500 mg/dL / Nitrite: Negative   Leuk Esterase: Negative / RBC: 0-2 /HPF / WBC 0-2 /HPF   Sq Epi:  / Non Sq Epi: Moderate /HPF / Bacteria: Many /HPF      Sodium, Random Urine: 14 mmol/L ( @ 14:16)  Chloride, Random Urine: 24 mmol/L ( @ 14:16)  Creatinine, Random Urine: 86 mg/dL ( @ 14:16)

## 2020-07-10 NOTE — SWALLOW BEDSIDE ASSESSMENT ADULT - SLP PERTINENT HISTORY OF CURRENT PROBLEM
89yo Female with CKD-3, HTN, HLD, Severe AS, Arthritis p/w SOB. Patient a/w b/l PNA and CHF exacerbation.

## 2020-07-10 NOTE — PROGRESS NOTE ADULT - SUBJECTIVE AND OBJECTIVE BOX
PGY-1 Progress Note discussed with attending    PAGER #: [-----] TILL 5:00 PM  PLEASE CONTACT ON CALL TEAM:  - On Call Team (Please refer to Giovanna) FROM 5:00 PM - 8:30PM  - Nightfloat Team FROM 8:30 -7:30 AM    CHIEF COMPLAINT & BRIEF HOSPITAL COURSE:  HPI:  89 year old female lives alone with HHA with medical history of HTN, HLD, arthritis , Afib ? ( patient is on eliquis and b blocker)presented to ED with complains of difficulty breathing. Patient AAO X 3 at baseline states that she was having difficulty in breathing and was sent to hospital. Patient denies any fever and chills and was saying that she was having difficulty  breathing . At present Patient is saturating well on 3 Lr nasal cannula. Patient does not remember her medications but states that her home health aid gives her meds regularly. Patient denies contact to any sock patient and denies chest pain, body ache , Bowel or Urinary problems.      INTERVAL HPI/OVERNIGHT EVENTS:   patient  seen and examined bed side,she had constipation and we did enema. sh is in need of home oxygen      REVIEW OF SYSTEMS:  CONSTITUTIONAL: No fever, weight loss, or fatigue  RESPIRATORY: No cough, wheezing, chills or hemoptysis; No shortness of breath  CARDIOVASCULAR: No chest pain, palpitations, dizziness, or leg swelling  GASTROINTESTINAL: No abdominal pain. No nausea, vomiting, or hematemesis; No diarrhea or constipation. No melena or hematochezia.  GENITOURINARY: No dysuria or hematuria, urinary frequency  NEUROLOGICAL: No headaches, memory loss, loss of strength, numbness, or tremors  SKIN: No itching, burning, rashes, or lesions     MEDICATIONS  (STANDING):  albuterol/ipratropium for Nebulization 3 milliLiter(s) Nebulizer every 8 hours  apixaban 2.5 milliGRAM(s) Oral two times a day  aspirin  chewable 81 milliGRAM(s) Oral daily  atorvastatin 40 milliGRAM(s) Oral at bedtime  furosemide    Tablet 20 milliGRAM(s) Oral <User Schedule>  metoprolol tartrate 12.5 milliGRAM(s) Oral two times a day  piperacillin/tazobactam IVPB.. 3.375 Gram(s) IV Intermittent every 12 hours  polyethylene glycol 3350 17 Gram(s) Oral daily  senna 2 Tablet(s) Oral at bedtime    MEDICATIONS  (PRN):  ALBUTerol    90 MICROgram(s) HFA Inhaler 2 Puff(s) Inhalation every 6 hours PRN Shortness of Breath and/or Wheezing  sodium chloride 0.65% Nasal 1 Spray(s) Both Nostrils two times a day PRN Nasal Congestion      Vital Signs Last 24 Hrs  T(C): 37.1 (10 Jul 2020 16:23), Max: 37.1 (10 Jul 2020 05:13)  T(F): 98.8 (10 Jul 2020 16:23), Max: 98.8 (10 Jul 2020 16:23)  HR: 92 (10 Jul 2020 14:33) (55 - 92)  BP: 127/77 (10 Jul 2020 14:33) (115/62 - 133/55)  BP(mean): --  RR: 16 (10 Jul 2020 14:33) (16 - 20)  SpO2: 98% (10 Jul 2020 14:33) (86% - 99%)    PHYSICAL EXAMINATION:  GENERAL: NAD, well built  HEAD:  Atraumatic, Normocephalic  EYES:  conjunctiva and sclera clear  NECK: Supple, No JVD, Normal thyroid  CHEST/LUNG: Clear to auscultation. Clear to percussion bilaterally; No rales, rhonchi, wheezing, or rubs  HEART: Regular rate and rhythm; No murmurs, rubs, or gallops  ABDOMEN: Soft, Nontender, Nondistended; Bowel sounds present  NERVOUS SYSTEM:  Alert & Oriented X3,    EXTREMITIES:  2+ Peripheral Pulses, No clubbing, cyanosis, or edema  SKIN: warm dry                          9.8    7.31  )-----------( 240      ( 10 Jul 2020 07:21 )             31.6     07-10    141  |  102  |  38<H>  ----------------------------<  105<H>  3.9   |  35<H>  |  1.40<H>    Ca    8.9      10 Jul 2020 07:21  Phos  3.1     07-10  Mg     2.3     07-10    TPro  6.8  /  Alb  2.5<L>  /  TBili  0.5  /  DBili  x   /  AST  23  /  ALT  21  /  AlkPhos  41  07-10    LIVER FUNCTIONS - ( 10 Jul 2020 07:21 )  Alb: 2.5 g/dL / Pro: 6.8 g/dL / ALK PHOS: 41 U/L / ALT: 21 U/L DA / AST: 23 U/L / GGT: x                   CAPILLARY BLOOD GLUCOSE          RADIOLOGY & ADDITIONAL TESTS:

## 2020-07-11 LAB
ALBUMIN SERPL ELPH-MCNC: 2.4 G/DL — LOW (ref 3.5–5)
ALP SERPL-CCNC: 37 U/L — LOW (ref 40–120)
ALT FLD-CCNC: 19 U/L DA — SIGNIFICANT CHANGE UP (ref 10–60)
ANION GAP SERPL CALC-SCNC: 2 MMOL/L — LOW (ref 5–17)
AST SERPL-CCNC: 20 U/L — SIGNIFICANT CHANGE UP (ref 10–40)
BASOPHILS # BLD AUTO: 0.05 K/UL — SIGNIFICANT CHANGE UP (ref 0–0.2)
BASOPHILS NFR BLD AUTO: 0.6 % — SIGNIFICANT CHANGE UP (ref 0–2)
BILIRUB SERPL-MCNC: 0.6 MG/DL — SIGNIFICANT CHANGE UP (ref 0.2–1.2)
BUN SERPL-MCNC: 32 MG/DL — HIGH (ref 7–18)
CALCIUM SERPL-MCNC: 8.7 MG/DL — SIGNIFICANT CHANGE UP (ref 8.4–10.5)
CHLORIDE SERPL-SCNC: 103 MMOL/L — SIGNIFICANT CHANGE UP (ref 96–108)
CO2 SERPL-SCNC: 39 MMOL/L — HIGH (ref 22–31)
CREAT SERPL-MCNC: 1.28 MG/DL — SIGNIFICANT CHANGE UP (ref 0.5–1.3)
EOSINOPHIL # BLD AUTO: 0.21 K/UL — SIGNIFICANT CHANGE UP (ref 0–0.5)
EOSINOPHIL NFR BLD AUTO: 2.4 % — SIGNIFICANT CHANGE UP (ref 0–6)
GLUCOSE SERPL-MCNC: 96 MG/DL — SIGNIFICANT CHANGE UP (ref 70–99)
HCT VFR BLD CALC: 29.9 % — LOW (ref 34.5–45)
HGB BLD-MCNC: 9 G/DL — LOW (ref 11.5–15.5)
IMM GRANULOCYTES NFR BLD AUTO: 0.5 % — SIGNIFICANT CHANGE UP (ref 0–1.5)
LYMPHOCYTES # BLD AUTO: 1.61 K/UL — SIGNIFICANT CHANGE UP (ref 1–3.3)
LYMPHOCYTES # BLD AUTO: 18.3 % — SIGNIFICANT CHANGE UP (ref 13–44)
MAGNESIUM SERPL-MCNC: 2.2 MG/DL — SIGNIFICANT CHANGE UP (ref 1.6–2.6)
MCHC RBC-ENTMCNC: 30.1 GM/DL — LOW (ref 32–36)
MCHC RBC-ENTMCNC: 31.1 PG — SIGNIFICANT CHANGE UP (ref 27–34)
MCV RBC AUTO: 103.5 FL — HIGH (ref 80–100)
MONOCYTES # BLD AUTO: 0.73 K/UL — SIGNIFICANT CHANGE UP (ref 0–0.9)
MONOCYTES NFR BLD AUTO: 8.3 % — SIGNIFICANT CHANGE UP (ref 2–14)
NEUTROPHILS # BLD AUTO: 6.16 K/UL — SIGNIFICANT CHANGE UP (ref 1.8–7.4)
NEUTROPHILS NFR BLD AUTO: 69.9 % — SIGNIFICANT CHANGE UP (ref 43–77)
NRBC # BLD: 0 /100 WBCS — SIGNIFICANT CHANGE UP (ref 0–0)
PHOSPHATE SERPL-MCNC: 3.6 MG/DL — SIGNIFICANT CHANGE UP (ref 2.5–4.5)
PLATELET # BLD AUTO: 227 K/UL — SIGNIFICANT CHANGE UP (ref 150–400)
POTASSIUM SERPL-MCNC: 3.8 MMOL/L — SIGNIFICANT CHANGE UP (ref 3.5–5.3)
POTASSIUM SERPL-SCNC: 3.8 MMOL/L — SIGNIFICANT CHANGE UP (ref 3.5–5.3)
PROT SERPL-MCNC: 6.4 G/DL — SIGNIFICANT CHANGE UP (ref 6–8.3)
RBC # BLD: 2.89 M/UL — LOW (ref 3.8–5.2)
RBC # FLD: 12.1 % — SIGNIFICANT CHANGE UP (ref 10.3–14.5)
SODIUM SERPL-SCNC: 144 MMOL/L — SIGNIFICANT CHANGE UP (ref 135–145)
WBC # BLD: 8.8 K/UL — SIGNIFICANT CHANGE UP (ref 3.8–10.5)
WBC # FLD AUTO: 8.8 K/UL — SIGNIFICANT CHANGE UP (ref 3.8–10.5)

## 2020-07-11 RX ADMIN — ATORVASTATIN CALCIUM 40 MILLIGRAM(S): 80 TABLET, FILM COATED ORAL at 22:07

## 2020-07-11 RX ADMIN — PIPERACILLIN AND TAZOBACTAM 25 GRAM(S): 4; .5 INJECTION, POWDER, LYOPHILIZED, FOR SOLUTION INTRAVENOUS at 05:59

## 2020-07-11 RX ADMIN — APIXABAN 2.5 MILLIGRAM(S): 2.5 TABLET, FILM COATED ORAL at 17:16

## 2020-07-11 RX ADMIN — Medication 12.5 MILLIGRAM(S): at 05:59

## 2020-07-11 RX ADMIN — APIXABAN 2.5 MILLIGRAM(S): 2.5 TABLET, FILM COATED ORAL at 05:59

## 2020-07-11 RX ADMIN — Medication 81 MILLIGRAM(S): at 11:51

## 2020-07-11 RX ADMIN — POLYETHYLENE GLYCOL 3350 17 GRAM(S): 17 POWDER, FOR SOLUTION ORAL at 11:51

## 2020-07-11 RX ADMIN — Medication 12.5 MILLIGRAM(S): at 17:16

## 2020-07-11 NOTE — PROGRESS NOTE ADULT - ASSESSMENT
Patient is a 89yo Female with CKD-3, HTN, HLD, Severe AS, Arthritis p/w SOB. Patient a/w b/l PNA and CHF exacerbation.  Nephrology consulted for Elevated serum creatinine.    1. SHAUNA- likely pre-renal in the setting of overdiuresis for which IV Lasix was held. Pt with improving renal function still now back on home dose of Lasix 20mg PO q other day.      CXR with mild pulmonary vascular congestion and small b/l pleural effusion. s/p Lasix 20mg IV on 7/7 & 7/8. Now back on home dose of restart Lasix 20mg PO q other day. FeUrea 26% consistent with pre-renal SHAUNA. Strict I/Os. Avoid nephrotoxins/ NSAIDs/ RCA. Monitor BMP.    2. CKD-3- in the setting of HTN. Baseline renal function SCr 1.2-1.4. Avoid Nephrotoxins. Monitor lytes.   3. LE edema- mild, Plan as above.  Strict I/Os. Monitor urine o/p. Daily weights.   4. Aortic Stenosis- Severe based on TTE. avoid overdiuresis. Plan as per Cardiology.

## 2020-07-11 NOTE — PROGRESS NOTE ADULT - SUBJECTIVE AND OBJECTIVE BOX
[facility] NEPHROLOGY- PROGRESS NOTE    Patient is a 89yo Female with CKD-3, HTN, HLD, Severe AS, Arthritis p/w SOB. Patient a/w b/l PNA and CHF exacerbation.  Nephrology consulted for Elevated serum creatinine.    Hospital Medications: Medications reviewed.  REVIEW OF SYSTEMS:  CONSTITUTIONAL: No fevers or chills  RESPIRATORY: No shortness of breath  CARDIOVASCULAR: No chest pain.  GASTROINTESTINAL: No nausea, vomiting, diarrhea or abdominal pain.   VASCULAR: +bilateral lower extremity edema.     VITALS:  T(F): 99.2 (07-11-20 @ 21:31), Max: 99.2 (07-11-20 @ 21:31)  HR: 66 (07-11-20 @ 21:31)  BP: 125/60 (07-11-20 @ 21:31)  RR: 16 (07-11-20 @ 21:31)  SpO2: 100% (07-11-20 @ 21:31)  Wt(kg): --    PHYSICAL EXAM:  Gen: NAD, calm  HEENT: anicteric  Neck: no JVD  Cards: RRR, +S1/S2, +ROLLY  Resp: decreased BS at bases  GI: soft, NT/ND, NABS  Extremities: +trace  LE edema B/L - b/l leg protectors    LABS:  07-11    144  |  103  |  32<H>  ----------------------------<  96  3.8   |  39<H>  |  1.28    Ca    8.7      11 Jul 2020 06:01  Phos  3.6     07-11  Mg     2.2     07-11    TPro  6.4  /  Alb  2.4<L>  /  TBili  0.6  /  DBili      /  AST  20  /  ALT  19  /  AlkPhos  37<L>  07-11    Creatinine Trend: 1.28 <--, 1.40 <--, 1.51 <--, 1.82 <--, 1.96 <--, 2.32 <--, 1.64 <--                        9.0    8.80  )-----------( 227      ( 11 Jul 2020 06:01 )             29.9     Urine Studies:    Sodium, Random Urine: 14 mmol/L (07-06 @ 14:16)  Chloride, Random Urine: 24 mmol/L (07-06 @ 14:16)  Creatinine, Random Urine: 86 mg/dL (07-06 @ 14:16)

## 2020-07-11 NOTE — PROGRESS NOTE ADULT - SUBJECTIVE AND OBJECTIVE BOX
PGY-1 Progress Note discussed with attending    PAGER #: [[phone]] TILL 1:00 PM  PLEASE CONTACT ON CALL TEAM:  - On Call Team (Please refer to Giovanna) FROM 5:00 PM - 8:30PM  - Nightfloat Team FROM 8:30 -7:30 AM    CHIEF COMPLAINT & BRIEF HOSPITAL COURSE:    89 year old female lives alone with HHA with medical history of HTN, HLD, arthritis , Afib ? ( patient is on eliquis and b blocker)presented to ED with complains of difficulty breathing. Patient AAO X 3 at baseline states that she was having difficulty in breathing and was sent to hospital. Patient denies any fever and chills and was saying that she was having difficulty  breathing . At present Patient is saturating well on 3 Lr nasal cannula. Patient does not remember her medications but states that her home health aid gives her meds regularly. Patient denies contact to any sock patient and denies chest pain, body ache , Bowel or Urinary problems.    GOC : Discussed with Son, Patient is DNR/DNI has to be signed by attending. (03 Jul 2020 21:33)      INTERVAL HPI/OVERNIGHT EVENTS:   PATIENT seen and examined bed side.she had constipation and got an enema yesterday then the nurse witnessed a bowel movement. the patient denies and still complaining of constipation.      REVIEW OF SYSTEMS:  CONSTITUTIONAL: No fever, weight loss, or fatigue  RESPIRATORY: No cough, wheezing, chills or hemoptysis; No shortness of breath  CARDIOVASCULAR: No chest pain, palpitations, dizziness, or leg swelling  GASTROINTESTINAL: No abdominal pain. No nausea, vomiting, or hematemesis; No diarrhea or constipation. No melena or hematochezia.  GENITOURINARY: No dysuria or hematuria, urinary frequency  NEUROLOGICAL: No headaches, memory loss, loss of strength, numbness, or tremors  SKIN: No itching, burning, rashes, or lesions     MEDICATIONS  (STANDING):  albuterol/ipratropium for Nebulization 3 milliLiter(s) Nebulizer every 8 hours  apixaban 2.5 milliGRAM(s) Oral two times a day  aspirin  chewable 81 milliGRAM(s) Oral daily  atorvastatin 40 milliGRAM(s) Oral at bedtime  furosemide    Tablet 20 milliGRAM(s) Oral <User Schedule>  metoprolol tartrate 12.5 milliGRAM(s) Oral two times a day  piperacillin/tazobactam IVPB.. 3.375 Gram(s) IV Intermittent every 12 hours  polyethylene glycol 3350 17 Gram(s) Oral daily  senna 2 Tablet(s) Oral at bedtime    MEDICATIONS  (PRN):  ALBUTerol    90 MICROgram(s) HFA Inhaler 2 Puff(s) Inhalation every 6 hours PRN Shortness of Breath and/or Wheezing  sodium chloride 0.65% Nasal 1 Spray(s) Both Nostrils two times a day PRN Nasal Congestion      Vital Signs Last 24 Hrs  T(C): 36.9 (11 Jul 2020 05:21), Max: 37.1 (10 Jul 2020 16:23)  T(F): 98.5 (11 Jul 2020 05:21), Max: 98.8 (10 Jul 2020 16:23)  HR: 92 (11 Jul 2020 05:21) (74 - 92)  BP: 132/61 (11 Jul 2020 05:21) (122/65 - 132/61)  BP(mean): --  RR: 16 (11 Jul 2020 05:21) (16 - 17)  SpO2: 97% (11 Jul 2020 05:21) (97% - 99%)    PHYSICAL EXAMINATION:  GENERAL: NAD, well built  HEAD:  Atraumatic, Normocephalic  EYES:  conjunctiva and sclera clear  NECK: Supple, No JVD, Normal thyroid  CHEST/LUNG: Clear to auscultation. Clear to percussion bilaterally; No rales, rhonchi, wheezing, or rubs  HEART: Regular rate and rhythm; No murmurs, rubs, or gallops  ABDOMEN: Soft, Nontender, Nondistended; Bowel sounds present  NERVOUS SYSTEM:  Alert & Oriented X3,    EXTREMITIES:  2+ Peripheral Pulses, No clubbing, cyanosis, or edema  SKIN: warm dry                          9.0    8.80  )-----------( 227      ( 11 Jul 2020 06:01 )             29.9     07-11    144  |  103  |  32<H>  ----------------------------<  96  3.8   |  39<H>  |  1.28    Ca    8.7      11 Jul 2020 06:01  Phos  3.6     07-11  Mg     2.2     07-11    TPro  6.4  /  Alb  2.4<L>  /  TBili  0.6  /  DBili  x   /  AST  20  /  ALT  19  /  AlkPhos  37<L>  07-11    LIVER FUNCTIONS - ( 11 Jul 2020 06:01 )  Alb: 2.4 g/dL / Pro: 6.4 g/dL / ALK PHOS: 37 U/L / ALT: 19 U/L DA / AST: 20 U/L / GGT: x                   RADIOLOGY & ADDITIONAL TESTS: PGY-1 Progress Note discussed with attending    PAGER #: [[phone]] TILL 1:00 PM  PLEASE CONTACT ON CALL TEAM:  - On Call Team (Please refer to Giovanna) FROM 5:00 PM - 8:30PM  - Nightfloat Team FROM 8:30 -7:30 AM    CHIEF COMPLAINT & BRIEF HOSPITAL COURSE:    89 year old female lives alone with HHA with medical history of HTN, HLD, arthritis , Afib ? ( patient is on eliquis and b blocker)presented to ED with complains of difficulty breathing. Patient AAO X 3 at baseline states that she was having difficulty in breathing and was sent to hospital. Patient denies any fever and chills and was saying that she was having difficulty  breathing . At present Patient is saturating well on 3 Lr nasal cannula. Patient does not remember her medications but states that her home health aid gives her meds regularly. Patient denies contact to any sock patient and denies chest pain, body ache , Bowel or Urinary problems.    GOC : Discussed with Son, Patient is DNR/DNI has to be signed by attending. (03 Jul 2020 21:33)      INTERVAL HPI/OVERNIGHT EVENTS:   PATIENT seen and examined bed side.she had constipation and got an enema yesterday then the nurse witnessed a bowel movement. Speech consult recommended dysphagia diet.    REVIEW OF SYSTEMS:  CONSTITUTIONAL: No fever, weight loss, or fatigue  RESPIRATORY: No cough, wheezing, chills or hemoptysis; No shortness of breath  CARDIOVASCULAR: No chest pain, palpitations, dizziness, or leg swelling  GASTROINTESTINAL: No abdominal pain. No nausea, vomiting, or hematemesis; No diarrhea or constipation. No melena or hematochezia.  GENITOURINARY: No dysuria or hematuria, urinary frequency  NEUROLOGICAL: No headaches, memory loss, loss of strength, numbness, or tremors  SKIN: No itching, burning, rashes, or lesions     MEDICATIONS  (STANDING):  albuterol/ipratropium for Nebulization 3 milliLiter(s) Nebulizer every 8 hours  apixaban 2.5 milliGRAM(s) Oral two times a day  aspirin  chewable 81 milliGRAM(s) Oral daily  atorvastatin 40 milliGRAM(s) Oral at bedtime  furosemide    Tablet 20 milliGRAM(s) Oral <User Schedule>  metoprolol tartrate 12.5 milliGRAM(s) Oral two times a day  piperacillin/tazobactam IVPB.. 3.375 Gram(s) IV Intermittent every 12 hours  polyethylene glycol 3350 17 Gram(s) Oral daily  senna 2 Tablet(s) Oral at bedtime    MEDICATIONS  (PRN):  ALBUTerol    90 MICROgram(s) HFA Inhaler 2 Puff(s) Inhalation every 6 hours PRN Shortness of Breath and/or Wheezing  sodium chloride 0.65% Nasal 1 Spray(s) Both Nostrils two times a day PRN Nasal Congestion      Vital Signs Last 24 Hrs  T(C): 36.9 (11 Jul 2020 05:21), Max: 37.1 (10 Jul 2020 16:23)  T(F): 98.5 (11 Jul 2020 05:21), Max: 98.8 (10 Jul 2020 16:23)  HR: 92 (11 Jul 2020 05:21) (74 - 92)  BP: 132/61 (11 Jul 2020 05:21) (122/65 - 132/61)  BP(mean): --  RR: 16 (11 Jul 2020 05:21) (16 - 17)  SpO2: 97% (11 Jul 2020 05:21) (97% - 99%)    PHYSICAL EXAMINATION:  GENERAL: NAD, well built  HEAD:  Atraumatic, Normocephalic  EYES:  conjunctiva and sclera clear  NECK: Supple, No JVD, Normal thyroid  CHEST/LUNG: Clear to auscultation. Clear to percussion bilaterally; No rales, rhonchi, wheezing, or rubs  HEART: Regular rate and rhythm; No murmurs, rubs, or gallops  ABDOMEN: Soft, Nontender, Nondistended; Bowel sounds present  NERVOUS SYSTEM:  Alert & Oriented X3,    EXTREMITIES:  2+ Peripheral Pulses, No clubbing, cyanosis, or edema  SKIN: warm dry                          9.0    8.80  )-----------( 227      ( 11 Jul 2020 06:01 )             29.9     07-11    144  |  103  |  32<H>  ----------------------------<  96  3.8   |  39<H>  |  1.28    Ca    8.7      11 Jul 2020 06:01  Phos  3.6     07-11  Mg     2.2     07-11    TPro  6.4  /  Alb  2.4<L>  /  TBili  0.6  /  DBili  x   /  AST  20  /  ALT  19  /  AlkPhos  37<L>  07-11    LIVER FUNCTIONS - ( 11 Jul 2020 06:01 )  Alb: 2.4 g/dL / Pro: 6.4 g/dL / ALK PHOS: 37 U/L / ALT: 19 U/L DA / AST: 20 U/L / GGT: x                   RADIOLOGY & ADDITIONAL TESTS:

## 2020-07-12 LAB
ALBUMIN SERPL ELPH-MCNC: 2.4 G/DL — LOW (ref 3.5–5)
ALP SERPL-CCNC: 38 U/L — LOW (ref 40–120)
ALT FLD-CCNC: 19 U/L DA — SIGNIFICANT CHANGE UP (ref 10–60)
ANION GAP SERPL CALC-SCNC: 1 MMOL/L — LOW (ref 5–17)
AST SERPL-CCNC: 20 U/L — SIGNIFICANT CHANGE UP (ref 10–40)
BASOPHILS # BLD AUTO: 0.06 K/UL — SIGNIFICANT CHANGE UP (ref 0–0.2)
BASOPHILS NFR BLD AUTO: 0.6 % — SIGNIFICANT CHANGE UP (ref 0–2)
BILIRUB SERPL-MCNC: 0.5 MG/DL — SIGNIFICANT CHANGE UP (ref 0.2–1.2)
BUN SERPL-MCNC: 35 MG/DL — HIGH (ref 7–18)
CALCIUM SERPL-MCNC: 9 MG/DL — SIGNIFICANT CHANGE UP (ref 8.4–10.5)
CHLORIDE SERPL-SCNC: 105 MMOL/L — SIGNIFICANT CHANGE UP (ref 96–108)
CO2 SERPL-SCNC: 39 MMOL/L — HIGH (ref 22–31)
CREAT SERPL-MCNC: 1.61 MG/DL — HIGH (ref 0.5–1.3)
EOSINOPHIL # BLD AUTO: 0.2 K/UL — SIGNIFICANT CHANGE UP (ref 0–0.5)
EOSINOPHIL NFR BLD AUTO: 2.1 % — SIGNIFICANT CHANGE UP (ref 0–6)
GLUCOSE SERPL-MCNC: 99 MG/DL — SIGNIFICANT CHANGE UP (ref 70–99)
HCT VFR BLD CALC: 31.8 % — LOW (ref 34.5–45)
HGB BLD-MCNC: 9.3 G/DL — LOW (ref 11.5–15.5)
IMM GRANULOCYTES NFR BLD AUTO: 0.4 % — SIGNIFICANT CHANGE UP (ref 0–1.5)
LYMPHOCYTES # BLD AUTO: 2.06 K/UL — SIGNIFICANT CHANGE UP (ref 1–3.3)
LYMPHOCYTES # BLD AUTO: 21.6 % — SIGNIFICANT CHANGE UP (ref 13–44)
MAGNESIUM SERPL-MCNC: 2.3 MG/DL — SIGNIFICANT CHANGE UP (ref 1.6–2.6)
MCHC RBC-ENTMCNC: 29.2 GM/DL — LOW (ref 32–36)
MCHC RBC-ENTMCNC: 31.1 PG — SIGNIFICANT CHANGE UP (ref 27–34)
MCV RBC AUTO: 106.4 FL — HIGH (ref 80–100)
MONOCYTES # BLD AUTO: 0.82 K/UL — SIGNIFICANT CHANGE UP (ref 0–0.9)
MONOCYTES NFR BLD AUTO: 8.6 % — SIGNIFICANT CHANGE UP (ref 2–14)
NEUTROPHILS # BLD AUTO: 6.35 K/UL — SIGNIFICANT CHANGE UP (ref 1.8–7.4)
NEUTROPHILS NFR BLD AUTO: 66.7 % — SIGNIFICANT CHANGE UP (ref 43–77)
NRBC # BLD: 0 /100 WBCS — SIGNIFICANT CHANGE UP (ref 0–0)
PHOSPHATE SERPL-MCNC: 4.1 MG/DL — SIGNIFICANT CHANGE UP (ref 2.5–4.5)
PLATELET # BLD AUTO: 243 K/UL — SIGNIFICANT CHANGE UP (ref 150–400)
POTASSIUM SERPL-MCNC: 4.3 MMOL/L — SIGNIFICANT CHANGE UP (ref 3.5–5.3)
POTASSIUM SERPL-SCNC: 4.3 MMOL/L — SIGNIFICANT CHANGE UP (ref 3.5–5.3)
PROT SERPL-MCNC: 6.7 G/DL — SIGNIFICANT CHANGE UP (ref 6–8.3)
RBC # BLD: 2.99 M/UL — LOW (ref 3.8–5.2)
RBC # FLD: 12.3 % — SIGNIFICANT CHANGE UP (ref 10.3–14.5)
SODIUM SERPL-SCNC: 145 MMOL/L — SIGNIFICANT CHANGE UP (ref 135–145)
WBC # BLD: 9.53 K/UL — SIGNIFICANT CHANGE UP (ref 3.8–10.5)
WBC # FLD AUTO: 9.53 K/UL — SIGNIFICANT CHANGE UP (ref 3.8–10.5)

## 2020-07-12 RX ADMIN — Medication 12.5 MILLIGRAM(S): at 17:00

## 2020-07-12 RX ADMIN — Medication 81 MILLIGRAM(S): at 11:22

## 2020-07-12 RX ADMIN — ATORVASTATIN CALCIUM 40 MILLIGRAM(S): 80 TABLET, FILM COATED ORAL at 22:03

## 2020-07-12 RX ADMIN — APIXABAN 2.5 MILLIGRAM(S): 2.5 TABLET, FILM COATED ORAL at 17:00

## 2020-07-12 RX ADMIN — APIXABAN 2.5 MILLIGRAM(S): 2.5 TABLET, FILM COATED ORAL at 05:48

## 2020-07-12 RX ADMIN — Medication 12.5 MILLIGRAM(S): at 05:47

## 2020-07-12 NOTE — PROGRESS NOTE ADULT - ASSESSMENT
Patient is a 89yo Female with CKD-3, HTN, HLD, Severe AS, Arthritis p/w SOB. Patient a/w b/l PNA and CHF exacerbation.  Nephrology consulted for Elevated serum creatinine.    1. SHAUNA- likely pre-renal in the setting of overdiuresis for which IV Lasix was held. Pt now again with worsening renal function when started back on home dose of Lasix 20mg PO q other day.   D/C Lasix. Will give another course of gentle IVF.    CXR with mild pulmonary vascular congestion and small b/l pleural effusion. s/p Lasix 20mg IV on 7/7 & 7/8. Now back on home dose of restart Lasix 20mg PO q other day. FeUrea 26% consistent with pre-renal SHAUNA. Strict I/Os. Avoid nephrotoxins/ NSAIDs/ RCA. Monitor BMP.    2. CKD-3- in the setting of HTN. Baseline renal function SCr 1.2-1.4. Avoid Nephrotoxins. Monitor lytes.   3. LE edema- mild, Plan as above.  Strict I/Os. Monitor urine o/p. Daily weights.   4. Aortic Stenosis- Severe based on TTE. avoid overdiuresis. Plan as per Cardiology. Patient is a 89yo Female with CKD-3, HTN, HLD, Severe AS, Arthritis p/w SOB. Patient a/w b/l PNA and CHF exacerbation.  Nephrology consulted for Elevated serum creatinine.    1. SHAUNA- likely pre-renal in the setting of overdiuresis for which IV Lasix was held. Pt now again with worsening renal function when started back on home dose of Lasix 20mg PO q other day.   D/C Lasix. Will give another course of gentle IVF if no improvement tomorrow.    CXR with mild pulmonary vascular congestion and small b/l pleural effusion. s/p Lasix 20mg IV on 7/7 & 7/8. Now back on home dose of restart Lasix 20mg PO q other day. FeUrea 26% consistent with pre-renal SHAUNA. Strict I/Os. Avoid nephrotoxins/ NSAIDs/ RCA. Monitor BMP.    2. CKD-3- in the setting of HTN. Baseline renal function SCr 1.2-1.4. Avoid Nephrotoxins. Monitor lytes.   3. LE edema- mild, Plan as above.  Strict I/Os. Monitor urine o/p. Daily weights.   4. Aortic Stenosis- Severe based on TTE. avoid overdiuresis. Plan as per Cardiology.

## 2020-07-12 NOTE — PROGRESS NOTE ADULT - SUBJECTIVE AND OBJECTIVE BOX
[facility] NEPHROLOGY- PROGRESS NOTE    Patient is a 89yo Female with CKD-3, HTN, HLD, Severe AS, Arthritis p/w SOB. Patient a/w b/l PNA and CHF exacerbation.  Nephrology consulted for Elevated serum creatinine.    Hospital Medications: Medications reviewed.  REVIEW OF SYSTEMS:  CONSTITUTIONAL: No fevers or chills  RESPIRATORY: No shortness of breath  CARDIOVASCULAR: No chest pain.  GASTROINTESTINAL: No nausea, vomiting, diarrhea or abdominal pain.   VASCULAR: +bilateral lower extremity edema.     VITALS:  T(F): 98 (07-12-20 @ 14:08), Max: 99.2 (07-11-20 @ 21:31)  HR: 57 (07-12-20 @ 14:08)  BP: 107/63 (07-12-20 @ 14:08)  RR: 16 (07-12-20 @ 14:08)  SpO2: 99% (07-12-20 @ 14:08)  Wt(kg): --    PHYSICAL EXAM:  Gen: NAD, calm  HEENT: anicteric  Neck: no JVD  Cards: RRR, +S1/S2, +ROLLY  Resp: decreased BS at bases  GI: soft, NT/ND, NABS  Extremities: +trace  LE edema B/L - b/l leg protectors    LABS:  07-12    145  |  105  |  35<H>  ----------------------------<  99  4.3   |  39<H>  |  1.61<H>    Ca    9.0      12 Jul 2020 06:00  Phos  4.1     07-12  Mg     2.3     07-12    TPro  6.7  /  Alb  2.4<L>  /  TBili  0.5  /  DBili      /  AST  20  /  ALT  19  /  AlkPhos  38<L>  07-12    Creatinine Trend: 1.61 <--, 1.28 <--, 1.40 <--, 1.51 <--, 1.82 <--, 1.96 <--, 2.32 <--                        9.3    9.53  )-----------( 243      ( 12 Jul 2020 06:00 )             31.8     Urine Studies:    Sodium, Random Urine: 14 mmol/L (07-06 @ 14:16)  Chloride, Random Urine: 24 mmol/L (07-06 @ 14:16)  Creatinine, Random Urine: 86 mg/dL (07-06 @ 14:16)

## 2020-07-12 NOTE — PROGRESS NOTE ADULT - SUBJECTIVE AND OBJECTIVE BOX
SUBJECTIVE / OVERNIGHT EVENTS: pt seen and examined      MEDICATIONS  (STANDING):  albuterol/ipratropium for Nebulization 3 milliLiter(s) Nebulizer every 8 hours  apixaban 2.5 milliGRAM(s) Oral two times a day  aspirin  chewable 81 milliGRAM(s) Oral daily  atorvastatin 40 milliGRAM(s) Oral at bedtime  furosemide    Tablet 20 milliGRAM(s) Oral <User Schedule>  metoprolol tartrate 12.5 milliGRAM(s) Oral two times a day  polyethylene glycol 3350 17 Gram(s) Oral daily  senna 2 Tablet(s) Oral at bedtime    MEDICATIONS  (PRN):  ALBUTerol    90 MICROgram(s) HFA Inhaler 2 Puff(s) Inhalation every 6 hours PRN Shortness of Breath and/or Wheezing  sodium chloride 0.65% Nasal 1 Spray(s) Both Nostrils two times a day PRN Nasal Congestion    Vital Signs Last 24 Hrs  T(C): 36.7 (12 Jul 2020 14:08), Max: 37.3 (11 Jul 2020 21:31)  T(F): 98 (12 Jul 2020 14:08), Max: 99.2 (11 Jul 2020 21:31)  HR: 66 (12 Jul 2020 17:00) (57 - 72)  BP: 125/58 (12 Jul 2020 17:00) (107/63 - 136/66)  BP(mean): --  RR: 16 (12 Jul 2020 14:08) (16 - 17)  SpO2: 99% (12 Jul 2020 14:08) (98% - 100%)    CAPILLARY BLOOD GLUCOSE        I&O's Summary      PHYSICAL EXAM:  GENERAL: NAD  EYES: EOMI, PERRLA  NECK: Supple, No JVD  CHEST/LUNG: dec breath sounds rt base  HEART:  S1 , S2 +  ABDOMEN: soft, bs+  EXTREMITIES:no edema    NEUROLOGY:alert awake      LABS:                        9.3    9.53  )-----------( 243      ( 12 Jul 2020 06:00 )             31.8     07-12    145  |  105  |  35<H>  ----------------------------<  99  4.3   |  39<H>  |  1.61<H>    Ca    9.0      12 Jul 2020 06:00  Phos  4.1     07-12  Mg     2.3     07-12    TPro  6.7  /  Alb  2.4<L>  /  TBili  0.5  /  DBili  x   /  AST  20  /  ALT  19  /  AlkPhos  38<L>  07-12              RADIOLOGY & ADDITIONAL TESTS:    Imaging Personally Reviewed:    Consultant(s) Notes Reviewed:      Care Discussed with Consultants/Other Providers:

## 2020-07-13 VITALS — OXYGEN SATURATION: 98 % | HEART RATE: 72 BPM | SYSTOLIC BLOOD PRESSURE: 142 MMHG | DIASTOLIC BLOOD PRESSURE: 52 MMHG

## 2020-07-13 LAB
ALBUMIN SERPL ELPH-MCNC: 2.3 G/DL — LOW (ref 3.5–5)
ALP SERPL-CCNC: 36 U/L — LOW (ref 40–120)
ALT FLD-CCNC: 16 U/L DA — SIGNIFICANT CHANGE UP (ref 10–60)
ANION GAP SERPL CALC-SCNC: 1 MMOL/L — LOW (ref 5–17)
AST SERPL-CCNC: 14 U/L — SIGNIFICANT CHANGE UP (ref 10–40)
BASOPHILS # BLD AUTO: 0.04 K/UL — SIGNIFICANT CHANGE UP (ref 0–0.2)
BASOPHILS NFR BLD AUTO: 0.4 % — SIGNIFICANT CHANGE UP (ref 0–2)
BILIRUB SERPL-MCNC: 0.5 MG/DL — SIGNIFICANT CHANGE UP (ref 0.2–1.2)
BUN SERPL-MCNC: 38 MG/DL — HIGH (ref 7–18)
CALCIUM SERPL-MCNC: 9 MG/DL — SIGNIFICANT CHANGE UP (ref 8.4–10.5)
CHLORIDE SERPL-SCNC: 107 MMOL/L — SIGNIFICANT CHANGE UP (ref 96–108)
CO2 SERPL-SCNC: 39 MMOL/L — HIGH (ref 22–31)
CREAT SERPL-MCNC: 1.38 MG/DL — HIGH (ref 0.5–1.3)
CULTURE RESULTS: SIGNIFICANT CHANGE UP
CULTURE RESULTS: SIGNIFICANT CHANGE UP
EOSINOPHIL # BLD AUTO: 0.22 K/UL — SIGNIFICANT CHANGE UP (ref 0–0.5)
EOSINOPHIL NFR BLD AUTO: 2.4 % — SIGNIFICANT CHANGE UP (ref 0–6)
GLUCOSE SERPL-MCNC: 115 MG/DL — HIGH (ref 70–99)
HCT VFR BLD CALC: 29.8 % — LOW (ref 34.5–45)
HGB BLD-MCNC: 8.8 G/DL — LOW (ref 11.5–15.5)
IMM GRANULOCYTES NFR BLD AUTO: 0.4 % — SIGNIFICANT CHANGE UP (ref 0–1.5)
LYMPHOCYTES # BLD AUTO: 1.79 K/UL — SIGNIFICANT CHANGE UP (ref 1–3.3)
LYMPHOCYTES # BLD AUTO: 19.8 % — SIGNIFICANT CHANGE UP (ref 13–44)
MAGNESIUM SERPL-MCNC: 2.4 MG/DL — SIGNIFICANT CHANGE UP (ref 1.6–2.6)
MCHC RBC-ENTMCNC: 29.5 GM/DL — LOW (ref 32–36)
MCHC RBC-ENTMCNC: 31.3 PG — SIGNIFICANT CHANGE UP (ref 27–34)
MCV RBC AUTO: 106 FL — HIGH (ref 80–100)
MONOCYTES # BLD AUTO: 0.86 K/UL — SIGNIFICANT CHANGE UP (ref 0–0.9)
MONOCYTES NFR BLD AUTO: 9.5 % — SIGNIFICANT CHANGE UP (ref 2–14)
NEUTROPHILS # BLD AUTO: 6.07 K/UL — SIGNIFICANT CHANGE UP (ref 1.8–7.4)
NEUTROPHILS NFR BLD AUTO: 67.5 % — SIGNIFICANT CHANGE UP (ref 43–77)
NRBC # BLD: 0 /100 WBCS — SIGNIFICANT CHANGE UP (ref 0–0)
PHOSPHATE SERPL-MCNC: 3.9 MG/DL — SIGNIFICANT CHANGE UP (ref 2.5–4.5)
PLATELET # BLD AUTO: 212 K/UL — SIGNIFICANT CHANGE UP (ref 150–400)
POTASSIUM SERPL-MCNC: 4.4 MMOL/L — SIGNIFICANT CHANGE UP (ref 3.5–5.3)
POTASSIUM SERPL-SCNC: 4.4 MMOL/L — SIGNIFICANT CHANGE UP (ref 3.5–5.3)
PROT SERPL-MCNC: 6.5 G/DL — SIGNIFICANT CHANGE UP (ref 6–8.3)
RBC # BLD: 2.81 M/UL — LOW (ref 3.8–5.2)
RBC # FLD: 12.2 % — SIGNIFICANT CHANGE UP (ref 10.3–14.5)
SODIUM SERPL-SCNC: 147 MMOL/L — HIGH (ref 135–145)
SPECIMEN SOURCE: SIGNIFICANT CHANGE UP
SPECIMEN SOURCE: SIGNIFICANT CHANGE UP
WBC # BLD: 9.02 K/UL — SIGNIFICANT CHANGE UP (ref 3.8–10.5)
WBC # FLD AUTO: 9.02 K/UL — SIGNIFICANT CHANGE UP (ref 3.8–10.5)

## 2020-07-13 PROCEDURE — 82553 CREATINE MB FRACTION: CPT

## 2020-07-13 PROCEDURE — 84300 ASSAY OF URINE SODIUM: CPT

## 2020-07-13 PROCEDURE — 84484 ASSAY OF TROPONIN QUANT: CPT

## 2020-07-13 PROCEDURE — 84443 ASSAY THYROID STIM HORMONE: CPT

## 2020-07-13 PROCEDURE — 93005 ELECTROCARDIOGRAM TRACING: CPT

## 2020-07-13 PROCEDURE — 85379 FIBRIN DEGRADATION QUANT: CPT

## 2020-07-13 PROCEDURE — 92610 EVALUATE SWALLOWING FUNCTION: CPT

## 2020-07-13 PROCEDURE — 80061 LIPID PANEL: CPT

## 2020-07-13 PROCEDURE — 83880 ASSAY OF NATRIURETIC PEPTIDE: CPT

## 2020-07-13 PROCEDURE — 82607 VITAMIN B-12: CPT

## 2020-07-13 PROCEDURE — 93970 EXTREMITY STUDY: CPT

## 2020-07-13 PROCEDURE — 85610 PROTHROMBIN TIME: CPT

## 2020-07-13 PROCEDURE — 97161 PT EVAL LOW COMPLEX 20 MIN: CPT

## 2020-07-13 PROCEDURE — 97530 THERAPEUTIC ACTIVITIES: CPT

## 2020-07-13 PROCEDURE — 84540 ASSAY OF URINE/UREA-N: CPT

## 2020-07-13 PROCEDURE — 85027 COMPLETE CBC AUTOMATED: CPT

## 2020-07-13 PROCEDURE — 87040 BLOOD CULTURE FOR BACTERIA: CPT

## 2020-07-13 PROCEDURE — 82570 ASSAY OF URINE CREATININE: CPT

## 2020-07-13 PROCEDURE — 81001 URINALYSIS AUTO W/SCOPE: CPT

## 2020-07-13 PROCEDURE — 97110 THERAPEUTIC EXERCISES: CPT

## 2020-07-13 PROCEDURE — 36415 COLL VENOUS BLD VENIPUNCTURE: CPT

## 2020-07-13 PROCEDURE — 96374 THER/PROPH/DIAG INJ IV PUSH: CPT | Mod: XU

## 2020-07-13 PROCEDURE — 83036 HEMOGLOBIN GLYCOSYLATED A1C: CPT

## 2020-07-13 PROCEDURE — 83735 ASSAY OF MAGNESIUM: CPT

## 2020-07-13 PROCEDURE — 86140 C-REACTIVE PROTEIN: CPT

## 2020-07-13 PROCEDURE — 87086 URINE CULTURE/COLONY COUNT: CPT

## 2020-07-13 PROCEDURE — 71045 X-RAY EXAM CHEST 1 VIEW: CPT

## 2020-07-13 PROCEDURE — 80053 COMPREHEN METABOLIC PANEL: CPT

## 2020-07-13 PROCEDURE — 82962 GLUCOSE BLOOD TEST: CPT

## 2020-07-13 PROCEDURE — 83605 ASSAY OF LACTIC ACID: CPT

## 2020-07-13 PROCEDURE — 84145 PROCALCITONIN (PCT): CPT

## 2020-07-13 PROCEDURE — 86769 SARS-COV-2 COVID-19 ANTIBODY: CPT

## 2020-07-13 PROCEDURE — 94640 AIRWAY INHALATION TREATMENT: CPT

## 2020-07-13 PROCEDURE — 82436 ASSAY OF URINE CHLORIDE: CPT

## 2020-07-13 PROCEDURE — 93306 TTE W/DOPPLER COMPLETE: CPT

## 2020-07-13 PROCEDURE — 80048 BASIC METABOLIC PNL TOTAL CA: CPT

## 2020-07-13 PROCEDURE — 82746 ASSAY OF FOLIC ACID SERUM: CPT

## 2020-07-13 PROCEDURE — 84100 ASSAY OF PHOSPHORUS: CPT

## 2020-07-13 PROCEDURE — U0003: CPT

## 2020-07-13 PROCEDURE — 99291 CRITICAL CARE FIRST HOUR: CPT | Mod: 25

## 2020-07-13 PROCEDURE — 82728 ASSAY OF FERRITIN: CPT

## 2020-07-13 PROCEDURE — 71275 CT ANGIOGRAPHY CHEST: CPT

## 2020-07-13 PROCEDURE — 82550 ASSAY OF CK (CPK): CPT

## 2020-07-13 RX ORDER — AMLODIPINE BESYLATE 2.5 MG/1
1 TABLET ORAL
Qty: 0 | Refills: 0 | DISCHARGE

## 2020-07-13 RX ORDER — LOSARTAN POTASSIUM 100 MG/1
1 TABLET, FILM COATED ORAL
Qty: 0 | Refills: 0 | DISCHARGE

## 2020-07-13 RX ORDER — METOPROLOL TARTRATE 50 MG
1 TABLET ORAL
Qty: 0 | Refills: 0 | DISCHARGE

## 2020-07-13 RX ORDER — METOPROLOL TARTRATE 50 MG
0.5 TABLET ORAL
Qty: 30 | Refills: 0
Start: 2020-07-13 | End: 2020-08-11

## 2020-07-13 RX ORDER — FUROSEMIDE 40 MG
1 TABLET ORAL
Qty: 15 | Refills: 0
Start: 2020-07-13 | End: 2020-08-11

## 2020-07-13 RX ADMIN — Medication 81 MILLIGRAM(S): at 12:01

## 2020-07-13 RX ADMIN — Medication 12.5 MILLIGRAM(S): at 06:33

## 2020-07-13 RX ADMIN — APIXABAN 2.5 MILLIGRAM(S): 2.5 TABLET, FILM COATED ORAL at 06:33

## 2020-07-13 NOTE — PROGRESS NOTE ADULT - PROBLEM SELECTOR PROBLEM 2
Congestive heart failure, unspecified HF chronicity, unspecified heart failure type
Congestive heart failure, unspecified HF chronicity, unspecified heart failure type
Acute respiratory failure with hypoxia
Congestive heart failure, unspecified HF chronicity, unspecified heart failure type
Acute respiratory failure with hypoxia
Acute respiratory failure with hypoxia
Congestive heart failure, unspecified HF chronicity, unspecified heart failure type
Pneumonia of both lower lobes due to infectious organism
Acute respiratory failure with hypoxia
Congestive heart failure, unspecified HF chronicity, unspecified heart failure type

## 2020-07-13 NOTE — PROGRESS NOTE ADULT - PROBLEM SELECTOR PLAN 5
mild trop elevation   Patient denies any chest pain.  -EKG NSR.  - most likely demand ischemia   -follow  ECHO: normal EF, G II dD   Cardiology consult Dr Gama
hb: 9. stable
mild trop elevation   Patient denies any chest pain.  -EKG NSR.  - most likely demand ischemia   -follow  ECHO: normal EF, G II dD   Cardiology consult Dr Gama
BP on softer side   DC Amlodipine,   Decrease Metoprolol dose to 12.5 BID   - Lasix 20 mg Po   - c/w holding home med: Losartan
BP on softer side   DC Amlodipine,   Decrease Metoprolol dose to 12.5 BID   - Lasix 20 mg Po   - c/w holding home med: Losartan

## 2020-07-13 NOTE — PROGRESS NOTE ADULT - PROBLEM SELECTOR PLAN 6
cr went up likely sec to diuresis
hb: 8.6, slight drop from 9.3  - monitor H/h q24
cont statin
hb: 9. stable
hb: 8.6, slight drop from 9.3  - monitor H/h q24
hb: 9. stable
Patient has CKD, stage 3 creatinine around base line
hb: 8.5, slightly drop from yesterday   - monitor H/h q24

## 2020-07-13 NOTE — PROGRESS NOTE ADULT - SUBJECTIVE AND OBJECTIVE BOX
[facility] NEPHROLOGY- PROGRESS NOTE    Patient is a 89yo Female with CKD-3, HTN, HLD, Severe AS, Arthritis p/w SOB. Patient a/w b/l PNA and CHF exacerbation.  Nephrology consulted for Elevated serum creatinine.    Hospital Medications: Medications reviewed.  REVIEW OF SYSTEMS:  CONSTITUTIONAL: No fevers or chills  RESPIRATORY: No shortness of breath  CARDIOVASCULAR: No chest pain.  GASTROINTESTINAL: No nausea, vomiting, diarrhea or abdominal pain.   VASCULAR: +bilateral lower extremity edema.     VITALS:  T(F): 98.1 (07-13-20 @ 04:51), Max: 98.7 (07-12-20 @ 21:32)  HR: 72 (07-13-20 @ 09:22)  BP: 142/52 (07-13-20 @ 09:22)  RR: 16 (07-13-20 @ 04:51)  SpO2: 98% (07-13-20 @ 09:22)  Wt(kg): --    07-12 @ 07:01  -  07-13 @ 07:00  --------------------------------------------------------  IN: 0 mL / OUT: 2 mL / NET: -2 mL      PHYSICAL EXAM:  Gen: NAD, calm  HEENT: anicteric  Neck: no JVD  Cards: RRR, +S1/S2, +ROLLY  Resp: decreased BS at bases  GI: soft, NT/ND, NABS  Extremities: +trace  LE edema B/L - b/l leg protectors      LABS:  07-13    147<H>  |  107  |  38<H>  ----------------------------<  115<H>  4.4   |  39<H>  |  1.38<H>    Ca    9.0      13 Jul 2020 07:17  Phos  3.9     07-13  Mg     2.4     07-13    TPro  6.5  /  Alb  2.3<L>  /  TBili  0.5  /  DBili      /  AST  14  /  ALT  16  /  AlkPhos  36<L>  07-13    Creatinine Trend: 1.38 <--, 1.61 <--, 1.28 <--, 1.40 <--, 1.51 <--, 1.82 <--, 1.96 <--                        8.8    9.02  )-----------( 212      ( 13 Jul 2020 07:17 )             29.8     Urine Studies:    Sodium, Random Urine: 14 mmol/L (07-06 @ 14:16)  Chloride, Random Urine: 24 mmol/L (07-06 @ 14:16)  Creatinine, Random Urine: 86 mg/dL (07-06 @ 14:16)

## 2020-07-13 NOTE — PROGRESS NOTE ADULT - PROBLEM SELECTOR PLAN 1
Patient admitted for SOB, denies fever.  CX ray on admission showed pulmonary vascular congestion.  c/w Rocephin and Azithro for CAP   -Albuterol inhaler.  -Covid PCR negative   saturation improved, 99% on 2L  Dr Perez consulted
Patients presented after SOB.  -ECHO from Jan 2020 showed grossly normal ejection fraction with G II DD   - home dose: Takes 20 mg Lasix at home every other day  - home dose resumed   - Pt requiring 2L NC support.  - son refused palliative care at this point. Would like to take her home. Pt lives by her self. Social work consulted for safety issues.
abx as per id   f/u cx
Patients presented after SOB.  -ECHO from Jan 2020 showed grossly normal ejection fraction with G II DD   - home dose: Takes 20 mg Lasix at home every other day  - home dose resumed   - Pt requiring 2L NC support.  - son refused palliative care at this point. Would like to take her home. Pt lives by her self. Social work consulted for safety issues.
Patients presented after SOB.  -ECHO from Jan 2020 showed grossly normal ejection fraction with G II DD   - home dose: Takes 20 mg Lasix at home every other day  s/p Lasix 40 mg : 07/03,   7/7: pt noted to be in respiratory distress, CXR; getting worse. Creat: improved to 1.9 today, will order Lasix 20 mg one dose and reaccess tomorrow.  - Palliative consulted, poor prognosis,
Pt spiked a fever of 100.8 yesterday   Dc Rocephin and Azithromycin   c/w  Zosyn to cover for aspiration PNA   - Dysphagia diet, SS eval   - Dr Kan consulted and recommended discontinue antibiotics after friday  - blood culture  is followed and no growth to date  - Aspiration precautions
creat: 1.3, baseline   creatinine improved to 1.5    Resumed home dose of Lasix 20 every other day then creatinine is elevated to 1.6   -nephrology consult recommended d/c oral lasix   - creatinine improved to 1.3  - fu nephrology   Dr Mai on board  - monitor BMP  - monitor Urine output
Patients presented after SOB.  -ECHO from Jan 2020 showed grossly normal ejection fraction with G II DD   - home dose: Takes 20 mg Lasix at home every other day  s/p Lasix 40 mg : 07/03,   - Breathing slightly improved today as compared to yesterday, will order another dose of Lasix 20 mg IV today and monitor tomorrow  - Palliative follow up, poor prognosis,
Patient admitted for SOB, denies fever.  CX ray on admission showed pulmonary vascular congestion.  c/w Rocephin and Azithro for CAP   -Albuterol inhaler.  -Covid PCR negative , follow repeat COVID   saturation improved, 99% on 2L
Patient admitted for SOB, denies fever.  CX ray on admission showed pulmonary vascular congestion.  c/w Rocephin and Azithro for CAP   -Albuterol inhaler.  -Covid PCR negative , follow repeat COVID   saturation improved, 99% on 2L

## 2020-07-13 NOTE — PROGRESS NOTE ADULT - ASSESSMENT
Patient is a 89yo Female with CKD-3, HTN, HLD, Severe AS, Arthritis p/w SOB. Patient a/w b/l PNA and CHF exacerbation.  Nephrology consulted for Elevated serum creatinine.    1. SHAUNA- likely pre-renal in the setting of overdiuresis for which IV Lasix was held. Pt again with worsening renal function when started back on home dose of Lasix 20mg PO q other day.  Pt now improving renal function. Can restart home lasix dose. Last CXR with mild pulmonary vascular congestion and small b/l pleural effusion. s/p Lasix 20mg IV on 7/7 & 7/8. Now back on home dose of restart Lasix 20mg PO q other day. FeUrea 26% consistent with pre-renal SHAUNA. Strict I/Os. Avoid nephrotoxins/ NSAIDs/ RCA. Monitor BMP.  2. CKD-3- in the setting of HTN. Baseline renal function SCr 1.2-1.4. Avoid Nephrotoxins. Monitor lytes.   3. LE edema- resolved, Plan as above.  Strict I/Os. Monitor urine o/p. Daily weights.   4. Aortic Stenosis- Severe based on TTE. avoid overdiuresis. Plan as per Cardiology.

## 2020-07-13 NOTE — PROGRESS NOTE ADULT - PROBLEM SELECTOR PLAN 9
-Patient has elevated D-dimers.  presented with SOB.  CTA -ve for Pulmonary Emboli.  follow LE doppler: no DVT

## 2020-07-13 NOTE — DISCHARGE NOTE NURSING/CASE MANAGEMENT/SOCIAL WORK - PATIENT PORTAL LINK FT
You can access the FollowMyHealth Patient Portal offered by [facility] by registering at the following website: [email or web address]. By joining [facility]’s FollowMyHealth portal, you will also be able to view your health information using other applications (apps) compatible with our system.

## 2020-07-13 NOTE — PROGRESS NOTE ADULT - PROBLEM SELECTOR PROBLEM 8
Prophylactic measure
Prophylactic measure
HTN (hypertension)

## 2020-07-13 NOTE — PROGRESS NOTE ADULT - PROBLEM SELECTOR PLAN 8
IMPROVE VTE Individual Risk Assessment  RISK                                                                Points  [  ] Previous VTE                                                  3  [  ] Thrombophilia                                               2  [  ] Lower limb paralysis                                      2        (unable to hold up >15 seconds)    [  ] Current Cancer                                              2         (within 6 months)  [  ] Immobilization > 24 hrs                                1  [  ] ICU/CCU stay > 24 hours                              1  [x  ] Age > 60                                                      1  IMPROVE VTE Score : 2  Patient atkes Eliquis at home , continue with Eliquis
IMPROVE VTE Individual Risk Assessment  RISK                                                                Points  [  ] Previous VTE                                                  3  [  ] Thrombophilia                                               2  [  ] Lower limb paralysis                                      2        (unable to hold up >15 seconds)    [  ] Current Cancer                                              2         (within 6 months)  [  ] Immobilization > 24 hrs                                1  [  ] ICU/CCU stay > 24 hours                              1  [x  ] Age > 60                                                      1  IMPROVE VTE Score : 2  Patient atkes Eliquis at home , continue with Eliquis
BP on softer side   DC Amlodipine,   Decrease Metoprolol dose to 12.5 BID   - Lasix 20 mg Po   - c/w holding home med: Losartan
BP on softer side   DC Amlodipine,   Decrease Metoprolol dose to 12.5 BID   - Lasix as above   - c/w holding home med: Losartan  - - monitor BP
BP on softer side   DC Amlodipine,   Decrease Metoprolol dose to 12.5 BID   - Lasix as above   - c/w holding home med: Losartan
BP on softer side   DC Amlodipine,   Decrease Metoprolol dose to 12.5 BID   - Lasix as above   - c/w holding home med: Losartan  - - monitor BP
BP on softer side   DC Amlodipine,   Decrease Metoprolol dose to 12.5 BID   - Lasix as above   - c/w holding home med: Losartan  - one episode of hypotension overnight, repeat BP in 130s.   - monitor BP

## 2020-07-13 NOTE — PROGRESS NOTE ADULT - PROBLEM SELECTOR PLAN 4
- desaturating to 80s on RA,   - c/w NC support   - c/w Lasix 20 every other day   c/w Rocephin and Azithro   - spirometry   - Dr Perez consulted
Pt spiked a fever of 100.8 yesterday   Dc Rocephin and Azithromycin   c/w  Zosyn to cover for aspiration PNA   - Dysphagia diet, SS eval   - Dr Kan consulted   - blood culture  is folloed and no growth to date  - Aspiration precautions
creat: 1.3, baseline
Patient admitted for SOB, denies fever.  CX ray on admission showed pulmonary vascular congestion.  c/w Rocephin and Jennifer for CAP   -Albuterol inhaler.  -Covid PCR negative   saturation improved, 99% on 4L  Dr Perez consulted: recommended albuterol standing with Lasix.
Pt spiked a fever of 100.8 yesterday   Dc Rocephin and Azithromycin   c/w  Zosyn to cover for aspiration PNA   - Dysphagia diet, SS eval   - Dr Kan consulted   - blood culture  ordered   - Aspiration precautions
creat: 1.3, baseline   creatinine improved to 1.5 today,   Resumed home dose of Lasix 20 every other day   Dr Mai on board  - monitor BMP  - monitor Urine output
- desaturating to 80s on RA,   - c/w NC support @ 2L today   -Lasix 20 mg one dose IV for today   c/w Zosyn for concern for aspiration PNA.   Follow blood culture   Dysphagia diet,   SS eval   - spirometry
Pt spiked a fever of 100.8 yesterday   Dc Rocephin and Azithromycin   started on Zosyn to cover for aspiration PNA   - Dr Kan consulted   - blood culture  ordered   - Aspiration precautions
Patient takes Lipitor 40 at home .
Patient takes Lipitor 40 at home .

## 2020-07-13 NOTE — PROGRESS NOTE ADULT - SUBJECTIVE AND OBJECTIVE BOX
PGY-1 Progress Note discussed with attending    PAGER #: [[phone]] TILL 5:00 PM  PLEASE CONTACT ON CALL TEAM:  - On Call Team (Please refer to Giovanna) FROM 5:00 PM - 8:30PM  - Nightfloat Team FROM 8:30 -7:30 AM    CHIEF COMPLAINT & BRIEF HOSPITAL COURSE:    89 year old female lives alone with HHA with medical history of HTN, HLD, arthritis , Afib ? ( patient is on eliquis and b blocker)presented to ED with complains of difficulty breathing.     INTERVAL HPI/OVERNIGHT EVENTS:   patient examined bedside . no acute event during the night. due to a rise in creatinione level the nephrology consult recommended stopping the home dose of lasix and give gentle IVF       REVIEW OF SYSTEMS:  CONSTITUTIONAL: No fever, weight loss, or fatigue  RESPIRATORY: No cough, wheezing, chills or hemoptysis; No shortness of breath  CARDIOVASCULAR: No chest pain, palpitations, dizziness, or leg swelling  GASTROINTESTINAL: No abdominal pain. No nausea, vomiting, or hematemesis; No diarrhea or constipation. No melena or hematochezia.  GENITOURINARY: No dysuria or hematuria, urinary frequency  NEUROLOGICAL: No headaches, memory loss, loss of strength, numbness, or tremors  SKIN: No itching, burning, rashes, or lesions     MEDICATIONS  (STANDING):  albuterol/ipratropium for Nebulization 3 milliLiter(s) Nebulizer every 8 hours  apixaban 2.5 milliGRAM(s) Oral two times a day  aspirin  chewable 81 milliGRAM(s) Oral daily  atorvastatin 40 milliGRAM(s) Oral at bedtime  metoprolol tartrate 12.5 milliGRAM(s) Oral two times a day  polyethylene glycol 3350 17 Gram(s) Oral daily  senna 2 Tablet(s) Oral at bedtime    MEDICATIONS  (PRN):  ALBUTerol    90 MICROgram(s) HFA Inhaler 2 Puff(s) Inhalation every 6 hours PRN Shortness of Breath and/or Wheezing  sodium chloride 0.65% Nasal 1 Spray(s) Both Nostrils two times a day PRN Nasal Congestion      Vital Signs Last 24 Hrs  T(C): 36.7 (13 Jul 2020 04:51), Max: 37.1 (12 Jul 2020 21:32)  T(F): 98.1 (13 Jul 2020 04:51), Max: 98.7 (12 Jul 2020 21:32)  HR: 79 (13 Jul 2020 04:51) (57 - 79)  BP: 116/54 (13 Jul 2020 04:51) (107/63 - 125/58)  BP(mean): --  RR: 16 (13 Jul 2020 04:51) (16 - 16)  SpO2: 99% (13 Jul 2020 04:51) (99% - 99%)    PHYSICAL EXAMINATION:  GENERAL: NAD, well built  HEAD:  Atraumatic, Normocephalic  EYES:  conjunctiva and sclera clear  NECK: Supple, No JVD, Normal thyroid  CHEST/LUNG: Clear to auscultation. Clear to percussion bilaterally; No rales, rhonchi, wheezing, or rubs  HEART: Regular rate and rhythm; No murmurs, rubs, or gallops  ABDOMEN: Soft, Nontender, Nondistended; Bowel sounds present  NERVOUS SYSTEM:  Alert & Oriented X3,    EXTREMITIES:  2+ Peripheral Pulses, No clubbing, cyanosis, or edema  SKIN: warm dry                          8.8    9.02  )-----------( 212      ( 13 Jul 2020 07:17 )             29.8     07-13    147<H>  |  107  |  38<H>  ----------------------------<  115<H>  4.4   |  39<H>  |  1.38<H>    Ca    9.0      13 Jul 2020 07:17  Phos  3.9     07-13  Mg     2.4     07-13    TPro  6.5  /  Alb  2.3<L>  /  TBili  0.5  /  DBili  x   /  AST  14  /  ALT  16  /  AlkPhos  36<L>  07-13    LIVER FUNCTIONS - ( 13 Jul 2020 07:17 )  Alb: 2.3 g/dL / Pro: 6.5 g/dL / ALK PHOS: 36 U/L / ALT: 16 U/L DA / AST: 14 U/L / GGT: x                       CAPILLARY BLOOD GLUCOSE  CAPILLARY BLOOD GLUCOSE        CAPILLARY BLOOD GLUCOSE          RADIOLOGY & ADDITIONAL TESTS: PGY-1 Progress Note discussed with attending    PAGER #: [[phone]] TILL 5:00 PM  PLEASE CONTACT ON CALL TEAM:  - On Call Team (Please refer to Giovanna) FROM 5:00 PM - 8:30PM  - Nightfloat Team FROM 8:30 -7:30 AM    CHIEF COMPLAINT & BRIEF HOSPITAL COURSE:    89 year old female lives alone with HHA with medical history of HTN, HLD, arthritis , Afib ? ( patient is on eliquis and b blocker)presented to ED with complains of difficulty breathing.     INTERVAL HPI/OVERNIGHT EVENTS:   patient examined bedside . no acute event during the night. due to a rise in creatinione level the nephrology consult recommended stopping the home dose of lasix.    REVIEW OF SYSTEMS:  CONSTITUTIONAL: No fever, weight loss, or fatigue  RESPIRATORY: No cough, wheezing, chills or hemoptysis; No shortness of breath  CARDIOVASCULAR: No chest pain, palpitations, dizziness, or leg swelling  GASTROINTESTINAL: No abdominal pain. No nausea, vomiting, or hematemesis; No diarrhea or constipation. No melena or hematochezia.  GENITOURINARY: No dysuria or hematuria, urinary frequency  NEUROLOGICAL: No headaches, memory loss, loss of strength, numbness, or tremors  SKIN: No itching, burning, rashes, or lesions     MEDICATIONS  (STANDING):  albuterol/ipratropium for Nebulization 3 milliLiter(s) Nebulizer every 8 hours  apixaban 2.5 milliGRAM(s) Oral two times a day  aspirin  chewable 81 milliGRAM(s) Oral daily  atorvastatin 40 milliGRAM(s) Oral at bedtime  metoprolol tartrate 12.5 milliGRAM(s) Oral two times a day  polyethylene glycol 3350 17 Gram(s) Oral daily  senna 2 Tablet(s) Oral at bedtime    MEDICATIONS  (PRN):  ALBUTerol    90 MICROgram(s) HFA Inhaler 2 Puff(s) Inhalation every 6 hours PRN Shortness of Breath and/or Wheezing  sodium chloride 0.65% Nasal 1 Spray(s) Both Nostrils two times a day PRN Nasal Congestion      Vital Signs Last 24 Hrs  T(C): 36.7 (13 Jul 2020 04:51), Max: 37.1 (12 Jul 2020 21:32)  T(F): 98.1 (13 Jul 2020 04:51), Max: 98.7 (12 Jul 2020 21:32)  HR: 79 (13 Jul 2020 04:51) (57 - 79)  BP: 116/54 (13 Jul 2020 04:51) (107/63 - 125/58)  BP(mean): --  RR: 16 (13 Jul 2020 04:51) (16 - 16)  SpO2: 99% (13 Jul 2020 04:51) (99% - 99%)    PHYSICAL EXAMINATION:  GENERAL: NAD, well built  HEAD:  Atraumatic, Normocephalic  EYES:  conjunctiva and sclera clear  NECK: Supple, No JVD, Normal thyroid  CHEST/LUNG: Clear to auscultation. Clear to percussion bilaterally; No rales, rhonchi, wheezing, or rubs  HEART: Regular rate and rhythm; No murmurs, rubs, or gallops  ABDOMEN: Soft, Nontender, Nondistended; Bowel sounds present  NERVOUS SYSTEM:  Alert & Oriented X3,    EXTREMITIES:  2+ Peripheral Pulses, No clubbing, cyanosis, or edema  SKIN: warm dry                          8.8    9.02  )-----------( 212      ( 13 Jul 2020 07:17 )             29.8     07-13    147<H>  |  107  |  38<H>  ----------------------------<  115<H>  4.4   |  39<H>  |  1.38<H>    Ca    9.0      13 Jul 2020 07:17  Phos  3.9     07-13  Mg     2.4     07-13    TPro  6.5  /  Alb  2.3<L>  /  TBili  0.5  /  DBili  x   /  AST  14  /  ALT  16  /  AlkPhos  36<L>  07-13    LIVER FUNCTIONS - ( 13 Jul 2020 07:17 )  Alb: 2.3 g/dL / Pro: 6.5 g/dL / ALK PHOS: 36 U/L / ALT: 16 U/L DA / AST: 14 U/L / GGT: x                       CAPILLARY BLOOD GLUCOSE  CAPILLARY BLOOD GLUCOSE        CAPILLARY BLOOD GLUCOSE          RADIOLOGY & ADDITIONAL TESTS:

## 2020-07-13 NOTE — PROGRESS NOTE ADULT - PROBLEM SELECTOR PROBLEM 6
Chronic kidney disease (CKD)
Chronic kidney disease (CKD)
Anemia
HLD (hyperlipidemia)
Anemia

## 2020-07-13 NOTE — PROGRESS NOTE ADULT - PROBLEM SELECTOR PROBLEM 4
HLD (hyperlipidemia)
HLD (hyperlipidemia)
Pneumonia of both lower lobes due to infectious organism
SHAUNA (acute kidney injury)
Pneumonia of both lower lobes due to infectious organism
Pneumonia of both lower lobes due to infectious organism
SHAUNA (acute kidney injury)
Acute respiratory failure with hypoxia
Acute respiratory failure with hypoxia
Pneumonia of both lower lobes due to infectious organism

## 2020-07-13 NOTE — PROGRESS NOTE ADULT - PROBLEM SELECTOR PROBLEM 1
Congestive heart failure, unspecified HF chronicity, unspecified heart failure type
Pneumonia of both lower lobes due to infectious organism
Congestive heart failure, unspecified HF chronicity, unspecified heart failure type
Congestive heart failure, unspecified HF chronicity, unspecified heart failure type
Pneumonia of both lower lobes due to infectious organism
Pneumonia of both lower lobes due to infectious organism
SHAUNA (acute kidney injury)
Congestive heart failure, unspecified HF chronicity, unspecified heart failure type
Pneumonia of both lower lobes due to infectious organism
Pneumonia of both lower lobes due to infectious organism

## 2020-07-13 NOTE — PROGRESS NOTE ADULT - PROBLEM SELECTOR PLAN 7
-Patient has elevated D-dimers.  presented with SOB.  CTA -ve for Pulmonary Emboli.  follow LE doppler
Patient takes Lipitor 40 at home .
Patient takes Lipitor 40 at home .
cont current htn med regimen
Patient takes Lipitor 40 at home .
-Patient has elevated D-dimers.  presented with SOB.  CTA -ve for Pulmonary Emboli.  follow LE doppler
Patient takes Lipitor 40 at home .

## 2020-07-13 NOTE — PROGRESS NOTE ADULT - PROBLEM SELECTOR PROBLEM 7
Elevated d-dimer
Elevated d-dimer
HLD (hyperlipidemia)
HLD (hyperlipidemia)
HTN (hypertension)
HLD (hyperlipidemia)

## 2020-07-13 NOTE — PROGRESS NOTE ADULT - PROVIDER SPECIALTY LIST ADULT
Cardiology
Internal Medicine
Nephrology
Pulmonology
Pulmonology
Cardiology
Cardiology
Nephrology
Internal Medicine
Internal Medicine
Pulmonology
Internal Medicine

## 2020-07-13 NOTE — PROGRESS NOTE ADULT - ATTENDING COMMENTS
Patient was seen and examined by me on 07/05/2020,interim events noted,labs and radiology studies reviewed.  Eleazar Gama MD,FACC.  [address] [place] [address].  [facility].  [phone]
Patient was seen and examined by me on 07/06/2020,interim events noted,labs and radiology studies reviewed.  Eleazar Gama MD,FACC.  [address] [place] [address].  [facility].  [phone]
[facility] NEPHROLOGY  Joe Barnes M.D.  Ari Lawton D.O.  Michaela Mai M.D.  Tami Arshad, MSN, ANP-C  [phone]    [address] [place] [address]
[facility] NEPHROLOGY  Joe Barnes M.D.  Ari Lawton D.O.  Michaela Mai M.D.  Tami Arshad, MSN, ANP-C  [phone]    [address] [place] [address]
[facility] NEPHROLOGY  Joe Barnes M.D.  Ari Lawton D.O.  Michaela Mai M.D.  Tami Arshad, MSN, ANP-C  [phone]    [address] [place] [address]
[facility] NEPHROLOGY  Joe Barnes M.D.  Ari Lawton D.O.  Michaela Mai M.D.  Tami Arshad, MSN, ANP-C  [phone]    [address] [place] [address]
[facility] NEPHROLOGY  Joe Barnes M.D.  Ari Lawton D.O.  Michaela Mai M.D.  Tami Arshad, MSN, ANP-C  [phone]    [address] [place] [address]
[facility] NEPHROLOGY  Joe Barnes M.D.  Ari Lawton D.O.  Michaela Mai M.D.  Tami Arshad, MSN, ANP-C  [phone]    [address] [place] [address]
[facility] NEPHROLOGY  Joe Barnes M.D.  Ari Lawton D.O.  Michaela Mai M.D.  Tami Arshad, MSN, ANP-C  [phone]    [address] [place] [address]
[facility] NEPHROLOGY  Joe Barnes M.D.  Ari Lawton D.O.  Michaela Mai M.D.  Tami Arshad, MSN, ANP-C  [phone]    [address] [place] [address]

## 2020-07-13 NOTE — PROGRESS NOTE ADULT - PROBLEM SELECTOR PLAN 2
Patients presented after SOB.  -ECHO from Jan 2020 showed grossly normal ejection fraction with G II DD   Takes 20 mg Lasix at home every other day  s/p Lasix 40 mg : 07/03, Lasix decreased to 20  mg OD on 7/4,  since creat is getting worse, will decrease Lasix to home dose i.e: 20 mg every other day   monitor creat, getting worse
- desaturating to 80s on RA,   - c/w NC support @ 2L today   -Lasix 20 mg one dose IV for today   c/w Zosyn for concern for aspiration PNA.   Follow blood culture   Dysphagia diet,   SS eval   - spirometry   - follow Dr Kan
diuresis
- desaturating to 80s on RA,   - c/w NC support @ 2L today   -Lasix 20 mg one dose IV for today   c/w Zosyn for concern for aspiration PNA.   Follow blood culture   Dysphagia diet,   SS eval   - spirometry   - follow Dr Kan
- desaturating to 80s on RA,   - c/w NC support @ 4L today   -Lasix 20 mg one dose IV for today   c/w Rocephin and Azithro   - spirometry   - Palliative consulted
Patients presented after SOB.  -ECHO from Jan 2020 showed grossly normal ejection fraction with G II DD   - home dose: Takes 20 mg Lasix at home every other day  - home dose resumed   - Pt requiring 2L NC support.  - son refused palliative care at this point. Would like to take her home. Pt lives by her self. Social work consulted for safety issues.
- blood culture  is followed and no growth to date  - Aspiration precautions
- desaturating to 80s on RA,   - c/w NC support @ 2L today   -Lasix 20 mg one dose IV for today   Pt spiked a fever 100.8, will start on zosyn for aspiration PNA   - spirometry   - Palliative follow up
Patients presented after SOB.  -has mild B/L leg edema.  -Pro BNP 6728.  -ECHO from Jan 2020 showed grossly normal ejection fraction   Takes 20 mg Lasix at home every other day  s/p Lasix 40 mg : 07/03  BP in 100s, will taper down lasix to 20 mg OD PO, repeat CXR
diuresis

## 2020-07-13 NOTE — PROGRESS NOTE ADULT - PROBLEM SELECTOR PLAN 3
creat: 1.3, baseline   Getting worse; today creat: 2.3   Lasix decreased to 20 every other day, home dose   Dr Mai on board
creat: 1.3, baseline   creatinine improved to 1.5 today,   Resumed home dose of Lasix 20 every other day   Dr Mai on board  - monitor BMP  - monitor Urine output
improving with oxygen suppl
- desaturating to 80s on RA,   - c/w NC support @ 2L today   -Lasix 20 mg one dose IV for today   c/w Zosyn for concern for aspiration PNA.   Follow blood culture   Dysphagia diet,   SS eval   - spirometry   - follow Dr Kan
creat: 1.3, baseline   creatinine improved to 1.5 today,   Resumed home dose of Lasix 20 every other day   Dr Mai on board  - monitor BMP  - monitor Urine output
creat: 1.3, baseline   creatinine improved to 1.9 today but pt has worsening of pulmonary edema, will give one dose of lasix 20 IV   Dr Mai on board  - monitor BMP
Patients presented after SOB.  -ECHO from Jan 2020 showed grossly normal ejection fraction with G II DD   - home dose: Takes 20 mg Lasix at home every other day  - home dose resumed   - Pt requiring 2L NC support.  - son refused palliative care at this point. Would like to take her home. Pt lives by her self. Social work consulted for safety issues.
creat: 1.3, baseline   creatinine improved to 1.82 today, will give one dose of lasix 20 IV   Dr Mai on board  - monitor BMP  - monitor Urine output
mild trop elevation   Patient denies any chest pain.  -EKG NSR.  - most likely demand ischemia   -follow  ECHO  -doppler Leg: follow   Cardiology consult Dr Gama
mild trop elevation   Patient denies any chest pain.  -EKG NSR.  - most likely demand ischemia   -follow  ECHO  -doppler Leg: follow   Cardiology consult Dr Gama

## 2020-07-13 NOTE — PROGRESS NOTE ADULT - PROBLEM SELECTOR PROBLEM 9
Elevated d-dimer

## 2020-07-13 NOTE — PROGRESS NOTE ADULT - PROBLEM SELECTOR PROBLEM 3
Elevated troponin I level
Acute respiratory failure with hypoxia
SHAUNA (acute kidney injury)
Acute respiratory failure with hypoxia
SHAUNA (acute kidney injury)
SHAUNA (acute kidney injury)
Congestive heart failure, unspecified HF chronicity, unspecified heart failure type
SHAUNA (acute kidney injury)
SHAUNA (acute kidney injury)
Elevated troponin I level

## 2020-07-14 ENCOUNTER — INPATIENT (INPATIENT)
Facility: HOSPITAL | Age: 85
LOS: 1 days | Discharge: NURSING, REHAB OR LONG-TERM CARE | DRG: 70 | End: 2020-07-16
Attending: INTERNAL MEDICINE | Admitting: INTERNAL MEDICINE
Payer: MEDICARE

## 2020-07-14 VITALS
TEMPERATURE: 100 F | RESPIRATION RATE: 20 BRPM | DIASTOLIC BLOOD PRESSURE: 88 MMHG | OXYGEN SATURATION: 98 % | HEART RATE: 89 BPM | SYSTOLIC BLOOD PRESSURE: 117 MMHG

## 2020-07-14 DIAGNOSIS — G93.40 ENCEPHALOPATHY, UNSPECIFIED: ICD-10-CM

## 2020-07-14 DIAGNOSIS — N18.9 CHRONIC KIDNEY DISEASE, UNSPECIFIED: ICD-10-CM

## 2020-07-14 DIAGNOSIS — I50.30 UNSPECIFIED DIASTOLIC (CONGESTIVE) HEART FAILURE: ICD-10-CM

## 2020-07-14 DIAGNOSIS — R79.89 OTHER SPECIFIED ABNORMAL FINDINGS OF BLOOD CHEMISTRY: ICD-10-CM

## 2020-07-14 DIAGNOSIS — I10 ESSENTIAL (PRIMARY) HYPERTENSION: ICD-10-CM

## 2020-07-14 DIAGNOSIS — R53.1 WEAKNESS: ICD-10-CM

## 2020-07-14 DIAGNOSIS — Z29.9 ENCOUNTER FOR PROPHYLACTIC MEASURES, UNSPECIFIED: ICD-10-CM

## 2020-07-14 DIAGNOSIS — E78.5 HYPERLIPIDEMIA, UNSPECIFIED: ICD-10-CM

## 2020-07-14 DIAGNOSIS — I48.91 UNSPECIFIED ATRIAL FIBRILLATION: ICD-10-CM

## 2020-07-14 DIAGNOSIS — Z98.890 OTHER SPECIFIED POSTPROCEDURAL STATES: Chronic | ICD-10-CM

## 2020-07-14 LAB
ACETONE SERPL-MCNC: NEGATIVE — SIGNIFICANT CHANGE UP
ALBUMIN SERPL ELPH-MCNC: 2.7 G/DL — LOW (ref 3.5–5)
ALP SERPL-CCNC: 47 U/L — SIGNIFICANT CHANGE UP (ref 40–120)
ALT FLD-CCNC: 17 U/L DA — SIGNIFICANT CHANGE UP (ref 10–60)
ANION GAP SERPL CALC-SCNC: 4 MMOL/L — LOW (ref 5–17)
APPEARANCE UR: CLEAR — SIGNIFICANT CHANGE UP
AST SERPL-CCNC: 21 U/L — SIGNIFICANT CHANGE UP (ref 10–40)
BACTERIA # UR AUTO: ABNORMAL /HPF
BASOPHILS # BLD AUTO: 0.06 K/UL — SIGNIFICANT CHANGE UP (ref 0–0.2)
BASOPHILS NFR BLD AUTO: 0.7 % — SIGNIFICANT CHANGE UP (ref 0–2)
BILIRUB SERPL-MCNC: 0.8 MG/DL — SIGNIFICANT CHANGE UP (ref 0.2–1.2)
BILIRUB UR-MCNC: NEGATIVE — SIGNIFICANT CHANGE UP
BUN SERPL-MCNC: 37 MG/DL — HIGH (ref 7–18)
CALCIUM SERPL-MCNC: 9.1 MG/DL — SIGNIFICANT CHANGE UP (ref 8.4–10.5)
CHLORIDE SERPL-SCNC: 102 MMOL/L — SIGNIFICANT CHANGE UP (ref 96–108)
CO2 SERPL-SCNC: 36 MMOL/L — HIGH (ref 22–31)
COLOR SPEC: YELLOW — SIGNIFICANT CHANGE UP
COMMENT - URINE: SIGNIFICANT CHANGE UP
CREAT SERPL-MCNC: 1.33 MG/DL — HIGH (ref 0.5–1.3)
DIFF PNL FLD: ABNORMAL
EOSINOPHIL # BLD AUTO: 0.12 K/UL — SIGNIFICANT CHANGE UP (ref 0–0.5)
EOSINOPHIL NFR BLD AUTO: 1.3 % — SIGNIFICANT CHANGE UP (ref 0–6)
EPI CELLS # UR: SIGNIFICANT CHANGE UP /HPF
GLUCOSE SERPL-MCNC: 105 MG/DL — HIGH (ref 70–99)
GLUCOSE UR QL: NEGATIVE — SIGNIFICANT CHANGE UP
HCT VFR BLD CALC: 31.5 % — LOW (ref 34.5–45)
HGB BLD-MCNC: 9.5 G/DL — LOW (ref 11.5–15.5)
HYALINE CASTS # UR AUTO: ABNORMAL /LPF
IMM GRANULOCYTES NFR BLD AUTO: 0.3 % — SIGNIFICANT CHANGE UP (ref 0–1.5)
KETONES UR-MCNC: NEGATIVE — SIGNIFICANT CHANGE UP
LACTATE SERPL-SCNC: 1.2 MMOL/L — SIGNIFICANT CHANGE UP (ref 0.7–2)
LEUKOCYTE ESTERASE UR-ACNC: NEGATIVE — SIGNIFICANT CHANGE UP
LIDOCAIN IGE QN: 104 U/L — SIGNIFICANT CHANGE UP (ref 73–393)
LYMPHOCYTES # BLD AUTO: 1.39 K/UL — SIGNIFICANT CHANGE UP (ref 1–3.3)
LYMPHOCYTES # BLD AUTO: 15.6 % — SIGNIFICANT CHANGE UP (ref 13–44)
MAGNESIUM SERPL-MCNC: 2.4 MG/DL — SIGNIFICANT CHANGE UP (ref 1.6–2.6)
MCHC RBC-ENTMCNC: 30.2 GM/DL — LOW (ref 32–36)
MCHC RBC-ENTMCNC: 31.3 PG — SIGNIFICANT CHANGE UP (ref 27–34)
MCV RBC AUTO: 103.6 FL — HIGH (ref 80–100)
MONOCYTES # BLD AUTO: 0.81 K/UL — SIGNIFICANT CHANGE UP (ref 0–0.9)
MONOCYTES NFR BLD AUTO: 9.1 % — SIGNIFICANT CHANGE UP (ref 2–14)
NEUTROPHILS # BLD AUTO: 6.48 K/UL — SIGNIFICANT CHANGE UP (ref 1.8–7.4)
NEUTROPHILS NFR BLD AUTO: 73 % — SIGNIFICANT CHANGE UP (ref 43–77)
NITRITE UR-MCNC: NEGATIVE — SIGNIFICANT CHANGE UP
NRBC # BLD: 0 /100 WBCS — SIGNIFICANT CHANGE UP (ref 0–0)
NT-PROBNP SERPL-SCNC: 7280 PG/ML — HIGH (ref 0–450)
PH UR: 5 — SIGNIFICANT CHANGE UP (ref 5–8)
PLATELET # BLD AUTO: 247 K/UL — SIGNIFICANT CHANGE UP (ref 150–400)
POTASSIUM SERPL-MCNC: 4.8 MMOL/L — SIGNIFICANT CHANGE UP (ref 3.5–5.3)
POTASSIUM SERPL-SCNC: 4.8 MMOL/L — SIGNIFICANT CHANGE UP (ref 3.5–5.3)
PROT SERPL-MCNC: 7.2 G/DL — SIGNIFICANT CHANGE UP (ref 6–8.3)
PROT UR-MCNC: 100
RBC # BLD: 3.04 M/UL — LOW (ref 3.8–5.2)
RBC # FLD: 12.4 % — SIGNIFICANT CHANGE UP (ref 10.3–14.5)
RBC CASTS # UR COMP ASSIST: SIGNIFICANT CHANGE UP /HPF (ref 0–2)
SODIUM SERPL-SCNC: 142 MMOL/L — SIGNIFICANT CHANGE UP (ref 135–145)
SP GR SPEC: 1.01 — SIGNIFICANT CHANGE UP (ref 1.01–1.02)
TROPONIN I SERPL-MCNC: 0.1 NG/ML — HIGH (ref 0–0.04)
UROBILINOGEN FLD QL: NEGATIVE — SIGNIFICANT CHANGE UP
WBC # BLD: 8.89 K/UL — SIGNIFICANT CHANGE UP (ref 3.8–10.5)
WBC # FLD AUTO: 8.89 K/UL — SIGNIFICANT CHANGE UP (ref 3.8–10.5)
WBC UR QL: SIGNIFICANT CHANGE UP /HPF (ref 0–5)

## 2020-07-14 PROCEDURE — 99285 EMERGENCY DEPT VISIT HI MDM: CPT

## 2020-07-14 PROCEDURE — 70450 CT HEAD/BRAIN W/O DYE: CPT | Mod: 26

## 2020-07-14 PROCEDURE — 71045 X-RAY EXAM CHEST 1 VIEW: CPT | Mod: 26

## 2020-07-14 RX ORDER — CHOLECALCIFEROL (VITAMIN D3) 125 MCG
1000 CAPSULE ORAL DAILY
Refills: 0 | Status: DISCONTINUED | OUTPATIENT
Start: 2020-07-14 | End: 2020-07-16

## 2020-07-14 RX ORDER — APIXABAN 2.5 MG/1
2.5 TABLET, FILM COATED ORAL
Refills: 0 | Status: DISCONTINUED | OUTPATIENT
Start: 2020-07-14 | End: 2020-07-16

## 2020-07-14 RX ORDER — SODIUM CHLORIDE 9 MG/ML
1000 INJECTION INTRAMUSCULAR; INTRAVENOUS; SUBCUTANEOUS ONCE
Refills: 0 | Status: COMPLETED | OUTPATIENT
Start: 2020-07-14 | End: 2020-07-14

## 2020-07-14 RX ORDER — SODIUM CHLORIDE 9 MG/ML
1000 INJECTION INTRAMUSCULAR; INTRAVENOUS; SUBCUTANEOUS
Refills: 0 | Status: DISCONTINUED | OUTPATIENT
Start: 2020-07-14 | End: 2020-07-14

## 2020-07-14 RX ORDER — OXYBUTYNIN CHLORIDE 5 MG
5 TABLET ORAL THREE TIMES A DAY
Refills: 0 | Status: DISCONTINUED | OUTPATIENT
Start: 2020-07-14 | End: 2020-07-15

## 2020-07-14 RX ORDER — FUROSEMIDE 40 MG
20 TABLET ORAL
Refills: 0 | Status: DISCONTINUED | OUTPATIENT
Start: 2020-07-14 | End: 2020-07-16

## 2020-07-14 RX ORDER — METOPROLOL TARTRATE 50 MG
12.5 TABLET ORAL
Refills: 0 | Status: DISCONTINUED | OUTPATIENT
Start: 2020-07-14 | End: 2020-07-16

## 2020-07-14 RX ADMIN — SODIUM CHLORIDE 1000 MILLILITER(S): 9 INJECTION INTRAMUSCULAR; INTRAVENOUS; SUBCUTANEOUS at 16:00

## 2020-07-14 RX ADMIN — SODIUM CHLORIDE 125 MILLILITER(S): 9 INJECTION INTRAMUSCULAR; INTRAVENOUS; SUBCUTANEOUS at 16:14

## 2020-07-14 RX ADMIN — Medication 5 MILLIGRAM(S): at 23:58

## 2020-07-14 NOTE — ED ADULT TRIAGE NOTE - CHIEF COMPLAINT QUOTE
As per son h/o copd.  Son requesting long term placement.  Oriented to person. As per son h/o copd.  Son requesting long term placement.  Oriented to person.  EKG/FS/CORE requested in main ED

## 2020-07-14 NOTE — ED PROVIDER NOTE - CLINICAL SUMMARY MEDICAL DECISION MAKING FREE TEXT BOX
pt just d/c home yest., now returns with mild confusion, decreased appetite, will get labs, d/w Dr. Bennett, also

## 2020-07-14 NOTE — H&P ADULT - PROBLEM SELECTOR PLAN 4
-c/w eliquis and metoprolol  -EKG: NSR  -Monitor HR -Echo: G2DD  -c/w lasix 20 mg every 48 hours  -CXR: Marked improvement in lung and pleural findings compared to previous CXR

## 2020-07-14 NOTE — H&P ADULT - HISTORY OF PRESENT ILLNESS
89 y female ambulates with walker, has HHA with PMHx of HTN, HLD, arthritis was brought to ED due to altered mental status x1 day. per son pt is normally AAOX3 but today was found to be AAOX2. Pt was recently admitted to [facility] for pneumonia and was discharged yesterday on 2L oxygen at home. Patient was recommended rehab during the previous admission but son wanted to take the pt home. Per son, he as well as the HHA were unable to take care of the mother as she was not able to move out of bed. VN from [facility] saw pt this AM and advised to return to ED. Pt denies any fever, cough, chest pain, palpitations, N/V/D, abdominal pain or any other acute complaints.    In ED, /88, HR 89, SaO2 98% on 2L NC. 89 y female ambulates with walker, has HHA with PMHx of afib, HTN, HLD, arthritis was brought to ED due to altered mental status x1 day. per son pt is normally AAOX3 but today was found to be AAOX2. Pt was recently admitted to [facility] for pneumonia and was discharged yesterday on 2L oxygen at home. Patient was recommended rehab during the previous admission but son wanted to take the pt home. Per son, he as well as the HHA were unable to take care of the mother as she was not able to move out of bed. [facility] pt this AM and advised to return to ED. Pt denies any fever, cough, chest pain, palpitations, N/V/D, abdominal pain or any other acute complaints.    In ED, /88, HR 89, SaO2 98% on 2L NC.

## 2020-07-14 NOTE — ED ADULT NURSE NOTE - NSIMPLEMENTINTERV_GEN_ALL_ED
Implemented All Fall with Harm Risk Interventions:  [place] to call system. Call bell, personal items and telephone within reach. Instruct patient to call for assistance. Room bathroom lighting operational. Non-slip footwear when patient is off stretcher. Physically safe environment: no spills, clutter or unnecessary equipment. Stretcher in lowest position, wheels locked, appropriate side rails in place. Provide visual cue, wrist band, yellow gown, etc. Monitor gait and stability. Monitor for mental status changes and reorient to person, place, and time. Review medications for side effects contributing to fall risk. Reinforce activity limits and safety measures with patient and family. Provide visual clues: red socks.

## 2020-07-14 NOTE — H&P ADULT - PROBLEM SELECTOR PLAN 3
-Echo: G2DD  -c/w lasix 20 mg every 48 hours  -CXR: Marked improvement in lung and pleural findings compared to previous CXR -Troponin 0.097 likely demand ischemia  -EKG: NSR  -f/u serial troponins -Troponin 0.097 likely demand ischemia  -EKG: NSR  -Troponin elevated to 0.09 on last admission, won't trend troponin further

## 2020-07-14 NOTE — H&P ADULT - PROBLEM SELECTOR PLAN 2
-Pt was unable to walk or get out of bed after discharge  -Son wants pt to be placed in NH/Rehab  -PT consulted  -CM consulted

## 2020-07-14 NOTE — ED PROVIDER NOTE - OBJECTIVE STATEMENT
Son Yvan [phone] 89 y.o. female BIBA as per son, pt was d/c home yest. for PNA.  Recommended rehab, son preferred pt to take pt home.  Since pt returned home yest. pt unable to ambulate, son & HHA unable to get pt up out of the bed.  Pt with decreased appetite, weakness.  VN from [facility] saw pt this am, advised to return to ED.  No SOB, pt's on home O2 2L.

## 2020-07-15 DIAGNOSIS — N17.9 ACUTE KIDNEY FAILURE, UNSPECIFIED: ICD-10-CM

## 2020-07-15 DIAGNOSIS — I50.31 ACUTE DIASTOLIC (CONGESTIVE) HEART FAILURE: ICD-10-CM

## 2020-07-15 DIAGNOSIS — Z02.9 ENCOUNTER FOR ADMINISTRATIVE EXAMINATIONS, UNSPECIFIED: ICD-10-CM

## 2020-07-15 LAB
ALBUMIN SERPL ELPH-MCNC: 2.3 G/DL — LOW (ref 3.5–5)
ALP SERPL-CCNC: 42 U/L — SIGNIFICANT CHANGE UP (ref 40–120)
ALT FLD-CCNC: 16 U/L DA — SIGNIFICANT CHANGE UP (ref 10–60)
ANION GAP SERPL CALC-SCNC: 2 MMOL/L — LOW (ref 5–17)
AST SERPL-CCNC: 16 U/L — SIGNIFICANT CHANGE UP (ref 10–40)
BASOPHILS # BLD AUTO: 0.04 K/UL — SIGNIFICANT CHANGE UP (ref 0–0.2)
BASOPHILS NFR BLD AUTO: 0.7 % — SIGNIFICANT CHANGE UP (ref 0–2)
BILIRUB SERPL-MCNC: 1 MG/DL — SIGNIFICANT CHANGE UP (ref 0.2–1.2)
BUN SERPL-MCNC: 31 MG/DL — HIGH (ref 7–18)
CALCIUM SERPL-MCNC: 8.9 MG/DL — SIGNIFICANT CHANGE UP (ref 8.4–10.5)
CHLORIDE SERPL-SCNC: 104 MMOL/L — SIGNIFICANT CHANGE UP (ref 96–108)
CHOLEST SERPL-MCNC: 100 MG/DL — SIGNIFICANT CHANGE UP (ref 10–199)
CO2 SERPL-SCNC: 37 MMOL/L — HIGH (ref 22–31)
CREAT SERPL-MCNC: 0.98 MG/DL — SIGNIFICANT CHANGE UP (ref 0.5–1.3)
CULTURE RESULTS: NO GROWTH — SIGNIFICANT CHANGE UP
EOSINOPHIL # BLD AUTO: 0.16 K/UL — SIGNIFICANT CHANGE UP (ref 0–0.5)
EOSINOPHIL NFR BLD AUTO: 2.7 % — SIGNIFICANT CHANGE UP (ref 0–6)
FOLATE SERPL-MCNC: 15.7 NG/ML — SIGNIFICANT CHANGE UP
GLUCOSE SERPL-MCNC: 76 MG/DL — SIGNIFICANT CHANGE UP (ref 70–99)
HCT VFR BLD CALC: 28.5 % — LOW (ref 34.5–45)
HDLC SERPL-MCNC: 37 MG/DL — LOW
HGB BLD-MCNC: 8.7 G/DL — LOW (ref 11.5–15.5)
IMM GRANULOCYTES NFR BLD AUTO: 0.3 % — SIGNIFICANT CHANGE UP (ref 0–1.5)
LIPID PNL WITH DIRECT LDL SERPL: 46 MG/DL — SIGNIFICANT CHANGE UP
LYMPHOCYTES # BLD AUTO: 1.37 K/UL — SIGNIFICANT CHANGE UP (ref 1–3.3)
LYMPHOCYTES # BLD AUTO: 22.9 % — SIGNIFICANT CHANGE UP (ref 13–44)
MAGNESIUM SERPL-MCNC: 2.3 MG/DL — SIGNIFICANT CHANGE UP (ref 1.6–2.6)
MCHC RBC-ENTMCNC: 30.5 GM/DL — LOW (ref 32–36)
MCHC RBC-ENTMCNC: 31.3 PG — SIGNIFICANT CHANGE UP (ref 27–34)
MCV RBC AUTO: 102.5 FL — HIGH (ref 80–100)
MONOCYTES # BLD AUTO: 0.61 K/UL — SIGNIFICANT CHANGE UP (ref 0–0.9)
MONOCYTES NFR BLD AUTO: 10.2 % — SIGNIFICANT CHANGE UP (ref 2–14)
NEUTROPHILS # BLD AUTO: 3.78 K/UL — SIGNIFICANT CHANGE UP (ref 1.8–7.4)
NEUTROPHILS NFR BLD AUTO: 63.2 % — SIGNIFICANT CHANGE UP (ref 43–77)
NRBC # BLD: 0 /100 WBCS — SIGNIFICANT CHANGE UP (ref 0–0)
PHOSPHATE SERPL-MCNC: 3.3 MG/DL — SIGNIFICANT CHANGE UP (ref 2.5–4.5)
PLATELET # BLD AUTO: 212 K/UL — SIGNIFICANT CHANGE UP (ref 150–400)
POTASSIUM SERPL-MCNC: 4.4 MMOL/L — SIGNIFICANT CHANGE UP (ref 3.5–5.3)
POTASSIUM SERPL-SCNC: 4.4 MMOL/L — SIGNIFICANT CHANGE UP (ref 3.5–5.3)
PROT SERPL-MCNC: 6.4 G/DL — SIGNIFICANT CHANGE UP (ref 6–8.3)
RBC # BLD: 2.78 M/UL — LOW (ref 3.8–5.2)
RBC # FLD: 12.3 % — SIGNIFICANT CHANGE UP (ref 10.3–14.5)
SARS-COV-2 IGG SERPL QL IA: NEGATIVE — SIGNIFICANT CHANGE UP
SARS-COV-2 IGM SERPL IA-ACNC: <0.1 INDEX — SIGNIFICANT CHANGE UP
SARS-COV-2 RNA SPEC QL NAA+PROBE: SIGNIFICANT CHANGE UP
SODIUM SERPL-SCNC: 143 MMOL/L — SIGNIFICANT CHANGE UP (ref 135–145)
SPECIMEN SOURCE: SIGNIFICANT CHANGE UP
TOTAL CHOLESTEROL/HDL RATIO MEASUREMENT: 2.7 RATIO — LOW (ref 3.3–7.1)
TRIGL SERPL-MCNC: 85 MG/DL — SIGNIFICANT CHANGE UP (ref 10–149)
TROPONIN I SERPL-MCNC: 0.08 NG/ML — HIGH (ref 0–0.04)
TROPONIN I SERPL-MCNC: 0.09 NG/ML — HIGH (ref 0–0.04)
TSH SERPL-MCNC: 5.49 UU/ML — HIGH (ref 0.34–4.82)
VIT B12 SERPL-MCNC: 950 PG/ML — SIGNIFICANT CHANGE UP (ref 232–1245)
WBC # BLD: 5.98 K/UL — SIGNIFICANT CHANGE UP (ref 3.8–10.5)
WBC # FLD AUTO: 5.98 K/UL — SIGNIFICANT CHANGE UP (ref 3.8–10.5)

## 2020-07-15 RX ORDER — TAMSULOSIN HYDROCHLORIDE 0.4 MG/1
0.4 CAPSULE ORAL AT BEDTIME
Refills: 0 | Status: DISCONTINUED | OUTPATIENT
Start: 2020-07-15 | End: 2020-07-16

## 2020-07-15 RX ADMIN — Medication 12.5 MILLIGRAM(S): at 05:58

## 2020-07-15 RX ADMIN — TAMSULOSIN HYDROCHLORIDE 0.4 MILLIGRAM(S): 0.4 CAPSULE ORAL at 21:59

## 2020-07-15 RX ADMIN — APIXABAN 2.5 MILLIGRAM(S): 2.5 TABLET, FILM COATED ORAL at 05:58

## 2020-07-15 RX ADMIN — Medication 1000 UNIT(S): at 13:10

## 2020-07-15 RX ADMIN — Medication 12.5 MILLIGRAM(S): at 18:14

## 2020-07-15 RX ADMIN — APIXABAN 2.5 MILLIGRAM(S): 2.5 TABLET, FILM COATED ORAL at 18:14

## 2020-07-15 RX ADMIN — Medication 20 MILLIGRAM(S): at 13:10

## 2020-07-15 RX ADMIN — Medication 5 MILLIGRAM(S): at 05:58

## 2020-07-15 NOTE — PROGRESS NOTE ADULT - SUBJECTIVE AND OBJECTIVE BOX
NP Note discussed with  Primary Attending    Patient is a 89y old  Female who presents with a chief complaint of Altered mental status (15 Jul 2020 10:47)      INTERVAL HPI/OVERNIGHT EVENTS: no new complaints    MEDICATIONS  (STANDING):  apixaban 2.5 milliGRAM(s) Oral two times a day  cholecalciferol 1000 Unit(s) Oral daily  furosemide    Tablet 20 milliGRAM(s) Oral <User Schedule>  metoprolol tartrate 12.5 milliGRAM(s) Oral two times a day  oxybutynin 5 milliGRAM(s) Oral three times a day    MEDICATIONS  (PRN):      __________________________________________________  REVIEW OF SYSTEMS:    CONSTITUTIONAL: No fever,   EYES: no acute visual disturbances  NECK: No pain or stiffness  RESPIRATORY: No cough; No shortness of breath  CARDIOVASCULAR: No chest pain, no palpitations  GASTROINTESTINAL: No pain. No nausea or vomiting; No diarrhea   NEUROLOGICAL: No headache or numbness, no tremors  MUSCULOSKELETAL: No joint pain, no muscle pain  GENITOURINARY: no dysuria, no frequency, no hesitancy  PSYCHIATRY: no depression , no anxiety  ALL OTHER  ROS negative        Vital Signs Last 24 Hrs  T(C): 36.8 (15 Jul 2020 11:15), Max: 37.7 (2020 13:22)  T(F): 98.2 (15 Jul 2020 11:15), Max: 99.8 (2020 13:22)  HR: 56 (15 Jul 2020 11:15) (56 - 89)  BP: 117/54 (15 Jul 2020 11:15) (110/74 - 149/83)  BP(mean): 68 (15 Jul 2020 11:15) (68 - 68)  RR: 14 (15 Jul 2020 11:15) (14 - 20)  SpO2: 100% (15 Jul 2020 11:15) (97% - 100%)    ________________________________________________  PHYSICAL EXAM:  GENERAL: NAD  HEENT: Normocephalic;  conjunctivae and sclerae clear; moist mucous membranes;   NECK : supple  CHEST/LUNG:Effort normal.  Clear to auscultation bilaterally with good air entry   HEART: S1 S2  regular; no murmurs, gallops or rubs  ABDOMEN: Soft, Nontender, Nondistended; Bowel sounds present  EXTREMITIES: 1+  edema; no calf tenderness  SKIN: warm and dry; no rash  NERVOUS SYSTEM:  Awake and alert; Oriented  to place, person.; no new deficits    _________________________________________________  LABS:                        8.7    5.98  )-----------( 212      ( 15 Jul 2020 07:21 )             28.5     07-15    143  |  104  |  31<H>  ----------------------------<  76  4.4   |  37<H>  |  0.98    Ca    8.9      15 Jul 2020 07:21  Phos  3.3     07-15  Mg     2.3     07-15    TPro  6.4  /  Alb  2.3<L>  /  TBili  1.0  /  DBili  x   /  AST  16  /  ALT  16  /  AlkPhos  42  07-15      Urinalysis Basic - ( 2020 17:20 )    Color: Yellow / Appearance: Clear / S.015 / pH: x  Gluc: x / Ketone: Negative  / Bili: Negative / Urobili: Negative   Blood: x / Protein: 100 / Nitrite: Negative   Leuk Esterase: Negative / RBC: 0-2 /HPF / WBC 0-2 /HPF   Sq Epi: x / Non Sq Epi: Few /HPF / Bacteria: Few /HPF      CAPILLARY BLOOD GLUCOSE            RADIOLOGY & ADDITIONAL TESTS:    Imaging Personally Reviewed:  YES/NO    Consultant(s) Notes Reviewed:   YES/ No    Care Discussed with Consultants :     Plan of care was discussed with patient and /or primary care giver; all questions and concerns were addressed and care was aligned with patient's wishes. NP Note discussed with  Primary Attending    Patient is a 89y old  Female who presents with a chief complaint of Altered mental status (15 Jul 2020 10:47)    HPI  89 y female ambulates with walker, has HHA with PMHx of afib, HTN, HLD, arthritis was brought to ED due to altered mental status x1 day. per son pt is normally AAOX3 but today was found to be AAOX2. Pt was recently admitted to [facility] for pneumonia and was discharged yesterday on 2L oxygen at home. Patient was recommended rehab during the previous admission but son wanted to take the pt home. Per son, he as well as the HHA were unable to take care of the mother as she was not able to move out of bed. VN from [facility] saw pt this AM and advised to return to ED. Pt denies any fever, cough, chest pain, palpitations, N/V/D, abdominal pain or any other acute complaints.    INTERVAL HPI/OVERNIGHT EVENTS: Pt seen and examined this morning. Pt reports "hungry". Pt denies SOB, chest pain.     MEDICATIONS  (STANDING):  apixaban 2.5 milliGRAM(s) Oral two times a day  cholecalciferol 1000 Unit(s) Oral daily  furosemide    Tablet 20 milliGRAM(s) Oral <User Schedule>  metoprolol tartrate 12.5 milliGRAM(s) Oral two times a day  oxybutynin 5 milliGRAM(s) Oral three times a day    MEDICATIONS  (PRN):      __________________________________________________  REVIEW OF SYSTEMS:    CONSTITUTIONAL: No fever,   EYES: no acute visual disturbances  NECK: No pain or stiffness  RESPIRATORY: No cough; No shortness of breath  CARDIOVASCULAR: No chest pain, no palpitations  GASTROINTESTINAL: No pain. No nausea or vomiting; No diarrhea   NEUROLOGICAL: No headache or numbness, no tremors  MUSCULOSKELETAL: No joint pain, no muscle pain  GENITOURINARY: no dysuria, no frequency, no hesitancy  PSYCHIATRY: no depression , no anxiety  ALL OTHER  ROS negative        Vital Signs Last 24 Hrs  T(C): 36.8 (15 Jul 2020 11:15), Max: 37.7 (2020 13:22)  T(F): 98.2 (15 Jul 2020 11:15), Max: 99.8 (2020 13:22)  HR: 56 (15 Jul 2020 11:15) (56 - 89)  BP: 117/54 (15 Jul 2020 11:15) (110/74 - 149/83)  BP(mean): 68 (15 Jul 2020 11:15) (68 - 68)  RR: 14 (15 Jul 2020 11:15) (14 - 20)  SpO2: 100% (15 Jul 2020 11:15) (97% - 100%)    ________________________________________________  PHYSICAL EXAM:  GENERAL: NAD  HEENT: Normocephalic;  conjunctivae and sclerae clear; moist mucous membranes;   NECK : supple  CHEST/LUNG:Effort normal.  Clear to auscultation bilaterally with good air entry   HEART: S1 S2  regular; no murmurs, gallops or rubs  ABDOMEN: Soft, Nontender, Nondistended; Bowel sounds present  EXTREMITIES: 1+  edema; no calf tenderness  SKIN: warm and dry; no rash  NERVOUS SYSTEM:  Awake and alert; Oriented  to place, person.; no new deficits    _________________________________________________  LABS:                        8.7    5.98  )-----------( 212      ( 15 Jul 2020 07:21 )             28.5     07-15    143  |  104  |  31<H>  ----------------------------<  76  4.4   |  37<H>  |  0.98    Ca    8.9      15 Jul 2020 07:21  Phos  3.3     07-15  Mg     2.3     07-15    TPro  6.4  /  Alb  2.3<L>  /  TBili  1.0  /  DBili  x   /  AST  16  /  ALT  16  /  AlkPhos  42  07-15      Urinalysis Basic - ( 2020 17:20 )    Color: Yellow / Appearance: Clear / S.015 / pH: x  Gluc: x / Ketone: Negative  / Bili: Negative / Urobili: Negative   Blood: x / Protein: 100 / Nitrite: Negative   Leuk Esterase: Negative / RBC: 0-2 /HPF / WBC 0-2 /HPF   Sq Epi: x / Non Sq Epi: Few /HPF / Bacteria: Few /HPF      CAPILLARY BLOOD GLUCOSE            RADIOLOGY & ADDITIONAL TESTS:    Imaging Personally Reviewed:  YES/NO    Consultant(s) Notes Reviewed:   YES/ No    Care Discussed with Consultants :     Plan of care was discussed with patient and /or primary care giver; all questions and concerns were addressed and care was aligned with patient's wishes. NP Note discussed with  Primary Attending    Patient is a 89y old  Female who presents with a chief complaint of Altered mental status (15 Jul 2020 10:47)    HPI  89 y female ambulates with walker, has HHA with PMHx of afib, HTN, HLD, arthritis was brought to ED due to altered mental status x1 day. per son pt is normally AAOX3 but today was found to be AAOX2. Pt was recently admitted to [facility] for pneumonia and was discharged yesterday on 2L oxygen at home. Patient was recommended rehab during the previous admission but son wanted to take the pt home. Per son, he as well as the HHA were unable to take care of the mother as she was not able to move out of bed. VN from [facility] who saw pt and advised to return to ED. Pt denies any fever, cough, chest pain, palpitations, N/V/D, abdominal pain or any other acute complaints.    INTERVAL HPI/OVERNIGHT EVENTS: Pt seen and examined this morning. Pt reports "hungry". Pt denies SOB, chest pain.     MEDICATIONS  (STANDING):  apixaban 2.5 milliGRAM(s) Oral two times a day  cholecalciferol 1000 Unit(s) Oral daily  furosemide    Tablet 20 milliGRAM(s) Oral <User Schedule>  metoprolol tartrate 12.5 milliGRAM(s) Oral two times a day  oxybutynin 5 milliGRAM(s) Oral three times a day    MEDICATIONS  (PRN):      __________________________________________________  REVIEW OF SYSTEMS:    CONSTITUTIONAL: No fever,   EYES: no acute visual disturbances  NECK: No pain or stiffness  RESPIRATORY: No cough; No shortness of breath  CARDIOVASCULAR: No chest pain, no palpitations  GASTROINTESTINAL: No pain. No nausea or vomiting; No diarrhea   NEUROLOGICAL: No headache or numbness, no tremors  MUSCULOSKELETAL: No joint pain, no muscle pain  GENITOURINARY: no dysuria, no frequency, no hesitancy  PSYCHIATRY: no depression , no anxiety  ALL OTHER  ROS negative        Vital Signs Last 24 Hrs  T(C): 36.8 (15 Jul 2020 11:15), Max: 37.7 (2020 13:22)  T(F): 98.2 (15 Jul 2020 11:15), Max: 99.8 (2020 13:22)  HR: 56 (15 Jul 2020 11:15) (56 - 89)  BP: 117/54 (15 Jul 2020 11:15) (110/74 - 149/83)  BP(mean): 68 (15 Jul 2020 11:15) (68 - 68)  RR: 14 (15 Jul 2020 11:15) (14 - 20)  SpO2: 100% (15 Jul 2020 11:15) (97% - 100%)    ________________________________________________  PHYSICAL EXAM:  GENERAL: NAD  HEENT: Normocephalic;  conjunctivae and sclerae clear; moist mucous membranes;   NECK : supple  CHEST/LUNG:Effort normal.  Clear to auscultation bilaterally with good air entry   HEART: S1 S2  regular; no murmurs, gallops or rubs  ABDOMEN: Soft, Nontender, Nondistended; Bowel sounds present  EXTREMITIES: 1+  edema; no calf tenderness  SKIN: warm and dry; no rash  NERVOUS SYSTEM:  Awake and alert; Oriented  to place, person.; no new deficits    _________________________________________________  LABS:                        8.7    5.98  )-----------( 212      ( 15 Jul 2020 07:21 )             28.5     07-15    143  |  104  |  31<H>  ----------------------------<  76  4.4   |  37<H>  |  0.98    Ca    8.9      15 Jul 2020 07:21  Phos  3.3     07-15  Mg     2.3     07-15    TPro  6.4  /  Alb  2.3<L>  /  TBili  1.0  /  DBili  x   /  AST  16  /  ALT  16  /  AlkPhos  42  07-15      Urinalysis Basic - ( 2020 17:20 )    Color: Yellow / Appearance: Clear / S.015 / pH: x  Gluc: x / Ketone: Negative  / Bili: Negative / Urobili: Negative   Blood: x / Protein: 100 / Nitrite: Negative   Leuk Esterase: Negative / RBC: 0-2 /HPF / WBC 0-2 /HPF   Sq Epi: x / Non Sq Epi: Few /HPF / Bacteria: Few /HPF      CAPILLARY BLOOD GLUCOSE            RADIOLOGY & ADDITIONAL TESTS:    Imaging Personally Reviewed:  YES/NO    Consultant(s) Notes Reviewed:   YES/ No    Care Discussed with Consultants :     Plan of care was discussed with patient and /or primary care giver; all questions and concerns were addressed and care was aligned with patient's wishes.

## 2020-07-15 NOTE — PROGRESS NOTE ADULT - PROBLEM SELECTOR PLAN 6
with CKD Stage 3  a/w SCreat 1.33 likely due to poor po intake  Improving  Screat 0.98  Avoid nephrotoxins, NSAIDs, RCA  Monitor BMP in am with CKD Stage 3  a/w SCreat 1.33 likely due post-renal (urinary retention)   Improved after indwelling pearson catheter. SCreat 0.98 today.  Discontinue Oxybutynin.  Start Flomax  TOV in 3 days  Avoid nephrotoxins, NSAIDs, RCA  Monitor BMP in am

## 2020-07-15 NOTE — CONSULT NOTE ADULT - SUBJECTIVE AND OBJECTIVE BOX
HPI:  89 y female ambulates with walker, has HHA with PMHx of afib, HTN, HLD, arthritis was brought to ED due to altered mental status x1 day. per son pt is normally AAOX3 but today was found to be AAOX2. Pt was recently admitted to [facility] for pneumonia and was discharged yesterday on 2L oxygen at home. Patient was recommended rehab during the previous admission but son wanted to take the pt home. Per son, he as well as the HHA were unable to take care of the mother as she was not able to move out of bed. [facility]N from [facility] saw pt this AM and advised to return to ED. Pt denies any fever, cough, chest pain, palpitations, N/V/D, abdominal pain or any other acute complaints.    In ED, /88, HR 89, SaO2 98% on 2L NC. (14 Jul 2020 19:53)    Interval History:     MEDICATIONS  (STANDING):  apixaban 2.5 milliGRAM(s) Oral two times a day  cholecalciferol 1000 Unit(s) Oral daily  furosemide    Tablet 20 milliGRAM(s) Oral <User Schedule>  metoprolol tartrate 12.5 milliGRAM(s) Oral two times a day  oxybutynin 5 milliGRAM(s) Oral three times a day    MEDICATIONS  (PRN):    PAST MEDICAL & SURGICAL HISTORY:  Arthritis  HLD (hyperlipidemia)  Chronic kidney disease (CKD)  HTN (hypertension)  H/O local excision of skin lesion    SOCIAL HISTORY:  Smoker:  YES / NO        PACK YEARS:                         WHEN QUIT?  ETOH use:  YES / NO               FREQUENCY / QUANTITY:  Ilicit Drug use:  YES / NO      REVIEW OF SYSTEMS:    CONSTITUTIONAL: No weakness, fevers or chills  RESPIRATORY: No cough, wheezing, hemoptysis; No shortness of breath  CARDIOVASCULAR: No chest pain or palpitations  GASTROINTESTINAL: No abdominal or epigastric pain. No nausea, vomiting, or hematemesis; No diarrhea or constipation. No melena or hematochezia.  NEUROLOGICAL: No numbness or weakness  All other review of systems is negative unless indicated above.    Vital Signs Last 24 Hrs  T(C): 36.8 (15 Jul 2020 08:39), Max: 37.7 (14 Jul 2020 13:22)  T(F): 98.3 (15 Jul 2020 08:39), Max: 99.8 (14 Jul 2020 13:22)  HR: 80 (15 Jul 2020 08:39) (62 - 89)  BP: 110/74 (15 Jul 2020 10:01) (110/74 - 149/83)  BP(mean): --  RR: 20 (15 Jul 2020 08:39) (18 - 20)  SpO2: 100% (15 Jul 2020 08:39) (97% - 100%)  General: A/ox 3, No acute Distress  Neck: Supple, NO JVD  Cardiac: S1 S2, No M/R/G  Pulmonary: CTAB, Breathing unlabored, No Rhonchi/Rales/Wheezing  Abdomen: Soft, Non -tender, +BS x 4 quads  Extremities: No Rashes, No edema  Neuro: A/o x 3, No focal deficits        Telemetry:  EKG:  CHADSVASC:                          8.7    5.98  )-----------( 212      ( 15 Jul 2020 07:21 )             28.5     07-15    143  |  104  |  31<H>  ----------------------------<  76  4.4   |  37<H>  |  0.98    Ca    8.9      15 Jul 2020 07:21  Phos  3.3     07-15  Mg     2.3     07-15    TPro  6.4  /  Alb  2.3<L>  /  TBili  1.0  /  DBili  x   /  AST  16  /  ALT  16  /  AlkPhos  42  07-15      Assessment/Plan    Problem/Plan - 1  ·  Problem: Congestive heart failure, Diastolic.   - CXR yielded marked improvement in lung and pleural finding with mild basilar effusions suggesting significant resolution of CHF   - has mild B/L leg edema.  - ECHO EF63% Severe Aortic stenosis   - patient with Divya during previous admission , resolved   - c/w Lasix 20mg daily, will be conserative with diuresis given severe aortic stenosis  - patient with contraction alkalosis, improved, no indicatio for IVF for now         Problem/Plan - 2:  ·  Problem: Elevated troponin I level.  Plan: mild trop elevation Patient denies any chest pain.  -EKG NSR.  - most likely demand ischemia   - troponin trended down        Problem/Plan - 3:  ·  Problem: HLD (hyperlipidemia).  Plan: Patient takes Lipitor 40 at home .   Lipid Profile (07.04.20 @ 05:53)    Total Cholesterol/HDL Ratio Measurement: 2 RATIO    Cholesterol, Serum: 110 mg/dL    Triglycerides, Serum: 85 mg/dL    HDL Cholesterol, Serum: 37  mg/dL         Problem/Plan - 4:  ·  Problem: Paroxysmal Atrial Fibrillation.  Plan: CHADS2-4 BP on softer side   -c/w  Metoprolol dose to 12.5 BID   -c/w Eliquis     Problem/Plan - 5:  Problem: Chronic kidney disease (CKD). Plan: Patient has CKD, stage 4   creatinine improved to baseline 0.98.     Problem/Plan - 6:  ·  Problem: Elevated d-dimer.  Plan: -Patient has elevated D-dimers.  presented with SOB.  CTA -ve for Pulmonary Emboli.-  - LE doppler--No DVT. HPI:  89 y female ambulates with walker, has HHA with PMHx of afib, HTN, HLD, arthritis was brought to ED due to altered mental status x1 day. per son pt is normally AAOX3 but today was found to be AAOX2. Pt was recently admitted to [facility] for pneumonia and was discharged yesterday on 2L oxygen at home. Patient was recommended rehab during the previous admission but son wanted to take the pt home. Per son, he as well as the HHA were unable to take care of the mother as she was not able to move out of bed. VN from [facility] saw pt this AM and advised to return to ED. Pt denies any fever, cough, chest pain, palpitations, N/V/D, abdominal pain or any other acute complaints.    In ED, /88, HR 89, SaO2 98% on 2L NC. (14 Jul 2020 19:53)    Interval History:     MEDICATIONS  (STANDING):  apixaban 2.5 milliGRAM(s) Oral two times a day  cholecalciferol 1000 Unit(s) Oral daily  furosemide    Tablet 20 milliGRAM(s) Oral <User Schedule>  metoprolol tartrate 12.5 milliGRAM(s) Oral two times a day  oxybutynin 5 milliGRAM(s) Oral three times a day    MEDICATIONS  (PRN):    PAST MEDICAL & SURGICAL HISTORY:  Arthritis  HLD (hyperlipidemia)  Chronic kidney disease (CKD)  HTN (hypertension)  H/O local excision of skin lesion    SOCIAL HISTORY:  Smoker:  YES / NO        PACK YEARS:                         WHEN QUIT?  ETOH use:  YES / NO               FREQUENCY / QUANTITY:  Ilicit Drug use:  YES / NO      REVIEW OF SYSTEMS:    CONSTITUTIONAL: No weakness, fevers or chills  RESPIRATORY: No cough, wheezing, hemoptysis; No shortness of breath  CARDIOVASCULAR: No chest pain or palpitations  GASTROINTESTINAL: No abdominal or epigastric pain. No nausea, vomiting, or hematemesis; No diarrhea or constipation. No melena or hematochezia.  NEUROLOGICAL: No numbness or weakness  All other review of systems is negative unless indicated above.    Vital Signs Last 24 Hrs  T(C): 36.8 (15 Jul 2020 08:39), Max: 37.7 (14 Jul 2020 13:22)  T(F): 98.3 (15 Jul 2020 08:39), Max: 99.8 (14 Jul 2020 13:22)  HR: 80 (15 Jul 2020 08:39) (62 - 89)  BP: 110/74 (15 Jul 2020 10:01) (110/74 - 149/83)  BP(mean): --  RR: 20 (15 Jul 2020 08:39) (18 - 20)  SpO2: 100% (15 Jul 2020 08:39) (97% - 100%)  General: A/ox 3, No acute Distress  Neck: Supple, NO JVD  Cardiac: S1 S2, No M/R/G  Pulmonary: CTAB, Breathing unlabored, No Rhonchi/Rales/Wheezing  Abdomen: Soft, Non -tender, +BS x 4 quads  Extremities: No Rashes, No edema  Neuro: A/o x 3, No focal deficits        Telemetry:  EKG:  CHADSVASC:                          8.7    5.98  )-----------( 212      ( 15 Jul 2020 07:21 )             28.5     07-15    143  |  104  |  31<H>  ----------------------------<  76  4.4   |  37<H>  |  0.98    Ca    8.9      15 Jul 2020 07:21  Phos  3.3     07-15  Mg     2.3     07-15    TPro  6.4  /  Alb  2.3<L>  /  TBili  1.0  /  DBili  x   /  AST  16  /  ALT  16  /  AlkPhos  42  07-15      Assessment/Plan    Problem/Plan - 1  ·  Problem: Congestive heart failure, Diastolic.   - CXR yielded marked improvement in lung and pleural finding with mild basilar effusions suggesting significant resolution of CHF   - has mild B/L leg edema.  - ECHO EF63% Severe Aortic stenosis   - patient with Divya during previous admission , resolved   - c/w Lasix 20mg daily, will be conserative with diuresis given severe aortic stenosis  - patient with contraction alkalosis, improved, no indicatio for IVF for now         Problem/Plan - 2:  ·  Problem: Elevated troponin I level.  Plan: mild trop elevation Patient denies any chest pain.  -EKG NSR.  - most likely demand ischemia   - troponin trended down        Problem/Plan - 3:  ·  Problem: HLD (hyperlipidemia).  Plan: Patient takes Lipitor 40 at home .   Lipid Profile (07.04.20 @ 05:53)    Total Cholesterol/HDL Ratio Measurement: 2 RATIO    Cholesterol, Serum: 110 mg/dL    Triglycerides, Serum: 85 mg/dL    HDL Cholesterol, Serum: 37  mg/dL         Problem/Plan - 4:  ·  Problem: Paroxysmal Atrial Fibrillation.  Plan: CHADS2-4 BP on softer side   -c/w  Metoprolol dose to 12.5 BID   -c/w Eliquis     Problem/Plan - 5:  Problem: Chronic kidney disease (CKD). Plan: Patient has CKD, stage 3  creatinine improved to baseline 0.98.     Problem/Plan - 6:  ·  Problem: Elevated d-dimer.  Plan: -Patient has elevated D-dimers.  presented with SOB.  CTA -ve for Pulmonary Emboli.-  - LE doppler--No DVT. PATIENT SEEN:DATE OF SERVICE:07/15/2020    HPI:89 y female ambulates with walker, has HHA with PMHx of afib, HTN, HLD, arthritis was brought to ED due to altered mental status x1 day. per son pt is normally AAOX3 but today was found to be AAOX2. Pt was recently admitted to [facility] for pneumonia and was discharged yesterday on 2L oxygen at home. Patient was recommended rehab during the previous admission but son wanted to take the pt home. Per son, he as well as the HHA were unable to take care of the mother as she was not able to move out of bed. VN from [facility] saw pt this AM and advised to return to ED. Pt denies any fever, cough, chest pain, palpitations, N/V/D, abdominal pain or any other acute complaints.    In ED, /88, HR 89, SaO2 98% on 2L NC. (14 Jul 2020 19:53)    Interval History:     MEDICATIONS  (STANDING):  apixaban 2.5 milliGRAM(s) Oral two times a day  cholecalciferol 1000 Unit(s) Oral daily  furosemide    Tablet 20 milliGRAM(s) Oral <User Schedule>  metoprolol tartrate 12.5 milliGRAM(s) Oral two times a day  oxybutynin 5 milliGRAM(s) Oral three times a day    MEDICATIONS  (PRN):    PAST MEDICAL & SURGICAL HISTORY:  Arthritis  HLD (hyperlipidemia)  Chronic kidney disease (CKD)  HTN (hypertension)  H/O local excision of skin lesion    SOCIAL HISTORY:  Smoker:  YES / NO        PACK YEARS:                         WHEN QUIT?  ETOH use:  YES / NO               FREQUENCY / QUANTITY:  Ilicit Drug use:  YES / NO      REVIEW OF SYSTEMS:    CONSTITUTIONAL: No weakness, fevers or chills  RESPIRATORY: No cough, wheezing, hemoptysis; No shortness of breath  CARDIOVASCULAR: No chest pain or palpitations  GASTROINTESTINAL: No abdominal or epigastric pain. No nausea, vomiting, or hematemesis; No diarrhea or constipation. No melena or hematochezia.  NEUROLOGICAL: No numbness or weakness  All other review of systems is negative unless indicated above.    Vital Signs Last 24 Hrs  T(C): 36.8 (15 Jul 2020 08:39), Max: 37.7 (14 Jul 2020 13:22)  T(F): 98.3 (15 Jul 2020 08:39), Max: 99.8 (14 Jul 2020 13:22)  HR: 80 (15 Jul 2020 08:39) (62 - 89)  BP: 110/74 (15 Jul 2020 10:01) (110/74 - 149/83)  BP(mean): --  RR: 20 (15 Jul 2020 08:39) (18 - 20)  SpO2: 100% (15 Jul 2020 08:39) (97% - 100%)  General: A/ox 3, No acute Distress  Neck: Supple, NO JVD  Cardiac: S1 S2, No M/R/G  Pulmonary: CTAB, Breathing unlabored, No Rhonchi/Rales/Wheezing  Abdomen: Soft, Non -tender, +BS x 4 quads  Extremities: No Rashes, No edema  Neuro: A/o x 3, No focal deficits        Telemetry:  EKG:  CHADSVASC:                          8.7    5.98  )-----------( 212      ( 15 Jul 2020 07:21 )             28.5     07-15    143  |  104  |  31<H>  ----------------------------<  76  4.4   |  37<H>  |  0.98    Ca    8.9      15 Jul 2020 07:21  Phos  3.3     07-15  Mg     2.3     07-15    TPro  6.4  /  Alb  2.3<L>  /  TBili  1.0  /  DBili  x   /  AST  16  /  ALT  16  /  AlkPhos  42  07-15      Assessment/Plan    Problem/Plan - 1  ·  Problem: Congestive heart failure, Diastolic-with severe Aortic Stenosis   - CXR yielded marked improvement in lung and pleural finding with mild basilar effusions suggesting significant resolution of CHF   - has mild B/L leg edema.  - ECHO EF63% Severe Aortic stenosis   - patient with Divya during previous admission , resolved   - c/w Lasix 20mg daily, will be conserative with diuresis given severe aortic stenosis  - patient with contraction alkalosis, improved, no indicatio for IVF for now         Problem/Plan - 2:  ·  Problem: Elevated troponin I level.  Plan: mild trop elevation Patient denies any chest pain.  -EKG NSR.  - most likely demand ischemia   - troponin trended down        Problem/Plan - 3:  ·  Problem: HLD (hyperlipidemia).  Plan: Patient takes Lipitor 40 at home .   Lipid Profile (07.04.20 @ 05:53)    Total Cholesterol/HDL Ratio Measurement: 2 RATIO    Cholesterol, Serum: 110 mg/dL    Triglycerides, Serum: 85 mg/dL    HDL Cholesterol, Serum: 37  mg/dL         Problem/Plan - 4:  ·  Problem: Paroxysmal Atrial Fibrillation.  Plan: CHADS2-4 BP on softer side   -c/w  Metoprolol dose to 12.5 BID   -c/w Eliquis     Problem/Plan - 5:  Problem: Chronic kidney disease (CKD). Plan: Patient has CKD, stage 3  creatinine improved to baseline 0.98.     Problem/Plan - 6:  ·  Problem: Elevated d-dimer.  Plan: -Patient has elevated D-dimers.  presented with SOB.  CTA -ve for Pulmonary Emboli.-  - LE doppler--No DVT.

## 2020-07-15 NOTE — PROGRESS NOTE ADULT - PROBLEM SELECTOR PLAN 4
Echo: G2DD  c/w lasix 20 mg every 48 hours  CXR: Marked improvement in lung and pleural findings compared to previous CXR.  CTA negative  for Pulmonary Emboli.  - LE doppler--No DVT.   Cardiology Dr. Ramos following

## 2020-07-15 NOTE — PHYSICAL THERAPY INITIAL EVALUATION ADULT - MANUAL MUSCLE TESTING RESULTS, REHAB EVAL
BUE 3+/5, except shoulders 3+/5 (within limited range). B/l hips 2+/5, knee 3-/5. Ankles 3-/5 functionally

## 2020-07-15 NOTE — PHYSICAL THERAPY INITIAL EVALUATION ADULT - CRITERIA FOR SKILLED THERAPEUTIC INTERVENTIONS
therapy frequency/impairments found/predicted duration of therapy intervention/risk reduction/prevention/rehab potential/functional limitations in following categories/anticipated discharge recommendation

## 2020-07-15 NOTE — CONSULT NOTE ADULT - ATTENDING COMMENTS
Patient was seen and examined by me on 07/15/2020,interim events noted,labs and radiology studies reviewed.  Eleazar Gama MD,FACC.  [address] [place] [address].  [facility].  [phone]

## 2020-07-15 NOTE — PROGRESS NOTE ADULT - PROBLEM SELECTOR PLAN 3
Call placed to Dr Hernandez's office.  Spoke with Sasha, Troponin 0.097 likely demand ischemia  -EKG: NSR  -Troponin elevated to 0.09 on last admission, won't trend troponin further.

## 2020-07-15 NOTE — PHYSICAL THERAPY INITIAL EVALUATION ADULT - PASSIVE RANGE OF MOTION EXAMINATION, REHAB EVAL
except B/l shoulder ~80 deg flx/abd/bilateral lower extremity Passive ROM was WFL (within functional limits)/bilateral upper extremity Passive ROM was WFL (within functional limits)

## 2020-07-15 NOTE — PHYSICAL THERAPY INITIAL EVALUATION ADULT - GENERAL OBSERVATIONS, REHAB EVAL
Consult received, chart reviewed. Patient received supine in bed, NAD, +NC, +pearson Patient agreed to EVALUATION from Physical Therapist.

## 2020-07-15 NOTE — PROGRESS NOTE ADULT - PROBLEM SELECTOR PLAN 1
likely due to likely due to delirium 2/2 recent PNA vs acute neuro pathology  CT head likely due to delirium 2/2 recent PNA vs acute neuro pathology vs metabolic  CT head no acute pathology  CXR shows Marked improvement in lung and pleural findings with mild basilar effusions remaining suggesting significant resolution of CHF  UA negative  PT  CM for placement to  rehab per son request

## 2020-07-15 NOTE — PHYSICAL THERAPY INITIAL EVALUATION ADULT - PLANNED THERAPY INTERVENTIONS, PT EVAL
transfer training/strengthening/gait training/neuromuscular re-education/stretching/ROM/balance training/bed mobility training

## 2020-07-15 NOTE — PHYSICAL THERAPY INITIAL EVALUATION ADULT - ACTIVE RANGE OF MOTION EXAMINATION, REHAB EVAL
bilateral upper extremity Active ROM was WFL (within functional limits)/except B/l shoulder ~65 deg flx/abd, hip ~1/4 range, knee ~1/2 range./bilateral  lower extremity Active ROM was WFL (within functional limits)

## 2020-07-16 VITALS
DIASTOLIC BLOOD PRESSURE: 78 MMHG | TEMPERATURE: 98 F | OXYGEN SATURATION: 99 % | HEART RATE: 84 BPM | SYSTOLIC BLOOD PRESSURE: 113 MMHG | RESPIRATION RATE: 19 BRPM

## 2020-07-16 LAB
ANION GAP SERPL CALC-SCNC: 4 MMOL/L — LOW (ref 5–17)
BUN SERPL-MCNC: 37 MG/DL — HIGH (ref 7–18)
CALCIUM SERPL-MCNC: 8.7 MG/DL — SIGNIFICANT CHANGE UP (ref 8.4–10.5)
CHLORIDE SERPL-SCNC: 101 MMOL/L — SIGNIFICANT CHANGE UP (ref 96–108)
CO2 SERPL-SCNC: 37 MMOL/L — HIGH (ref 22–31)
CREAT SERPL-MCNC: 1.2 MG/DL — SIGNIFICANT CHANGE UP (ref 0.5–1.3)
GLUCOSE SERPL-MCNC: 83 MG/DL — SIGNIFICANT CHANGE UP (ref 70–99)
HCT VFR BLD CALC: 26.9 % — LOW (ref 34.5–45)
HGB BLD-MCNC: 8.2 G/DL — LOW (ref 11.5–15.5)
MAGNESIUM SERPL-MCNC: 2.2 MG/DL — SIGNIFICANT CHANGE UP (ref 1.6–2.6)
MCHC RBC-ENTMCNC: 30.5 GM/DL — LOW (ref 32–36)
MCHC RBC-ENTMCNC: 31.2 PG — SIGNIFICANT CHANGE UP (ref 27–34)
MCV RBC AUTO: 102.3 FL — HIGH (ref 80–100)
MRSA PCR RESULT.: SIGNIFICANT CHANGE UP
NRBC # BLD: 0 /100 WBCS — SIGNIFICANT CHANGE UP (ref 0–0)
PLATELET # BLD AUTO: 207 K/UL — SIGNIFICANT CHANGE UP (ref 150–400)
POTASSIUM SERPL-MCNC: 4.1 MMOL/L — SIGNIFICANT CHANGE UP (ref 3.5–5.3)
POTASSIUM SERPL-SCNC: 4.1 MMOL/L — SIGNIFICANT CHANGE UP (ref 3.5–5.3)
RBC # BLD: 2.63 M/UL — LOW (ref 3.8–5.2)
RBC # FLD: 12.4 % — SIGNIFICANT CHANGE UP (ref 10.3–14.5)
S AUREUS DNA NOSE QL NAA+PROBE: SIGNIFICANT CHANGE UP
SODIUM SERPL-SCNC: 142 MMOL/L — SIGNIFICANT CHANGE UP (ref 135–145)
WBC # BLD: 6.09 K/UL — SIGNIFICANT CHANGE UP (ref 3.8–10.5)
WBC # FLD AUTO: 6.09 K/UL — SIGNIFICANT CHANGE UP (ref 3.8–10.5)

## 2020-07-16 PROCEDURE — 85027 COMPLETE CBC AUTOMATED: CPT

## 2020-07-16 PROCEDURE — 83880 ASSAY OF NATRIURETIC PEPTIDE: CPT

## 2020-07-16 PROCEDURE — 87086 URINE CULTURE/COLONY COUNT: CPT

## 2020-07-16 PROCEDURE — 70450 CT HEAD/BRAIN W/O DYE: CPT

## 2020-07-16 PROCEDURE — 96374 THER/PROPH/DIAG INJ IV PUSH: CPT

## 2020-07-16 PROCEDURE — 87040 BLOOD CULTURE FOR BACTERIA: CPT

## 2020-07-16 PROCEDURE — 87641 MR-STAPH DNA AMP PROBE: CPT

## 2020-07-16 PROCEDURE — 84484 ASSAY OF TROPONIN QUANT: CPT

## 2020-07-16 PROCEDURE — 84443 ASSAY THYROID STIM HORMONE: CPT

## 2020-07-16 PROCEDURE — U0003: CPT

## 2020-07-16 PROCEDURE — 84100 ASSAY OF PHOSPHORUS: CPT

## 2020-07-16 PROCEDURE — 83605 ASSAY OF LACTIC ACID: CPT

## 2020-07-16 PROCEDURE — 99285 EMERGENCY DEPT VISIT HI MDM: CPT

## 2020-07-16 PROCEDURE — 83690 ASSAY OF LIPASE: CPT

## 2020-07-16 PROCEDURE — 86769 SARS-COV-2 COVID-19 ANTIBODY: CPT

## 2020-07-16 PROCEDURE — 82009 KETONE BODYS QUAL: CPT

## 2020-07-16 PROCEDURE — 80053 COMPREHEN METABOLIC PANEL: CPT

## 2020-07-16 PROCEDURE — 82746 ASSAY OF FOLIC ACID SERUM: CPT

## 2020-07-16 PROCEDURE — 71045 X-RAY EXAM CHEST 1 VIEW: CPT

## 2020-07-16 PROCEDURE — 97162 PT EVAL MOD COMPLEX 30 MIN: CPT

## 2020-07-16 PROCEDURE — 80048 BASIC METABOLIC PNL TOTAL CA: CPT

## 2020-07-16 PROCEDURE — 93005 ELECTROCARDIOGRAM TRACING: CPT

## 2020-07-16 PROCEDURE — 81001 URINALYSIS AUTO W/SCOPE: CPT

## 2020-07-16 PROCEDURE — 36415 COLL VENOUS BLD VENIPUNCTURE: CPT

## 2020-07-16 PROCEDURE — 82607 VITAMIN B-12: CPT

## 2020-07-16 PROCEDURE — 80061 LIPID PANEL: CPT

## 2020-07-16 PROCEDURE — 87640 STAPH A DNA AMP PROBE: CPT

## 2020-07-16 PROCEDURE — 83735 ASSAY OF MAGNESIUM: CPT

## 2020-07-16 RX ORDER — FUROSEMIDE 40 MG
1 TABLET ORAL
Qty: 0 | Refills: 0 | DISCHARGE
Start: 2020-07-16

## 2020-07-16 RX ORDER — TAMSULOSIN HYDROCHLORIDE 0.4 MG/1
1 CAPSULE ORAL
Qty: 0 | Refills: 0 | DISCHARGE
Start: 2020-07-16

## 2020-07-16 RX ORDER — CHOLECALCIFEROL (VITAMIN D3) 125 MCG
1000 CAPSULE ORAL
Qty: 0 | Refills: 0 | DISCHARGE
Start: 2020-07-16

## 2020-07-16 RX ORDER — COLLAGENASE CLOSTRIDIUM HIST. 250 UNIT/G
1 OINTMENT (GRAM) TOPICAL DAILY
Refills: 0 | Status: DISCONTINUED | OUTPATIENT
Start: 2020-07-16 | End: 2020-07-16

## 2020-07-16 RX ORDER — COLLAGENASE CLOSTRIDIUM HIST. 250 UNIT/G
1 OINTMENT (GRAM) TOPICAL
Qty: 0 | Refills: 0 | DISCHARGE
Start: 2020-07-16

## 2020-07-16 RX ADMIN — Medication 1 APPLICATION(S): at 16:12

## 2020-07-16 RX ADMIN — APIXABAN 2.5 MILLIGRAM(S): 2.5 TABLET, FILM COATED ORAL at 06:44

## 2020-07-16 RX ADMIN — Medication 1000 UNIT(S): at 11:50

## 2020-07-16 NOTE — DISCHARGE NOTE PROVIDER - NSDCMRMEDTOKEN_GEN_ALL_CORE_FT
cholecalciferol oral tablet: 1000 unit(s) orally once a day  collagenase 250 units/g topical ointment: 1 application topically once a day  Eliquis 2.5 mg oral tablet: 1 tab(s) orally 2 times a day MDD:2  furosemide 20 mg oral tablet: 1 tab(s) orally   lovastatin 20 mg oral tablet: 1 tab(s) orally once a day  metoprolol tartrate 25 mg oral tablet: 0.5 tab(s) orally 2 times a day   Myrbetriq 25 mg oral tablet, extended release: 1 tab(s) orally once a day  oxybutynin 5 mg oral tablet: 1 tab(s) orally 3 times a day  tamsulosin 0.4 mg oral capsule: 1 cap(s) orally once a day (at bedtime)

## 2020-07-16 NOTE — DISCHARGE NOTE NURSING/CASE MANAGEMENT/SOCIAL WORK - PATIENT PORTAL LINK FT
You can access the FollowMyHealth Patient Portal offered by [facility] by registering at the following website: [email or web address]. By joining [employer]’s FollowMyHealth portal, you will also be able to view your health information using other applications (apps) compatible with our system.

## 2020-07-16 NOTE — DISCHARGE NOTE PROVIDER - HOSPITAL COURSE
89 y female ambulates with walker, has HHA with PMHx of afib, HTN, HLD, arthritis was brought to back to [facility] after being treated for pneumonia and was 7/13 on 2L oxygen at home. Patient was recommended rehab during the previous admission but son wanted to take the pt home. Per son, he as well as the HHA were unable to take care of the mother as she was not able to move out of bed. [facility] and [facility] to return to ED.     All imaging blood and urine culture negative. Chronic lower extremity swelling doppler negative for DVT. PT recommending GRACE, and accepted to ______    Patient medically optimized for discharge to rehab    Discharge discussed with attending     This is only a brief summary of patients stay, for full hospital course please see EMR 89 y female ambulates with walker, has HHA with PMHx of afib, HTN, HLD, arthritis was brought to back to [facility] after being treated for pneumonia and was 7/13 on 2L oxygen at home. Patient was recommended rehab during the previous admission but son wanted to take the pt home. Per son, he as well as the HHA were unable to take care of the mother as she was not able to move out of bed. VN from [facility] saw pt this AM and advised to return to ED.     All imaging blood and urine culture negative. Chronic lower extremity swelling doppler negative for DVT. PT recommending GRACE, and [facility]    Patient medically optimized for discharge to rehab    Discharge discussed with attending     This is only a brief summary of patients stay, for full hospital course please see EMR 89 y female ambulates with walker, has HHA with PMHx of afib, HTN, HLD, arthritis was brought to back to [facility] after being treated for pneumonia and was 7/13 on 2L oxygen at home. Patient was recommended rehab during the previous admission but son wanted to take the pt home. Per son, he as well as the HHA were unable to take care of the mother as she was not able to move out of bed. VN from [facility] saw pt  and advised to return to ED.     All imaging blood and urine culture negative. Chronic lower extremity swelling doppler negative for DVT. PT recommending [facility]ACE, and accepted to [facility]    Patient medically optimized for discharge to rehab    Discharge discussed with attending     This is only a brief summary of patients stay, for full hospital course please see EMR

## 2020-07-16 NOTE — PROGRESS NOTE ADULT - ATTENDING COMMENTS
Patient was seen and examined by me on 07/16/2020,interim events noted,labs and radiology studies reviewed.  Eleazar Gama MD,FACC.  [address] [place] [address].  [facility].  [phone]

## 2020-07-16 NOTE — PROGRESS NOTE ADULT - ASSESSMENT
Problem/Plan - 1  ·  Problem: Congestive heart failure, Diastolic-with severe Aortic Stenosis   - CXR yielded marked improvement in lung and pleural finding with mild basilar effusions suggesting significant resolution of CHF   - has mild B/L leg edema.  - ECHO EF63% Severe Aortic stenosis   - patient with Divya during previous admission , resolved   - c/w Lasix 20mg daily, will be conservative with diuresis given severe aortic stenosis  - patient with contraction alkalosis, improved, no indicatio for IVF for now         Problem/Plan - 2:  ·  Problem: Elevated troponin I level.  Plan: mild trop elevation Patient denies any chest pain.  -EKG NSR.  - most likely demand ischemia   - troponin trended down        Problem/Plan - 3:  ·  Problem: HLD (hyperlipidemia).  Plan: Patient takes Lipitor 40 at home .   Lipid Profile (07.04.20 @ 05:53)    Total Cholesterol/HDL Ratio Measurement: 2 RATIO    Cholesterol, Serum: 110 mg/dL    Triglycerides, Serum: 85 mg/dL    HDL Cholesterol, Serum: 37  mg/dL         Problem/Plan - 4:  ·  Problem: Paroxysmal Atrial Fibrillation.  Plan: CHADS2-4 BP on softer side   -c/w  Metoprolol dose to 12.5 BID   -c/w Eliquis     Problem/Plan - 5:  Problem: Chronic kidney disease (CKD). Plan: Patient has CKD, stage 3  creatinine improved to baseline 0.98.     Problem/Plan - 6:  ·  Problem: Elevated d-dimer.  Plan: -Patient has elevated D-dimers.  presented with SOB.  CTA -ve for Pulmonary Emboli.-  - LE doppler--No DVT.

## 2020-07-16 NOTE — PROGRESS NOTE ADULT - SUBJECTIVE AND OBJECTIVE BOX
SUBJ:  No interval events, patient denies SOB , chest pain or any other acute sx    MEDICATIONS  (STANDING):  apixaban 2.5 milliGRAM(s) Oral two times a day  cholecalciferol 1000 Unit(s) Oral daily  furosemide    Tablet 20 milliGRAM(s) Oral <User Schedule>  metoprolol tartrate 12.5 milliGRAM(s) Oral two times a day  tamsulosin 0.4 milliGRAM(s) Oral at bedtime    MEDICATIONS  (PRN):            Vital Signs Last 24 Hrs  T(C): 36.6 (16 Jul 2020 05:10), Max: 36.6 (16 Jul 2020 05:10)  T(F): 97.9 (16 Jul 2020 05:10), Max: 97.9 (16 Jul 2020 05:10)  HR: 54 (16 Jul 2020 05:10) (54 - 68)  BP: 105/47 (16 Jul 2020 05:10) (103/51 - 140/45)  BP(mean): --  RR: 17 (16 Jul 2020 05:10) (16 - 17)  SpO2: 100% (16 Jul 2020 05:10) (100% - 100%)    REVIEW OF SYSTEMS:  CONSTITUTIONAL: No fever, weight loss, or fatigue  EYES: No eye pain, visual disturbances, or discharge  ENMT:  No difficulty hearing, tinnitus, vertigo; No sinus or throat pain  NECK: No pain or stiffness  RESPIRATORY: No cough, wheezing, chills or hemoptysis; No shortness of breath  CARDIOVASCULAR: No chest pain, palpitations, dizziness, or leg swelling  GASTROINTESTINAL: No abdominal or epigastric pain. No nausea, vomiting, or hematemesis; No diarrhea or constipation. No melena or hematochezia.  GENITOURINARY: No dysuria, frequency, hematuria, or incontinence  NEUROLOGICAL: No headaches, memory loss, loss of strength, numbness, or tremors  SKIN: No itching, burning, rashes, or lesions   LYMPH NODES: No enlarged glands  ENDOCRINE: No heat or cold intolerance; No hair loss  MUSCULOSKELETAL: No joint pain or swelling; No muscle, back, or extremity pain  PSYCHIATRIC: No depression, anxiety, mood swings, or difficulty sleeping  HEME/LYMPH: No easy bruising, or bleeding gums  ALLERY AND IMMUNOLOGIC: No hives or eczema    PHYSICAL EXAM:  · CONSTITUTIONAL:	Well-developed, well nourished    BMI-  · EYES:	EOMI; PERRL; no drainage or redness  · ENMT	No oral lesions; no gross abnormalities  · NECK:	No bruits; no thyromegaly or nodules  ·BACK:	No deformity or limitation of movement  ·RESPIRATORY:   airway patent; breath sounds equal; good air movement; respirations non-labored; clear to auscultation bilaterally; no chest wall tenderness; no intercostal retractions; no rales,rhonchi or wheeze  · CARDIOVASCULAR	regular rate and rhythm  no rub  no murmur  normal PMI  . GASTROINTESTINAL:  no distention; no masses palpable; bowel sounds normal; no rebound tenderness; no guarding; no rigidity; no organomegaly  · EXTREMITIES: No cyanosis, clubbing or edema  · VASCULAR: 	Equal and normal pulses (carotid, femoral, dorsalis pedis)  ·NEUROLOGICAL:   alert and oriented x 3; sensation intact; deep reflexes intact; cranial nerves intact; no spontaneous movement; superficial reflexes intact; normal strength  · SKIN:	No lesions; no rash  . LYMPH NODES:	No lymphadedenopathy  · MUSCULOSKELETAL:   No calf tenderness  no joint swelling	  TELEMETRY:    ECG:    TTE:    LABS:                        8.2    6.09  )-----------( 207      ( 16 Jul 2020 07:05 )             26.9     07-16    142  |  101  |  37<H>  ----------------------------<  83  4.1   |  37<H>  |  1.20    Ca    8.7      16 Jul 2020 07:05  Phos  3.3     07-15  Mg     2.2     07-16    TPro  6.4  /  Alb  2.3<L>  /  TBili  1.0  /  DBili  x   /  AST  16  /  ALT  16  /  AlkPhos  42  07-15    CARDIAC MARKERS ( 15 Jul 2020 07:21 )  0.084 ng/mL / x     / x     / x     / x      CARDIAC MARKERS ( 14 Jul 2020 22:16 )  0.088 ng/mL / x     / x     / x     / x      CARDIAC MARKERS ( 14 Jul 2020 15:46 )  0.097 ng/mL / x     / x     / x     / x            Creatinine Trend: 1.20<--, 0.98<--, 1.33<--, 1.38<--, 1.61<--, 1.28<--  I&O's Summary    15 Jul 2020 07:01  -  16 Jul 2020 07:00  --------------------------------------------------------  IN: 0 mL / OUT: 385 mL / NET: -385 mL      BNP  RADIOLOGY & ADDITIONAL STUDIES:    IMPRESSION AND PLAN: DATE OF SERVICE:07/16/2020      SUBJ:  No interval events, patient denies SOB , chest pain or any other acute sx    MEDICATIONS  (STANDING):  apixaban 2.5 milliGRAM(s) Oral two times a day  cholecalciferol 1000 Unit(s) Oral daily  furosemide    Tablet 20 milliGRAM(s) Oral <User Schedule>  metoprolol tartrate 12.5 milliGRAM(s) Oral two times a day  tamsulosin 0.4 milliGRAM(s) Oral at bedtime    MEDICATIONS  (PRN):            Vital Signs Last 24 Hrs  T(C): 36.6 (16 Jul 2020 05:10), Max: 36.6 (16 Jul 2020 05:10)  T(F): 97.9 (16 Jul 2020 05:10), Max: 97.9 (16 Jul 2020 05:10)  HR: 54 (16 Jul 2020 05:10) (54 - 68)  BP: 105/47 (16 Jul 2020 05:10) (103/51 - 140/45)  BP(mean): --  RR: 17 (16 Jul 2020 05:10) (16 - 17)  SpO2: 100% (16 Jul 2020 05:10) (100% - 100%)    REVIEW OF SYSTEMS:  CONSTITUTIONAL: No fever, weight loss, or fatigue  EYES: No eye pain, visual disturbances, or discharge  ENMT:  No difficulty hearing, tinnitus, vertigo; No sinus or throat pain  NECK: No pain or stiffness  RESPIRATORY: No cough, wheezing, chills or hemoptysis; No shortness of breath  CARDIOVASCULAR: No chest pain, palpitations, dizziness, or leg swelling  GASTROINTESTINAL: No abdominal or epigastric pain. No nausea, vomiting, or hematemesis; No diarrhea or constipation. No melena or hematochezia.  GENITOURINARY: No dysuria, frequency, hematuria, or incontinence  NEUROLOGICAL: No headaches, memory loss, loss of strength, numbness, or tremors  SKIN: No itching, burning, rashes, or lesions   LYMPH NODES: No enlarged glands  ENDOCRINE: No heat or cold intolerance; No hair loss  MUSCULOSKELETAL: No joint pain or swelling; No muscle, back, or extremity pain  PSYCHIATRIC: No depression, anxiety, mood swings, or difficulty sleeping  HEME/LYMPH: No easy bruising, or bleeding gums  ALLERY AND IMMUNOLOGIC: No hives or eczema    PHYSICAL EXAM:  · CONSTITUTIONAL:	Well-developed, well nourished    BMI-  · EYES:	EOMI; PERRL; no drainage or redness  · ENMT	No oral lesions; no gross abnormalities  · NECK:	No bruits; no thyromegaly or nodules  ·BACK:	No deformity or limitation of movement  ·RESPIRATORY:   airway patent; breath sounds equal; good air movement; respirations non-labored; clear to auscultation bilaterally; no chest wall tenderness; no intercostal retractions; no rales,rhonchi or wheeze  · CARDIOVASCULAR	regular rate and rhythm  no rub  no murmur  normal PMI  . GASTROINTESTINAL:  no distention; no masses palpable; bowel sounds normal; no rebound tenderness; no guarding; no rigidity; no organomegaly  · EXTREMITIES: No cyanosis, clubbing or edema  · VASCULAR: 	Equal and normal pulses (carotid, femoral, dorsalis pedis)  ·NEUROLOGICAL:   alert and oriented x 3; sensation intact; deep reflexes intact; cranial nerves intact; no spontaneous movement; superficial reflexes intact; normal strength  · SKIN:	No lesions; no rash  . LYMPH NODES:	No lymphadedenopathy  · MUSCULOSKELETAL:   No calf tenderness  no joint swelling	  TELEMETRY:    ECG:    TTE:    LABS:                        8.2    6.09  )-----------( 207      ( 16 Jul 2020 07:05 )             26.9     07-16    142  |  101  |  37<H>  ----------------------------<  83  4.1   |  37<H>  |  1.20    Ca    8.7      16 Jul 2020 07:05  Phos  3.3     07-15  Mg     2.2     07-16    TPro  6.4  /  Alb  2.3<L>  /  TBili  1.0  /  DBili  x   /  AST  16  /  ALT  16  /  AlkPhos  42  07-15    CARDIAC MARKERS ( 15 Jul 2020 07:21 )  0.084 ng/mL / x     / x     / x     / x      CARDIAC MARKERS ( 14 Jul 2020 22:16 )  0.088 ng/mL / x     / x     / x     / x      CARDIAC MARKERS ( 14 Jul 2020 15:46 )  0.097 ng/mL / x     / x     / x     / x            Creatinine Trend: 1.20<--, 0.98<--, 1.33<--, 1.38<--, 1.61<--, 1.28<--  I&O's Summary    15 Jul 2020 07:01  -  16 Jul 2020 07:00  --------------------------------------------------------  IN: 0 mL / OUT: 385 mL / NET: -385 mL      BNP  RADIOLOGY & ADDITIONAL STUDIES:    IMPRESSION AND PLAN:

## 2020-07-16 NOTE — ADVANCED PRACTICE NURSE CONSULT - ASSESSMENT
This is a 89yr old female patient admitted for Encephalopathy, presenting with the following:  -There is evidence of  blanchable erythema to the Bilateral Gluteus and Coccyx areas  -There is an Unstageable Pressure Injury to the R. Heel (2cm x 2.3cm x 0.2cm) with slough (85%), pink tissue, and drainage

## 2020-07-16 NOTE — DISCHARGE NOTE PROVIDER - CARE PROVIDER_API CALL
Aníbal Lloyd  Internal Medicine  [address] [place] [address]  Phone: [phone]  Fax: [phone]  Follow Up Time:

## 2020-07-16 NOTE — PROGRESS NOTE ADULT - SUBJECTIVE AND OBJECTIVE BOX
NP Note discussed with  primary attending    Patient is a 89y old  Female who presents with a chief complaint of Altered mental status (2020 11:52)    HPI  89 y female ambulates with walker, has HHA with PMHx of afib, HTN, HLD, arthritis was brought to ED due to altered mental status x1 day. per son pt is normally AAOX3 but today was found to be AAOX2. Pt was recently admitted to [facility] for pneumonia and was discharged yesterday on 2L oxygen at home. Patient was recommended rehab during the previous admission but son wanted to take the pt home. Per son, he as well as the HHA were unable to take care of the mother as she was not able to move out of bed. VN from [facility] who saw pt and advised to return to ED.     INTERVAL HPI/OVERNIGHT EVENTS: Pt seen and examined this morning. Pt offers no complaints     MEDICATIONS  (STANDING):  apixaban 2.5 milliGRAM(s) Oral two times a day  cholecalciferol 1000 Unit(s) Oral daily  collagenase Ointment 1 Application(s) Topical daily  furosemide    Tablet 20 milliGRAM(s) Oral <User Schedule>  metoprolol tartrate 12.5 milliGRAM(s) Oral two times a day  tamsulosin 0.4 milliGRAM(s) Oral at bedtime    MEDICATIONS  (PRN):      __________________________________________________  REVIEW OF SYSTEMS:    CONSTITUTIONAL: No fever,   EYES: no acute visual disturbances  NECK: No pain or stiffness  RESPIRATORY: No cough; No shortness of breath  CARDIOVASCULAR: No chest pain, no palpitations  GASTROINTESTINAL: No pain. No nausea or vomiting; No diarrhea   NEUROLOGICAL: No headache or numbness, no tremors  MUSCULOSKELETAL: No joint pain, no muscle pain  GENITOURINARY: no dysuria, no frequency, no hesitancy  PSYCHIATRY: no depression , no anxiety  ALL OTHER  ROS negative        Vital Signs Last 24 Hrs  T(C): 36.6 (2020 05:10), Max: 36.6 (2020 05:10)  T(F): 97.9 (2020 05:10), Max: 97.9 (2020 05:10)  HR: 54 (2020 05:10) (54 - 68)  BP: 105/47 (2020 05:10) (103/51 - 124/62)  BP(mean): --  RR: 17 (2020 05:10) (16 - 17)  SpO2: 100% (2020 05:10) (100% - 100%)    ________________________________________________  PHYSICAL EXAM:  GENERAL: NAD  HEENT: Normocephalic;  conjunctivae and sclerae clear; moist mucous membranes;   NECK : supple  CHEST/LUNG: Clear to ausculitation bilaterally with good air entry   HEART: S1 S2  regular; no murmurs, gallops or rubs  ABDOMEN: Soft, Nontender, Nondistended; Bowel sounds present  EXTREMITIES: no cyanosis; no edema; no calf tenderness  SKIN: warm and dry; no rash  NERVOUS SYSTEM:  Awake and alert; Oriented  to place, person and time ; no new deficits    _________________________________________________  LABS:                        8.2    6.09  )-----------( 207      ( 2020 07:05 )             26.9     07-16    142  |  101  |  37<H>  ----------------------------<  83  4.1   |  37<H>  |  1.20    Ca    8.7      2020 07:05  Phos  3.3     07-15  Mg     2.2     07-16    TPro  6.4  /  Alb  2.3<L>  /  TBili  1.0  /  DBili  x   /  AST  16  /  ALT  16  /  AlkPhos  42  07-15      Urinalysis Basic - ( 2020 17:20 )    Color: Yellow / Appearance: Clear / S.015 / pH: x  Gluc: x / Ketone: Negative  / Bili: Negative / Urobili: Negative   Blood: x / Protein: 100 / Nitrite: Negative   Leuk Esterase: Negative / RBC: 0-2 /HPF / WBC 0-2 /HPF   Sq Epi: x / Non Sq Epi: Few /HPF / Bacteria: Few /HPF      CAPILLARY BLOOD GLUCOSE            RADIOLOGY & ADDITIONAL TESTS:    Imaging Personally Reviewed:  YES/NO    Consultant(s) Notes Reviewed:   YES/ No    Care Discussed with Consultants :     Plan of care was discussed with patient and /or primary care giver; all questions and concerns were addressed and care was aligned with patient's wishes.

## 2020-07-16 NOTE — ADVANCED PRACTICE NURSE CONSULT - RECOMMEDATIONS
-Clean the R. Heel wound with normal saline and apply skin prep to the surrounding skin  -Apply Collagenase ointment to the slough areas of the wound bed, apply saline moistened gauze to the wound bed, and cover with a Foam dressing Daily PRN  -Apply a Foam dressing to the Coccyx Q 72hrs PRN  -Elevate/float the patients heels using heel protectors and reposition the patient Q 2hrs using wedges or pillows

## 2020-07-16 NOTE — DISCHARGE NOTE PROVIDER - NSDCCPCAREPLAN_GEN_ALL_CORE_FT
PRINCIPAL DISCHARGE DIAGNOSIS  Diagnosis: Acute encephalopathy  Assessment and Plan of Treatment: You were brought in with a change in your mental status. This has now resolved. All imaging negative and blood/urine cultures found no infections.      SECONDARY DISCHARGE DIAGNOSES  Diagnosis: Pressure ulcer  Assessment and Plan of Treatment: Wound care recommendations as follows   There is evidence of  blanchable erythema to the Bilateral Gluteus and Coccyx areas  -There is an Unstageable Pressure Injury to the R. Heel (2cm x 2.3cm x 0.2cm) with slough (85%), pink tissue, and drainage	  · Recommendations	  -Clean the R. Heel wound with normal saline and apply skin prep to the surrounding skin  -Apply Collagenase ointment to the slough areas of the wound bed, apply saline moistened gauze to the wound bed, and cover with a Foam dressing Daily PRN  -Apply a Foam dressing to the Coccyx Q 72hrs PRN  -Elevate/float the patients heels using heel protectors and reposition the patient Q 2hrs using wedges or pillows      Diagnosis: Weakness  Assessment and Plan of Treatment: This appears chronic you will require extra help and physical therapy at a short term rehab facility PRINCIPAL DISCHARGE DIAGNOSIS  Diagnosis: Acute encephalopathy  Assessment and Plan of Treatment: You were brought in with a change in your mental status. This has now resolved. All imaging negative and blood/urine cultures found no infections.      SECONDARY DISCHARGE DIAGNOSES  Diagnosis: At risk for urinary tract infection associated with indwelling catheter  Assessment and Plan of Treatment: You have an indwelling pearson catheter placed in the hospital. Trial of void in 3 days    Diagnosis: Pressure ulcer  Assessment and Plan of Treatment: Wound care recommendations as follows   There is evidence of  blanchable erythema to the Bilateral Gluteus and Coccyx areas  -There is an Unstageable Pressure Injury to the R. Heel (2cm x 2.3cm x 0.2cm) with slough (85%), pink tissue, and drainage	  · Recommendations	  -Clean the R. Heel wound with normal saline and apply skin prep to the surrounding skin  -Apply Collagenase ointment to the slough areas of the wound bed, apply saline moistened gauze to the wound bed, and cover with a Foam dressing Daily PRN  -Apply a Foam dressing to the Coccyx Q 72hrs PRN  -Elevate/float the patients heels using heel protectors and reposition the patient Q 2hrs using wedges or pillows      Diagnosis: Weakness  Assessment and Plan of Treatment: This appears chronic you will require extra help and physical therapy at a short term rehab facility

## 2020-07-19 LAB
CULTURE RESULTS: SIGNIFICANT CHANGE UP
SPECIMEN SOURCE: SIGNIFICANT CHANGE UP

## 2020-07-20 LAB
CULTURE RESULTS: SIGNIFICANT CHANGE UP
SPECIMEN SOURCE: SIGNIFICANT CHANGE UP

## 2020-11-16 NOTE — PATIENT PROFILE ADULT - NSPROPASSIVESMOKEEXPOSURE_GEN_A_NUR
I have reviewed the provider's instructions with the patient, answering all questions to her satisfaction.
No

## 2021-01-01 NOTE — ED ADULT NURSE NOTE - BREATH SOUNDS, MLM
(2) spontaneous and intermittent (24 hrs old)
(1) a few with stimulation
Clear
(2) spontaneous and intermittent (24 hrs old)

## 2021-05-25 NOTE — PROGRESS NOTE ADULT - PROBLEM/PLAN-6
ALBUTEROL IN    Inhale into the lungs as needed    FLUTICASONE-UMECLIDIN-VILANT (TRELEGY ELLIPTA) 200-62.5-25 MCG/INH AEPB    Inhale into the lungs daily    LEVOCETIRIZINE (XYZAL) 5 MG TABLET    Take 5 mg by mouth nightly    MONTELUKAST (SINGULAIR) 10 MG TABLET    Take 10 mg by mouth nightly    SERTRALINE (ZOLOFT) 50 MG TABLET    Take 50 mg by mouth nightly     ALLERGIES     has No Known Allergies. FAMILY HISTORY     has no family status information on file. SOCIAL HISTORY       Social History     Tobacco Use    Smoking status: Never Smoker    Smokeless tobacco: Never Used   Vaping Use    Vaping Use: Never used   Substance Use Topics    Alcohol use: Never    Drug use: Never       I personally evaluated and examined the patient in conjunction with the APC and agree with the assessment, treatment plan, and disposition of the patient as recorded by the APC.    Isaiah Dakins, MD  Attending Emergency Physician         Isaiah Dakins, MD  05/25/21 [id number]
DISPLAY PLAN FREE TEXT

## 2021-09-09 NOTE — PHYSICAL THERAPY INITIAL EVALUATION ADULT - PATIENT/FAMILY/SIGNIFICANT OTHER GOALS STATEMENT, PT EVAL
CC:  Angelique M Alanaethan is here today for left knee follow up.    PCP Glory Sparrow MD  Medications: medications verified and updated  Refills needed today?  NO  reports known Latex allergy or symptoms of Latex sensitivity.  Patient would like communication of their results via:      Cell Phone:   Telephone Information:   Mobile [phone]     Okay to leave a message containing results? Yes  Tobacco history: verified                 Patient states wants to get better and go home.

## 2022-02-02 NOTE — DIETITIAN INITIAL EVALUATION ADULT. - +GENDER
Rx Refill Note  Requested Prescriptions     Pending Prescriptions Disp Refills   • amitriptyline (ELAVIL) 10 MG tablet 180 tablet 0     Sig: Take 1-2 tablets by mouth every night at bedtime.      Last office visit with prescribing clinician: 11/12/2021      Next office visit with prescribing clinician: Visit date not found   3}  Preeti Clark MA  02/02/22, 14:10 EST     Last fill: 10/26/2021  
Statement Selected
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
## 2022-04-05 NOTE — ED ADULT TRIAGE NOTE - ACCOMPANIED BY
Self BMI: BMI (kg/m2): 23.3 (04-04-22 @ 13:15)  HbA1c: A1C with Estimated Average Glucose Result: 5.5 % (06-21-21 @ 09:21)    Glucose: POCT Blood Glucose.: 99 mg/dL (06-25-21 @ 07:15)    BP: 127/88 (04-04-22 @ 17:27) (127/88 - 160/100)  Lipid Panel: Date/Time: 06-21-21 @ 09:24  Cholesterol, Serum: 248  Direct LDL: --  HDL Cholesterol, Serum: 74  Total Cholesterol/HDL Ration Measurement: --  Triglycerides, Serum: 89

## 2022-08-15 NOTE — PHYSICAL THERAPY INITIAL EVALUATION ADULT - LEVEL OF INDEPENDENCE: GAIT, REHAB EVAL
Planning to discharge home on POD 1 in the morning with his wife helping him.       08/15/22 1110   Discharge Planning   Patient/Family Anticipates Transition to home with family  (outpatient PT arranged at [facility])   Concerns to be Addressed all concerns addressed in this encounter   Living Arrangements   People in Home spouse   Type of Residence Private Residence   Is your private residence a single family home or apartment? Single family home   Number of Stairs, Within Home, Primary greater than 10 stairs   Stair Railings, Within Home, Primary railings safe and in good condition   Once home, are you able to live on one level? No   Which rooms are not on the main level? Bedroom   Bathroom Shower/Tub Walk-in shower   Equipment Currently Used at Home grab bar, tub/shower;shower chair  (has a walker and cane at home)   Support System   Support Systems Spouse/Significant Other  (wife, Valarie)   Do you have someone available to stay with you one or two nights once you are home? Yes   Education   Patient attended total joint pre-op class/received pre-op teaching  email/phone call        supervision

## 2022-11-19 NOTE — ED PROVIDER NOTE - CROS ED GI ALL NEG
[address] ENCOUNTER        Pt Name: Calin Garcia  MRN: [id number]  [place] 2003  Date of evaluation: 11/18/2022  Provider: MARK Crews CNP  PCP: No primary care provider on file. I have discussed the care of this patient with my supervising physician Dr. Cj An including evaluation and plan of care and he is in agreement. Triage CHIEF COMPLAINT       Chief Complaint   Patient presents with    Dizziness         HISTORY OF PRESENT ILLNESS      Chief Complaint: dizziness and chest pain    Calin Garcia is a 23 y.o. female who presents for evaluation of intermittent vertiginous dizziness for the last several days. This is worse when she moves around. She also reports feeling intermittent chest pain for the last several days. This is worse with anxiety. She admits her anxiety has been out of control. She has an appt with psychiatry on 11/30. No shortness of breath. She denies any neurologic symptoms. Denies URI symptoms. No prior history of DVT or PE, recent travel, or birth control use. Nursing Notes were all reviewed and agreed with or any disagreements were addressed in the HPI. REVIEW OF SYSTEMS     Constitutional:   Denies fever, chills, weight loss or weakness   HENT:  Denies sore throat or ear pain   Cardiovascular:   Denies palpitations  Respiratory:  Denies cough or shortness of breath    GI:   Denies abdominal pain, nausea, vomiting, or diarrhea  Musculoskeletal:   Denies neck, back, or extremity pain  Skin:   Denies rash  Neurologic:   Denies headache, focal weakness or sensory changes.   + dizziness    PAST MEDICAL HISTORY     Past Medical History:   Diagnosis Date    ADD (attention deficit disorder)     Anemia     Anxiety     [facility]     Depression     Dysmenorrhea     Exposure to STD     Infertility counseling     Insomnia     Menorrhagia Obesity     OCD (obsessive compulsive disorder)     Panic attack        SURGICAL HISTORY     Past Surgical History:   Procedure Laterality Date    EYE SURGERY      2005 tear duct    TEAR DUCT SURGERY         CURRENTMEDICATIONS       Discharge Medication List as of 11/18/2022 11:05 PM        CONTINUE these medications which have NOT CHANGED    Details   valACYclovir (VALTREX) 500 MG tablet Take 1 tablet by mouth 2 times daily for 3 days, Disp-6 tablet, R-2Normal      doxycycline hyclate (VIBRA-TABS) 100 MG tablet Take 1 tablet by mouth 2 times daily for 7 days, Disp-14 tablet, R-0Normal      metroNIDAZOLE (FLAGYL) 500 MG tablet Take 1 tablet by mouth 2 times daily for 7 days, Disp-14 tablet, R-0Normal      naproxen (NAPROSYN) 500 MG tablet Take 1 tablet by mouth 2 times daily as needed for Pain, Disp-20 tablet, R-0Normal      dicyclomine (BENTYL) 10 MG capsule Take 1 capsule by mouth 3 times daily As needed for abdominal pain, Disp-15 capsule, R-3Print      !! ondansetron (ZOFRAN ODT) 4 MG disintegrating tablet Take 1 tablet by mouth every 8 hours as needed for Nausea, Disp-15 tablet, R-0Print      acetaminophen (TYLENOL) 325 MG tablet Take 2 tablets by mouth every 6 hours as needed for Pain, Disp-120 tablet, R-3Print      !! ondansetron (ZOFRAN ODT) 4 MG disintegrating tablet Take 1 tablet by mouth every 8 hours as needed for Nausea or Vomiting, Disp-15 tablet, R-0Normal      norelgestromin-ethinyl estradiol (XULANE) 150-35 MCG/24HR Place 1 patch onto the skin once a week For 3 weeks, and then no patch the fourth week, Disp-3 patch, R-11Normal      hydrOXYzine (VISTARIL) 25 MG capsule Take 2 capsules by mouth 2 times daily, Disp-60 capsule, R-5Normal      Blood Pressure Monitoring (SPHYGMOMANOMETER) MISC Disp-1 each, R-0, NormalTake BP daily and record      ibuprofen (ADVIL;MOTRIN) 800 MG tablet Take 1 tablet by mouth every 8 hours, Disp-120 tablet, R-0Print       !! - Potential duplicate medications found.  Please discuss with provider. ALLERGIES     Amoxicillin    FAMILYHISTORY       Family History   Problem Relation Age of Onset    Hypertension Paternal Grandfather     Hypertension Maternal Grandmother     Diabetes Maternal Grandmother     Hypothyroidism Maternal Grandmother     Hypertension Other     Cancer Mother         cervical cancer    Cancer Sister         cervical cancer        SOCIAL HISTORY       Social History     Socioeconomic History    Marital status: Single     Spouse name: None    Number of children: None    Years of education: None    Highest education level: None   Tobacco Use    Smoking status: Never    Smokeless tobacco: Never   Vaping Use    Vaping Use: Never used   Substance and Sexual Activity    Alcohol use: Yes     Comment: \"when i start to feel depressed\"    Drug use: Yes     Types: Marijuana (Weed)     Comment: occasionally    Sexual activity: Yes     Partners: Male     Social Determinants of Health     Financial Resource Strain: Low Risk     Difficulty of Paying Living Expenses: Not hard at all   Food Insecurity: No Food Insecurity    Worried About Running Out of Food in the Last Year: Never true    Ran Out of Food in the Last Year: Never true       SCREENINGS           PHYSICAL EXAM       ED Triage Vitals [11/18/22 2038]   BP Temp Temp Source Heart Rate Resp SpO2 Height Weight   (!) 109/93 98.2 °F (36.8 °C) Oral 76 16 99 % 5' 3\" (1.6 m) 172 lb (78 kg)      Constitutional:  Well developed, Well nourished. No distress. Patient is repeatedly texting on phone throughout examination and requires repeat questions at times. HENT:  Normocephalic, Atraumatic, PERRL. EOMI. no nystagmus. Negative test of skew. Sclera clear. Conjunctiva normal, No discharge. Moist mucus membranes. Neck/Lymphatics: supple, no carotid bruits, no swollen nodes  Cardiovascular:   RRR,  no murmurs/rubs/gallops. Distal cap refill and pulses intact bilateral upper and lower extremities. no peripheral edema. Respiratory:   Nonlabored breathing. Normal breath sounds, No wheezing  Abdomen: Bowel sounds normal, Soft, No tenderness, no masses. Musculoskeletal:  There is no edema, asymmetry, or calf / thigh tenderness bilaterally. No cyanosis. Bilateral upper and lower extremity ROM intact without pain or obvious deficit  Integument:   Warm, Dry, No rashes. Neurologic:  Alert & oriented , No focal deficits noted. Cranial nerves II through XII grossly intact. Normal gross motor coordination & motor strength bilateral upper and lower extremities  Sensation intact. Gait is normal.  No ataxia. Psychiatric:  Affect normal, Mood normal.     DIAGNOSTIC RESULTS   LABS:    Labs Reviewed - No data to display    When ordered, only abnormal lab results are displayed. All other labs were within normal range or not returned as of this dictation. EKG: When ordered, EKG's are interpreted by the Emergency Department Physician in the absence of a cardiologist.  Please see their note for interpretation of EKG. RADIOLOGY:   Non-plain film images such as CT, Ultrasound and MRI are read by the radiologist. Plain radiographic images are visualized and preliminarily interpreted by the  ED Provider with the below findings:    Interpretation perthe Radiologist below, if available at the time of this note:    No orders to display     No results found. PROCEDURES   Unless otherwise noted below, none         CRITICAL CARE   CRITICAL CARE NOTE:  N/A    CONSULTS:  None      EMERGENCY DEPARTMENT COURSE and MDM:   Vitals:    Vitals:    11/18/22 2038   BP: (!) 109/93   Pulse: 76   Resp: 16   Temp: 98.2 °F (36.8 °C)   TempSrc: Oral   SpO2: 99%   Weight: 172 lb (78 kg)   Height: 5' 3\" (1.6 m)       Patient was given thefollowing medications:  Medications - No data to display      Is this patient to be included in the SEP-1 Core Measure due to severe sepsis or septic shock?    No   Exclusion criteria - the patient is NOT to be included for SEP-1 Core Measure due to: Infection is not suspected    MDM:  Patient presents as above. Emergent etiologies considered. Patient seen and examined. Work-up initiated secondary to presentation, physical exam findings, vital signs and medical chart review. In brief, patient presents for evaluation of intermittent dizziness and chest pain. Not having chest pain or dizziness during time of examination. She describes the dizziness as more vertiginous that is worse with motion but both symptoms are also precipitated by anxiety. She is not having any shortness of breath. Well score 0. PERC negative. No cardiovascular history. An ECG was performed and showed normal sinus rhythm without evidence of ACS. Please see attending note for complete interpretation. Recommended that we do a chest x-ray for complete evaluation and patient declined. Neurologic exam was completely normal here. This does not seem like a posterior stroke. The symptoms may be secondary to the patient's anxiety as she does admit that it has been elevated recently. 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 She does have an appoint with psychiatry in 2 weeks. Offered to refill her hydroxyzine but she declined as she states this is not helpful. Encouraged her to return for reevaluation if the symptoms persist or worsen or she has any other new or worrisome symptoms. CLINICAL IMPRESSION      1. Dizziness    2.  EUGENIO (generalized anxiety disorder)          DISPOSITION/PLAN   DISPOSITION Decision To Discharge 11/18/2022 10:59:28 PM      PATIENT REFERREDTO:  psychiatry      Follow up on 11/30 as scheduled    [address]  [address] [facility] [address]  [phone]  Schedule an appointment as soon as possible for a visit   to establish primary care as we discussed    DISCHARGE MEDICATIONS:  Discharge Medication List as of 11/18/2022 11:05 PM          DISCONTINUED MEDICATIONS:  Discharge Medication List as of 11/18/2022 11:05 PM        STOP taking these medications       risperiDONE (RISPERDAL) 0.5 MG tablet Comments:   Reason for Stopping:         buPROPion (WELLBUTRIN) 100 MG tablet Comments:   Reason for Stopping:         norgestimate-ethinyl estradiol ([address] [address]) 0.25-35 MG-MCG per tablet Comments:   Reason for Stopping:                      (Please note that portions ofthis note were completed with a voice recognition program.  Efforts were made to edit the dictations but occasionally words are mis-transcribed.)    MARK Wade CNP (electronically signed)             MARK hTapa CNP  11/19/22 0019 negative...

## 2022-11-28 NOTE — DISCHARGE NOTE PROVIDER - NS AS DC PROVIDER CONTACT Y/N MULTI
Physical Therapy Daily Treatment Note      Patient: Maureen Courtney   : 1971  Referring practitioner: JAKE Lorenz  Date of Initial Visit: Type: THERAPY  Noted: 11/15/2022  Today's Date: 2022  Patient seen for 6 sessions           Subjective Questionnaire:       Subjective Evaluation    History of Present Illness    Subjective comment: Pt reports 5/10 pain stating pain has gone up the last couple of days.  Pt reports R knee pain woke her up last night the first time in a couple of weeks.  Pt reports seeing Dr. Nicholson today and he is referring her to Dr. Velez, orthopediic doctor.   Pt reports Dr. Nicholson kept her oambulating with one axillary crutch and limited standing therefore she is still off of work.        Objective   See Exercise, Manual, and Modality Logs for complete treatment.       Assessment & Plan     Assessment    Assessment details: Pt was able to tolerate increased wt from 15 to 25lb on Cybex hip ABD/adduction.  Pt tolerated progressed exercise well.  Pt will continue to be off work secondary to MD restrictions.  Continue to progress pt as tolerated to get pt back to daily work to include return to work.          Visit Diagnoses:    ICD-10-CM ICD-9-CM   1. Contusion of right knee, subsequent encounter  S80.01XD V58.89     924.11   2. Right knee pain, unspecified chronicity  M25.561 719.46   3. Knee stiffness, right  M25.661 719.56   4. Difficulty walking  R26.2 719.7       Progress per Plan of Care and Progress strengthening /stabilization /functional activity           Timed:  Manual Therapy:         mins  96853;  Therapeutic Exercise:    25     mins  68991;     Neuromuscular Una:        mins  09362;    Therapeutic Activity:     8     mins  04938;     Gait Training:           mins  43456;     Ultrasound:          mins  99179;    Electrical Stimulation:         mins  87540 ( );  Aquatic Therapy          mins  77466    Untimed:  Electrical Stimulation:         mins  99106 (  );  Mechanical Traction:         mins  29145;     Timed Treatment:   33   mins   Total Treatment:     33   mins    Electronically signed    Melody Fuentes PTA  Physical Therapist Assistant    [employer]ER[place] license: [id number]     Yes

## 2022-11-29 ENCOUNTER — INPATIENT (INPATIENT)
Facility: HOSPITAL | Age: 87
LOS: 7 days | Discharge: NURSING, REHAB OR LONG-TERM CARE | DRG: 291 | End: 2022-12-07
Attending: HOSPITALIST | Admitting: HOSPITALIST
Payer: MEDICARE

## 2022-11-29 VITALS
DIASTOLIC BLOOD PRESSURE: 63 MMHG | RESPIRATION RATE: 20 BRPM | OXYGEN SATURATION: 96 % | HEIGHT: 62 IN | TEMPERATURE: 98 F | SYSTOLIC BLOOD PRESSURE: 157 MMHG | HEART RATE: 55 BPM | WEIGHT: 160.06 LBS

## 2022-11-29 DIAGNOSIS — J96.01 ACUTE RESPIRATORY FAILURE WITH HYPOXIA: ICD-10-CM

## 2022-11-29 DIAGNOSIS — Z98.890 OTHER SPECIFIED POSTPROCEDURAL STATES: Chronic | ICD-10-CM

## 2022-11-29 LAB
ALBUMIN SERPL ELPH-MCNC: 3.6 G/DL — SIGNIFICANT CHANGE UP (ref 3.3–5)
ALP SERPL-CCNC: 74 U/L — SIGNIFICANT CHANGE UP (ref 40–120)
ALT FLD-CCNC: 15 U/L — SIGNIFICANT CHANGE UP (ref 10–45)
ANION GAP SERPL CALC-SCNC: 12 MMOL/L — SIGNIFICANT CHANGE UP (ref 5–17)
AST SERPL-CCNC: 22 U/L — SIGNIFICANT CHANGE UP (ref 10–40)
BASE EXCESS BLDV CALC-SCNC: 1.8 MMOL/L — SIGNIFICANT CHANGE UP (ref -2–3)
BASOPHILS # BLD AUTO: 0.03 K/UL — SIGNIFICANT CHANGE UP (ref 0–0.2)
BASOPHILS NFR BLD AUTO: 0.4 % — SIGNIFICANT CHANGE UP (ref 0–2)
BILIRUB SERPL-MCNC: 1 MG/DL — SIGNIFICANT CHANGE UP (ref 0.2–1.2)
BUN SERPL-MCNC: 53 MG/DL — HIGH (ref 7–23)
CA-I SERPL-SCNC: 1.16 MMOL/L — SIGNIFICANT CHANGE UP (ref 1.15–1.33)
CALCIUM SERPL-MCNC: 9.1 MG/DL — SIGNIFICANT CHANGE UP (ref 8.4–10.5)
CHLORIDE BLDV-SCNC: 101 MMOL/L — SIGNIFICANT CHANGE UP (ref 96–108)
CHLORIDE SERPL-SCNC: 99 MMOL/L — SIGNIFICANT CHANGE UP (ref 96–108)
CK MB CFR SERPL CALC: 2.6 NG/ML — SIGNIFICANT CHANGE UP (ref 0–3.8)
CK SERPL-CCNC: 38 U/L — SIGNIFICANT CHANGE UP (ref 25–170)
CO2 BLDV-SCNC: 30 MMOL/L — HIGH (ref 22–26)
CO2 SERPL-SCNC: 26 MMOL/L — SIGNIFICANT CHANGE UP (ref 22–31)
CREAT SERPL-MCNC: 1.71 MG/DL — HIGH (ref 0.5–1.3)
EGFR: 27 ML/MIN/1.73M2 — LOW
EOSINOPHIL # BLD AUTO: 0.01 K/UL — SIGNIFICANT CHANGE UP (ref 0–0.5)
EOSINOPHIL NFR BLD AUTO: 0.1 % — SIGNIFICANT CHANGE UP (ref 0–6)
GAS PNL BLDV: 135 MMOL/L — LOW (ref 136–145)
GAS PNL BLDV: SIGNIFICANT CHANGE UP
GLUCOSE BLDV-MCNC: 142 MG/DL — HIGH (ref 70–99)
GLUCOSE SERPL-MCNC: 147 MG/DL — HIGH (ref 70–99)
HCO3 BLDV-SCNC: 28 MMOL/L — SIGNIFICANT CHANGE UP (ref 22–29)
HCT VFR BLD CALC: 34.4 % — LOW (ref 34.5–45)
HCT VFR BLDA CALC: 34 % — LOW (ref 34.5–46.5)
HGB BLD CALC-MCNC: 11.4 G/DL — LOW (ref 11.7–16.1)
HGB BLD-MCNC: 10.9 G/DL — LOW (ref 11.5–15.5)
IMM GRANULOCYTES NFR BLD AUTO: 0.7 % — SIGNIFICANT CHANGE UP (ref 0–0.9)
LACTATE BLDV-MCNC: 1.6 MMOL/L — SIGNIFICANT CHANGE UP (ref 0.5–2)
LYMPHOCYTES # BLD AUTO: 0.79 K/UL — LOW (ref 1–3.3)
LYMPHOCYTES # BLD AUTO: 9.5 % — LOW (ref 13–44)
MAGNESIUM SERPL-MCNC: 2.3 MG/DL — SIGNIFICANT CHANGE UP (ref 1.6–2.6)
MCHC RBC-ENTMCNC: 31.7 GM/DL — LOW (ref 32–36)
MCHC RBC-ENTMCNC: 31.8 PG — SIGNIFICANT CHANGE UP (ref 27–34)
MCV RBC AUTO: 100.3 FL — HIGH (ref 80–100)
MONOCYTES # BLD AUTO: 0.69 K/UL — SIGNIFICANT CHANGE UP (ref 0–0.9)
MONOCYTES NFR BLD AUTO: 8.3 % — SIGNIFICANT CHANGE UP (ref 2–14)
NEUTROPHILS # BLD AUTO: 6.76 K/UL — SIGNIFICANT CHANGE UP (ref 1.8–7.4)
NEUTROPHILS NFR BLD AUTO: 81 % — HIGH (ref 43–77)
NRBC # BLD: 0 /100 WBCS — SIGNIFICANT CHANGE UP (ref 0–0)
PCO2 BLDV: 51 MMHG — HIGH (ref 39–42)
PH BLDV: 7.35 — SIGNIFICANT CHANGE UP (ref 7.32–7.43)
PLATELET # BLD AUTO: 224 K/UL — SIGNIFICANT CHANGE UP (ref 150–400)
PO2 BLDV: 66 MMHG — HIGH (ref 25–45)
POTASSIUM BLDV-SCNC: 4.8 MMOL/L — SIGNIFICANT CHANGE UP (ref 3.5–5.1)
POTASSIUM SERPL-MCNC: 4.7 MMOL/L — SIGNIFICANT CHANGE UP (ref 3.5–5.3)
POTASSIUM SERPL-SCNC: 4.7 MMOL/L — SIGNIFICANT CHANGE UP (ref 3.5–5.3)
PROT SERPL-MCNC: 7.7 G/DL — SIGNIFICANT CHANGE UP (ref 6–8.3)
RAPID RVP RESULT: SIGNIFICANT CHANGE UP
RBC # BLD: 3.43 M/UL — LOW (ref 3.8–5.2)
RBC # FLD: 12 % — SIGNIFICANT CHANGE UP (ref 10.3–14.5)
SAO2 % BLDV: 93.1 % — HIGH (ref 67–88)
SARS-COV-2 RNA SPEC QL NAA+PROBE: SIGNIFICANT CHANGE UP
SODIUM SERPL-SCNC: 137 MMOL/L — SIGNIFICANT CHANGE UP (ref 135–145)
TROPONIN T, HIGH SENSITIVITY RESULT: 70 NG/L — HIGH (ref 0–51)
WBC # BLD: 8.34 K/UL — SIGNIFICANT CHANGE UP (ref 3.8–10.5)
WBC # FLD AUTO: 8.34 K/UL — SIGNIFICANT CHANGE UP (ref 3.8–10.5)

## 2022-11-29 PROCEDURE — 71045 X-RAY EXAM CHEST 1 VIEW: CPT | Mod: 26

## 2022-11-29 PROCEDURE — 93010 ELECTROCARDIOGRAM REPORT: CPT | Mod: GC

## 2022-11-29 PROCEDURE — 99285 EMERGENCY DEPT VISIT HI MDM: CPT | Mod: CS,GC

## 2022-11-29 RX ORDER — METOPROLOL TARTRATE 50 MG
12.5 TABLET ORAL
Refills: 0 | Status: DISCONTINUED | OUTPATIENT
Start: 2022-11-29 | End: 2022-11-30

## 2022-11-29 RX ORDER — LANOLIN ALCOHOL/MO/W.PET/CERES
3 CREAM (GRAM) TOPICAL AT BEDTIME
Refills: 0 | Status: DISCONTINUED | OUTPATIENT
Start: 2022-11-29 | End: 2022-12-04

## 2022-11-29 RX ORDER — MULTIVIT-MIN/FERROUS GLUCONATE 9 MG/15 ML
1 LIQUID (ML) ORAL DAILY
Refills: 0 | Status: DISCONTINUED | OUTPATIENT
Start: 2022-11-29 | End: 2022-12-07

## 2022-11-29 RX ORDER — FUROSEMIDE 40 MG
40 TABLET ORAL DAILY
Refills: 0 | Status: DISCONTINUED | OUTPATIENT
Start: 2022-11-29 | End: 2022-12-01

## 2022-11-29 RX ORDER — POTASSIUM CHLORIDE 20 MEQ
20 PACKET (EA) ORAL DAILY
Refills: 0 | Status: DISCONTINUED | OUTPATIENT
Start: 2022-11-29 | End: 2022-12-01

## 2022-11-29 RX ORDER — ACETAMINOPHEN 500 MG
650 TABLET ORAL EVERY 6 HOURS
Refills: 0 | Status: DISCONTINUED | OUTPATIENT
Start: 2022-11-29 | End: 2022-12-07

## 2022-11-29 RX ORDER — APIXABAN 2.5 MG/1
2.5 TABLET, FILM COATED ORAL
Refills: 0 | Status: DISCONTINUED | OUTPATIENT
Start: 2022-11-29 | End: 2022-12-07

## 2022-11-29 RX ORDER — FUROSEMIDE 40 MG
40 TABLET ORAL ONCE
Refills: 0 | Status: COMPLETED | OUTPATIENT
Start: 2022-11-29 | End: 2022-11-29

## 2022-11-29 RX ORDER — ONDANSETRON 8 MG/1
4 TABLET, FILM COATED ORAL EVERY 8 HOURS
Refills: 0 | Status: DISCONTINUED | OUTPATIENT
Start: 2022-11-29 | End: 2022-12-07

## 2022-11-29 RX ADMIN — Medication 40 MILLIGRAM(S): at 15:02

## 2022-11-29 NOTE — H&P ADULT - NSHPSOCIALHISTORY_GEN_ALL_CORE
Alochol: Denied  Smoking: Nonsmoker  Drug Use: Denied  Marital Status:  Alochol: Denied  Smoking: Nonsmoker  Drug Use: Denied  Marital Status:

## 2022-11-29 NOTE — ED PROVIDER NOTE - OBJECTIVE STATEMENT
89F with h/o HTN, HLD, HF, CKD, mild cognitive impairment who presents from her SNF with reports of hypoxia & increased work of breathing.     Collateral history obtained from her OP provider notes that they had some concern for increased work of breathing at her facility c/f viral syndrome.

## 2022-11-29 NOTE — ED PROVIDER NOTE - PHYSICAL EXAMINATION
GEN: Awake, AOx3, NAD.  HEENT: NCAT  ---EYES: no scleral icterus  CARDIO: RRR. Normal S1/S2, no m/r/g.   RESP: Crackles at b/l bases, trace  ABD: Soft, NTND.   MSK: No obvious deformity or ROM deficit.   SKIN: Warm, dry.   NEURO: Moves all four extremities spontaneously  PSYCH: Appropriate mood & affect.

## 2022-11-29 NOTE — ED ADULT NURSE NOTE - OBJECTIVE STATEMENT
pt 89 yo female hx htn afib PVd cardiac [facility] in [facility] saturation noted today pt alert on arrival 4liters nasal canula in use pt alert verbal oriented x 2 knows she is in hospital pt denies any complaints no chest pain pt placed on cardiac pulse ox monitoring pt Aflutter on monitor bradycardia to the 40's pt denies any fever or chills lower extreities edematous bilateraly

## 2022-11-29 NOTE — CONSULT NOTE ADULT - ASSESSMENT
89 yo F with PMH of Afib, HTN, HLD, arthritis admitted to hospital to [facility]. He was just discharged from hospital after being treated for PNA and discharged home on 2 lit home O2.  Patient was found hypoxic with labored breathing this morning and hypertensive. She was given one dose of Lasix 40 mg IM. Because she was still in labored breathing started on non-rebreather and was transferred to [facility] ER. She was found with elevated ProBNP. CXR showed mild CHF. Will be admitted for CHF exacerbation. She received 40 mg extra Lasix IV in ER.    chf ?systolic acute on chronic  check echo  iv lasix  beta blocker for a.fib rate control  continue AC  echo  will add hydralzine and nitrated sec to ckd and chf  strict i/o  ckd check bladder scan

## 2022-11-29 NOTE — H&P ADULT - HISTORY OF PRESENT ILLNESS
89 yo F with PMH of Afib, HTN, HLD, arthritis admitted to hospital to [facility]. He was just discharged from hospital after being treated for PNA and discharged home on 2 lit home O2.    Patient was found hypoxic with labored breathing this morning and hypertensive. She was given one dose of Lasix 40 mg IM. Because she was still in labored breathing started on non-rebreather and was transferred to [facility] ER. She was found with elevated ProBNP. CXR showed mild CHF. Will be admitted for CHF exacerbation. She received 40 mg extra Lasix IV in ER.

## 2022-11-29 NOTE — H&P ADULT - ASSESSMENT
89 yo F with PMH of Afib, HTN, HLD, arthritis admitted to hospital to [facility]. He was just discharged from hospital after being treated for PNA and discharged home on 2 lit home O2.    acute hypoxic respiratory failure 2/2 CHF exacerbation - continue O2 supplement   acute on chronic CHF exacerbation - treated with Lasix IV. Continue Lasix 40 mg IV daily. F/u  echo results. Cardiology consult with Dr Joyce.   HLD Stable, well-controlled and continue Lovastatin 20 MG  Atrial fibrillation  EKG NSR, continue metoprolol and eliquis 2.5 MG  HTN Metoprolol  [place] [facility] f/u with consult with audiology  Frontal facial skin lesion. S/p KELIN procedure by Dr. Lemos,[phone] continues dry dressing and f/u with dermatologist and surgeon as needed.   Vit D deficiency vit d supplements 18433 units monthly   covid 19 PCR is negative, pt asymptomatic,  no need for contact isolation on general population floor.Anemia:Hgb is stable and asymptomatic, The iron panel, b12, folic acid, and reticulocytes are within normal. All within normal range. Monitor CBC every other week on Monday.   Dysphagia: Downgraded from regular to chopped food & thin liquid. F/u with speech therapist.  Cognitive disorder/dementia w/out behavior disturbance: F/u with psych, confirmed and evaluate, off of medication, continue supportive care  General weakness:FAP 30ft w/RW & 1 assist   Covid vaccination: The family refused, no vaccinated for covid 19  Flu vaccine was refused by pt and family 2021/2022 89 yo F with PMH of Afib, HTN, HLD, arthritis admitted to hospital to [facility]. He was just discharged from hospital after being treated for PNA and discharged home on 2 lit home O2.    acute hypoxic respiratory failure 2/2 CHF exacerbation   continue O2 supplement     acute on chronic CHF exacerbation  treated with Lasix IV. Continue Lasix 40 mg IV daily. F/u  echo results. Cardiology consult with Dr Joyce.     HLD   Stable, well-controlled and continue Lovastatin 20 MG    Atrial fibrillation   EKG NSR, continue metoprolol and eliquis 2.5 MG    HTN   Metoprolol    [place] [facility] f/u with consult with audiology    Frontal facial skin lesion. S/p KELIN procedure by Dr. Lemos,[phone] continues dry dressing and f/u with dermatologist and surgeon as needed.     Vit D deficiency vit d supplements 66726 units monthly     Dysphagia:  Downgraded from regular to chopped food & thin liquid. F/u with speech therapist.    Cognitive disorder/dementia w/out behavior disturbance  F/u with psych, confirmed and evaluate, off of medication    DVT ppx   Eliquis

## 2022-11-29 NOTE — ED PROVIDER NOTE - NSICDXPASTMEDICALHX_GEN_ALL_CORE_FT
PAST MEDICAL HISTORY:  Chronic kidney disease, unspecified CKD stage     H/O CHF     HLD (hyperlipidemia)     HTN (hypertension)     Mild cognitive impairment

## 2022-11-29 NOTE — ED ADULT NURSE REASSESSMENT NOTE - NS ED NURSE REASSESS COMMENT FT1
Received report from OVI Arthur. Pt is A&Ox3, breathing spontaneously, unlabored and speaking in full sentences on RA, denies any complaints, on CM, basilia, pt safety and comfort measures provided.

## 2022-11-29 NOTE — ED ADULT NURSE REASSESSMENT NOTE - NS ED NURSE REASSESS COMMENT FT1
pt repeat labs sent pt remains alert denies any chest pain atrial flutter rhythm on monitor pt admitted to telemetry pending bed assignment

## 2022-11-29 NOTE — H&P ADULT - NSHPPHYSICALEXAM_GEN_ALL_CORE
PHYSICAL EXAM    Constitutional: NAD, well-groomed, well-developed  HEENT: PERRLA, EOMI, Normal Hearing, MMM  Neck: No LAD, No JVD  Back: Normal spine flexure, No CVA tenderness  Respiratory: mild crackles    Cardiovascular: S1 and S2, RRR, no M/G/R  Gastrointestinal: BS+, soft, NT/ND  Extremities: 1-  edema  Vascular: 2+ peripheral pulses  Neurological: A/O x 1 confused and emented   Skin: No rashes

## 2022-11-29 NOTE — ED PROVIDER NOTE - CLINICAL SUMMARY MEDICAL DECISION MAKING FREE TEXT BOX
See Attending Note 89F h/o HTN, HLD, CKD, CHF, MCI p/w increased WOB/hypoxia on new 2L NC. Pt resting comfortably now s/p 40mg IV furosemide at SNF. Still some pleural effusion on POCUS, so will dose again. DDx viral syndrome, HFe.   - Labs  - CXR, ECG, Tele  - Trop, BNP, TTE  - Furos 40    See Attending Note

## 2022-11-29 NOTE — H&P ADULT - NSHPREVIEWOFSYSTEMS_GEN_ALL_CORE
REVIEW OF SYSTEMS:  CONSTITUTIONAL: No fever, weight loss, or fatigue  EYES: No eye pain, visual disturbances, or discharge  ENMT:  No difficulty hearing, tinnitus, vertigo; No sinus or throat pain  NECK: No pain or stiffness  BREASTS: No pain, masses, or nipple discharge  RESPIRATORY: No cough, wheezing, chills or hemoptysis; +  shortness of breath  CARDIOVASCULAR: No chest pain, palpitations, dizziness, or leg swelling  GASTROINTESTINAL: No abdominal or epigastric pain. No nausea, vomiting, or hematemesis; No diarrhea or constipation. No melena or hematochezia.  GENITOURINARY: No dysuria, frequency, hematuria, or incontinence  NEUROLOGICAL: No headaches, memory loss, loss of strength, numbness, or tremors  SKIN: No itching, burning, rashes, or lesions   LYMPH NODES: No enlarged glands  ENDOCRINE: No heat or cold intolerance; No hair loss; No polydipsia or polyuria  MUSCULOSKELETAL: No joint pain or swelling; No muscle, back, or extremity pain  PSYCHIATRIC: No depression, anxiety, mood swings, or difficulty sleeping  HEME/LYMPH: No easy bruising, or bleeding gums  ALLERGY AND IMMUNOLOGIC: No hives or eczema

## 2022-11-29 NOTE — ED PROVIDER NOTE - ATTENDING CONTRIBUTION TO CARE
pt is a 89 pt is a 89 female with h/o chf Presenting from nursing home with low oxygen saturations and no actual distress at this time lungs are clear to auscultation afebrile CHF work-up initiated +1 swelling to lower extremities lungs are clear to auscultation placed on several liters of nasal cannula satting well.  Also check for possible viral etiology.

## 2022-11-29 NOTE — ED ADULT NURSE NOTE - NSIMPLEMENTINTERV_GEN_ALL_ED
Implemented All Fall with Harm Risk Interventions:  [place] to call system. Call bell, personal items and telephone within reach. Instruct patient to call for assistance. Room bathroom lighting operational. Non-slip footwear when patient is off stretcher. Physically safe environment: no spills, clutter or unnecessary equipment. Stretcher in lowest position, wheels locked, appropriate side rails in place. Provide visual cue, wrist band, yellow gown, etc. Monitor gait and stability. Monitor for mental status changes and reorient to person, place, and time. Review medications for side effects contributing to fall risk. Reinforce activity limits and safety measures with patient and family. Provide visual clues: red socks.

## 2022-11-30 LAB
A1C WITH ESTIMATED AVERAGE GLUCOSE RESULT: 6.1 % — HIGH (ref 4–5.6)
ANION GAP SERPL CALC-SCNC: 14 MMOL/L — SIGNIFICANT CHANGE UP (ref 5–17)
BUN SERPL-MCNC: 46 MG/DL — HIGH (ref 7–23)
CALCIUM SERPL-MCNC: 9.3 MG/DL — SIGNIFICANT CHANGE UP (ref 8.4–10.5)
CHLORIDE SERPL-SCNC: 99 MMOL/L — SIGNIFICANT CHANGE UP (ref 96–108)
CHOLEST SERPL-MCNC: 157 MG/DL — SIGNIFICANT CHANGE UP
CO2 SERPL-SCNC: 30 MMOL/L — SIGNIFICANT CHANGE UP (ref 22–31)
CREAT SERPL-MCNC: 1.54 MG/DL — HIGH (ref 0.5–1.3)
EGFR: 30 ML/MIN/1.73M2 — LOW
ESTIMATED AVERAGE GLUCOSE: 128 MG/DL — HIGH (ref 68–114)
GLUCOSE SERPL-MCNC: 123 MG/DL — HIGH (ref 70–99)
HCT VFR BLD CALC: 37.1 % — SIGNIFICANT CHANGE UP (ref 34.5–45)
HDLC SERPL-MCNC: 46 MG/DL — LOW
HGB BLD-MCNC: 11.5 G/DL — SIGNIFICANT CHANGE UP (ref 11.5–15.5)
LIPID PNL WITH DIRECT LDL SERPL: 93 MG/DL — SIGNIFICANT CHANGE UP
MCHC RBC-ENTMCNC: 31 GM/DL — LOW (ref 32–36)
MCHC RBC-ENTMCNC: 31.4 PG — SIGNIFICANT CHANGE UP (ref 27–34)
MCV RBC AUTO: 101.4 FL — HIGH (ref 80–100)
NON HDL CHOLESTEROL: 112 MG/DL — SIGNIFICANT CHANGE UP
NRBC # BLD: 0 /100 WBCS — SIGNIFICANT CHANGE UP (ref 0–0)
PLATELET # BLD AUTO: 189 K/UL — SIGNIFICANT CHANGE UP (ref 150–400)
POTASSIUM SERPL-MCNC: 4.1 MMOL/L — SIGNIFICANT CHANGE UP (ref 3.5–5.3)
POTASSIUM SERPL-SCNC: 4.1 MMOL/L — SIGNIFICANT CHANGE UP (ref 3.5–5.3)
RBC # BLD: 3.66 M/UL — LOW (ref 3.8–5.2)
RBC # FLD: 11.9 % — SIGNIFICANT CHANGE UP (ref 10.3–14.5)
SODIUM SERPL-SCNC: 143 MMOL/L — SIGNIFICANT CHANGE UP (ref 135–145)
TRIGL SERPL-MCNC: 93 MG/DL — SIGNIFICANT CHANGE UP
WBC # BLD: 5.78 K/UL — SIGNIFICANT CHANGE UP (ref 3.8–10.5)
WBC # FLD AUTO: 5.78 K/UL — SIGNIFICANT CHANGE UP (ref 3.8–10.5)

## 2022-11-30 RX ORDER — HYDRALAZINE HCL 50 MG
10 TABLET ORAL THREE TIMES A DAY
Refills: 0 | Status: DISCONTINUED | OUTPATIENT
Start: 2022-11-30 | End: 2022-12-01

## 2022-11-30 RX ORDER — ISOSORBIDE MONONITRATE 60 MG/1
30 TABLET, EXTENDED RELEASE ORAL DAILY
Refills: 0 | Status: DISCONTINUED | OUTPATIENT
Start: 2022-11-30 | End: 2022-12-02

## 2022-11-30 RX ADMIN — Medication 1 TABLET(S): at 11:26

## 2022-11-30 RX ADMIN — Medication 3 MILLIGRAM(S): at 22:18

## 2022-11-30 RX ADMIN — APIXABAN 2.5 MILLIGRAM(S): 2.5 TABLET, FILM COATED ORAL at 17:06

## 2022-11-30 RX ADMIN — Medication 10 MILLIGRAM(S): at 22:18

## 2022-11-30 RX ADMIN — Medication 20 MILLIEQUIVALENT(S): at 11:25

## 2022-11-30 RX ADMIN — APIXABAN 2.5 MILLIGRAM(S): 2.5 TABLET, FILM COATED ORAL at 08:52

## 2022-11-30 RX ADMIN — Medication 40 MILLIGRAM(S): at 08:52

## 2022-11-30 RX ADMIN — ISOSORBIDE MONONITRATE 30 MILLIGRAM(S): 60 TABLET, EXTENDED RELEASE ORAL at 11:25

## 2022-11-30 RX ADMIN — Medication 40 MILLIGRAM(S): at 22:19

## 2022-11-30 NOTE — PROGRESS NOTE ADULT - ASSESSMENT
89 yo F with PMH of Afib, HTN, HLD, arthritis admitted to hospital to [facility]. He was just discharged from hospital after being treated for PNA and discharged home on 2 lit home O2.    acute hypoxic respiratory failure 2/2 CHF exacerbation   continue O2 supplement     acute on chronic CHF exacerbation  treated with Lasix IV. Continue Lasix 40 mg IV daily. F/u  echo results. Cardiology consult with Dr Joyce.     HLD   Stable, well-controlled and continue Lovastatin 20 MG    Atrial fibrillation   EKG NSR, continue metoprolol and eliquis 2.5 MG    HTN   Metoprolol    [place] [facility] f/u with consult with audiology    Frontal facial skin lesion. S/p KELIN procedure by Dr. Lemos,[phone] continues dry dressing and f/u with dermatologist and surgeon as needed.     Vit D deficiency vit d supplements 93101 units monthly     Dysphagia:  Downgraded from regular to chopped food & thin liquid. F/u with speech therapist.    Cognitive disorder/dementia w/out behavior disturbance  F/u with psych, confirmed and evaluate, off of medication    DVT ppx   Eliquis

## 2022-12-01 LAB
ALBUMIN SERPL ELPH-MCNC: 3.4 G/DL — SIGNIFICANT CHANGE UP (ref 3.3–5)
ALP SERPL-CCNC: 64 U/L — SIGNIFICANT CHANGE UP (ref 40–120)
ALT FLD-CCNC: 12 U/L — SIGNIFICANT CHANGE UP (ref 10–45)
ANION GAP SERPL CALC-SCNC: 14 MMOL/L — SIGNIFICANT CHANGE UP (ref 5–17)
AST SERPL-CCNC: 16 U/L — SIGNIFICANT CHANGE UP (ref 10–40)
BASOPHILS # BLD AUTO: 0 K/UL — SIGNIFICANT CHANGE UP (ref 0–0.2)
BASOPHILS NFR BLD AUTO: 0 % — SIGNIFICANT CHANGE UP (ref 0–2)
BILIRUB SERPL-MCNC: 1.2 MG/DL — SIGNIFICANT CHANGE UP (ref 0.2–1.2)
BUN SERPL-MCNC: 55 MG/DL — HIGH (ref 7–23)
CALCIUM SERPL-MCNC: 9.3 MG/DL — SIGNIFICANT CHANGE UP (ref 8.4–10.5)
CHLORIDE SERPL-SCNC: 99 MMOL/L — SIGNIFICANT CHANGE UP (ref 96–108)
CO2 SERPL-SCNC: 29 MMOL/L — SIGNIFICANT CHANGE UP (ref 22–31)
CREAT SERPL-MCNC: 1.72 MG/DL — HIGH (ref 0.5–1.3)
EGFR: 26 ML/MIN/1.73M2 — LOW
EOSINOPHIL # BLD AUTO: 0 K/UL — SIGNIFICANT CHANGE UP (ref 0–0.5)
EOSINOPHIL NFR BLD AUTO: 0 % — SIGNIFICANT CHANGE UP (ref 0–6)
GLUCOSE SERPL-MCNC: 123 MG/DL — HIGH (ref 70–99)
HCT VFR BLD CALC: 35.4 % — SIGNIFICANT CHANGE UP (ref 34.5–45)
HGB BLD-MCNC: 10.9 G/DL — LOW (ref 11.5–15.5)
LYMPHOCYTES # BLD AUTO: 0.16 K/UL — LOW (ref 1–3.3)
LYMPHOCYTES # BLD AUTO: 4.4 % — LOW (ref 13–44)
MANUAL SMEAR VERIFICATION: SIGNIFICANT CHANGE UP
MCHC RBC-ENTMCNC: 30.8 GM/DL — LOW (ref 32–36)
MCHC RBC-ENTMCNC: 31.7 PG — SIGNIFICANT CHANGE UP (ref 27–34)
MCV RBC AUTO: 102.9 FL — HIGH (ref 80–100)
MONOCYTES # BLD AUTO: 0.03 K/UL — SIGNIFICANT CHANGE UP (ref 0–0.9)
MONOCYTES NFR BLD AUTO: 0.9 % — LOW (ref 2–14)
NEUTROPHILS # BLD AUTO: 3.45 K/UL — SIGNIFICANT CHANGE UP (ref 1.8–7.4)
NEUTROPHILS NFR BLD AUTO: 94.7 % — HIGH (ref 43–77)
NT-PROBNP SERPL-SCNC: HIGH PG/ML (ref 0–300)
PLAT MORPH BLD: NORMAL — SIGNIFICANT CHANGE UP
PLATELET # BLD AUTO: 203 K/UL — SIGNIFICANT CHANGE UP (ref 150–400)
POTASSIUM SERPL-MCNC: 4.2 MMOL/L — SIGNIFICANT CHANGE UP (ref 3.5–5.3)
POTASSIUM SERPL-SCNC: 4.2 MMOL/L — SIGNIFICANT CHANGE UP (ref 3.5–5.3)
PROT SERPL-MCNC: 7.4 G/DL — SIGNIFICANT CHANGE UP (ref 6–8.3)
RBC # BLD: 3.44 M/UL — LOW (ref 3.8–5.2)
RBC # FLD: 11.9 % — SIGNIFICANT CHANGE UP (ref 10.3–14.5)
RBC BLD AUTO: SIGNIFICANT CHANGE UP
SODIUM SERPL-SCNC: 142 MMOL/L — SIGNIFICANT CHANGE UP (ref 135–145)
WBC # BLD: 3.64 K/UL — LOW (ref 3.8–10.5)
WBC # FLD AUTO: 3.64 K/UL — LOW (ref 3.8–10.5)

## 2022-12-01 PROCEDURE — 93306 TTE W/DOPPLER COMPLETE: CPT | Mod: 26

## 2022-12-01 PROCEDURE — 99222 1ST HOSP IP/OBS MODERATE 55: CPT

## 2022-12-01 RX ORDER — BUDESONIDE AND FORMOTEROL FUMARATE DIHYDRATE 160; 4.5 UG/1; UG/1
2 AEROSOL RESPIRATORY (INHALATION)
Refills: 0 | Status: DISCONTINUED | OUTPATIENT
Start: 2022-12-01 | End: 2022-12-01

## 2022-12-01 RX ORDER — FUROSEMIDE 40 MG
40 TABLET ORAL DAILY
Refills: 0 | Status: DISCONTINUED | OUTPATIENT
Start: 2022-12-01 | End: 2022-12-03

## 2022-12-01 RX ORDER — HYDRALAZINE HCL 50 MG
10 TABLET ORAL
Refills: 0 | Status: DISCONTINUED | OUTPATIENT
Start: 2022-12-01 | End: 2022-12-02

## 2022-12-01 RX ORDER — ALBUTEROL 90 UG/1
1 AEROSOL, METERED ORAL
Refills: 0 | Status: DISCONTINUED | OUTPATIENT
Start: 2022-12-01 | End: 2022-12-07

## 2022-12-01 RX ADMIN — APIXABAN 2.5 MILLIGRAM(S): 2.5 TABLET, FILM COATED ORAL at 17:59

## 2022-12-01 RX ADMIN — Medication 40 MILLIGRAM(S): at 05:27

## 2022-12-01 RX ADMIN — Medication 40 MILLIGRAM(S): at 05:29

## 2022-12-01 RX ADMIN — ISOSORBIDE MONONITRATE 30 MILLIGRAM(S): 60 TABLET, EXTENDED RELEASE ORAL at 14:01

## 2022-12-01 RX ADMIN — Medication 10 MILLIGRAM(S): at 18:02

## 2022-12-01 RX ADMIN — Medication 1 TABLET(S): at 12:06

## 2022-12-01 RX ADMIN — APIXABAN 2.5 MILLIGRAM(S): 2.5 TABLET, FILM COATED ORAL at 05:29

## 2022-12-01 RX ADMIN — Medication 10 MILLIGRAM(S): at 05:27

## 2022-12-01 NOTE — CONSULT NOTE ADULT - ASSESSMENT
89 year old female with AF, HTN, and HLD presented with AMS. Found to be hypoxemic and hypertensive. Admitted for acute on chronic diastolic heart failure. Mentation is improved. Has mild wheezing. Denies SOB at this time. Has remote history of asthma.     Diuresis as per cardiology / primary team  May benefit from short course steroids if there is a component of reactive airway disease  Can reduce dose of steroids to prednisone 20 mg daily x 3-5 days based on clinical response  Albuterol PRN    May follow with pulmonology within 2-3 weeks of discharge. Please email [email or web address] and include patient's name, , MRN, telephone number, and discharge diagnosis, and allow 24 hours for scheduling. The office is located at [address] [place] [address], [address]. Number [phone].     89 year old female with AF, HTN, and HLD presented with AMS. Found to be hypoxemic and hypertensive. Admitted for acute on chronic diastolic heart failure. Mentation is improved. Has mild wheezing. Denies SOB at this time. Has remote history of asthma.     Diuresis as per cardiology / primary team  May benefit from short course steroids if there is a component of reactive airway disease / acute asthma exacerbation  Can reduce dose of steroids to prednisone 20 mg daily x 3-5 days based on clinical response  Albuterol PRN    May follow with pulmonology within 2-3 weeks of discharge. Please email [email or web address] and include patient's name, , MRN, telephone number, and discharge diagnosis, and allow 24 hours for scheduling. The office is located at [address] [place] [address], [address]. Number [phone].

## 2022-12-01 NOTE — SWALLOW BEDSIDE ASSESSMENT ADULT - SLP PERTINENT HISTORY OF CURRENT PROBLEM
89 yo F with PMH of Afib, HTN, HLD, arthritis admitted to hospital to [facility]. He was just discharged from hospital after being treated for PNA and discharged home on 2 lit home O2.

## 2022-12-01 NOTE — PROGRESS NOTE ADULT - ASSESSMENT
89 yo F with PMH of Afib, HTN, HLD, arthritis admitted to hospital to [facility]. He was just discharged from hospital after being treated for PNA and discharged home on 2 lit home O2.    acute hypoxic respiratory failure 2/2 CHF exacerbation   continue O2 supplement     acute on chronic CHF exacerbation  improved. Switch IV Lasix to PO, Continue Imdur and Hydralazine.     severe aortic stenosis  new finding on echo, normal ejection fraction. F/u cardiology if she is candidate for TAVR procedure.     R breast swelling and stiffness  r/o malignancy with ultrasound.     HLD   Stable, well-controlled and continue Lovastatin 20 MG    Atrial fibrillation   EKG NSR, continue metoprolol and eliquis 2.5 MG    HTN   Metoprolol    [place] [facility] f/u with consult with audiology    Frontal facial skin lesion. S/p KELIN procedure by Dr. Lemos,[phone] continues dry dressing and f/u with dermatologist and surgeon as needed.     Vit D deficiency vit d supplements 01560 units monthly     Dysphagia:  Downgraded from regular to chopped food & thin liquid. F/u with speech therapist.    Cognitive disorder/dementia w/out behavior disturbance  F/u with psych, confirmed and evaluate, off of medication    DVT ppx   Eliquis  89 yo F with PMH of Afib, HTN, HLD, arthritis admitted to hospital to [facility]. He was just discharged from hospital after being treated for PNA and discharged home on 2 lit home O2.    acute hypoxic respiratory failure 2/2 CHF exacerbation   continue O2 supplement     acute on chronic CHF exacerbation  improved. Switch IV Lasix to PO, Continue Imdur and Hydralazine.     Asthma exacerbation   improved wiht IV steroid, tapered to po prednisone per pulmonary, appreciate input, albuterol PRN. f/u as outpatient for PFT.     severe aortic stenosis  new finding on echo, normal ejection fraction. F/u cardiology if she is candidate for TAVR procedure.     R breast swelling and stiffness  r/o malignancy with ultrasound.     HLD   Stable, well-controlled and continue Lovastatin 20 MG    Atrial fibrillation   EKG NSR, continue metoprolol and eliquis 2.5 MG    HTN   Metoprolol    [place] [facility] f/u with consult with audiology    Frontal facial skin lesion. S/p KELIN procedure by Dr. Lemos,[phone] continues dry dressing and f/u with dermatologist and surgeon as needed.     Vit D deficiency vit d supplements 00453 units monthly     Dysphagia:  Downgraded from regular to chopped food & thin liquid. F/u with speech therapist.    Cognitive disorder/dementia w/out behavior disturbance  F/u with psych, confirmed and evaluate, off of medication    DVT ppx   Eliquis

## 2022-12-01 NOTE — SWALLOW BEDSIDE ASSESSMENT ADULT - ASR SWALLOW RECOMMEND DIAG
not indicated at this time; if there is further concern for aspiration, MBS may be considered for objective assessment

## 2022-12-01 NOTE — SWALLOW BEDSIDE ASSESSMENT ADULT - ASR SWALLOW ASPIRATION MONITOR
Monitor for s/s aspiration/laryngeal penetration. If noted:  D/C p.o. intake, provide non-oral nutrition/hydration/meds, and contact this service @ [phone]/change of breathing pattern/cough/gurgly voice/fever/pneumonia/throat clearing/upper respiratory infection

## 2022-12-01 NOTE — CONSULT NOTE ADULT - REASON FOR ADMISSION
SOB , labored breathing, acute hypoxic respiratory failure
SOB , labored breathing, acute hypoxic respiratory failure

## 2022-12-01 NOTE — CHART NOTE - NSCHARTNOTEFT_GEN_A_CORE
Informed by primary RN of Right breast swelling.  Patient assessed at bedside and noted with an enlarged Right breast (as compared to left) with skin thickening vs mass.    No pain/grimacing noted to palpation.  Right breast US ordered at this time.  Medicine attending made aware.    Zita Colorado, ANP-C  [id number]

## 2022-12-01 NOTE — PROGRESS NOTE ADULT - TIME BILLING
Discussed with PA. Discussed with PA about her chf and AS,   d/w her son at least for 30 min regarding ACP and reviewed the MOLST form and decided to keep as full code.

## 2022-12-01 NOTE — SWALLOW BEDSIDE ASSESSMENT ADULT - COMMENTS
O2 supp for acute hypoxic respiratory failure 2/2 CHF exacerbation   Lasix IV for acute on chronic CHF exacerbation  HLD -Stable, well-controlled; Lovastatin 20 MG  Atrial fibrillation -EKG NSR, continue metoprolol and eliquis 2.5 MG  HTN-Metoprolol  [facility] - audiology consult  Dysphagia:  Downgraded from regular to chopped food & thin liquid. F/u with speech therapist.    Cognitive disorder/dementia w/out behavior disturbance  F/u with psych, confirmed and evaluate, off of medication    DVT ppx   Eliquis       Frontal facial skin lesion. S/p KELIN procedure by Dr. Lemos,[phone] continues dry dressing and f/u with dermatologist and surgeon as needed.

## 2022-12-01 NOTE — CONSULT NOTE ADULT - SUBJECTIVE AND OBJECTIVE BOX
CHIEF COMPLAINT:Patient is a 89y old  Female who presents with a chief complaint of SOB , labored breathing, acute hypoxic respiratory failure (29 Nov 2022 16:44)      HPI:  89 yo F with PMH of Afib, HTN, HLD, arthritis admitted to hospital to [facility]. He was just discharged from hospital after being treated for PNA and discharged home on 2 lit home O2.  Patient was found hypoxic with labored breathing this morning and hypertensive. She was given one dose of Lasix 40 mg IM. Because she was still in labored breathing started on non-rebreather and was transferred to [facility] ER. She was found with elevated ProBNP. CXR showed mild CHF. Will be admitted for CHF exacerbation. She received 40 mg extra Lasix IV in ER.        PAST MEDICAL & SURGICAL HISTORY:  HTN (hypertension)      HLD (hyperlipidemia)      Chronic kidney disease, unspecified CKD stage      H/O CHF      Mild cognitive impairment      No significant past surgical history          MEDICATIONS  (STANDING):      MEDICATIONS  (PRN):      FAMILY HISTORY:  No pertinent family history in first degree relatives        SOCIAL HISTORY:    [ ] Non-smoker  [ ] Smoker  [ ] Alcohol    Allergies    Augmentin (Unknown)    Intolerances    	    REVIEW OF SYSTEMS:  CONSTITUTIONAL: No fever, weight loss, or fatigue  EYES: No eye pain, visual disturbances, or discharge  ENT:  No difficulty hearing, tinnitus, vertigo; No sinus or throat pain  NECK: No pain or stiffness  RESPIRATORY: No cough, wheezing, chills or hemoptysis; No Shortness of Breath  CARDIOVASCULAR: No chest pain, palpitations, passing out, dizziness, or leg swelling  GASTROINTESTINAL: No abdominal or epigastric pain. No nausea, vomiting, or hematemesis; No diarrhea or constipation. No melena or hematochezia.  GENITOURINARY: No dysuria, frequency, hematuria, or incontinence  NEUROLOGICAL: No headaches, memory loss, loss of strength, numbness, or tremors  SKIN: No itching, burning, rashes, or lesions   LYMPH Nodes: No enlarged glands  ENDOCRINE: No heat or cold intolerance; No hair loss  MUSCULOSKELETAL: No joint pain or swelling; No muscle, back, or extremity pain  PSYCHIATRIC: No depression, anxiety, mood swings, or difficulty sleeping  HEME/LYMPH: No easy bruising, or bleeding gums  ALLERGY AND IMMUNOLOGIC: No hives or eczema	    [ ] All others negative	  [ ] Unable to obtain    PHYSICAL EXAM:  T(C): 37.1 (11-29-22 @ 17:55), Max: 37.1 (11-29-22 @ 13:33)  HR: 50 (11-29-22 @ 17:55) (50 - 60)  BP: 174/54 (11-29-22 @ 17:55) (133/58 - 174/54)  RR: 20 (11-29-22 @ 17:55) (20 - 26)  SpO2: 97% (11-29-22 @ 17:55) (96% - 100%)  Wt(kg): --  I&O's Summary      Appearance: Normal	  HEENT:   Normal oral mucosa, PERRL, EOMI	  Lymphatic: No lymphadenopathy  Cardiovascular: Normal S1 S2, No JVD, + murmurs, + edema  Respiratory: rales bl  Psychiatry: A & O x 3, Mood & affect appropriate  Gastrointestinal:  Soft, Non-tender, + BS	  Skin: No rashes, No ecchymoses, No cyanosis	  Neurologic: Non-focal  Extremities: Normal range of motion, No clubbing, cyanosis + edema  Vascular: Peripheral pulses palpable 2+ bilaterally    TELEMETRY: 	    ECG:  	  RADIOLOGY:  OTHER: 	  	  LABS:	 	    CARDIAC MARKERS:                              10.9   8.34  )-----------( 224      ( 29 Nov 2022 14:56 )             34.4     11-29    137  |  99  |  53<H>  ----------------------------<  147<H>  4.7   |  26  |  1.71<H>    Ca    9.1      29 Nov 2022 14:56  Mg     2.3     11-29    TPro  7.7  /  Alb  3.6  /  TBili  1.0  /  DBili  x   /  AST  22  /  ALT  15  /  AlkPhos  74  11-29    proBNP: Serum Pro-Brain Natriuretic Peptide: 39312 pg/mL (11-29 @ 14:56)    Lipid Profile:   HgA1c:   TSH:       PREVIOUS DIAGNOSTIC TESTING:    · EKG Date/Time: 29-Nov-2022 14:21  · Rate: 42  · Interpretation: abnormal  · Abnormal Rhythm: atrial flutter  · ST/T Wave: q waves inferiorly     < from: Xray Chest 1 View- PORTABLE-Urgent (11.29.22 @ 14:28) >  The heart size is not accurately assessed on this projection.  The lungs are clear.  There is no pneumothorax or pleural effusion.  Severe right humeral arthrosis.    IMPRESSION:  Clear lungs.    Troponin T, High Sensitivity (11.29.22 @ 14:56)    Troponin T, High Sensitivity Result: 70: Specimen not hemolyzed  *  *  Rapid upward or downward changes in high-sensitivity troponin levels  suggest acute myocardial injury. Renal impairment may cause sustained  troponin elevations.  Normal: <6 - 14 ng/L  Indeterminate: 15-51 ng/L  Elevated: > 51 ng/L  See http://labs/test/[email or web address] on the [facility] intranet for more  information ng/L          
CHIEF COMPLAINT:  SOB    HPI:  89 year old female with AF, HTN, and HLD presented with AMS. Found to be hypoxemic and hypertensive. Admitted for CHD exacerbation. Improving with IV diuresis. Has mild wheezing on exam prompting a pulmonary consult for further evaluation. Patients states she is a lifetime non-smoker. Was told she may have asthma many years ago as an adult but cannot recall any specific details. Does not use inhalers. She denies SOB at rest and is saturating well on 2L NC.    PAST MEDICAL & SURGICAL HISTORY:  HTN (hypertension)    HLD (hyperlipidemia)    Chronic kidney disease, unspecified CKD stage    H/O CHF    Mild cognitive impairment    No significant past surgical history    FAMILY HISTORY:  Denies FH of pulmonary disease      SOCIAL HISTORY:  Smoking: Denies  Substance Use: Denies  EtOH Use: Denies  Marital Status: [ ] Single [ ]  [ ]  [ ]   Sexual History:   Occupation:  Recent Travel:  Country of Birth: [facility]  Advance Directives:    Allergies    Augmentin (Unknown)    Intolerances        HOME MEDICATIONS:    REVIEW OF SYSTEMS:  Constitutional: [ ] negative [-] fevers [-] chills [ ] weight loss [ ] weight gain  HEENT: [ ] negative [ ] dry eyes [ ] eye irritation [ ] postnasal drip [ ] nasal congestion  CV: [ ] negative  [-] chest pain [-] orthopnea [-] palpitations [ ] murmur  Resp: [ ] negative [-] cough [-] shortness of breath [-] wheezing [-] sputum [-] hemoptysis  GI: [ ] negative [-] nausea [-] vomiting [-] diarrhea [-] constipation [-] abd pain [ ] dysphagia   : [ ] negative [-] dysuria [ ] nocturia [ ] hematuria [ ] increased urinary frequency  Musculoskeletal: [ ] negative [ ] back pain [-] myalgias [-] arthralgias [ ] fracture  Skin: [ ] negative [-] rash [ ] itch  Neurological: [ ] negative [-] headache [-] dizziness [ ] syncope [ ] weakness [ ] numbness  Psychiatric: [ ] negative [ ] anxiety [ ] depression  Endocrine: [ ] negative [ ] diabetes [ ] thyroid problem  Hematologic/Lymphatic: [ ] negative [ ] anemia [ ] bleeding problem  Allergic/Immunologic: [ ] negative [ ] itchy eyes [ ] nasal discharge [ ] hives [ ] angioedema  [ ] All other systems negative  [ ] Unable to assess ROS because ________    OBJECTIVE:  ICU Vital Signs Last 24 Hrs  T(C): 36.3 (01 Dec 2022 11:17), Max: 37.2 (30 Nov 2022 15:59)  T(F): 97.3 (01 Dec 2022 11:17), Max: 99 (30 Nov 2022 15:59)  HR: 60 (01 Dec 2022 14:00) (53 - 75)  BP: 118/69 (01 Dec 2022 14:00) (103/68 - 154/82)  BP(mean): 98 (01 Dec 2022 11:17) (91 - 98)  ABP: --  ABP(mean): --  RR: 18 (01 Dec 2022 11:54) (18 - 20)  SpO2: 100% (01 Dec 2022 11:54) (95% - 100%)    O2 Parameters below as of 01 Dec 2022 11:54  Patient On (Oxygen Delivery Method): nasal cannula  O2 Flow (L/min): 2            11-30 @ 07:01  -  12-01 @ 07:00  --------------------------------------------------------  IN: 240 mL / OUT: 0 mL / NET: 240 mL    12-01 @ 07:01  -  12-01 @ 15:37  --------------------------------------------------------  IN: 100 mL / OUT: 0 mL / NET: 100 mL      CAPILLARY BLOOD GLUCOSE          PHYSICAL EXAM:  General: No apparent distress  HEENT: NC/AT  Lymph Nodes: No supraclavicular or cervical lymphadenopathy  Neck: Supple  Respiratory: CTAB, no wheezing or crackles, good air entry  Cardiovascular: RRR, no murmur, no LE edema  Abdomen: Soft, nontender, nondistended  Extremities: Warm  Skin: Intact  Neurological: A&Ox3, no focal deficits  Psychiatry: Normal mood and affect    HOSPITAL MEDICATIONS:  apixaban 2.5 milliGRAM(s) Oral two times a day      furosemide    Tablet 40 milliGRAM(s) Oral daily  hydrALAZINE 10 milliGRAM(s) Oral two times a day  isosorbide   mononitrate ER Tablet (IMDUR) 30 milliGRAM(s) Oral daily    methylPREDNISolone sodium succinate Injectable 40 milliGRAM(s) IV Push every 12 hours      acetaminophen     Tablet .. 650 milliGRAM(s) Oral every 6 hours PRN  melatonin 3 milliGRAM(s) Oral at bedtime PRN  ondansetron Injectable 4 milliGRAM(s) IV Push every 8 hours PRN    aluminum hydroxide/magnesium hydroxide/simethicone Suspension 30 milliLiter(s) Oral every 4 hours PRN        multivitamin/minerals 1 Tablet(s) Oral daily            LABS:                        10.9   3.64  )-----------( 203      ( 01 Dec 2022 07:25 )             35.4     Hgb Trend: 10.9<--, 11.5<--, 10.9<--  12-01    142  |  99  |  55<H>  ----------------------------<  123<H>  4.2   |  29  |  1.72<H>    Ca    9.3      01 Dec 2022 07:23    TPro  7.4  /  Alb  3.4  /  TBili  1.2  /  DBili  x   /  AST  16  /  ALT  12  /  AlkPhos  64  12-01    Creatinine Trend: 1.72<--, 1.54<--, 1.71<--            MICROBIOLOGY:     RADIOLOGY:  [x] Reviewed and interpreted by me  CXR 11/29/22 no consolidations or effusions    ASSESSMENT AND RECOMMENDATION:

## 2022-12-01 NOTE — SWALLOW BEDSIDE ASSESSMENT ADULT - ORAL PREPARATORY PHASE
prolonged on easy to chew; optimized efficiency on soft bite sized/Within functional limits Within functional limits

## 2022-12-01 NOTE — SWALLOW BEDSIDE ASSESSMENT ADULT - SWALLOW EVAL: DIAGNOSIS
89 yo F admitted for SOB; CHF exac; now breathing comfortably on 2L O2 NC. Patient presents on bedside swallow evaluation with grossly functional oropharyngeal swallow in setting of advanced age. Mastication is limited by edentulous lower arch; bolus prep/formation is safe and efficient on soft cut up solids. Oral management is otherwise adequate across textures trialed. There is no evidence of laryngeal penetration/aspiration.

## 2022-12-02 LAB
ANION GAP SERPL CALC-SCNC: 13 MMOL/L — SIGNIFICANT CHANGE UP (ref 5–17)
BUN SERPL-MCNC: 85 MG/DL — HIGH (ref 7–23)
CALCIUM SERPL-MCNC: 9.2 MG/DL — SIGNIFICANT CHANGE UP (ref 8.4–10.5)
CHLORIDE SERPL-SCNC: 100 MMOL/L — SIGNIFICANT CHANGE UP (ref 96–108)
CO2 SERPL-SCNC: 30 MMOL/L — SIGNIFICANT CHANGE UP (ref 22–31)
CREAT SERPL-MCNC: 2.32 MG/DL — HIGH (ref 0.5–1.3)
EGFR: 18 ML/MIN/1.73M2 — LOW
GLUCOSE SERPL-MCNC: 117 MG/DL — HIGH (ref 70–99)
POTASSIUM SERPL-MCNC: 4 MMOL/L — SIGNIFICANT CHANGE UP (ref 3.5–5.3)
POTASSIUM SERPL-SCNC: 4 MMOL/L — SIGNIFICANT CHANGE UP (ref 3.5–5.3)
SODIUM SERPL-SCNC: 143 MMOL/L — SIGNIFICANT CHANGE UP (ref 135–145)

## 2022-12-02 PROCEDURE — 99232 SBSQ HOSP IP/OBS MODERATE 35: CPT

## 2022-12-02 RX ADMIN — Medication 1 TABLET(S): at 12:14

## 2022-12-02 RX ADMIN — Medication 20 MILLIGRAM(S): at 05:21

## 2022-12-02 RX ADMIN — APIXABAN 2.5 MILLIGRAM(S): 2.5 TABLET, FILM COATED ORAL at 05:19

## 2022-12-02 RX ADMIN — APIXABAN 2.5 MILLIGRAM(S): 2.5 TABLET, FILM COATED ORAL at 17:34

## 2022-12-02 RX ADMIN — Medication 40 MILLIGRAM(S): at 05:19

## 2022-12-02 RX ADMIN — Medication 10 MILLIGRAM(S): at 05:19

## 2022-12-02 NOTE — PROGRESS NOTE ADULT - ASSESSMENT
89 yo F with PMH of Afib, HTN, HLD, arthritis admitted to hospital to [facility]. He was just discharged from hospital after being treated for PNA and discharged home on 2 lit home O2.    acute hypoxic respiratory failure 2/2 CHF exacerbation   continue O2 supplement     acute on chronic CHF exacerbation  resolving. ProBNP down to 21,000. Continue Lasix 40 mg daily.  D/ c Hydralazine and Imdur due to hypotension risk    Asthma exacerbation   resolved, continue Prednisone tapering and  albuterol PRN. F/u pulmonologist  outpatient .     severe aortic stenosis  new finding on echo, normal ejection fraction. Not candidate for TAVR procedure due to advanced age, CKD, dementia per cardio.     R breast swelling and stiffness  r/o malignancy with ultrasound.     HLD   Stable, well-controlled and continue Lovastatin 20 MG    Atrial fibrillation   EKG NSR, continue metoprolol and eliquis 2.5 MG    HTN   Metoprolol. D/c Hydralazine and Imdur.     [place] [facility] f/u with consult with audiology    Frontal facial skin lesion. S/p KELIN procedure by Dr. Lemos,[phone] continues dry dressing and f/u with dermatologist and surgeon as needed.     Vit D deficiency vit d supplements 65988 units monthly     Dysphagia:  Downgraded from regular to chopped food & thin liquid. F/u with speech therapist.    Cognitive disorder/dementia w/out behavior disturbance  F/u with psych, confirmed and evaluate, off of medication    DVT ppx   Eliquis

## 2022-12-02 NOTE — DISCHARGE NOTE PROVIDER - NSDCMRMEDTOKEN_GEN_ALL_CORE_FT
cholecalciferol 125 mcg (5000 intl units) oral capsule: 1 cap(s) orally once a day  Eliquis 2.5 mg oral tablet: 1 tab(s) orally 2 times a day  furosemide 40 mg oral tablet: 1 tab(s) orally once a day  lovastatin 20 mg oral tablet: 1 tab(s) orally once a day  metoprolol tartrate 25 mg oral tablet: 0.5 tab(s) orally 2 times a day  Milk of Magnesia 8% oral suspension: 30 milliliter(s) orally once a day (at bedtime), As Needed  multivitamin with minerals: 1 tab(s) orally once a day  polyethylene glycol 3350 oral powder for reconstitution: 17 gram(s) orally once a day  potassium chloride 20 mEq/15 mL oral liquid: 15 milliliter(s) orally once a day   amLODIPine 2.5 mg oral tablet: 1 tab(s) orally once a day  cholecalciferol 125 mcg (5000 intl units) oral capsule: 1 cap(s) orally once a day  Eliquis 2.5 mg oral tablet: 1 tab(s) orally 2 times a day  lovastatin 20 mg oral tablet: 1 tab(s) orally once a day  Milk of Magnesia 8% oral suspension: 30 milliliter(s) orally once a day (at bedtime), As Needed  multivitamin with minerals: 1 tab(s) orally once a day  polyethylene glycol 3350 oral powder for reconstitution: 17 gram(s) orally once a day

## 2022-12-02 NOTE — OCCUPATIONAL THERAPY INITIAL EVALUATION ADULT - PERTINENT HX OF CURRENT PROBLEM, REHAB EVAL
89 yo F with PMH of Afib, HTN, HLD, arthritis admitted to hospital to [facility]. She was just discharged from hospital after being treated for PNA and discharged home on 2 lit home O2. Patient was found hypoxic with labored breathing this morning and hypertensive. She was given one dose of Lasix 40 mg IM. Because she was still in labored breathing started on non-rebreather and was transferred to [facility] ER. She was found with elevated ProBNP. CXR showed mild CHF. Admitted for CHF exacerbation.

## 2022-12-02 NOTE — DISCHARGE NOTE PROVIDER - NSFOLLOWUPCLINICS_GEN_ALL_ED_FT
[facility] Pulmonolgy and Sleep Medicine  Pulmonology  [address] [place] [address], [place] [address]  Phone: [phone]  Fax:   Follow Up Time: 2 weeks

## 2022-12-02 NOTE — PHYSICAL THERAPY INITIAL EVALUATION ADULT - PERTINENT HX OF CURRENT PROBLEM, REHAB EVAL
89 yo F with PMH of Afib, HTN, HLD, arthritis admitted to hospital to [facility]. She was just discharged from hospital after being treated for PNA and discharged home on 2 lit home O2. Patient was found hypoxic with labored breathing this morning and hypertensive. She was given one dose of Lasix 40 mg IM. Because she was still in labored breathing started on non-rebreather and was transferred to [facility] ER. She was found with elevated ProBNP. CXR showed mild CHF. Admitted for CHF exacerbation.

## 2022-12-02 NOTE — DISCHARGE NOTE PROVIDER - PROVIDER TOKENS
PROVIDER:[TOKEN:[[id number]]] PROVIDER:[TOKEN:[[id number]]],PROVIDER:[TOKEN:[[id number]],FOLLOWUP:[2 weeks]] PROVIDER:[TOKEN:[[id number]],FOLLOWUP:[1-3 days]],PROVIDER:[TOKEN:[[id number]],FOLLOWUP:[2 weeks]]

## 2022-12-02 NOTE — CHART NOTE - NSCHARTNOTEFT_GEN_A_CORE
89 yo F with PMH of Afib, HTN, HLD, arthritis admitted to hospital to [facility]. Required O2 supp and lasix for acute hypoxic respiratory failure 2/2 CHF exacerbation.     12/1: Bedside swallow evaluation found grossly functional oropharyngeal swallow with mastication limited by edentulous lower arch. Recommended for soft bite sized/thin liquids.    Patient seen today for follow up to assess for upgrade of solids. AA+Ox4. 2L O2 NC. Patient tolerating soft bite sized and thin liquids with no reported or observed overt signs of laryngeal penetration/aspiration. As per d/w patient's son Yvan, patient has been eating regular/soft textures prior to admission despite absence of lower dentition; does not eat salads. Re-assessed at bedside with mildly prolonged yet functional oral prep/transfer/clearance across consistencies, optimized on Easy to Chew.     Pt left in NAD. VSS. Reclined in chair with footrest up. Call bell within reach.     RECOMMENDATIONS:   1) Easy to Chew/thin liquids  2) Assist for feeding    No further ST services indicated at this time. This service will remain available for reconsult as needed.    d/w fernandez De La Fuente, RN Zena Bravo MA, CCC-SLP ([phone] or Teams)

## 2022-12-02 NOTE — DISCHARGE NOTE PROVIDER - HOSPITAL COURSE
89 yo F with PMH of Afib, HTN, HLD, arthritis admitted to hospital to [facility]. He was just discharged from hospital after being treated for PNA and discharged home on 2 lit home O2.    Problem #1: Acute hypoxic respiratory failure 2/2 CHF exacerbation   -continue O2 supplement     Problem #2: Acute on chronic CHF exacerbation  -Improved  -Switched from IV Lasix to PO  -Continue Imdur and Hydralazine.     Problem #3: Asthma exacerbation   -Improved with IV steroids   -Tapered to po prednisone per pulmonary, appreciate input, albuterol PRN  -Follow up as outpatient for PFT.     Problem #4: Severe aortic stenosis  -New finding on echo, normal ejection fraction  -Not a candidate for TAVR procedure as per cardiology    Problem #5: R breast swelling and stiffness  -R/o malignancy with outpatient ultrasound    Problem #6: HLD   -Stable, well-controlled  -Continue Lovastatin 20 MG    Problem #7: Atrial fibrillation   -EKG NSR  -Continue metoprolol and eliquis 2.5 MG    Problem #8: HTN   -Continue Metoprolol    Problem #9: Frontal facial skin lesion  -S/p KELIN procedure by Dr. Lemos,[phone]   -Continue dry dressing and f/u with dermatologist and surgeon as needed.     Problem #10: Vit D deficiency vit d   -supplements 68409 units monthly

## 2022-12-02 NOTE — PHYSICAL THERAPY INITIAL EVALUATION ADULT - ADDITIONAL COMMENTS
Patient is from a nursing home. She states she has a rolling walker and wheelchair. Staff assist her to the chair.

## 2022-12-02 NOTE — PATIENT PROFILE ADULT - FUNCTIONAL ASSESSMENT - BASIC MOBILITY 5.
Сергей Beltran is a 37year old female. HPI:   Pt is here to f/u from her visit nearly 2 years ago, same R hip/knee/ankle/foot issues. Greatly interfering with her life.       When she is on her feet walking her ankles and feet feel like they \"need to Disorder Maternal Grandmother      ? in 45s   • Cancer Maternal Grandfather      ? bone or spine cancer   • Other[other] [OTHER] Paternal Grandmother      crippling arthritis, uncertain which type        Smoking Status: Never Smoker                      Sm Comp Metabolic Panel (14) [E]; Future  -     TSH [E]; Future  -     Hillary, Direct, Reflex To 9 Enas [E]; Future  -     Sed Rate, Westergren (Automated) [E]; Future  -     C-Reactive Protein [E]; Future  -     Cyclic Citrullinate Pep. IGG [E];  Future  -     C impacting her quality of life; has failed ankle bracing and shoe inserts to help with overpronation; I suspect a foot and ankle issue is triggering a cascade of knee and hip pains (neg hip xray 2 years ago); we opted to more aggressively look into her ankl 2 = A lot of assistance

## 2022-12-02 NOTE — DISCHARGE NOTE PROVIDER - NSDCACTIVITY_GEN_ALL_CORE
Follow Instructions Provided by your Surgical Team No restrictions/Follow Instructions Provided by your Surgical Team

## 2022-12-02 NOTE — DISCHARGE NOTE PROVIDER - CARE PROVIDER_API CALL
Andrea Durant)  Internal Medicine  [address] [place] [address]  Phone: [phone]  Fax: [phone]  Follow Up Time:    Andrea Durant)  Internal Medicine  [address] [place] [address]  Phone: [phone]  Fax: [phone]  Follow Up Time:     Marley Joyce  CARDIOVASCULAR DISEASE  [address] [place] [address], [facility] [address]  [place] [address]  Phone: [phone]  Fax: [phone]  Follow Up Time: 2 weeks   Andrea Durant)  Internal Medicine  [address] [place] [address]  Phone: [phone]  Fax: [phone]  Follow Up Time: 1-3 days    Marley Joyce  CARDIOVASCULAR DISEASE  [address] [place] [address], [facility] [address]  [place] [address]  Phone: [phone]  Fax: [phone]  Follow Up Time: 2 weeks

## 2022-12-02 NOTE — DISCHARGE NOTE PROVIDER - NSDCFUADDINST_GEN_ALL_CORE_FT
Please follow up with pulmonology 2-3 weeks after discharge  [address], [address]  [place] [address]  Phone: [phone]; if you can not contact them by phone, please email: [email or web address]

## 2022-12-02 NOTE — PROGRESS NOTE ADULT - ASSESSMENT
89 year old female with AF, HTN, and HLD presented with AMS. Found to be hypoxemic and hypertensive. Admitted for acute on chronic diastolic heart failure. TTE shows severe AS, hyperdynamic LV. Mentation is improved. Has mild wheezing on exam. Has remote history of asthma.    Diuresis as per cardiology / primary team  May benefit from short course steroids if there is a component of reactive airway disease / acute asthma exacerbation  Continue prednisone 20 mg daily x 3-5 days  Albuterol PRN    May follow with pulmonology within 2-3 weeks of discharge. Please email [email or web address] and include patient's name, , MRN, telephone number, and discharge diagnosis, and allow 24 hours for scheduling. The office is located at [address] [place] [address], [address]. Number [phone].

## 2022-12-02 NOTE — DISCHARGE NOTE PROVIDER - NSDCCPCAREPLAN_GEN_ALL_CORE_FT
PRINCIPAL DISCHARGE DIAGNOSIS  Diagnosis: Acute respiratory failure with hypoxia  Assessment and Plan of Treatment: !parf      SECONDARY DISCHARGE DIAGNOSES  Diagnosis: CHF, acute on chronic  Assessment and Plan of Treatment: Take all medications as prescribed.  Stop smoking if you currently If you are on medication to help you quit smoking, be sure to take it as prescribed. Find healthy ways to deal with stress, such as exercise (check with your healthcare provider first), deep breathing, meditation, or enjoyable healthy hobbies.  Avoid situations that may cause you to smoke a cigarette.  Look for help with quitting; you are not alone. A resource to help you stop smoking is the [facility] – phone number 239-214-1873., and avoid high altitudes.  Weigh yourself daily.  If you gain 3lbs in 3 days, or 5lbs in a week call your Health Care Provider.  Eat a low sodium diet.  If you have pulmonary hypertension and you are a female of child bearing age, talk to your caregiver about using birth control pills or getting pregnant.  Call your Health Care Provider if you have any swelling or increased swelling in your feet, ankles, and/or stomach.  If you experience dizziness, chest pain, or shortness of breath, seek immediate medical attention.    Diagnosis: Asthma exacerbation  Assessment and Plan of Treatment:     Diagnosis: Severe aortic stenosis  Assessment and Plan of Treatment: Aortic stenosis is a condition when the aortic valve in the heart does not open fully so not as much blood can flow out of the heart to the rest of the body & the heart has to work harder than usual to pump the blood out ot the heart to the rest of the body  Your doctor can hear a heart murmur  Call your doctor with shortness of breath, dizziness or fainting, or chest pain, which will  increase with physical activity  Your doctor will do echocardiograms to monitor  the condition  Take your cardiac medications as directed to minimize cardiac work load - it is important to keep your blood pressure & cholesterol levels  under control   Speak to your doctor about physical activity since this will depend on the severity of your condition    Diagnosis: Breast swelling  Assessment and Plan of Treatment:     Diagnosis: HLD (hyperlipidemia)  Assessment and Plan of Treatment: Low salt, low fat, low cholesterol, diabetic diet if appropriate  Continue medication as prescribed  Exercise, increase your activity level  Pt. verbalized an understanding of all instructions.    Diagnosis: Atrial fibrillation  Assessment and Plan of Treatment: Atrial fibrillation is a common heart rhythm problem which increases the risk of stroke and heat attack.  It helps if you control your blood pressure, avoid alcohol, cut down on caffeine, get treatment for your thyroid if it is overactive, and perform moderate exercise in consultation with your Primary Care Provider.  Call your doctor if you experience chest tightness/pain, lightheadedness, loss of consciousness, shortness of breath (especially with exercise), feel your heart racing or beating unusually, frequent or abnormal bleeding.  It is important to take all your heart medications as prescribed.    Diagnosis: HTN (hypertension)  Assessment and Plan of Treatment: Continue to follow a low salt/sodium diet.  Perform physical activities as tolerated in consultation with your Primary Care Provider and physical therapist.  Take all medications as prescribed.  Follow up with your medical doctor for routine blood pressure monitoring at your next visit.  Notify your doctor if you have any of the following symptoms:  Dizziness, lightheadedness, blurry vision, headache, chest pain, or shortness of breath.    Diagnosis: Vitamin D deficiency  Assessment and Plan of Treatment:      PRINCIPAL DISCHARGE DIAGNOSIS  Diagnosis: Acute respiratory failure with hypoxia  Assessment and Plan of Treatment: You presented to the hospital with a low oxygen saturation   You were given one dose of Lasix 40 mg IM prior to arrival   You had a chest x-ray done during admission which revealed mild congestive heart failure and were admitted for a CHF exacerbation   You were started on PO Lasix while in the hosptial  Follow up with your PCP upon discharge      SECONDARY DISCHARGE DIAGNOSES  Diagnosis: CHF, acute on chronic  Assessment and Plan of Treatment: Take all medications as prescribed.  Stop smoking if you currently If you are on medication to help you quit smoking, be sure to take it as prescribed. Find healthy ways to deal with stress, such as exercise (check with your healthcare provider first), deep breathing, meditation, or enjoyable healthy hobbies.  Avoid situations that may cause you to smoke a cigarette.  Look for help with quitting; you are not alone. A resource to help you stop smoking is the [facility] – phone number 503-286-5555., and avoid high altitudes.  Weigh yourself daily.  If you gain 3lbs in 3 days, or 5lbs in a week call your Health Care Provider.  Eat a low sodium diet.  If you have pulmonary hypertension and you are a female of child bearing age, talk to your caregiver about using birth control pills or getting pregnant.  Call your Health Care Provider if you have any swelling or increased swelling in your feet, ankles, and/or stomach.  If you experience dizziness, chest pain, or shortness of breath, seek immediate medical attention.    Diagnosis: Asthma exacerbation  Assessment and Plan of Treatment: You had an exacerbation of your asthma that improved with IV steroids   You were tapered to po prednisone per pulmonary  You received albuterol as needed  Follow up as outpatient for pulmonary function tests    Diagnosis: Severe aortic stenosis  Assessment and Plan of Treatment: Aortic stenosis is a condition when the aortic valve in the heart does not open fully so not as much blood can flow out of the heart to the rest of the body & the heart has to work harder than usual to pump the blood out ot the heart to the rest of the body  Your doctor can hear a heart murmur  Call your doctor with shortness of breath, dizziness or fainting, or chest pain, which will  increase with physical activity  Your doctor will do echocardiograms to monitor  the condition  Take your cardiac medications as directed to minimize cardiac work load - it is important to keep your blood pressure & cholesterol levels  under control   Speak to your doctor about physical activity since this will depend on the severity of your condition    Diagnosis: Breast swelling  Assessment and Plan of Treatment: You were noted to have swelling and stiffness of your right breast during admission   Follow up outpatient for a ultrasound/mammography of your breast    Diagnosis: HLD (hyperlipidemia)  Assessment and Plan of Treatment: Low salt, low fat, low cholesterol, diabetic diet if appropriate  Continue medication as prescribed  Exercise, increase your activity level  Pt. verbalized an understanding of all instructions.    Diagnosis: Atrial fibrillation  Assessment and Plan of Treatment: Atrial fibrillation is a common heart rhythm problem which increases the risk of stroke and heat attack.  It helps if you control your blood pressure, avoid alcohol, cut down on caffeine, get treatment for your thyroid if it is overactive, and perform moderate exercise in consultation with your Primary Care Provider.  Call your doctor if you experience chest tightness/pain, lightheadedness, loss of consciousness, shortness of breath (especially with exercise), feel your heart racing or beating unusually, frequent or abnormal bleeding.  It is important to take all your heart medications as prescribed.    Diagnosis: HTN (hypertension)  Assessment and Plan of Treatment: Continue to follow a low salt/sodium diet.  Perform physical activities as tolerated in consultation with your Primary Care Provider and physical therapist.  Take all medications as prescribed.  Follow up with your medical doctor for routine blood pressure monitoring at your next visit.  Notify your doctor if you have any of the following symptoms:  Dizziness, lightheadedness, blurry vision, headache, chest pain, or shortness of breath.    Diagnosis: Vitamin D deficiency  Assessment and Plan of Treatment: Continue vitamin D as prescribed

## 2022-12-02 NOTE — DISCHARGE NOTE PROVIDER - NSDCFUADDAPPT_GEN_ALL_CORE_FT
APPTS ARE READY TO BE MADE: [X] YES    Best Family or Patient Contact (if needed):    Additional Information about above appointments (if needed):    1: [facility] Pulmonology [phone]; if you can not contact them by phone, please email: [email or web address]   2:   3:     Other comments or requests:    APPTS ARE READY TO BE MADE: [X] YES    Best Family or Patient Contact (if needed):    Additional Information about above appointments (if needed):    1: [facility] Pulmonology [phone]; if you can not contact them by phone, please email: [email or web address]   2:   3:     Other comments or requests:     Patient is being discharged to rehab. Caregiver will arrange follow up.

## 2022-12-03 LAB
ANION GAP SERPL CALC-SCNC: 13 MMOL/L — SIGNIFICANT CHANGE UP (ref 5–17)
BUN SERPL-MCNC: 89 MG/DL — HIGH (ref 7–23)
CALCIUM SERPL-MCNC: 9.2 MG/DL — SIGNIFICANT CHANGE UP (ref 8.4–10.5)
CHLORIDE SERPL-SCNC: 104 MMOL/L — SIGNIFICANT CHANGE UP (ref 96–108)
CO2 SERPL-SCNC: 30 MMOL/L — SIGNIFICANT CHANGE UP (ref 22–31)
CREAT SERPL-MCNC: 2.22 MG/DL — HIGH (ref 0.5–1.3)
EGFR: 19 ML/MIN/1.73M2 — LOW
GLUCOSE SERPL-MCNC: 113 MG/DL — HIGH (ref 70–99)
POTASSIUM SERPL-MCNC: 3.5 MMOL/L — SIGNIFICANT CHANGE UP (ref 3.5–5.3)
POTASSIUM SERPL-SCNC: 3.5 MMOL/L — SIGNIFICANT CHANGE UP (ref 3.5–5.3)
SODIUM SERPL-SCNC: 147 MMOL/L — HIGH (ref 135–145)

## 2022-12-03 RX ORDER — POTASSIUM CHLORIDE 20 MEQ
40 PACKET (EA) ORAL ONCE
Refills: 0 | Status: COMPLETED | OUTPATIENT
Start: 2022-12-03 | End: 2022-12-03

## 2022-12-03 RX ORDER — FUROSEMIDE 40 MG
20 TABLET ORAL DAILY
Refills: 0 | Status: DISCONTINUED | OUTPATIENT
Start: 2022-12-03 | End: 2022-12-04

## 2022-12-03 RX ADMIN — APIXABAN 2.5 MILLIGRAM(S): 2.5 TABLET, FILM COATED ORAL at 17:32

## 2022-12-03 RX ADMIN — APIXABAN 2.5 MILLIGRAM(S): 2.5 TABLET, FILM COATED ORAL at 05:02

## 2022-12-03 RX ADMIN — Medication 40 MILLIEQUIVALENT(S): at 09:21

## 2022-12-03 RX ADMIN — Medication 20 MILLIGRAM(S): at 05:02

## 2022-12-03 RX ADMIN — Medication 40 MILLIGRAM(S): at 05:02

## 2022-12-03 RX ADMIN — Medication 1 TABLET(S): at 12:44

## 2022-12-03 NOTE — CHART NOTE - NSCHARTNOTEFT_GEN_A_CORE
(1) attempt(s) were made to reach patient, which have been unsuccessful. Unable to leave voicemail on 12/3.

## 2022-12-04 LAB
ANION GAP SERPL CALC-SCNC: 12 MMOL/L — SIGNIFICANT CHANGE UP (ref 5–17)
BUN SERPL-MCNC: 82 MG/DL — HIGH (ref 7–23)
CALCIUM SERPL-MCNC: 9.4 MG/DL — SIGNIFICANT CHANGE UP (ref 8.4–10.5)
CHLORIDE SERPL-SCNC: 107 MMOL/L — SIGNIFICANT CHANGE UP (ref 96–108)
CO2 SERPL-SCNC: 32 MMOL/L — HIGH (ref 22–31)
CREAT SERPL-MCNC: 1.91 MG/DL — HIGH (ref 0.5–1.3)
EGFR: 23 ML/MIN/1.73M2 — LOW
GLUCOSE SERPL-MCNC: 134 MG/DL — HIGH (ref 70–99)
HCT VFR BLD CALC: 37.4 % — SIGNIFICANT CHANGE UP (ref 34.5–45)
HGB BLD-MCNC: 11.5 G/DL — SIGNIFICANT CHANGE UP (ref 11.5–15.5)
MCHC RBC-ENTMCNC: 30.7 GM/DL — LOW (ref 32–36)
MCHC RBC-ENTMCNC: 31.6 PG — SIGNIFICANT CHANGE UP (ref 27–34)
MCV RBC AUTO: 102.7 FL — HIGH (ref 80–100)
NRBC # BLD: 0 /100 WBCS — SIGNIFICANT CHANGE UP (ref 0–0)
PLATELET # BLD AUTO: 239 K/UL — SIGNIFICANT CHANGE UP (ref 150–400)
POTASSIUM SERPL-MCNC: 4.1 MMOL/L — SIGNIFICANT CHANGE UP (ref 3.5–5.3)
POTASSIUM SERPL-SCNC: 4.1 MMOL/L — SIGNIFICANT CHANGE UP (ref 3.5–5.3)
RBC # BLD: 3.64 M/UL — LOW (ref 3.8–5.2)
RBC # FLD: 12.1 % — SIGNIFICANT CHANGE UP (ref 10.3–14.5)
SARS-COV-2 RNA SPEC QL NAA+PROBE: SIGNIFICANT CHANGE UP
SODIUM SERPL-SCNC: 151 MMOL/L — HIGH (ref 135–145)
WBC # BLD: 5.89 K/UL — SIGNIFICANT CHANGE UP (ref 3.8–10.5)
WBC # FLD AUTO: 5.89 K/UL — SIGNIFICANT CHANGE UP (ref 3.8–10.5)

## 2022-12-04 PROCEDURE — 71045 X-RAY EXAM CHEST 1 VIEW: CPT | Mod: 26

## 2022-12-04 RX ORDER — AMLODIPINE BESYLATE 2.5 MG/1
2.5 TABLET ORAL DAILY
Refills: 0 | Status: DISCONTINUED | OUTPATIENT
Start: 2022-12-04 | End: 2022-12-07

## 2022-12-04 RX ADMIN — Medication 20 MILLIGRAM(S): at 05:27

## 2022-12-04 RX ADMIN — Medication 20 MILLIGRAM(S): at 05:28

## 2022-12-04 RX ADMIN — APIXABAN 2.5 MILLIGRAM(S): 2.5 TABLET, FILM COATED ORAL at 17:43

## 2022-12-04 RX ADMIN — AMLODIPINE BESYLATE 2.5 MILLIGRAM(S): 2.5 TABLET ORAL at 13:02

## 2022-12-04 RX ADMIN — Medication 650 MILLIGRAM(S): at 15:07

## 2022-12-04 RX ADMIN — Medication 1 TABLET(S): at 12:59

## 2022-12-04 RX ADMIN — APIXABAN 2.5 MILLIGRAM(S): 2.5 TABLET, FILM COATED ORAL at 05:27

## 2022-12-04 RX ADMIN — Medication 650 MILLIGRAM(S): at 18:28

## 2022-12-04 NOTE — CHART NOTE - NSCHARTNOTEFT_GEN_A_CORE
1 attempt was made to reach patient, which have been unsuccessful. Unable to leave voicemail on 12/4/22

## 2022-12-05 LAB
ANION GAP SERPL CALC-SCNC: 12 MMOL/L — SIGNIFICANT CHANGE UP (ref 5–17)
BUN SERPL-MCNC: 78 MG/DL — HIGH (ref 7–23)
CALCIUM SERPL-MCNC: 9.9 MG/DL — SIGNIFICANT CHANGE UP (ref 8.4–10.5)
CHLORIDE SERPL-SCNC: 108 MMOL/L — SIGNIFICANT CHANGE UP (ref 96–108)
CO2 SERPL-SCNC: 33 MMOL/L — HIGH (ref 22–31)
CREAT SERPL-MCNC: 1.9 MG/DL — HIGH (ref 0.5–1.3)
EGFR: 23 ML/MIN/1.73M2 — LOW
GAS PNL BLDA: SIGNIFICANT CHANGE UP
GLUCOSE SERPL-MCNC: 135 MG/DL — HIGH (ref 70–99)
POTASSIUM SERPL-MCNC: 3.9 MMOL/L — SIGNIFICANT CHANGE UP (ref 3.5–5.3)
POTASSIUM SERPL-SCNC: 3.9 MMOL/L — SIGNIFICANT CHANGE UP (ref 3.5–5.3)
SODIUM SERPL-SCNC: 153 MMOL/L — HIGH (ref 135–145)
VIT B12 SERPL-MCNC: 1407 PG/ML — HIGH (ref 232–1245)

## 2022-12-05 PROCEDURE — 93010 ELECTROCARDIOGRAM REPORT: CPT

## 2022-12-05 PROCEDURE — 71045 X-RAY EXAM CHEST 1 VIEW: CPT | Mod: 26

## 2022-12-05 RX ORDER — IPRATROPIUM/ALBUTEROL SULFATE 18-103MCG
3 AEROSOL WITH ADAPTER (GRAM) INHALATION EVERY 6 HOURS
Refills: 0 | Status: DISCONTINUED | OUTPATIENT
Start: 2022-12-05 | End: 2022-12-07

## 2022-12-05 RX ORDER — SODIUM CHLORIDE 9 MG/ML
1000 INJECTION, SOLUTION INTRAVENOUS
Refills: 0 | Status: DISCONTINUED | OUTPATIENT
Start: 2022-12-05 | End: 2022-12-07

## 2022-12-05 RX ADMIN — APIXABAN 2.5 MILLIGRAM(S): 2.5 TABLET, FILM COATED ORAL at 06:01

## 2022-12-05 RX ADMIN — Medication 3 MILLILITER(S): at 17:52

## 2022-12-05 RX ADMIN — SODIUM CHLORIDE 70 MILLILITER(S): 9 INJECTION, SOLUTION INTRAVENOUS at 12:23

## 2022-12-05 RX ADMIN — APIXABAN 2.5 MILLIGRAM(S): 2.5 TABLET, FILM COATED ORAL at 17:52

## 2022-12-05 RX ADMIN — AMLODIPINE BESYLATE 2.5 MILLIGRAM(S): 2.5 TABLET ORAL at 06:02

## 2022-12-05 RX ADMIN — Medication 1 TABLET(S): at 12:22

## 2022-12-05 NOTE — PROGRESS NOTE ADULT - ASSESSMENT
89 yo F with PMH of Afib, HTN, HLD, arthritis admitted to hospital to [facility]. He was just discharged from hospital after being treated for PNA and discharged home on 2 lit home O2.    aspiration  r/o aspiration PNA. CXR negative. No leukocytosis. Keep NPO. Consult speech and swallow. Continue IV fluid.     hypernatremia  worse. Add IV fluid D5 W. Monitor sodium level closely.     acute hypoxic respiratory failure 2/2 CHF exacerbation   continue O2 supplement     acute on chronic CHF exacerbation  resolving. ProBNP down to 21,000. Continue Lasix 40 mg daily.  D/ c Hydralazine and Imdur due to hypotension risk    Asthma exacerbation   resolved, continue Prednisone tapering and  albuterol PRN. F/u pulmonologist  outpatient .     severe aortic stenosis  new finding on echo, normal ejection fraction. Not candidate for TAVR procedure due to advanced age, CKD, dementia per cardio.     R breast swelling and stiffness  r/o malignancy with ultrasound.     HLD   Stable, well-controlled and continue Lovastatin 20 MG    Atrial fibrillation   EKG NSR, continue metoprolol and eliquis 2.5 MG    HTN   Metoprolol. D/c Hydralazine and Imdur.     [facility] f/u with consult with audiology    Frontal facial skin lesion. S/p KELIN procedure by Dr. Lemos,[phone] continues dry dressing and f/u with dermatologist and surgeon as needed.     Vit D deficiency vit d supplements 22602 units monthly     Dysphagia:  Downgraded from regular to chopped food & thin liquid. F/u with speech therapist.    Cognitive disorder/dementia w/out behavior disturbance  F/u with psych, confirmed and evaluate, off of medication    DVT ppx   Eliquis

## 2022-12-05 NOTE — PROVIDER CONTACT NOTE (OTHER) - SITUATION
Giving pt PO medication with pudding. Then gave pt water and she started coughing and gagging. HOB was raised prior to administration of medication.
while providing am care hard mass felt on right breast. left breast is soft to touch no masses noted.
Pt A&Ox0, unable to follow commands, due for PO medications
Infromed by monitor tech HR decreased to 40
pt bp 109/54 hr 53.

## 2022-12-05 NOTE — PROVIDER CONTACT NOTE (OTHER) - ASSESSMENT
Patient alert and oriented x3, asymptomatic, no signs of distress noted. Patient was asleep at the time of event.  b/p 139/67 hr 73
Patient alert and oriented x3, asymptomatic. no complains of sob or cp.
Patient alert and oriented x3, no complaints of pain at site.
HR dropped down to 29, then up to 144 within 1 minute. Pt gagging. Pt now has a wet cough.
Pt A&Ox0, VSS. No s/s of pain, discomfort, or distress.

## 2022-12-05 NOTE — PROVIDER CONTACT NOTE (OTHER) - BACKGROUND
Patient admitted for acute respiratory failure with hypoxia.
Pt admitted for acute respiratory failure
Patient admitted for acute respiratory failure with hypoxia.
Patient admitted for acute respiratory failure with hypoxia.
Pt comes in for ARF with hypoxia d/t CHF exacerbation. S&S done that allows for thin liquids and easy to chew food.

## 2022-12-05 NOTE — CHART NOTE - NSCHARTNOTEFT_GEN_A_CORE
89 yo F with PMH of Afib, HTN, HLD, arthritis admitted to hospital to [facility]. He was just discharged from hospital after being treated for PNA and discharged home on 2 lit home O2.  Patient was found hypoxic with labored breathing and hypertensive. She was given one dose of Lasix 40 mg IV. Because she was still in labored breathing started on non-rebreather and was transferred to [facility] ER. She was found with elevated ProBNP. CXR showed mild CHF. Admitted for AHRF secondary to CHF exacerbation. She received 40 mg extra Lasix IV in ER. ECHO with severe AS with EF 75%.    Was notified by RN pt had episode of gagging and wet cough after AM meds were administered with water. During this time, pt experienced bradycardia (29 BPM) and subsequently up to 144 BPM. Confirmed event with tele. no vomiting.     Vital Signs Last 24 Hrs  T(C): 36.7 (05 Dec 2022 05:41), Max: 37.3 (04 Dec 2022 16:00)  T(F): 98.1 (05 Dec 2022 05:41), Max: 99.1 (04 Dec 2022 16:00)  HR: 88 (05 Dec 2022 05:41) (74 - 88)  BP: 157/97 (05 Dec 2022 05:41) (136/95 - 157/97)  BP(mean): 111 (04 Dec 2022 11:36) (111 - 111)  RR: 18 (05 Dec 2022 05:41) (18 - 18)  SpO2: 93% (05 Dec 2022 05:41) (93% - 99%)    Parameters below as of 05 Dec 2022 05:41  Patient On (Oxygen Delivery Method): nasal cannula 89 yo F with PMH of Afib, HTN, HLD, arthritis admitted to hospital to [facility]. He was just discharged from hospital after being treated for PNA and discharged home on 2 lit home O2.  Patient was found hypoxic with labored breathing and hypertensive. She was given one dose of Lasix 40 mg IV. Because she was still in labored breathing started on non-rebreather and was transferred to [facility] ER. She was found with elevated ProBNP. CXR showed mild CHF. Admitted for AHRF secondary to CHF exacerbation. She received 40 mg extra Lasix IV in ER. ECHO with severe AS with EF 75%.    Was notified by RN pt had episode of gagging and wet cough after AM meds were administered with water. During this time, pt experienced bradycardia (29 BPM) and subsequently up to 144 BPM. Confirmed event with tele. no vomiting. unknown if pt de-satted during event, was not on continuos pulse ox. pt is a poor historian.  pt appeared very tired on PE, but responsive and was able to follow commands. Lungs with scattered bilateral rhonchi and mild scattered inspiratory and expiratory wheezing, primarily on the left side.     Vital Signs Last 24 Hrs  T(C): 36.7 (05 Dec 2022 05:41), Max: 37.3 (04 Dec 2022 16:00)  T(F): 98.1 (05 Dec 2022 05:41), Max: 99.1 (04 Dec 2022 16:00)  HR: 88 (05 Dec 2022 05:41) (74 - 88)  BP: 157/97 (05 Dec 2022 05:41) (136/95 - 157/97)  BP(mean): 111 (04 Dec 2022 11:36) (111 - 111)  RR: 18 (05 Dec 2022 05:41) (18 - 18)  SpO2: 93% (05 Dec 2022 05:41) (93% - 99%)    Parameters below as of 05 Dec 2022 05:41  Patient On (Oxygen Delivery Method): nasal cannula    A/P: Bradycardic event s/p dysphagia with AM meds + water, r/o aspiration  -Urgent CXR  -Obtained EKG: Atrial flutter with variable AV block. inferior infarct, age undetermined. 89 yo F with PMH of Afib, HTN, HLD, arthritis admitted to hospital to [facility]. He was just discharged from hospital after being treated for PNA and discharged home on 2 lit home O2.  Patient was found hypoxic with labored breathing and hypertensive. She was given one dose of Lasix 40 mg IV. Because she was still in labored breathing started on non-rebreather and was transferred to [facility] ER. She was found with elevated ProBNP. CXR showed mild CHF. Admitted for AHRF secondary to CHF exacerbation. She received 40 mg extra Lasix IV in ER. ECHO with severe AS with EF 75%.    Was notified by RN pt had episode of gagging and wet cough after AM meds were administered with water. During this time, pt experienced bradycardia (29 BPM) and subsequently up to 144 BPM. Confirmed event with tele. no vomiting. unknown if pt de-satted during event, was not on continuos pulse ox. pt is a poor historian.  pt appeared very tired on PE, but responsive and was able to follow commands. Lungs with scattered bilateral rhonchi and mild scattered inspiratory and expiratory wheezing, primarily on the left side.     Vital Signs Last 24 Hrs  T(C): 36.7 (05 Dec 2022 05:41), Max: 37.3 (04 Dec 2022 16:00)  T(F): 98.1 (05 Dec 2022 05:41), Max: 99.1 (04 Dec 2022 16:00)  HR: 88 (05 Dec 2022 05:41) (74 - 88)  BP: 157/97 (05 Dec 2022 05:41) (136/95 - 157/97)  BP(mean): 111 (04 Dec 2022 11:36) (111 - 111)  RR: 18 (05 Dec 2022 05:41) (18 - 18)  SpO2: 93% (05 Dec 2022 05:41) (93% - 99%)    Parameters below as of 05 Dec 2022 05:41  Patient On (Oxygen Delivery Method): nasal cannula    A/P: Bradycardic event s/p dysphagia with AM meds + water, r/o aspiration  -Urgent CXR  -Obtained EKG: Atrial flutter with variable AV block. inferior infarct, age undetermined --> unchanged from prior 89 yo F with PMH of Afib, HTN, HLD, arthritis admitted to hospital to [facility]. He was just discharged from hospital after being treated for PNA and discharged home on 2 lit home O2.  Patient was found hypoxic with labored breathing and hypertensive. She was given one dose of Lasix 40 mg IV. Because she was still in labored breathing started on non-rebreather and was transferred to [facility] ER. She was found with elevated ProBNP. CXR showed mild CHF. Admitted for AHRF secondary to CHF exacerbation. She received 40 mg extra Lasix IV in ER. ECHO with severe AS with EF 75%.    Was notified by RN pt had episode of gagging and wet cough after AM meds were administered with water. During this time, pt experienced bradycardia (29 BPM) and subsequently up to 144 BPM. Confirmed event with tele. no vomiting. unknown if pt de-satted during event, was not on continuos pulse ox. pt is a poor historian.  pt appeared very tired on PE, but responsive and was able to follow commands. Lungs with scattered bilateral rhonchi and mild scattered inspiratory and expiratory wheezing, primarily on the left side.     Vital Signs Last 24 Hrs  T(C): 36.7 (05 Dec 2022 05:41), Max: 37.3 (04 Dec 2022 16:00)  T(F): 98.1 (05 Dec 2022 05:41), Max: 99.1 (04 Dec 2022 16:00)  HR: 88 (05 Dec 2022 05:41) (74 - 88)  BP: 157/97 (05 Dec 2022 05:41) (136/95 - 157/97)  BP(mean): 111 (04 Dec 2022 11:36) (111 - 111)  RR: 18 (05 Dec 2022 05:41) (18 - 18)  SpO2: 93% (05 Dec 2022 05:41) (93% - 99%)    CONSTITUTIONAL: no apparent distress  RESP: No respiratory distress, no use of accessory muscles; Lungs with scattered bilateral rhonchi and mild scattered inspiratory and expiratory wheezing, primarily on the left side.   CV: Irregularly irregular, +S1S2, no JVD; mild bilateral trace peripheral edema  SKIN: No rashes or ulcers noted; no subcutaneous nodules or induration palpable  NEURO: Responds to verbal stimuli, able to follow commands. A&Ox1 (Baseline A&Ox2)     Parameters below as of 05 Dec 2022 05:41  Patient On (Oxygen Delivery Method): nasal cannula    A/P: Bradycardic event s/p dysphagia with AM meds + water, r/o aspiration  -Urgent CXR  -Obtained EKG: Atrial flutter at 86 BPM with variable AV block. inferior infarct, age undetermined --> unchanged from prior  -Placed on NPO, reconsulted speech and swallow  -Continuous pulse ox and ABG ordered 89 yo F with PMH of Afib, HTN, HLD, arthritis admitted to hospital to [facility]. He was just discharged from hospital after being treated for PNA and discharged home on 2 lit home O2.  Patient was found hypoxic with labored breathing and hypertensive. She was given one dose of Lasix 40 mg IV. Because she was still in labored breathing started on non-rebreather and was transferred to [facility] ER. She was found with elevated ProBNP. CXR showed mild CHF. Admitted for AHRF secondary to CHF exacerbation. She received 40 mg extra Lasix IV in ER. ECHO with severe AS with EF 75%.    Was notified by RN pt had episode of gagging and wet cough after AM meds were administered with water. During this time, pt experienced bradycardia (29 BPM) and subsequently up to 144 BPM. Confirmed event with tele. no vomiting. unknown if pt de-satted during event, was not on continuos pulse ox. pt is a poor historian.  pt appeared very tired on PE, but responsive and was able to follow commands. Lungs with scattered bilateral rhonchi and mild scattered inspiratory and expiratory wheezing, primarily on the left side.     Vital Signs Last 24 Hrs  T(C): 36.7 (05 Dec 2022 05:41), Max: 37.3 (04 Dec 2022 16:00)  T(F): 98.1 (05 Dec 2022 05:41), Max: 99.1 (04 Dec 2022 16:00)  HR: 88 (05 Dec 2022 05:41) (74 - 88)  BP: 157/97 (05 Dec 2022 05:41) (136/95 - 157/97)  BP(mean): 111 (04 Dec 2022 11:36) (111 - 111)  RR: 18 (05 Dec 2022 05:41) (18 - 18)  SpO2: 93% (05 Dec 2022 05:41) (93% - 99%)    CONSTITUTIONAL: no apparent distress  RESP: No respiratory distress, no use of accessory muscles; Lungs with scattered bilateral rhonchi and mild scattered inspiratory and expiratory wheezing, primarily on the left side.   CV: Irregularly irregular, +S1S2, no JVD; mild bilateral trace peripheral edema  SKIN: No rashes or ulcers noted; no subcutaneous nodules or induration palpable  NEURO: Responds to verbal stimuli, able to follow commands. A&Ox1 (Baseline A&Ox2)     Parameters below as of 05 Dec 2022 05:41  Patient On (Oxygen Delivery Method): nasal cannula    A/P: Bradycardic event s/p dysphagia with AM meds + water, r/o aspiration  -Urgent CXR  -Obtained EKG: Atrial flutter at 86 BPM with variable AV block. inferior infarct, age undetermined --> unchanged from prior  -Placed on NPO, reconsulted speech and swallow  -Continuous pulse ox and ABG ordered

## 2022-12-05 NOTE — PROVIDER CONTACT NOTE (OTHER) - REASON
possible aspiration episode
pt bp low.
pt has hard mass on right breast.
HR 40
Pt A&Ox0, unable to follow commands, due for PO medications

## 2022-12-05 NOTE — PROVIDER CONTACT NOTE (OTHER) - ACTION/TREATMENT ORDERED:
Chest XR, continuous pulse ox. Pt saw provider at bedside to assess lung sounds. Pending results, pt placed NPO for now. Chest XR, continuous pulse ox., and EKG ordered. Pt saw provider at bedside to assess lung sounds. Pending results, pt placed NPO for now.

## 2022-12-05 NOTE — PROVIDER CONTACT NOTE (OTHER) - RECOMMENDATIONS
Continue to monitor patient
aspiration work up
Order speech and swallow
Continue to monitor patient
contact provider

## 2022-12-05 NOTE — CHART NOTE - NSCHARTNOTEFT_GEN_A_CORE
89 yo F with PMH of Afib, HTN, HLD, arthritis admitted to hospital to [facility]. Required O2 supp and lasix for acute hypoxic respiratory failure 2/2 CHF exacerbation.     12/1: Bedside swallow evaluation found grossly functional oropharyngeal swallow with mastication limited by edentulous lower arch. Recommended for soft bite sized/thin liquids.  12/2: Re-evaluated for upgrade. Recommended to baseline diet of Easy to Chew/thin liquids. Assist with feeding. D/w son Yvan.    Order received for repeat Bedside Swallow Evaluation due to RN reporting possible aspiration event while giving medication this morning. Patient sleeping; aroused to verbal stimuli. Oriented to name, hospital. 95% on 2L O2 NC. More sleepy, less engaged than on initial evaluation. Temp. 99.7. Adequate oral management and no overt signs of laryngeal penetration/aspiration on purees and thin liquids. Given increased lethargy/somnolence, less conservative textures deferred at this time. Patient also declining additional po intake. Will continue to follow.    Pt left in NAD. VSS, in care of OVI Nieves.     RECOMMENDATIONS:   1) Puree/thin liquids  2) NO Straws  3) SLOW rate; SMALL single sips/bites  4) Assist for feeding--Please provide verbal cueing to optimize orientation to food/utensil (Pt [place] and visual deficits)  5) Feed ONLY when awake, alert.  6) Crush medications in applesauce or provide via alternate route.    Monitor for s/s aspiration/laryngeal penetration. If noted:  D/C p.o. intake, provide non-oral nutrition/hydration/meds, and contact this service @ [phone]     d/w OVI Nieves; EVETTE Bravo MA, CCC-SLP  TEAMS or [phone]

## 2022-12-05 NOTE — CHART NOTE - NSCHARTNOTESELECT_GEN_ALL_CORE
Event Note
d/c appt/Event Note
Right breast enlargement/skin thickening/Event Note
Speech/Swallow
Speech/Swallow
d/c appt/Event Note
d/c appt/Event Note

## 2022-12-06 LAB
ANION GAP SERPL CALC-SCNC: 8 MMOL/L — SIGNIFICANT CHANGE UP (ref 5–17)
BUN SERPL-MCNC: 62 MG/DL — HIGH (ref 7–23)
CALCIUM SERPL-MCNC: 9.4 MG/DL — SIGNIFICANT CHANGE UP (ref 8.4–10.5)
CHLORIDE SERPL-SCNC: 108 MMOL/L — SIGNIFICANT CHANGE UP (ref 96–108)
CO2 SERPL-SCNC: 35 MMOL/L — HIGH (ref 22–31)
CREAT SERPL-MCNC: 1.56 MG/DL — HIGH (ref 0.5–1.3)
EGFR: 30 ML/MIN/1.73M2 — LOW
GLUCOSE SERPL-MCNC: 129 MG/DL — HIGH (ref 70–99)
HCT VFR BLD CALC: 38.7 % — SIGNIFICANT CHANGE UP (ref 34.5–45)
HGB BLD-MCNC: 11.7 G/DL — SIGNIFICANT CHANGE UP (ref 11.5–15.5)
MCHC RBC-ENTMCNC: 30.2 GM/DL — LOW (ref 32–36)
MCHC RBC-ENTMCNC: 32 PG — SIGNIFICANT CHANGE UP (ref 27–34)
MCV RBC AUTO: 105.7 FL — HIGH (ref 80–100)
NRBC # BLD: 0 /100 WBCS — SIGNIFICANT CHANGE UP (ref 0–0)
PLATELET # BLD AUTO: 215 K/UL — SIGNIFICANT CHANGE UP (ref 150–400)
POTASSIUM SERPL-MCNC: 3.8 MMOL/L — SIGNIFICANT CHANGE UP (ref 3.5–5.3)
POTASSIUM SERPL-SCNC: 3.8 MMOL/L — SIGNIFICANT CHANGE UP (ref 3.5–5.3)
RBC # BLD: 3.66 M/UL — LOW (ref 3.8–5.2)
RBC # FLD: 12 % — SIGNIFICANT CHANGE UP (ref 10.3–14.5)
SODIUM SERPL-SCNC: 151 MMOL/L — HIGH (ref 135–145)
WBC # BLD: 6.84 K/UL — SIGNIFICANT CHANGE UP (ref 3.8–10.5)
WBC # FLD AUTO: 6.84 K/UL — SIGNIFICANT CHANGE UP (ref 3.8–10.5)

## 2022-12-06 PROCEDURE — 71045 X-RAY EXAM CHEST 1 VIEW: CPT | Mod: 26

## 2022-12-06 PROCEDURE — 99233 SBSQ HOSP IP/OBS HIGH 50: CPT

## 2022-12-06 RX ADMIN — Medication 1 TABLET(S): at 11:26

## 2022-12-06 RX ADMIN — Medication 3 MILLILITER(S): at 17:48

## 2022-12-06 RX ADMIN — APIXABAN 2.5 MILLIGRAM(S): 2.5 TABLET, FILM COATED ORAL at 17:46

## 2022-12-06 RX ADMIN — AMLODIPINE BESYLATE 2.5 MILLIGRAM(S): 2.5 TABLET ORAL at 05:05

## 2022-12-06 RX ADMIN — Medication 3 MILLILITER(S): at 00:07

## 2022-12-06 RX ADMIN — APIXABAN 2.5 MILLIGRAM(S): 2.5 TABLET, FILM COATED ORAL at 05:05

## 2022-12-06 RX ADMIN — SODIUM CHLORIDE 70 MILLILITER(S): 9 INJECTION, SOLUTION INTRAVENOUS at 09:14

## 2022-12-06 RX ADMIN — Medication 3 MILLILITER(S): at 11:28

## 2022-12-06 RX ADMIN — Medication 3 MILLILITER(S): at 05:06

## 2022-12-06 RX ADMIN — SODIUM CHLORIDE 70 MILLILITER(S): 9 INJECTION, SOLUTION INTRAVENOUS at 16:53

## 2022-12-06 NOTE — PROGRESS NOTE ADULT - ASSESSMENT
Pt is a 89F with PMHx AF, HTN, and HLD initially presenting to [facility] on 22 with AMS 2/2 acute on chronic hypoxemic respiratory failure and hypertensive admitted for acute on chronic diastolic heart failure exacerbation requiring IV diuresis. TTE performed showing severe AoS with a  hyperdynamic LV. Pulmonary consulted for end expiratory wheezing with known remote history of asthma.     -Diuresis as per cardiology and primary team. Held 2/2 hypernatremia.  -Now s/p completion of PO steroid course  -c/w Albuterol PRN  -May follow with pulmonology within 2-3 weeks of discharge. Please email [email or web address] and include patient's name, , MRN, telephone number, and discharge diagnosis, and allow 24 hours for scheduling. The office is located at [address] [place] [address], Suite [facility]. Number [phone]. Pt is a 89F with PMHx AF, HTN, and HLD initially presenting to [facility] on 22 with AMS 2/2 acute on chronic hypoxemic respiratory failure and hypertensive admitted for acute on chronic diastolic heart failure exacerbation requiring IV diuresis. TTE performed showing severe AoS with a  hyperdynamic LV. Pulmonary consulted for end expiratory wheezing with known remote history of asthma, now improved.    -Diuresis as per cardiology and primary team. Held 2/2 hypernatremia. Mentation improved with resoling electrolyte abnormality.  -Now s/p completion of PO steroid course. No indication to continue at this time.   -c/w Albuterol PRN  -May follow with pulmonology within 2-3 weeks of discharge. Please email [email or web address] and include patient's name, , MRN, telephone number, and discharge diagnosis, and allow 24 hours for scheduling. The office is located at [address] [place] [address], [address]. Number [phone].

## 2022-12-06 NOTE — PROGRESS NOTE ADULT - ASSESSMENT
89 yo F with PMH of Afib, HTN, HLD, arthritis admitted to hospital to [facility]. He was just discharged from hospital after being treated for PNA and discharged home on 2 lit home O2.    aspiration  r/o aspiration PNA. 1st CXR negative. No leukocytosis. Keep NPO. Consult speech and swallow. Continue IV fluid.  Patient still coughing, repeat another CXR.     hypernatremia  worse. Add IV fluid D5 W. Monitor sodium level closely.     acute hypoxic respiratory failure 2/2 CHF exacerbation   continue O2 supplement     acute on chronic CHF exacerbation  resolving. ProBNP down to 21,000. Continue Lasix 40 mg daily.  D/ c Hydralazine and Imdur due to hypotension risk    Asthma exacerbation   resolved, continue Prednisone tapering and  albuterol PRN. F/u pulmonologist  outpatient .     severe aortic stenosis  new finding on echo, normal ejection fraction. Not candidate for TAVR procedure due to advanced age, CKD, dementia per cardio.     R breast swelling and stiffness  r/o malignancy with ultrasound.     HLD   Stable, well-controlled and continue Lovastatin 20 MG    Atrial fibrillation   EKG NSR, continue metoprolol and eliquis 2.5 MG    HTN   Metoprolol. D/c Hydralazine and Imdur.     [place] [employer] f/u with consult with audiology    Frontal facial skin lesion. S/p KLEIN procedure by Dr. Lemos,[phone] continues dry dressing and f/u with dermatologist and surgeon as needed.     Vit D deficiency vit d supplements 24710 units monthly     Dysphagia:  Downgraded from regular to chopped food & thin liquid. F/u with speech therapist.    Cognitive disorder/dementia w/out behavior disturbance  F/u with psych, confirmed and evaluate, off of medication    DVT ppx   Eliquis

## 2022-12-07 VITALS
RESPIRATION RATE: 18 BRPM | DIASTOLIC BLOOD PRESSURE: 68 MMHG | HEART RATE: 84 BPM | SYSTOLIC BLOOD PRESSURE: 119 MMHG | OXYGEN SATURATION: 98 % | TEMPERATURE: 98 F

## 2022-12-07 LAB
ANION GAP SERPL CALC-SCNC: 13 MMOL/L — SIGNIFICANT CHANGE UP (ref 5–17)
BUN SERPL-MCNC: 49 MG/DL — HIGH (ref 7–23)
CALCIUM SERPL-MCNC: 8.7 MG/DL — SIGNIFICANT CHANGE UP (ref 8.4–10.5)
CHLORIDE SERPL-SCNC: 103 MMOL/L — SIGNIFICANT CHANGE UP (ref 96–108)
CO2 SERPL-SCNC: 25 MMOL/L — SIGNIFICANT CHANGE UP (ref 22–31)
CREAT SERPL-MCNC: 1.36 MG/DL — HIGH (ref 0.5–1.3)
EGFR: 35 ML/MIN/1.73M2 — LOW
GLUCOSE SERPL-MCNC: 120 MG/DL — HIGH (ref 70–99)
HCT VFR BLD CALC: 37 % — SIGNIFICANT CHANGE UP (ref 34.5–45)
HGB BLD-MCNC: 11.1 G/DL — LOW (ref 11.5–15.5)
MCHC RBC-ENTMCNC: 30 GM/DL — LOW (ref 32–36)
MCHC RBC-ENTMCNC: 31.3 PG — SIGNIFICANT CHANGE UP (ref 27–34)
MCV RBC AUTO: 104.2 FL — HIGH (ref 80–100)
NRBC # BLD: 0 /100 WBCS — SIGNIFICANT CHANGE UP (ref 0–0)
PLATELET # BLD AUTO: 185 K/UL — SIGNIFICANT CHANGE UP (ref 150–400)
POTASSIUM SERPL-MCNC: 4.1 MMOL/L — SIGNIFICANT CHANGE UP (ref 3.5–5.3)
POTASSIUM SERPL-SCNC: 4.1 MMOL/L — SIGNIFICANT CHANGE UP (ref 3.5–5.3)
RBC # BLD: 3.55 M/UL — LOW (ref 3.8–5.2)
RBC # FLD: 11.9 % — SIGNIFICANT CHANGE UP (ref 10.3–14.5)
SARS-COV-2 RNA SPEC QL NAA+PROBE: SIGNIFICANT CHANGE UP
SODIUM SERPL-SCNC: 141 MMOL/L — SIGNIFICANT CHANGE UP (ref 135–145)
WBC # BLD: 7.9 K/UL — SIGNIFICANT CHANGE UP (ref 3.8–10.5)
WBC # FLD AUTO: 7.9 K/UL — SIGNIFICANT CHANGE UP (ref 3.8–10.5)

## 2022-12-07 PROCEDURE — 85014 HEMATOCRIT: CPT

## 2022-12-07 PROCEDURE — 80048 BASIC METABOLIC PNL TOTAL CA: CPT

## 2022-12-07 PROCEDURE — 84132 ASSAY OF SERUM POTASSIUM: CPT

## 2022-12-07 PROCEDURE — 0225U NFCT DS DNA&RNA 21 SARSCOV2: CPT

## 2022-12-07 PROCEDURE — 82607 VITAMIN B-12: CPT

## 2022-12-07 PROCEDURE — 80061 LIPID PANEL: CPT

## 2022-12-07 PROCEDURE — 82553 CREATINE MB FRACTION: CPT

## 2022-12-07 PROCEDURE — 83036 HEMOGLOBIN GLYCOSYLATED A1C: CPT

## 2022-12-07 PROCEDURE — 84295 ASSAY OF SERUM SODIUM: CPT

## 2022-12-07 PROCEDURE — 71045 X-RAY EXAM CHEST 1 VIEW: CPT

## 2022-12-07 PROCEDURE — 82435 ASSAY OF BLOOD CHLORIDE: CPT

## 2022-12-07 PROCEDURE — 83605 ASSAY OF LACTIC ACID: CPT

## 2022-12-07 PROCEDURE — 85018 HEMOGLOBIN: CPT

## 2022-12-07 PROCEDURE — 82330 ASSAY OF CALCIUM: CPT

## 2022-12-07 PROCEDURE — 97165 OT EVAL LOW COMPLEX 30 MIN: CPT

## 2022-12-07 PROCEDURE — 85025 COMPLETE CBC W/AUTO DIFF WBC: CPT

## 2022-12-07 PROCEDURE — 82803 BLOOD GASES ANY COMBINATION: CPT

## 2022-12-07 PROCEDURE — 82550 ASSAY OF CK (CPK): CPT

## 2022-12-07 PROCEDURE — 80053 COMPREHEN METABOLIC PANEL: CPT

## 2022-12-07 PROCEDURE — 36600 WITHDRAWAL OF ARTERIAL BLOOD: CPT

## 2022-12-07 PROCEDURE — 93306 TTE W/DOPPLER COMPLETE: CPT

## 2022-12-07 PROCEDURE — 92610 EVALUATE SWALLOWING FUNCTION: CPT

## 2022-12-07 PROCEDURE — 83880 ASSAY OF NATRIURETIC PEPTIDE: CPT

## 2022-12-07 PROCEDURE — U0005: CPT

## 2022-12-07 PROCEDURE — 82947 ASSAY GLUCOSE BLOOD QUANT: CPT

## 2022-12-07 PROCEDURE — 96374 THER/PROPH/DIAG INJ IV PUSH: CPT

## 2022-12-07 PROCEDURE — 92526 ORAL FUNCTION THERAPY: CPT

## 2022-12-07 PROCEDURE — U0003: CPT

## 2022-12-07 PROCEDURE — 93005 ELECTROCARDIOGRAM TRACING: CPT

## 2022-12-07 PROCEDURE — 85027 COMPLETE CBC AUTOMATED: CPT

## 2022-12-07 PROCEDURE — 83735 ASSAY OF MAGNESIUM: CPT

## 2022-12-07 PROCEDURE — 36415 COLL VENOUS BLD VENIPUNCTURE: CPT

## 2022-12-07 PROCEDURE — 99285 EMERGENCY DEPT VISIT HI MDM: CPT

## 2022-12-07 PROCEDURE — 84484 ASSAY OF TROPONIN QUANT: CPT

## 2022-12-07 PROCEDURE — 94640 AIRWAY INHALATION TREATMENT: CPT

## 2022-12-07 PROCEDURE — 97161 PT EVAL LOW COMPLEX 20 MIN: CPT

## 2022-12-07 RX ORDER — AMLODIPINE BESYLATE 2.5 MG/1
1 TABLET ORAL
Qty: 0 | Refills: 0 | DISCHARGE
Start: 2022-12-07

## 2022-12-07 RX ADMIN — Medication 3 MILLILITER(S): at 12:13

## 2022-12-07 RX ADMIN — AMLODIPINE BESYLATE 2.5 MILLIGRAM(S): 2.5 TABLET ORAL at 05:25

## 2022-12-07 RX ADMIN — Medication 3 MILLILITER(S): at 05:24

## 2022-12-07 RX ADMIN — APIXABAN 2.5 MILLIGRAM(S): 2.5 TABLET, FILM COATED ORAL at 05:24

## 2022-12-07 RX ADMIN — Medication 1 TABLET(S): at 12:14

## 2022-12-07 RX ADMIN — Medication 3 MILLILITER(S): at 00:04

## 2022-12-07 RX ADMIN — SODIUM CHLORIDE 70 MILLILITER(S): 9 INJECTION, SOLUTION INTRAVENOUS at 07:15

## 2022-12-07 NOTE — DISCHARGE NOTE NURSING/CASE MANAGEMENT/SOCIAL WORK - PATIENT PORTAL LINK FT
You can access the FollowMyHealth Patient Portal offered by [facility] by registering at the following website: [email or web address]. By joining [employer]’s FollowMyHealth portal, you will also be able to view your health information using other applications (apps) compatible with our system.

## 2022-12-07 NOTE — DISCHARGE NOTE NURSING/CASE MANAGEMENT/SOCIAL WORK - NSDCFUADDAPPT_GEN_ALL_CORE_FT
APPTS ARE READY TO BE MADE: [X] YES    Best Family or Patient Contact (if needed):    Additional Information about above appointments (if needed):    1: [facility] Pulmonology [phone]; if you can not contact them by phone, please email: [email or web address]   2:   3:     Other comments or requests:

## 2022-12-07 NOTE — PROGRESS NOTE ADULT - SUBJECTIVE AND OBJECTIVE BOX
CARDIOLOGY     PROGRESS  NOTE   ________________________________________________    CHIEF COMPLAINT:Patient is a 89y old  Female who presents with a chief complaint of SOB , labored breathing, acute hypoxic respiratory failure (03 Dec 2022 10:38)  no complain  	  REVIEW OF SYSTEMS:  CONSTITUTIONAL: No fever, weight loss, or fatigue  EYES: No eye pain, visual disturbances, or discharge  ENT:  No difficulty hearing, tinnitus, vertigo; No sinus or throat pain  NECK: No pain or stiffness  RESPIRATORY: No cough, wheezing, chills or hemoptysis; + decrease Shortness of Breath  CARDIOVASCULAR: No chest pain, palpitations, passing out, dizziness, or leg swelling  GASTROINTESTINAL: No abdominal or epigastric pain. No nausea, vomiting, or hematemesis; No diarrhea or constipation. No melena or hematochezia.  GENITOURINARY: No dysuria, frequency, hematuria, or incontinence  NEUROLOGICAL: No headaches, memory loss, loss of strength, numbness, or tremors  SKIN: No itching, burning, rashes, or lesions   LYMPH Nodes: No enlarged glands  ENDOCRINE: No heat or cold intolerance; No hair loss  MUSCULOSKELETAL: No joint pain or swelling; No muscle, back, or extremity pain  PSYCHIATRIC: No depression, anxiety, mood swings, or difficulty sleeping  HEME/LYMPH: No easy bruising, or bleeding gums  ALLERGY AND IMMUNOLOGIC: No hives or eczema	    [ ] All others negative	  [ ] Unable to obtain    PHYSICAL EXAM:  T(C): 36.6 (12-04-22 @ 05:12), Max: 36.9 (12-03-22 @ 21:13)  HR: 78 (12-04-22 @ 05:12) (68 - 80)  BP: 163/83 (12-04-22 @ 05:12) (135/78 - 163/83)  RR: 18 (12-04-22 @ 05:12) (18 - 18)  SpO2: 97% (12-04-22 @ 05:12) (96% - 97%)  Wt(kg): --  I&O's Summary    03 Dec 2022 07:01  -  04 Dec 2022 07:00  --------------------------------------------------------  IN: 597 mL / OUT: 1300 mL / NET: -703 mL        Appearance: Normal	  HEENT:   Normal oral mucosa, PERRL, EOMI	  Lymphatic: No lymphadenopathy  Cardiovascular: Normal S1 S2, No JVD, + murmurs, No edema  Respiratory: rhonchi  Psychiatry:dementia  Gastrointestinal:  Soft, Non-tender, + BS	  Skin: No rashes, No ecchymoses, No cyanosis	  Extremities: Normal range of motion, No clubbing, cyanosis or edema  Vascular: Peripheral pulses palpable 2+ bilaterally    MEDICATIONS  (STANDING):  apixaban 2.5 milliGRAM(s) Oral two times a day  furosemide    Tablet 20 milliGRAM(s) Oral daily  multivitamin/minerals 1 Tablet(s) Oral daily  predniSONE   Tablet 20 milliGRAM(s) Oral daily      TELEMETRY: 	    ECG:  	  RADIOLOGY:  OTHER: 	  	  LABS:	 	    CARDIAC MARKERS:                                11.5   5.89  )-----------( 239      ( 04 Dec 2022 05:24 )             37.4     12-04    151<H>  |  107  |  82<H>  ----------------------------<  134<H>  4.1   |  32<H>  |  1.91<H>    Ca    9.4      04 Dec 2022 05:00      proBNP: Serum Pro-Brain Natriuretic Peptide: 25177 pg/mL (12-01 @ 22:00)  Serum Pro-Brain Natriuretic Peptide: 75262 pg/mL (11-29 @ 14:56)    Lipid Profile: Cholesterol 157  LDL --  HDL 46  TG 93    HgA1c:   TSH:         Assessment and plan  ---------------------------  89 yo F with PMH of Afib, HTN, HLD, arthritis admitted to hospital to [facility]. He was just discharged from hospital after being treated for PNA and discharged home on 2 lit home O2.  Patient was found hypoxic with labored breathing this morning and hypertensive. She was given one dose of Lasix 40 mg IM. Because she was still in labored breathing started on non-rebreather and was transferred to [place]. She was found with elevated ProBNP. CXR showed mild CHF. Will be admitted for CHF exacerbation. She received 40 mg extra Lasix IV in ER.    chf ?systolic acute on chronic/ severe AS  dc beta blocker sec to bradycardia  continue AC  strict i/o  ckd check bladder scan  strict i/o  observe on tele, a.flutter hr is well controlled  echo noted with severe AS, pt with advanced age,CKD/ dementia not a candidate for intervention  dc hydralazine and nitrate avoid hypotension  continue lasix, decrease dose to 20 mg daily  fu hr on tele  will repeat chest x ray today  check bladder scan  dc prednisonea  add norvasc for bp control    	        
           CARDIOLOGY     PROGRESS  NOTE   ________________________________________________    CHIEF COMPLAINT:Patient is a 89y old  Female who presents with a chief complaint of SOB , labored breathing, acute hypoxic respiratory failure (30 Nov 2022 18:36)    	  REVIEW OF SYSTEMS:  CONSTITUTIONAL: No fever, weight loss, or fatigue  EYES: No eye pain, visual disturbances, or discharge  ENT:  No difficulty hearing, tinnitus, vertigo; No sinus or throat pain  NECK: No pain or stiffness  RESPIRATORY: No cough, wheezing, chills or hemoptysis; + decrease Shortness of Breath  CARDIOVASCULAR: No chest pain, palpitations, passing out, dizziness, or leg swelling  GASTROINTESTINAL: No abdominal or epigastric pain. No nausea, vomiting, or hematemesis; No diarrhea or constipation. No melena or hematochezia.  GENITOURINARY: No dysuria, frequency, hematuria, or incontinence  NEUROLOGICAL: No headaches, memory loss, loss of strength, numbness, or tremors  SKIN: No itching, burning, rashes, or lesions   LYMPH Nodes: No enlarged glands  ENDOCRINE: No heat or cold intolerance; No hair loss  MUSCULOSKELETAL: No joint pain or swelling; No muscle, back, or extremity pain  PSYCHIATRIC: No depression, anxiety, mood swings, or difficulty sleeping  HEME/LYMPH: No easy bruising, or bleeding gums  ALLERGY AND IMMUNOLOGIC: No hives or eczema	    [ ] All others negative	  [x ] Unable to obtain    PHYSICAL EXAM:  T(C): 36.7 (12-01-22 @ 05:18), Max: 37.2 (11-30-22 @ 15:59)  HR: 75 (12-01-22 @ 05:18) (67 - 75)  BP: 103/68 (12-01-22 @ 05:18) (103/68 - 154/82)  RR: 18 (12-01-22 @ 05:18) (18 - 20)  SpO2: 100% (12-01-22 @ 05:18) (95% - 100%)  Wt(kg): --  I&O's Summary    29 Nov 2022 07:01  -  30 Nov 2022 07:00  --------------------------------------------------------  IN: 0 mL / OUT: 1000 mL / NET: -1000 mL    30 Nov 2022 07:01  -  01 Dec 2022 06:42  --------------------------------------------------------  IN: 240 mL / OUT: 0 mL / NET: 240 mL        Appearance: Normal	  HEENT:   Normal oral mucosa, PERRL, EOMI	  Lymphatic: No lymphadenopathy  Cardiovascular: Normal S1 S2, No JVD, + murmurs, +edema  Respiratory: Lungs clear to auscultation	  Psychiatry: A & O x 3, Mood & affect appropriate  Gastrointestinal:  Soft, Non-tender, + BS	  Skin: No rashes, No ecchymoses, No cyanosis	  Neurologic: Non-focal  Extremities: Normal range of motion, No clubbing, cyanosis , + edema  Vascular: Peripheral pulses palpable 2+ bilaterally    MEDICATIONS  (STANDING):  apixaban 2.5 milliGRAM(s) Oral two times a day  furosemide   Injectable 40 milliGRAM(s) IV Push daily  hydrALAZINE 10 milliGRAM(s) Oral three times a day  isosorbide   mononitrate ER Tablet (IMDUR) 30 milliGRAM(s) Oral daily  methylPREDNISolone sodium succinate Injectable 40 milliGRAM(s) IV Push every 12 hours  multivitamin/minerals 1 Tablet(s) Oral daily  potassium chloride   Solution 20 milliEquivalent(s) Oral daily      TELEMETRY: 	    ECG:  	  RADIOLOGY:  OTHER: 	  	  LABS:	 	    CARDIAC MARKERS:  CARDIAC MARKERS ( 29 Nov 2022 17:19 )  x     / x     / 38 U/L / x     / 2.6 ng/mL                                11.5   5.78  )-----------( 189      ( 30 Nov 2022 06:48 )             37.1     11-30    143  |  99  |  46<H>  ----------------------------<  123<H>  4.1   |  30  |  1.54<H>    Ca    9.3      30 Nov 2022 06:48  Mg     2.3     11-29    TPro  7.7  /  Alb  3.6  /  TBili  1.0  /  DBili  x   /  AST  22  /  ALT  15  /  AlkPhos  74  11-29    proBNP: Serum Pro-Brain Natriuretic Peptide: 52837 pg/mL (11-29 @ 14:56)    Lipid Profile: Cholesterol 157  LDL --  HDL 46  TG 93    HgA1c:   TSH:     < from: Xray Chest 1 View- PORTABLE-Urgent (11.29.22 @ 14:28) >  The heart size is not accurately assessed on this projection.  The lungs are clear.  There is no pneumothorax or pleural effusion.  Severe right humeral arthrosis.    IMPRESSION:  Clear lungs.          Assessment and plan  ---------------------------  89 yo F with PMH of Afib, HTN, HLD, arthritis admitted to hospital to [facility]. He was just discharged from hospital after being treated for PNA and discharged home on 2 lit home O2.  Patient was found hypoxic with labored breathing this morning and hypertensive. She was given one dose of Lasix 40 mg IM. Because she was still in labored breathing started on non-rebreather and was transferred to [facility] ER. She was found with elevated ProBNP. CXR showed mild CHF. Will be admitted for CHF exacerbation. She received 40 mg extra Lasix IV in ER.    chf ?systolic acute on chronic  check echo  iv lasix change to po  dc beta blocker sec to bradycardia  continue AC  will add hydralzine and nitrated sec to ckd and chf  strict i/o  ckd check bladder scan  strict i/o  observe on odilia a.flutter hr is well controlled  awaiting echo    	        
Patient is a 89y old  Female who presents with a chief complaint of SOB , labored breathing, acute hypoxic respiratory failure (02 Dec 2022 15:44)      INTERVAL HPI/OVERNIGHT EVENTS: No event. Patient feels  better. Cre mildly up to 2.3 on PO Lasix proBNP down 21,000. Reviewed cardiology recommendations to discontinue Hydralazine and Imdur due to risk of hypotension. Continue Lasix 40 mg daily. Monitor renal function. Cardiology recommended patient not good candidate for TAVR procedure due to advanced age, CKD, and dementia.  He recommended to avoid beta blockers due to bradycardia. Seen by pulmonologist who recommended steroid low dose Prednisone for a few days then f/u as outpatient. Continue albuterol.     Pain Location & Control: Ok    MEDICATIONS  (STANDING):  apixaban 2.5 milliGRAM(s) Oral two times a day  furosemide    Tablet 40 milliGRAM(s) Oral daily  multivitamin/minerals 1 Tablet(s) Oral daily  predniSONE   Tablet 20 milliGRAM(s) Oral daily    MEDICATIONS  (PRN):  acetaminophen     Tablet .. 650 milliGRAM(s) Oral every 6 hours PRN Temp greater or equal to 38C (100.4F), Mild Pain (1 - 3)  albuterol    90 MICROgram(s) HFA Inhaler 1 Puff(s) Inhalation four times a day PRN Shortness of Breath and/or Wheezing  aluminum hydroxide/magnesium hydroxide/simethicone Suspension 30 milliLiter(s) Oral every 4 hours PRN Dyspepsia  melatonin 3 milliGRAM(s) Oral at bedtime PRN Insomnia  ondansetron Injectable 4 milliGRAM(s) IV Push every 8 hours PRN Nausea and/or Vomiting      Allergies    Augmentin (Unknown)    Intolerances        REVIEW OF SYSTEMS:  CONSTITUTIONAL: No fever, weight loss, or fatigue  EYES: No eye pain, visual disturbances, or discharge  ENMT:  No difficulty hearing, tinnitus, vertigo; No sinus or throat pain  NECK: No pain or stiffness  BREASTS: No pain, masses, or nipple discharge  RESPIRATORY: No cough, wheezing, chills or hemoptysis; No shortness of breath  CARDIOVASCULAR: No chest pain, palpitations, dizziness, or leg swelling  GASTROINTESTINAL: No abdominal or epigastric pain. No nausea, vomiting, or hematemesis; No diarrhea or constipation. No melena or hematochezia.  GENITOURINARY: No dysuria, frequency, hematuria, or incontinence  NEUROLOGICAL: No headaches, memory loss, loss of strength, numbness, or tremors  SKIN: No itching, burning, rashes, or lesions   LYMPH NODES: No enlarged glands  ENDOCRINE: No heat or cold intolerance; No hair loss; No polydipsia or polyuria  MUSCULOSKELETAL: No back pain  PSYCHIATRIC: No depression, anxiety, mood swings, or difficulty sleeping  HEME/LYMPH: No easy bruising, or bleeding gums  ALLERGY AND IMMUNOLOGIC: No hives or eczema    Vital Signs Last 24 Hrs  T(C): 36.7 (02 Dec 2022 11:13), Max: 36.7 (01 Dec 2022 20:15)  T(F): 98.1 (02 Dec 2022 11:13), Max: 98.1 (01 Dec 2022 20:15)  HR: 72 (02 Dec 2022 15:32) (64 - 82)  BP: 138/83 (02 Dec 2022 15:32) (113/69 - 138/83)  BP(mean): 84 (01 Dec 2022 20:15) (84 - 84)  RR: 18 (02 Dec 2022 15:32) (18 - 18)  SpO2: 93% (02 Dec 2022 15:32) (90% - 93%)    Parameters below as of 02 Dec 2022 11:13  Patient On (Oxygen Delivery Method): room air        PHYSICAL EXAM:  GENERAL: NAD, well-groomed, well-developed  HEAD:  Atraumatic, Normocephalic  EYES: EOMI, PERRLA, conjunctiva and sclera clear  ENMT: No tonsillar erythema, exudates, or enlargement; Moist mucous membranes, Good dentition, No lesions  NECK: Supple, No JVD, Normal thyroid  NERVOUS SYSTEM:  Alert & Oriented X 2 mildly confused   CHEST/LUNG: Clear to auscultation bilaterally; No rales, rhonchi, wheezing, or rubs  HEART: Regular rate and rhythm; systolic murmurs, rubs, or gallops  ABDOMEN: Soft, Nontender, Nondistended; Bowel sounds present  EXTREMITIES:  2+ Peripheral Pulses, No clubbing or cyanosis  LYMPH: No lymphadenopathy noted  SKIN: No rashes or lesions      LABS:    02 Dec 2022 06:18    143    |  100    |  85     ----------------------------<  117    4.0     |  30     |  2.32     Ca    9.2        02 Dec 2022 06:18          CAPILLARY BLOOD GLUCOSE            Cultures      RADIOLOGY & ADDITIONAL TESTS:    Imaging Personally Reviewed:  [X ] YES  [ ] NO    Consultant(s) Notes Reviewed:  [ X] YES  [ ] NO    Care Discussed with Consultants/Other Providers [X ] YES  [ ] NO
Patient is a 89y old  Female who presents with a chief complaint of SOB , labored breathing, acute hypoxic respiratory failure (05 Dec 2022 07:05)      INTERVAL HPI/OVERNIGHT EVENTS: Notified that patient vomited, most likely aspirated overnight. Keep her NPO. Started on IV fluid. only D5 W due to hypernatremia. Consult speech and swallow. Patient is mildly drowsy. No leukocytosis. NA up to 153, Cre improved 1.9. HGbA1c 6.1. Vitamin B12 normal. CXR clear after aspiration. Will monitor her closely. Breast ultrasound not done yet.     Pain Location & Control: OK    MEDICATIONS  (STANDING):  albuterol/ipratropium for Nebulization 3 milliLiter(s) Nebulizer every 6 hours  amLODIPine   Tablet 2.5 milliGRAM(s) Oral daily  apixaban 2.5 milliGRAM(s) Oral two times a day  dextrose 5%. 1000 milliLiter(s) (70 mL/Hr) IV Continuous <Continuous>  multivitamin/minerals 1 Tablet(s) Oral daily    MEDICATIONS  (PRN):  acetaminophen     Tablet .. 650 milliGRAM(s) Oral every 6 hours PRN Temp greater or equal to 38C (100.4F), Mild Pain (1 - 3)  albuterol    90 MICROgram(s) HFA Inhaler 1 Puff(s) Inhalation four times a day PRN Shortness of Breath and/or Wheezing  aluminum hydroxide/magnesium hydroxide/simethicone Suspension 30 milliLiter(s) Oral every 4 hours PRN Dyspepsia  ondansetron Injectable 4 milliGRAM(s) IV Push every 8 hours PRN Nausea and/or Vomiting      Allergies    Augmentin (Unknown)    Intolerances        REVIEW OF SYSTEMS:  CONSTITUTIONAL: No fever, weight loss, or fatigue  EYES: No eye pain, visual disturbances, or discharge  ENMT:  No difficulty hearing, tinnitus, vertigo; No sinus or throat pain  NECK: No pain or stiffness  BREASTS: No pain, masses, or nipple discharge  RESPIRATORY: No cough, wheezing, chills or hemoptysis; No shortness of breath  CARDIOVASCULAR: No chest pain, palpitations, dizziness, or leg swelling  GASTROINTESTINAL: No abdominal or epigastric pain. No nausea, vomiting, or hematemesis; No diarrhea or constipation. No melena or hematochezia.  GENITOURINARY: No dysuria, frequency, hematuria, or incontinence  NEUROLOGICAL: No headaches, memory loss, loss of strength, numbness, or tremors  SKIN: No itching, burning, rashes, or lesions   LYMPH NODES: No enlarged glands  ENDOCRINE: No heat or cold intolerance; No hair loss; No polydipsia or polyuria  MUSCULOSKELETAL: No back pain  PSYCHIATRIC: No depression, anxiety, mood swings, or difficulty sleeping  HEME/LYMPH: No easy bruising, or bleeding gums  ALLERGY AND IMMUNOLOGIC: No hives or eczema    Vital Signs Last 24 Hrs  T(C): 36.7 (05 Dec 2022 12:13), Max: 37.4 (05 Dec 2022 10:34)  T(F): 98.1 (05 Dec 2022 12:13), Max: 99.4 (05 Dec 2022 10:34)  HR: 87 (05 Dec 2022 12:13) (74 - 88)  BP: 151/92 (05 Dec 2022 12:13) (136/95 - 157/97)  BP(mean): 111 (05 Dec 2022 12:13) (111 - 111)  RR: 18 (05 Dec 2022 12:13) (18 - 18)  SpO2: 98% (05 Dec 2022 12:13) (93% - 98%)    Parameters below as of 05 Dec 2022 12:13  Patient On (Oxygen Delivery Method): nasal cannula        PHYSICAL EXAM:  GENERAL: NAD, well-groomed, well-developed  HEAD:  Atraumatic, Normocephalic  EYES: EOMI, PERRLA, conjunctiva and sclera clear  ENMT: No tonsillar erythema, exudates, or enlargement; Moist mucous membranes, Good dentition, No lesions  NECK: Supple, No JVD, Normal thyroid  NERVOUS SYSTEM:  Alert & Oriented X  1 more confused and drowsy, intact   CHEST/LUNG: Clear to auscultation bilaterally; No rales, rhonchi, wheezing, or rubs  HEART: Regular rate and rhythm; No murmurs, rubs, or gallops  ABDOMEN: Soft, Nontender, Nondistended; Bowel sounds present  EXTREMITIES:  2+ Peripheral Pulses, No clubbing or cyanosis  LYMPH: No lymphadenopathy noted  SKIN: No rashes or lesions      LABS:    05 Dec 2022 06:02    153    |  108    |  78     ----------------------------<  135    3.9     |  33     |  1.90     Ca    9.9        05 Dec 2022 06:02          CAPILLARY BLOOD GLUCOSE            Cultures      RADIOLOGY & ADDITIONAL TESTS:    Imaging Personally Reviewed:  [X ] YES  [ ] NO    Consultant(s) Notes Reviewed:  [ X] YES  [ ] NO    Care Discussed with Consultants/Other Providers [X ] YES  [ ] NO
Patient is a 89y old  Female who presents with a chief complaint of SOB , labored breathing, acute hypoxic respiratory failure (07 Dec 2022 06:39)      INTERVAL HPI/OVERNIGHT EVENTS: No event.  Afebrile. Still on 2L nasal cannula without any leukocytosis. Hypernatremia resolved, down to 141, Cre improved to 1.3. Repeated CXR yesterday showed clear lungs. Patient clear for discharge today back to SNF. Off of Lasix, consider low dose Lasix on discharge.     Pain Location & Control: OK    MEDICATIONS  (STANDING):  albuterol/ipratropium for Nebulization 3 milliLiter(s) Nebulizer every 6 hours  amLODIPine   Tablet 2.5 milliGRAM(s) Oral daily  apixaban 2.5 milliGRAM(s) Oral two times a day  multivitamin/minerals 1 Tablet(s) Oral daily    MEDICATIONS  (PRN):  acetaminophen     Tablet .. 650 milliGRAM(s) Oral every 6 hours PRN Temp greater or equal to 38C (100.4F), Mild Pain (1 - 3)  albuterol    90 MICROgram(s) HFA Inhaler 1 Puff(s) Inhalation four times a day PRN Shortness of Breath and/or Wheezing  aluminum hydroxide/magnesium hydroxide/simethicone Suspension 30 milliLiter(s) Oral every 4 hours PRN Dyspepsia  ondansetron Injectable 4 milliGRAM(s) IV Push every 8 hours PRN Nausea and/or Vomiting      Allergies    Augmentin (Unknown)    Intolerances        REVIEW OF SYSTEMS:  CONSTITUTIONAL: No fever, weight loss, or fatigue  EYES: No eye pain, visual disturbances, or discharge  ENMT:  No difficulty hearing, tinnitus, vertigo; No sinus or throat pain  NECK: No pain or stiffness  BREASTS: No pain, masses, or nipple discharge  RESPIRATORY: No cough, wheezing, chills or hemoptysis; No shortness of breath  CARDIOVASCULAR: No chest pain, palpitations, dizziness, or leg swelling  GASTROINTESTINAL: No abdominal or epigastric pain. No nausea, vomiting, or hematemesis; No diarrhea or constipation. No melena or hematochezia.  GENITOURINARY: No dysuria, frequency, hematuria, or incontinence  NEUROLOGICAL: No headaches, memory loss, loss of strength, numbness, or tremors  SKIN: No itching, burning, rashes, or lesions   LYMPH NODES: No enlarged glands  ENDOCRINE: No heat or cold intolerance; No hair loss; No polydipsia or polyuria  MUSCULOSKELETAL: No back pain  PSYCHIATRIC: No depression, anxiety, mood swings, or difficulty sleeping  HEME/LYMPH: No easy bruising, or bleeding gums  ALLERGY AND IMMUNOLOGIC: No hives or eczema    Vital Signs Last 24 Hrs  T(C): 36.5 (07 Dec 2022 12:06), Max: 37 (07 Dec 2022 05:15)  T(F): 97.7 (07 Dec 2022 12:06), Max: 98.6 (07 Dec 2022 05:15)  HR: 84 (07 Dec 2022 12:06) (82 - 85)  BP: 119/68 (07 Dec 2022 12:06) (119/68 - 141/79)  BP(mean): --  RR: 18 (07 Dec 2022 12:06) (18 - 18)  SpO2: 98% (07 Dec 2022 12:06) (98% - 99%)    Parameters below as of 07 Dec 2022 12:06  Patient On (Oxygen Delivery Method): nasal cannula  O2 Flow (L/min): 2      PHYSICAL EXAM:  GENERAL: NAD, well-groomed, well-developed  HEAD:  Atraumatic, Normocephalic  EYES: EOMI, PERRLA, conjunctiva and sclera clear  ENMT: No tonsillar erythema, exudates, or enlargement; Moist mucous membranes, Good dentition, No lesions  NECK: Supple, No JVD, Normal thyroid  NERVOUS SYSTEM:  Alert & Oriented X  2  CHEST/LUNG: Clear to auscultation bilaterally; No rales, rhonchi, wheezing, or rubs  HEART: Regular rate and rhythm; No murmurs, rubs, or gallops  ABDOMEN: Soft, Nontender, Nondistended; Bowel sounds present  EXTREMITIES:  2+ Peripheral Pulses, No clubbing or cyanosis  LYMPH: No lymphadenopathy noted  SKIN: No rashes or lesions    LABS:                        11.1   7.90  )-----------( 185      ( 07 Dec 2022 06:12 )             37.0     07 Dec 2022 06:12    141    |  103    |  49     ----------------------------<  120    4.1     |  25     |  1.36     Ca    8.7        07 Dec 2022 06:12          CAPILLARY BLOOD GLUCOSE            Cultures      RADIOLOGY & ADDITIONAL TESTS:    Imaging Personally Reviewed:  [X ] YES  [ ] NO    Consultant(s) Notes Reviewed:  [X ] YES  [ ] NO    Care Discussed with Consultants/Other Providers [X ] YES  [ ] NO
           CARDIOLOGY     PROGRESS  NOTE   ________________________________________________    CHIEF COMPLAINT:Patient is a 89y old  Female who presents with a chief complaint of SOB , labored breathing, acute hypoxic respiratory failure (02 Dec 2022 18:20)  doing better  	  REVIEW OF SYSTEMS:  CONSTITUTIONAL: No fever, weight loss, or fatigue  EYES: No eye pain, visual disturbances, or discharge  ENT:  No difficulty hearing, tinnitus, vertigo; No sinus or throat pain  NECK: No pain or stiffness  RESPIRATORY: No cough, wheezing, chills or hemoptysis; decrease  Shortness of Breath  CARDIOVASCULAR: No chest pain, palpitations, passing out, dizziness, or leg swelling  GASTROINTESTINAL: No abdominal or epigastric pain. No nausea, vomiting, or hematemesis; No diarrhea or constipation. No melena or hematochezia.  GENITOURINARY: No dysuria, frequency, hematuria, or incontinence  NEUROLOGICAL: No headaches, memory loss, loss of strength, numbness, or tremors  SKIN: No itching, burning, rashes, or lesions   LYMPH Nodes: No enlarged glands  ENDOCRINE: No heat or cold intolerance; No hair loss  MUSCULOSKELETAL: No joint pain or swelling; No muscle, back, or extremity pain  PSYCHIATRIC: No depression, anxiety, mood swings, or difficulty sleeping  HEME/LYMPH: No easy bruising, or bleeding gums  ALLERGY AND IMMUNOLOGIC: No hives or eczema	    [x ] All others negative	  [ ] Unable to obtain    PHYSICAL EXAM:  T(C): 36.7 (12-03-22 @ 04:36), Max: 36.7 (12-02-22 @ 11:13)  HR: 71 (12-03-22 @ 04:36) (59 - 82)  BP: 145/80 (12-03-22 @ 04:36) (118/73 - 145/80)  RR: 18 (12-03-22 @ 04:36) (18 - 18)  SpO2: 96% (12-03-22 @ 04:36) (91% - 96%)  Wt(kg): --  I&O's Summary    02 Dec 2022 07:01  -  03 Dec 2022 07:00  --------------------------------------------------------  IN: 537 mL / OUT: 400 mL / NET: 137 mL        Appearance: Normal	  HEENT:   Normal oral mucosa, PERRL, EOMI	  Lymphatic: No lymphadenopathy  Cardiovascular: Normal S1 S2, No JVD, + murmurs, No edema  Respiratory: ehonchi  Psychiatry: A & O x 3, Mood & affect appropriate  Gastrointestinal:  Soft, Non-tender, + BS	  Skin: No rashes, No ecchymoses, No cyanosis	  Neurologic: Non-focal  Extremities: Normal range of motion, No clubbing, cyanosis or edema  Vascular: Peripheral pulses palpable 2+ bilaterally    MEDICATIONS  (STANDING):  apixaban 2.5 milliGRAM(s) Oral two times a day  furosemide    Tablet 40 milliGRAM(s) Oral daily  multivitamin/minerals 1 Tablet(s) Oral daily  predniSONE   Tablet 20 milliGRAM(s) Oral daily      TELEMETRY: 	    ECG:  	  RADIOLOGY:  OTHER: 	  	  LABS:	 	    CARDIAC MARKERS:            12-03    147<H>  |  104  |  89<H>  ----------------------------<  113<H>  3.5   |  30  |  2.22<H>    Ca    9.2      03 Dec 2022 06:29      proBNP: Serum Pro-Brain Natriuretic Peptide: 39449 pg/mL (12-01 @ 22:00)  Serum Pro-Brain Natriuretic Peptide: 00662 pg/mL (11-29 @ 14:56)    Lipid Profile: Cholesterol 157  LDL --  HDL 46  TG 93    HgA1c:   TSH:         Assessment and plan  ---------------------------  89 yo F with PMH of Afib, HTN, HLD, arthritis admitted to hospital to [facility]. He was just discharged from hospital after being treated for PNA and discharged home on 2 lit home O2.  Patient was found hypoxic with labored breathing this morning and hypertensive. She was given one dose of Lasix 40 mg IM. Because she was still in labored breathing started on non-rebreather and was transferred to [facility]. She was found with elevated ProBNP. CXR showed mild CHF. Will be admitted for CHF exacerbation. She received 40 mg extra Lasix IV in ER.    chf ?systolic acute on chronic/ severe AS  dc beta blocker sec to bradycardia  continue AC  strict i/o  ckd check bladder scan  strict i/o  observe on tele, a.flutter hr is well controlled  echo noted with severe AS, pt with advanced age,CKD/ dementia not a candidate for intervention  dc hydralazine and nitrate avoid hypotension  continue lasix, decrease dose to 20 mg daily  fu hr on tele  will repeat chest x ray in am    	        
           CARDIOLOGY     PROGRESS  NOTE   ________________________________________________    CHIEF COMPLAINT:Patient is a 89y old  Female who presents with a chief complaint of SOB , labored breathing, acute hypoxic respiratory failure (05 Dec 2022 19:48)  no complain  	  REVIEW OF SYSTEMS:  CONSTITUTIONAL: No fever, weight loss, or fatigue  EYES: No eye pain, visual disturbances, or discharge  ENT:  No difficulty hearing, tinnitus, vertigo; No sinus or throat pain  NECK: No pain or stiffness  RESPIRATORY: No cough, wheezing, chills or hemoptysis;  decrease Shortness of Breath  CARDIOVASCULAR: No chest pain, palpitations, passing out, dizziness, or leg swelling  GASTROINTESTINAL: No abdominal or epigastric pain. No nausea, vomiting, or hematemesis; No diarrhea or constipation. No melena or hematochezia.  GENITOURINARY: No dysuria, frequency, hematuria, or incontinence  NEUROLOGICAL: No headaches, memory loss, loss of strength, numbness, or tremors  SKIN: No itching, burning, rashes, or lesions   LYMPH Nodes: No enlarged glands  ENDOCRINE: No heat or cold intolerance; No hair loss  MUSCULOSKELETAL: No joint pain or swelling; No muscle, back, or extremity pain  PSYCHIATRIC: No depression, anxiety, mood swings, or difficulty sleeping  HEME/LYMPH: No easy bruising, or bleeding gums  ALLERGY AND IMMUNOLOGIC: No hives or eczema	    [ ] All others negative	  [x ] Unable to obtain    PHYSICAL EXAM:  T(C): 36.6 (12-06-22 @ 04:42), Max: 37.4 (12-05-22 @ 10:34)  HR: 78 (12-06-22 @ 04:42) (78 - 87)  BP: 121/75 (12-06-22 @ 04:42) (121/75 - 151/92)  RR: 18 (12-06-22 @ 04:42) (18 - 18)  SpO2: 98% (12-06-22 @ 04:42) (95% - 99%)  Wt(kg): --  I&O's Summary    05 Dec 2022 07:01  -  06 Dec 2022 06:48  --------------------------------------------------------  IN: 1180 mL / OUT: 400 mL / NET: 780 mL        Appearance: Normal	  HEENT:   Normal oral mucosa, PERRL, EOMI	  Lymphatic: No lymphadenopathy  Cardiovascular: Normal S1 S2, No JVD, No murmurs, No edema  Respiratory: Lungs clear to auscultation	  Psychiatry:dementia  Gastrointestinal:  Soft, Non-tender, + BS	  Skin: No rashes, No ecchymoses, No cyanosis	  Neurologic: Non-focal  Extremities: Normal range of motion, No clubbing, cyanosis or edema  Vascular: Peripheral pulses palpable 2+ bilaterally    MEDICATIONS  (STANDING):  albuterol/ipratropium for Nebulization 3 milliLiter(s) Nebulizer every 6 hours  amLODIPine   Tablet 2.5 milliGRAM(s) Oral daily  apixaban 2.5 milliGRAM(s) Oral two times a day  dextrose 5%. 1000 milliLiter(s) (70 mL/Hr) IV Continuous <Continuous>  multivitamin/minerals 1 Tablet(s) Oral daily      TELEMETRY: 	    ECG:  	  RADIOLOGY:  OTHER: 	  	  LABS:	 	    CARDIAC MARKERS:            12-05    153<H>  |  108  |  78<H>  ----------------------------<  135<H>  3.9   |  33<H>  |  1.90<H>    Ca    9.9      05 Dec 2022 06:02      proBNP: Serum Pro-Brain Natriuretic Peptide: 78819 pg/mL (12-01 @ 22:00)  Serum Pro-Brain Natriuretic Peptide: 21775 pg/mL (11-29 @ 14:56)    Lipid Profile: Cholesterol 157  LDL --  HDL 46  TG 93    HgA1c:   TSH:       < from: Xray Chest 1 View- PORTABLE-Urgent (Xray Chest 1 View- PORTABLE-Urgent .) (12.05.22 @ 07:25) >  LINES/TUBES/SUPPORT DEVICES: None    LUNGS/PLEURA: The lungs are clear. No pleural effusion or pneumothorax.    HEART AND MEDIASTINUM: Heart size is poorly assessed on this projection.    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    IMPRESSION:  Clear lungs.        Assessment and plan  ---------------------------  89 yo F with PMH of Afib, HTN, HLD, arthritis admitted to hospital to [facility]. He was just discharged from hospital after being treated for PNA and discharged home on 2 lit home O2.  Patient was found hypoxic with labored breathing this morning and hypertensive. She was given one dose of Lasix 40 mg IM. Because she was still in labored breathing started on non-rebreather and was transferred to [facility] ER. She was found with elevated ProBNP. CXR showed mild CHF. Will be admitted for CHF exacerbation. She received 40 mg extra Lasix IV in ER.    chf ?systolic acute on chronic/ severe AS  dc beta blocker sec to bradycardia  continue AC  strict i/o  ckd check bladder scan  strict i/o  observe on tele, a.flutter hr is well controlled  echo noted with severe AS, pt with advanced age,CKD/ dementia not a candidate for intervention  dc hydralazine and nitrate avoid hypotensio  fu hr on tele is well controlled  check bladder scan  dc prednisone  add Norvasc for bp control, keep sbp >140, fu bp closely  chest x ray repeat , clear  check orthostatic bp if pt is mobile  ckd / hypernatremia hold diuretics for now increase free water, awaiting blood work  continue current meds    	        
           CARDIOLOGY     PROGRESS  NOTE   ________________________________________________    CHIEF COMPLAINT:Patient is a 89y old  Female who presents with a chief complaint of SOB , labored breathing, acute hypoxic respiratory failure (06 Dec 2022 17:22)  no complain  	  REVIEW OF SYSTEMS:  CONSTITUTIONAL: No fever, weight loss, or fatigue  EYES: No eye pain, visual disturbances, or discharge  ENT:  No difficulty hearing, tinnitus, vertigo; No sinus or throat pain  NECK: No pain or stiffness  RESPIRATORY: No cough, wheezing, chills or hemoptysis; No Shortness of Breath  CARDIOVASCULAR: No chest pain, palpitations, passing out, dizziness, or leg swelling  GASTROINTESTINAL: No abdominal or epigastric pain. No nausea, vomiting, or hematemesis; No diarrhea or constipation. No melena or hematochezia.  GENITOURINARY: No dysuria, frequency, hematuria, or incontinence  NEUROLOGICAL: No headaches, memory loss, loss of strength, numbness, or tremors  SKIN: No itching, burning, rashes, or lesions   LYMPH Nodes: No enlarged glands  ENDOCRINE: No heat or cold intolerance; No hair loss  MUSCULOSKELETAL: No joint pain or swelling; No muscle, back, or extremity pain  PSYCHIATRIC: No depression, anxiety, mood swings, or difficulty sleeping  HEME/LYMPH: No easy bruising, or bleeding gums  ALLERGY AND IMMUNOLOGIC: No hives or eczema	    [ ] All others negative	  [ x] Unable to obtain    PHYSICAL EXAM:  T(C): 37 (12-07-22 @ 05:15), Max: 37 (12-06-22 @ 11:21)  HR: 82 (12-07-22 @ 05:15) (80 - 85)  BP: 126/72 (12-07-22 @ 05:15) (126/64 - 141/79)  RR: 18 (12-07-22 @ 05:15) (18 - 18)  SpO2: 98% (12-07-22 @ 05:15) (98% - 99%)  Wt(kg): --  I&O's Summary    05 Dec 2022 07:01  -  06 Dec 2022 07:00  --------------------------------------------------------  IN: 2020 mL / OUT: 400 mL / NET: 1620 mL    06 Dec 2022 07:01  -  07 Dec 2022 06:39  --------------------------------------------------------  IN: 110 mL / OUT: 80 mL / NET: 30 mL        Appearance: Normal	  HEENT:   Normal oral mucosa, PERRL, EOMI	  Lymphatic: No lymphadenopathy  Cardiovascular: Normal S1 S2, No JVD, + murmurs, No edema  Respiratory: rhonchi  Psychiatry: A & O x 3, Mood & affect appropriate  Gastrointestinal:  Soft, Non-tender, + BS	  Skin: No rashes, No ecchymoses, No cyanosis	  Neurologic: Non-focal  Extremities: Normal range of motion, No clubbing, cyanosis or edema  Vascular: Peripheral pulses palpable 2+ bilaterally    MEDICATIONS  (STANDING):  albuterol/ipratropium for Nebulization 3 milliLiter(s) Nebulizer every 6 hours  amLODIPine   Tablet 2.5 milliGRAM(s) Oral daily  apixaban 2.5 milliGRAM(s) Oral two times a day  dextrose 5%. 1000 milliLiter(s) (70 mL/Hr) IV Continuous <Continuous>  multivitamin/minerals 1 Tablet(s) Oral daily      TELEMETRY: 	    ECG:  	  RADIOLOGY:  OTHER: 	  	  LABS:	 	    CARDIAC MARKERS:                                11.1   7.90  )-----------( 185      ( 07 Dec 2022 06:12 )             37.0     12-06    151<H>  |  108  |  62<H>  ----------------------------<  129<H>  3.8   |  35<H>  |  1.56<H>    Ca    9.4      06 Dec 2022 07:01      proBNP: Serum Pro-Brain Natriuretic Peptide: 50979 pg/mL (12-01 @ 22:00)  Serum Pro-Brain Natriuretic Peptide: 74228 pg/mL (11-29 @ 14:56)    Lipid Profile: Cholesterol 157  LDL --  HDL 46  TG 93    HgA1c:   TSH:         Assessment and plan  ---------------------------  89 yo F with PMH of Afib, HTN, HLD, arthritis admitted to hospital to [facility]. He was just discharged from hospital after being treated for PNA and discharged home on 2 lit home O2.  Patient was found hypoxic with labored breathing this morning and hypertensive. She was given one dose of Lasix 40 mg IM. Because she was still in labored breathing started on non-rebreather and was transferred to [facility] ER. She was found with elevated ProBNP. CXR showed mild CHF. Will be admitted for CHF exacerbation. She received 40 mg extra Lasix IV in ER.    chf ?systolic acute on chronic/ severe AS  dc beta blocker sec to bradycardia  continue AC  strict i/o  ckd check bladder scan  strict i/o  observe on tele, a.flutter hr is well controlled  echo noted with severe AS, pt with advanced age,CKD/ dementia not a candidate for intervention  dc hydralazine and nitrate avoid hypotensio  fu hr on tele is well controlled  check bladder scan  dc prednisone  add Norvasc for bp control, keep sbp >140, fu bp closely  chest x ray repeat , clear  check orthostatic bp if pt is mobile  ckd / hypernatremia hold diuretics for now increase free water, awaiting blood work  continue current meds  start free water    	        
           CARDIOLOGY     PROGRESS  NOTE   ________________________________________________    CHIEF COMPLAINT:Patient is a 89y old  Female who presents with a chief complaint of SOB , labored breathing, acute hypoxic respiratory failure (29 Nov 2022 18:31)  no complain  	  REVIEW OF SYSTEMS:  CONSTITUTIONAL: No fever, weight loss, or fatigue  EYES: No eye pain, visual disturbances, or discharge  ENT:  No difficulty hearing, tinnitus, vertigo; No sinus or throat pain  NECK: No pain or stiffness  RESPIRATORY: No cough, wheezing, chills or hemoptysis; + Shortness of Breath  CARDIOVASCULAR: No chest pain, palpitations, passing out, dizziness, or leg swelling  GASTROINTESTINAL: No abdominal or epigastric pain. No nausea, vomiting, or hematemesis; No diarrhea or constipation. No melena or hematochezia.  GENITOURINARY: No dysuria, frequency, hematuria, or incontinence  NEUROLOGICAL: No headaches, memory loss, loss of strength, numbness, or tremors  SKIN: No itching, burning, rashes, or lesions   LYMPH Nodes: No enlarged glands  ENDOCRINE: No heat or cold intolerance; No hair loss  MUSCULOSKELETAL: No joint pain or swelling; No muscle, back, or extremity pain  PSYCHIATRIC: No depression, anxiety, mood swings, or difficulty sleeping  HEME/LYMPH: No easy bruising, or bleeding gums  ALLERGY AND IMMUNOLOGIC: No hives or eczema	    [ ] All others negative	  [x ] Unable to obtain    PHYSICAL EXAM:  T(C): 36.4 (11-30-22 @ 03:38), Max: 37.1 (11-29-22 @ 13:33)  HR: 58 (11-30-22 @ 03:38) (50 - 60)  BP: 169/75 (11-30-22 @ 03:38) (130/55 - 174/54)  RR: 20 (11-30-22 @ 03:38) (20 - 26)  SpO2: 95% (11-30-22 @ 03:38) (95% - 100%)  Wt(kg): --  I&O's Summary    29 Nov 2022 07:01  -  30 Nov 2022 07:00  --------------------------------------------------------  IN: 0 mL / OUT: 1000 mL / NET: -1000 mL        Appearance: Normal	  HEENT:   Normal oral mucosa, PERRL, EOMI	  Lymphatic: No lymphadenopathy  Cardiovascular: Normal S1 S2, No JVD, + murmurs, + edema  Respiratory: rhonchi  Psychiatry: dementia  Gastrointestinal:  Soft, Non-tender, + BS	  Skin: No rashes, No ecchymoses, No cyanosis	  Neurologic: Non-focal  Extremities: Normal range of motion, No clubbing, cyanosis , +edema  Vascular: Peripheral pulses palpable 2+ bilaterally    MEDICATIONS  (STANDING):  apixaban 2.5 milliGRAM(s) Oral two times a day  furosemide   Injectable 40 milliGRAM(s) IV Push daily  metoprolol tartrate 12.5 milliGRAM(s) Oral two times a day  multivitamin/minerals 1 Tablet(s) Oral daily  potassium chloride   Solution 20 milliEquivalent(s) Oral daily      TELEMETRY: 	    ECG:  	  RADIOLOGY:  OTHER: 	  	  LABS:	 	    CARDIAC MARKERS:  CARDIAC MARKERS ( 29 Nov 2022 17:19 )  x     / x     / 38 U/L / x     / 2.6 ng/mL                                11.5   5.78  )-----------( 189      ( 30 Nov 2022 06:48 )             37.1     11-30    143  |  99  |  46<H>  ----------------------------<  123<H>  4.1   |  30  |  1.54<H>    Ca    9.3      30 Nov 2022 06:48  Mg     2.3     11-29    TPro  7.7  /  Alb  3.6  /  TBili  1.0  /  DBili  x   /  AST  22  /  ALT  15  /  AlkPhos  74  11-29    proBNP: Serum Pro-Brain Natriuretic Peptide: 63046 pg/mL (11-29 @ 14:56)    Lipid Profile:   HgA1c:   TSH:         Assessment and plan  ---------------------------  89 yo F with PMH of Afib, HTN, HLD, arthritis admitted to hospital to [facility]. He was just discharged from hospital after being treated for PNA and discharged home on 2 lit home O2.  Patient was found hypoxic with labored breathing this morning and hypertensive. She was given one dose of Lasix 40 mg IM. Because she was still in labored breathing started on non-rebreather and was transferred to [facility] ER. She was found with elevated ProBNP. CXR showed mild CHF. Will be admitted for CHF exacerbation. She received 40 mg extra Lasix IV in ER.    chf ?systolic acute on chronic  check echo  iv lasix  dc beta blocker sec to bradycardia  continue AC  will add hydralzine and nitrated sec to ckd and chf  strict i/o  ckd check bladder scan  strict i/o  observe on tele  	        
Patient is a 89y old  Female who presents with a chief complaint of SOB , labored breathing, acute hypoxic respiratory failure (30 Nov 2022 08:32)      INTERVAL HPI/OVERNIGHT EVENTS: No event. Stable. Mildly wheezing. Possibly there is some pulmonary component, consult in-house pulmonologist. Started on IV steroid 40 mg Q 12 hours. Consider bronchodilators and steroid inhalers. CHF exacerbation improved. Continue Lasix IV, consider to switch to PO tomorrow. Evaluated by cardiology who recommended Imdur and Hydralazine in the setting of renal failure and CHF.     Pain Location & Control: OK    MEDICATIONS  (STANDING):  apixaban 2.5 milliGRAM(s) Oral two times a day  furosemide   Injectable 40 milliGRAM(s) IV Push daily  hydrALAZINE 10 milliGRAM(s) Oral three times a day  isosorbide   mononitrate ER Tablet (IMDUR) 30 milliGRAM(s) Oral daily  methylPREDNISolone sodium succinate Injectable 40 milliGRAM(s) IV Push every 12 hours  multivitamin/minerals 1 Tablet(s) Oral daily  potassium chloride   Solution 20 milliEquivalent(s) Oral daily    MEDICATIONS  (PRN):  acetaminophen     Tablet .. 650 milliGRAM(s) Oral every 6 hours PRN Temp greater or equal to 38C (100.4F), Mild Pain (1 - 3)  aluminum hydroxide/magnesium hydroxide/simethicone Suspension 30 milliLiter(s) Oral every 4 hours PRN Dyspepsia  melatonin 3 milliGRAM(s) Oral at bedtime PRN Insomnia  ondansetron Injectable 4 milliGRAM(s) IV Push every 8 hours PRN Nausea and/or Vomiting      Allergies    Augmentin (Unknown)    Intolerances        REVIEW OF SYSTEMS:  CONSTITUTIONAL: No fever, weight loss, or fatigue  EYES: No eye pain, visual disturbances, or discharge  ENMT:  No difficulty hearing, tinnitus, vertigo; No sinus or throat pain  NECK: No pain or stiffness  BREASTS: No pain, masses, or nipple discharge  RESPIRATORY: No cough, wheezing, chills or hemoptysis; No shortness of breath  CARDIOVASCULAR: No chest pain, palpitations, dizziness, or leg swelling  GASTROINTESTINAL: No abdominal or epigastric pain. No nausea, vomiting, or hematemesis; No diarrhea or constipation. No melena or hematochezia.  GENITOURINARY: No dysuria, frequency, hematuria, or incontinence  NEUROLOGICAL: No headaches, memory loss, loss of strength, numbness, or tremors  SKIN: No itching, burning, rashes, or lesions   LYMPH NODES: No enlarged glands  ENDOCRINE: No heat or cold intolerance; No hair loss; No polydipsia or polyuria  MUSCULOSKELETAL: No back pain  PSYCHIATRIC: No depression, anxiety, mood swings, or difficulty sleeping  HEME/LYMPH: No easy bruising, or bleeding gums  ALLERGY AND IMMUNOLOGIC: No hives or eczema    Vital Signs Last 24 Hrs  T(C): 37.2 (30 Nov 2022 15:59), Max: 37.2 (30 Nov 2022 15:59)  T(F): 99 (30 Nov 2022 15:59), Max: 99 (30 Nov 2022 15:59)  HR: 74 (30 Nov 2022 15:59) (52 - 74)  BP: 154/82 (30 Nov 2022 15:59) (130/55 - 169/75)  BP(mean): 80 (29 Nov 2022 19:17) (80 - 80)  RR: 20 (30 Nov 2022 15:59) (20 - 20)  SpO2: 95% (30 Nov 2022 15:59) (95% - 100%)    Parameters below as of 30 Nov 2022 15:59  Patient On (Oxygen Delivery Method): nasal cannula        PHYSICAL EXAM:  GENERAL: NAD, well-groomed, well-developed  HEAD:  Atraumatic, Normocephalic  EYES: EOMI, PERRLA, conjunctiva and sclera clear  ENMT: No tonsillar erythema, exudates, or enlargement; Moist mucous membranes, Good dentition, No lesions  NECK: Supple, No JVD, Normal thyroid  NERVOUS SYSTEM:  Alert & Oriented X  2  CHEST/LUNG: mild wheezing  HEART: Regular rate and rhythm; No murmurs, rubs, or gallops  ABDOMEN: Soft, Nontender, Nondistended; Bowel sounds present  EXTREMITIES:  2+ Peripheral Pulses, No clubbing or cyanosis  LYMPH: No lymphadenopathy noted  SKIN: No rashes or lesions  INCISION:  Dressing dry and intact    LABS:                        11.5   5.78  )-----------( 189      ( 30 Nov 2022 06:48 )             37.1     30 Nov 2022 06:48    143    |  99     |  46     ----------------------------<  123    4.1     |  30     |  1.54     Ca    9.3        30 Nov 2022 06:48          CAPILLARY BLOOD GLUCOSE        CARDIAC MARKERS ( 29 Nov 2022 17:19 )  x     / x     / 38 U/L / x     / 2.6 ng/mL      Cultures      RADIOLOGY & ADDITIONAL TESTS:    Imaging Personally Reviewed:  [ X] YES  [ ] NO    Consultant(s) Notes Reviewed:  [X ] YES  [ ] NO    Care Discussed with Consultants/Other Providers [X ] YES  [ ] NO
           CARDIOLOGY     PROGRESS  NOTE   ________________________________________________    CHIEF COMPLAINT:Patient is a 89y old  Female who presents with a chief complaint of SOB , labored breathing, acute hypoxic respiratory failure (01 Dec 2022 16:35)  comfortable  	  REVIEW OF SYSTEMS:  CONSTITUTIONAL: No fever, weight loss, or fatigue  EYES: No eye pain, visual disturbances, or discharge  ENT:  No difficulty hearing, tinnitus, vertigo; No sinus or throat pain  NECK: No pain or stiffness  RESPIRATORY: No cough, wheezing, chills or hemoptysis;+Shortness of Breath  CARDIOVASCULAR: No chest pain, palpitations, passing out, dizziness, or leg swelling  GASTROINTESTINAL: No abdominal or epigastric pain. No nausea, vomiting, or hematemesis; No diarrhea or constipation. No melena or hematochezia.  GENITOURINARY: No dysuria, frequency, hematuria, or incontinence  NEUROLOGICAL: No headaches, memory loss, loss of strength, numbness, or tremors  SKIN: No itching, burning, rashes, or lesions   LYMPH Nodes: No enlarged glands  ENDOCRINE: No heat or cold intolerance; No hair loss  MUSCULOSKELETAL: No joint pain or swelling; No muscle, back, or extremity pain  PSYCHIATRIC: No depression, anxiety, mood swings, or difficulty sleeping  HEME/LYMPH: No easy bruising, or bleeding gums  ALLERGY AND IMMUNOLOGIC: No hives or eczema	    [ ] All others negative	  [ ] Unable to obtain    PHYSICAL EXAM:  T(C): 36.3 (12-02-22 @ 04:29), Max: 36.7 (12-01-22 @ 20:15)  HR: 77 (12-02-22 @ 04:29) (53 - 77)  BP: 118/68 (12-02-22 @ 04:29) (109/54 - 124/85)  RR: 18 (12-02-22 @ 04:29) (18 - 18)  SpO2: 90% (12-01-22 @ 20:15) (90% - 100%)  Wt(kg): --  I&O's Summary    30 Nov 2022 07:01  -  01 Dec 2022 07:00  --------------------------------------------------------  IN: 240 mL / OUT: 0 mL / NET: 240 mL    01 Dec 2022 07:01  -  02 Dec 2022 06:58  --------------------------------------------------------  IN: 400 mL / OUT: 300 mL / NET: 100 mL        Appearance: Normal	  HEENT:   Normal oral mucosa, PERRL, EOMI	  Lymphatic: No lymphadenopathy  Cardiovascular: Normal S1 S2, No JVD, + murmurs, No edema  Respiratory: rhonchi  Gastrointestinal:  Soft, Non-tender, + BS	  Skin: No rashes, No ecchymoses, No cyanosis	  Neurologic: Non-focal  Extremities: Normal range of motion, No clubbing, cyanosis or edema  Vascular: Peripheral pulses palpable 2+ bilaterally    MEDICATIONS  (STANDING):  apixaban 2.5 milliGRAM(s) Oral two times a day  furosemide    Tablet 40 milliGRAM(s) Oral daily  hydrALAZINE 10 milliGRAM(s) Oral two times a day  isosorbide   mononitrate ER Tablet (IMDUR) 30 milliGRAM(s) Oral daily  multivitamin/minerals 1 Tablet(s) Oral daily  predniSONE   Tablet 20 milliGRAM(s) Oral daily      TELEMETRY: 	    ECG:  	  RADIOLOGY:  OTHER: 	  	  LABS:	 	    CARDIAC MARKERS:                                10.9   3.64  )-----------( 203      ( 01 Dec 2022 07:25 )             35.4     12-01    142  |  99  |  55<H>  ----------------------------<  123<H>  4.2   |  29  |  1.72<H>    Ca    9.3      01 Dec 2022 07:23    TPro  7.4  /  Alb  3.4  /  TBili  1.2  /  DBili  x   /  AST  16  /  ALT  12  /  AlkPhos  64  12-01    proBNP: Serum Pro-Brain Natriuretic Peptide: 10016 pg/mL (12-01 @ 22:00)  Serum Pro-Brain Natriuretic Peptide: 83916 pg/mL (11-29 @ 14:56)    Lipid Profile: Cholesterol 157  LDL --  HDL 46  TG 93    HgA1c:   TSH:       < from: Transthoracic Echocardiogram (12.01.22 @ 09:16) >  1. Calcified aortic valve with decreased opening. Peak  transaortic valve gradient equals 135 mm Hg, mean  transaortic valve gradient equals 70 mm Hg, estimated  aortic valve area equals 0.5 sqcm (by continuity equation),  aortic valve velocity time integral equals 132 cm,  consistent with severe aortic stenosis.  2. Severely dilated left atrium.  LA volume index = 51  cc/m2.  3. Hyperdynamic left ventricular systolic function.  Endocardial visualization enhanced with intravenous  injection of Ultrasonic Enhancing Agent (Definity). Peak  left ventricular outflow tract gradient equals 3 mm Hg,  mean gradient is equal to 2 mm Hg, LVOT velocity time  integral equals 22 cm.  4. The right ventricle is not well visualized; grossly  normal right ventricular systolic function.      Assessment and plan  ---------------------------  89 yo F with PMH of Afib, HTN, HLD, arthritis admitted to hospital to [facility]. He was just discharged from hospital after being treated for PNA and discharged home on 2 lit home O2.  Patient was found hypoxic with labored breathing this morning and hypertensive. She was given one dose of Lasix 40 mg IM. Because she was still in labored breathing started on non-rebreather and was transferred to [facility] ER. She was found with elevated ProBNP. CXR showed mild CHF. Will be admitted for CHF exacerbation. She received 40 mg extra Lasix IV in ER.    chf ?systolic acute on chronic/ severe AS  dc beta blocker sec to bradycardia  continue AC  strict i/o  ckd check bladder scan  strict i/o  observe on tele, a.flutter hr is well controlled  echo noted with severe AS, pt with advanced age,CKD/ dementia not a candidate for intervention  dc hydralazine and nitrate avoid hypotension  continue lasix  fu hr on tele      	        
           CARDIOLOGY     PROGRESS  NOTE   ________________________________________________    CHIEF COMPLAINT:Patient is a 89y old  Female who presents with a chief complaint of SOB , labored breathing, acute hypoxic respiratory failure (04 Dec 2022 08:52)  comfortable  	  REVIEW OF SYSTEMS:  CONSTITUTIONAL: No fever, weight loss, or fatigue  EYES: No eye pain, visual disturbances, or discharge  ENT:  No difficulty hearing, tinnitus, vertigo; No sinus or throat pain  NECK: No pain or stiffness  RESPIRATORY: No cough, wheezing, chills or hemoptysis; No Shortness of Breath  CARDIOVASCULAR: No chest pain, palpitations, passing out, dizziness, or leg swelling  GASTROINTESTINAL: No abdominal or epigastric pain. No nausea, vomiting, or hematemesis; No diarrhea or constipation. No melena or hematochezia.  GENITOURINARY: No dysuria, frequency, hematuria, or incontinence  NEUROLOGICAL: No headaches, memory loss, loss of strength, numbness, or tremors  SKIN: No itching, burning, rashes, or lesions   LYMPH Nodes: No enlarged glands  ENDOCRINE: No heat or cold intolerance; No hair loss  MUSCULOSKELETAL: No joint pain or swelling; No muscle, back, or extremity pain  PSYCHIATRIC: No depression, anxiety, mood swings, or difficulty sleeping  HEME/LYMPH: No easy bruising, or bleeding gums  ALLERGY AND IMMUNOLOGIC: No hives or eczema	    [ ] All others negative	  [x ] Unable to obtain    PHYSICAL EXAM:  T(C): 36.7 (12-05-22 @ 05:41), Max: 37.3 (12-04-22 @ 16:00)  HR: 88 (12-05-22 @ 05:41) (74 - 88)  BP: 157/97 (12-05-22 @ 05:41) (136/95 - 157/97)  RR: 18 (12-05-22 @ 05:41) (18 - 18)  SpO2: 93% (12-05-22 @ 05:41) (93% - 99%)  Wt(kg): --  I&O's Summary      Appearance: Normal	  HEENT:   Normal oral mucosa, PERRL, EOMI	  Lymphatic: No lymphadenopathy  Cardiovascular: Normal S1 S2, No JVD, + murmurs, No edema  Respiratory: rhonchi  Psychiatry: A & O x 3, Mood & affect appropriate  Gastrointestinal:  Soft, Non-tender, + BS	  Skin: No rashes, No ecchymoses, No cyanosis	  Neurologic: Non-focal  Extremities: Normal range of motion, No clubbing, cyanosis or edema  Vascular: Peripheral pulses palpable 2+ bilaterally    MEDICATIONS  (STANDING):  amLODIPine   Tablet 2.5 milliGRAM(s) Oral daily  apixaban 2.5 milliGRAM(s) Oral two times a day  multivitamin/minerals 1 Tablet(s) Oral daily      TELEMETRY: 	    ECG:  	  RADIOLOGY:  OTHER: 	  	  LABS:	 	    CARDIAC MARKERS:                                11.5   5.89  )-----------( 239      ( 04 Dec 2022 05:24 )             37.4     12-05    153<H>  |  108  |  78<H>  ----------------------------<  135<H>  3.9   |  33<H>  |  1.90<H>    Ca    9.9      05 Dec 2022 06:02      proBNP: Serum Pro-Brain Natriuretic Peptide: 68410 pg/mL (12-01 @ 22:00)  Serum Pro-Brain Natriuretic Peptide: 42368 pg/mL (11-29 @ 14:56)    Lipid Profile: Cholesterol 157  LDL --  HDL 46  TG 93    HgA1c:   TSH:     < from: Xray Chest 1 View- PORTABLE-Routine (Xray Chest 1 View- PORTABLE-Routine in AM.) (12.04.22 @ 09:59) >  Heart/Vascular: Heart size is not accurately assessed on this projection.  Pulmonary: Pulmonary vascularity is normal. No focal infiltrates. No   pleural effusion or pneumothorax.  Bones: No acute bony finding.    Impression:  No evidence of acute pulmonary disease.        Assessment and plan  ---------------------------  89 yo F with PMH of Afib, HTN, HLD, arthritis admitted to hospital to [facility]. He was just discharged from hospital after being treated for PNA and discharged home on 2 lit home O2.  Patient was found hypoxic with labored breathing this morning and hypertensive. She was given one dose of Lasix 40 mg IM. Because she was still in labored breathing started on non-rebreather and was transferred to [place] ER. She was found with elevated ProBNP. CXR showed mild CHF. Will be admitted for CHF exacerbation. She received 40 mg extra Lasix IV in ER.    chf ?systolic acute on chronic/ severe AS  dc beta blocker sec to bradycardia  continue AC  strict i/o  ckd check bladder scan  strict i/o  observe on tele, a.flutter hr is well controlled  echo noted with severe AS, pt with advanced age,CKD/ dementia not a candidate for intervention  dc hydralazine and nitrate avoid hypotensio  fu hr on tele is well controlled  check bladder scan  dc prednisone  add Norvasc for bp control, keep sbp >140, fu bp closely  chest x ray repeat , clear  check orthostatic bp if pt is mobile  ckd / hypernatremia hold diuretics for now increase free water      	        
Interval Events:  Resting comfortably this morning  No events overnight    REVIEW OF SYSTEMS:  [x] All other systems negative  [ ] Unable to assess ROS because ________    OBJECTIVE:  ICU Vital Signs Last 24 Hrs  T(C): 36.3 (02 Dec 2022 04:29), Max: 36.7 (01 Dec 2022 20:15)  T(F): 97.3 (02 Dec 2022 04:29), Max: 98.1 (01 Dec 2022 20:15)  HR: 77 (02 Dec 2022 04:29) (53 - 77)  BP: 118/68 (02 Dec 2022 04:29) (109/54 - 124/85)  BP(mean): 84 (01 Dec 2022 20:15) (84 - 98)  ABP: --  ABP(mean): --  RR: 18 (02 Dec 2022 04:29) (18 - 18)  SpO2: 90% (01 Dec 2022 20:15) (90% - 100%)    O2 Parameters below as of 01 Dec 2022 11:54  Patient On (Oxygen Delivery Method): nasal cannula  O2 Flow (L/min): 2            12-01 @ 07:01  -  12-02 @ 07:00  --------------------------------------------------------  IN: 400 mL / OUT: 300 mL / NET: 100 mL      CAPILLARY BLOOD GLUCOSE          PHYSICAL EXAM:  General: NAD  HEENT: NC/AT  Respiratory: Mild end exp wheezing, good air entry  Cardiovascular: S1, S2. +ROLLY. No edema.  Abdomen: Soft, NT, ND  Extremities: Warm  Neurological: Alert  Psychiatry: Normal mood and affect    HOSPITAL MEDICATIONS:  apixaban 2.5 milliGRAM(s) Oral two times a day      furosemide    Tablet 40 milliGRAM(s) Oral daily    predniSONE   Tablet 20 milliGRAM(s) Oral daily    albuterol    90 MICROgram(s) HFA Inhaler 1 Puff(s) Inhalation four times a day PRN    acetaminophen     Tablet .. 650 milliGRAM(s) Oral every 6 hours PRN  melatonin 3 milliGRAM(s) Oral at bedtime PRN  ondansetron Injectable 4 milliGRAM(s) IV Push every 8 hours PRN    aluminum hydroxide/magnesium hydroxide/simethicone Suspension 30 milliLiter(s) Oral every 4 hours PRN        multivitamin/minerals 1 Tablet(s) Oral daily            LABS:                        10.9   3.64  )-----------( 203      ( 01 Dec 2022 07:25 )             35.4     Hgb Trend: 10.9<--, 11.5<--, 10.9<--  12-02    143  |  100  |  85<H>  ----------------------------<  117<H>  4.0   |  30  |  2.32<H>    Ca    9.2      02 Dec 2022 06:18    TPro  7.4  /  Alb  3.4  /  TBili  1.2  /  DBili  x   /  AST  16  /  ALT  12  /  AlkPhos  64  12-01    Creatinine Trend: 2.32<--, 1.72<--, 1.54<--, 1.71<--          
Patient is a 89y old  Female who presents with a chief complaint of SOB , labored breathing, acute hypoxic respiratory failure (06 Dec 2022 10:58)      INTERVAL HPI/OVERNIGHT EVENTS: No event. Patient still coughing productively. Repeat CXR. Sara CXR after aspiration was negative, f/u with another one. IF CXR still negative she can be discharged tomorrow to SNF.     Pain Location & Control:     MEDICATIONS  (STANDING):  albuterol/ipratropium for Nebulization 3 milliLiter(s) Nebulizer every 6 hours  amLODIPine   Tablet 2.5 milliGRAM(s) Oral daily  apixaban 2.5 milliGRAM(s) Oral two times a day  dextrose 5%. 1000 milliLiter(s) (70 mL/Hr) IV Continuous <Continuous>  multivitamin/minerals 1 Tablet(s) Oral daily    MEDICATIONS  (PRN):  acetaminophen     Tablet .. 650 milliGRAM(s) Oral every 6 hours PRN Temp greater or equal to 38C (100.4F), Mild Pain (1 - 3)  albuterol    90 MICROgram(s) HFA Inhaler 1 Puff(s) Inhalation four times a day PRN Shortness of Breath and/or Wheezing  aluminum hydroxide/magnesium hydroxide/simethicone Suspension 30 milliLiter(s) Oral every 4 hours PRN Dyspepsia  ondansetron Injectable 4 milliGRAM(s) IV Push every 8 hours PRN Nausea and/or Vomiting      Allergies    Augmentin (Unknown)    Intolerances        REVIEW OF SYSTEMS:  CONSTITUTIONAL: No fever, weight loss, or fatigue  EYES: No eye pain, visual disturbances, or discharge  ENMT:  No difficulty hearing, tinnitus, vertigo; No sinus or throat pain  NECK: No pain or stiffness  BREASTS: No pain, masses, or nipple discharge  RESPIRATORY: + cough, wheezing, chills or hemoptysis; No shortness of breath  CARDIOVASCULAR: No chest pain, palpitations, dizziness, or leg swelling  GASTROINTESTINAL: No abdominal or epigastric pain. No nausea, vomiting, or hematemesis; No diarrhea or constipation. No melena or hematochezia.  GENITOURINARY: No dysuria, frequency, hematuria, or incontinence  NEUROLOGICAL: No headaches, memory loss, loss of strength, numbness, or tremors  SKIN: No itching, burning, rashes, or lesions   LYMPH NODES: No enlarged glands  ENDOCRINE: No heat or cold intolerance; No hair loss; No polydipsia or polyuria  MUSCULOSKELETAL: No back pain  PSYCHIATRIC: No depression, anxiety, mood swings, or difficulty sleeping  HEME/LYMPH: No easy bruising, or bleeding gums  ALLERGY AND IMMUNOLOGIC: No hives or eczema    Vital Signs Last 24 Hrs  T(C): 37 (06 Dec 2022 11:21), Max: 37 (06 Dec 2022 11:21)  T(F): 98.6 (06 Dec 2022 11:21), Max: 98.6 (06 Dec 2022 11:21)  HR: 80 (06 Dec 2022 11:21) (78 - 85)  BP: 126/64 (06 Dec 2022 11:21) (121/75 - 149/83)  BP(mean): --  RR: 18 (06 Dec 2022 11:21) (18 - 18)  SpO2: 98% (06 Dec 2022 11:21) (98% - 99%)    Parameters below as of 06 Dec 2022 11:21  Patient On (Oxygen Delivery Method): nasal cannula  O2 Flow (L/min): 2      PHYSICAL EXAM:  GENERAL: NAD, well-groomed, well-developed  HEAD:  Atraumatic, Normocephalic  EYES: EOMI, PERRLA, conjunctiva and sclera clear  ENMT: No tonsillar erythema, exudates, or enlargement; Moist mucous membranes, Good dentition, No lesions  NECK: Supple, No JVD, Normal thyroid  NERVOUS SYSTEM:  Alert & Oriented X  2 mildly confused   CHEST/LUNG: Clear to auscultation bilaterally; No rales, rhonchi, wheezing, or rubs  HEART: Regular rate and rhythm; No murmurs, rubs, or gallops  ABDOMEN: Soft, Nontender, Nondistended; Bowel sounds present  EXTREMITIES:  2+ Peripheral Pulses, No clubbing or cyanosis  LYMPH: No lymphadenopathy noted  SKIN: No rashes or lesions      LABS:                        11.7   6.84  )-----------( 215      ( 06 Dec 2022 07:02 )             38.7     06 Dec 2022 07:01    151    |  108    |  62     ----------------------------<  129    3.8     |  35     |  1.56     Ca    9.4        06 Dec 2022 07:01          CAPILLARY BLOOD GLUCOSE            Cultures      RADIOLOGY & ADDITIONAL TESTS:    Imaging Personally Reviewed:  [X ] YES  [ ] NO    Consultant(s) Notes Reviewed:  [ X] YES  [ ] NO    Care Discussed with Consultants/Other Providers [ X] YES  [ ] NO
Interval Events: Pt seen and examined at bedside. No acute events overnight.     REVIEW OF SYSTEMS:  [x] All other systems negative  [ ] Unable to assess ROS because ________    OBJECTIVE:  ICU Vital Signs Last 24 Hrs  T(C): 36.3 (02 Dec 2022 04:29), Max: 36.7 (01 Dec 2022 20:15)  T(F): 97.3 (02 Dec 2022 04:29), Max: 98.1 (01 Dec 2022 20:15)  HR: 77 (02 Dec 2022 04:29) (53 - 77)  BP: 118/68 (02 Dec 2022 04:29) (109/54 - 124/85)  BP(mean): 84 (01 Dec 2022 20:15) (84 - 98)  ABP: --  ABP(mean): --  RR: 18 (02 Dec 2022 04:29) (18 - 18)  SpO2: 90% (01 Dec 2022 20:15) (90% - 100%)    O2 Parameters below as of 01 Dec 2022 11:54  Patient On (Oxygen Delivery Method): nasal cannula  O2 Flow (L/min): 2      12-01 @ 07:01  -  12-02 @ 07:00  --------------------------------------------------------  IN: 400 mL / OUT: 300 mL / NET: 100 mL      CAPILLARY BLOOD GLUCOSE      PHYSICAL EXAM:  General: NAD  HEENT: NC/AT  Respiratory: Mild end exp wheezing, good air entry  Cardiovascular: S1, S2. +ROLLY. No edema.  Abdomen: Soft, NT, ND  Extremities: Warm  Neurological: Alert  Psychiatry: Normal mood and affect    HOSPITAL MEDICATIONS:  apixaban 2.5 milliGRAM(s) Oral two times a day      furosemide    Tablet 40 milliGRAM(s) Oral daily    predniSONE   Tablet 20 milliGRAM(s) Oral daily    albuterol    90 MICROgram(s) HFA Inhaler 1 Puff(s) Inhalation four times a day PRN    acetaminophen     Tablet .. 650 milliGRAM(s) Oral every 6 hours PRN  melatonin 3 milliGRAM(s) Oral at bedtime PRN  ondansetron Injectable 4 milliGRAM(s) IV Push every 8 hours PRN    aluminum hydroxide/magnesium hydroxide/simethicone Suspension 30 milliLiter(s) Oral every 4 hours PRN  multivitamin/minerals 1 Tablet(s) Oral daily    LABS:                        10.9   3.64  )-----------( 203      ( 01 Dec 2022 07:25 )             35.4     Hgb Trend: 10.9<--, 11.5<--, 10.9<--  12-02    143  |  100  |  85<H>  ----------------------------<  117<H>  4.0   |  30  |  2.32<H>    Ca    9.2      02 Dec 2022 06:18    TPro  7.4  /  Alb  3.4  /  TBili  1.2  /  DBili  x   /  AST  16  /  ALT  12  /  AlkPhos  64  12-01    Creatinine Trend: 2.32<--, 1.72<--, 1.54<--, 1.71<--          
Patient is a 89y old  Female who presents with a chief complaint of SOB , labored breathing, acute hypoxic respiratory failure (01 Dec 2022 15:36)      INTERVAL HPI/OVERNIGHT EVENTS: No event. Patient feels better. CHF exacerbation improved. IV Lasix switched to PO Lasix 40 mg by cardiology. Continue Hydralazine and Imdur as vasodilator. Echocardiogram  showed severe aortic stenosis  for which  cardiology was notified. F/u cardiology recommendations. Patient found with r breast swelling and stiffness, need ultrasound of R breast to  r/o malignancy or inflammatory process does not look like cellulitis . Hold off on abx therapy.     Pain Location & Control: OK    MEDICATIONS  (STANDING):  apixaban 2.5 milliGRAM(s) Oral two times a day  furosemide    Tablet 40 milliGRAM(s) Oral daily  hydrALAZINE 10 milliGRAM(s) Oral two times a day  isosorbide   mononitrate ER Tablet (IMDUR) 30 milliGRAM(s) Oral daily  methylPREDNISolone sodium succinate Injectable 40 milliGRAM(s) IV Push every 12 hours  multivitamin/minerals 1 Tablet(s) Oral daily    MEDICATIONS  (PRN):  acetaminophen     Tablet .. 650 milliGRAM(s) Oral every 6 hours PRN Temp greater or equal to 38C (100.4F), Mild Pain (1 - 3)  aluminum hydroxide/magnesium hydroxide/simethicone Suspension 30 milliLiter(s) Oral every 4 hours PRN Dyspepsia  melatonin 3 milliGRAM(s) Oral at bedtime PRN Insomnia  ondansetron Injectable 4 milliGRAM(s) IV Push every 8 hours PRN Nausea and/or Vomiting      Allergies    Augmentin (Unknown)    Intolerances        REVIEW OF SYSTEMS:  CONSTITUTIONAL: No fever, weight loss, or fatigue  EYES: No eye pain, visual disturbances, or discharge  ENMT:  No difficulty hearing, tinnitus, vertigo; No sinus or throat pain  NECK: No pain or stiffness  BREASTS: No pain, masses, or nipple discharge  RESPIRATORY: No cough, wheezing, chills or hemoptysis; No shortness of breath  CARDIOVASCULAR: No chest pain, palpitations, dizziness, or leg swelling  GASTROINTESTINAL: No abdominal or epigastric pain. No nausea, vomiting, or hematemesis; No diarrhea or constipation. No melena or hematochezia.  GENITOURINARY: No dysuria, frequency, hematuria, or incontinence  NEUROLOGICAL: No headaches, memory loss, loss of strength, numbness, or tremors  SKIN: No itching, burning, rashes, or lesions   LYMPH NODES: No enlarged glands  ENDOCRINE: No heat or cold intolerance; No hair loss; No polydipsia or polyuria  MUSCULOSKELETAL: No back pain  PSYCHIATRIC: No depression, anxiety, mood swings, or difficulty sleeping  HEME/LYMPH: No easy bruising, or bleeding gums  ALLERGY AND IMMUNOLOGIC: No hives or eczema    Vital Signs Last 24 Hrs  T(C): 36.3 (01 Dec 2022 11:17), Max: 37.1 (30 Nov 2022 20:05)  T(F): 97.3 (01 Dec 2022 11:17), Max: 98.8 (30 Nov 2022 20:05)  HR: 60 (01 Dec 2022 14:00) (53 - 75)  BP: 118/69 (01 Dec 2022 14:00) (103/68 - 139/67)  BP(mean): 98 (01 Dec 2022 11:17) (91 - 98)  RR: 18 (01 Dec 2022 11:54) (18 - 19)  SpO2: 100% (01 Dec 2022 11:54) (98% - 100%)    Parameters below as of 01 Dec 2022 11:54  Patient On (Oxygen Delivery Method): nasal cannula  O2 Flow (L/min): 2      PHYSICAL EXAM:  GENERAL: NAD, well-groomed, well-developed  HEAD:  Atraumatic, Normocephalic  EYES: EOMI, PERRLA, conjunctiva and sclera clear  ENMT: No tonsillar erythema, exudates, or enlargement; Moist mucous membranes, Good dentition, No lesions  NECK: Supple, No JVD, Normal thyroid  NERVOUS SYSTEM:  Alert & Oriented X  2   CHEST/LUNG: Clear to auscultation bilaterally; No rales, rhonchi, wheezing, or rubs R breast swelling and stiffness  HEART: Regular rate and rhythm; No murmurs, rubs, or gallops  ABDOMEN: Soft, Nontender, Nondistended; Bowel sounds present  EXTREMITIES:  2+ Peripheral Pulses, No clubbing or cyanosis  LYMPH: No lymphadenopathy noted  SKIN: No rashes or lesions      LABS:                        10.9   3.64  )-----------( 203      ( 01 Dec 2022 07:25 )             35.4     01 Dec 2022 07:23    142    |  99     |  55     ----------------------------<  123    4.2     |  29     |  1.72     Ca    9.3        01 Dec 2022 07:23    TPro  7.4    /  Alb  3.4    /  TBili  1.2    /  DBili  x      /  AST  16     /  ALT  12     /  AlkPhos  64     01 Dec 2022 07:23        CAPILLARY BLOOD GLUCOSE        CARDIAC MARKERS ( 29 Nov 2022 17:19 )  x     / x     / 38 U/L / x     / 2.6 ng/mL      Cultures      RADIOLOGY & ADDITIONAL TESTS:    Imaging Personally Reviewed:  [ X] YES  [ ] NO    Consultant(s) Notes Reviewed:  [X ] YES  [ ] NO    Care Discussed with Consultants/Other Providers [ X] YES  [ ] NO

## 2022-12-07 NOTE — PROGRESS NOTE ADULT - ASSESSMENT
89 yo F with PMH of Afib, HTN, HLD, arthritis admitted to hospital to [facility]. He was just discharged from hospital after being treated for PNA and discharged home on 2 lit home O2.    aspiration   aspiration PNA ruled out with negative CXR. No leukocytosis. Passed  speech and swallow on diet. Continue IV fluid.  Patient still coughing.     hypernatremia  resolved. Off of IV fluid.     acute hypoxic respiratory failure 2/2 CHF exacerbation   improved, continue O2 supplement PRN    acute on chronic CHF exacerbation  resolving. ProBNP down to 21,000. Continue Lasix 40 mg daily.  D/ c Hydralazine and Imdur due to hypotension risk    Asthma exacerbation   resolved Continue  albuterol PRN. F/u pulmonologist as  outpatient .     severe aortic stenosis  new finding on echo, normal ejection fraction. Not candidate for TAVR procedure due to advanced age, CKD, dementia per cardio.     R breast swelling and stiffness  r/o malignancy with ultrasound which was not done, f/u as outpatient with mammogram/ultrasound    HLD   Stable, well-controlled and continue Lovastatin 20 MG    Atrial fibrillation   EKG NSR, continue metoprolol and eliquis 2.5 MG    HTN   Metoprolol. D/c Hydralazine and Imdur.     [place] [employer] f/u with consult with audiology    Frontal facial skin lesion. S/p KELIN procedure by Dr. Lemos,[phone] continues dry dressing and f/u with dermatologist and surgeon as needed.     Vit D deficiency vit d supplements 39801 units monthly     Dysphagia:  continue dysphagia diet, passed swallow and speech in hospital     Cognitive disorder/dementia w/out behavior disturbance  F/u with psych, confirmed and evaluate, off of medication    DVT ppx   Eliquis

## 2022-12-07 NOTE — PROGRESS NOTE ADULT - PROVIDER SPECIALTY LIST ADULT
Cardiology
Internal Medicine
Internal Medicine
Cardiology
Pulmonology
Internal Medicine
Pulmonology
Internal Medicine

## 2022-12-07 NOTE — DISCHARGE NOTE NURSING/CASE MANAGEMENT/SOCIAL WORK - NSDCPEFALRISK_GEN_ALL_CORE
For information on Fall & Injury Prevention, visit: [email or web address] OR  [email or web address] OR  https://www.cdc.gov/steadi/patient.html

## 2022-12-07 NOTE — PROGRESS NOTE ADULT - REASON FOR ADMISSION
SOB , labored breathing, acute hypoxic respiratory failure

## 2023-04-14 NOTE — H&P ADULT - PROBLEM SELECTOR PLAN 1
0 -p/w AMS  - -p/w AMS as per son  -In ED, pt is AAOX3  -CT Head: no acute changes  -Electrolytes wnl  -UA negative  -CXR: Marked improvement in lung and pleural findings compared to previous CXR  -Monitor mental status

## 2023-04-21 NOTE — ED ADULT NURSE NOTE - CAS TRG GENERAL AIRWAY, MLM
Patent (4/21) Na 137, BUN 12, Cr 0.68, BG 80, K+ 4.3, Phos 3.4, Mg 2.20, Alk Phos 54, ALT/SGPT 14, AST/SGOT 16  (4/17) HbA1c 4.7%

## 2023-05-05 NOTE — PHYSICAL THERAPY INITIAL EVALUATION ADULT - ORIENTATION, REHAB EVAL
[employer] was sent to patient on 5/5/2023 2:55 PM       oriented to person, place, time and situation

## 2023-07-21 NOTE — ED PROVIDER NOTE - PROGRESS NOTE ADDITIONAL1
-- DO NOT REPLY / DO NOT REPLY ALL --  -- Message is from Engagement Center Operations (ECO) --    General Patient Message: Pharmacy requesting alternative doctor, Dr. Mojica not enrolled in state medicaid      Caller Information       Type Contact Phone/Fax    07/21/2023 02:18 PM CDT Phone (Incoming) [id number] - [place], [place] - [address] AT [facility] (Pharmacy) [phone]        Alternative phone number: none     Can a detailed message be left? yes    Message Turnaround:     Is it Working Hours? Yes - Working Hours     [place]:    Please give this turnaround time to the caller:   \"This message will be sent to [state Provider's name]. The clinical team will fulfill your request as soon as they review your message.\"                 Additional Progress Note...

## 2023-08-15 NOTE — ED PROVIDER NOTE - TIMING
unknown [Recent Weight Loss (___ Lbs)] : recent weight loss: [unfilled] lbs [Fatigue] : no fatigue [Palpitations] : no palpitations [Shortness Of Breath] : no shortness of breath [Abdominal Pain] : no abdominal pain [Pain/Numbness of Digits] : no pain/numbness of digits

## 2023-11-01 NOTE — DIETITIAN INITIAL EVALUATION ADULT. - 20 CAL FROM
872 MD   metoprolol succinate (TOPROL XL) 50 MG extended release tablet TAKE 1 TABLET TWO TIMES DAILY. TAKE ALONG WITH 25 MG TWICE DAILY TO MAKE 76 MG TWICE DAILY  Hung Haywood MD   [facility] (ELDERBERRY IMMUNE COMPLEX) CHEW Take by mouth See Admin Instructions For immune support  Latanya Jenkins MD   polyethylene glycol (GLYCOLAX) 17 g packet Take 1 packet by mouth daily as needed for Constipation  Patient not taking: Reported on 10/18/2023  ProviderLatanya MD   cetirizine (ZYRTEC) 10 MG tablet Take 1 tablet by mouth daily  ProviderLatanya MD CareTeam (Including outside providers/suppliers regularly involved in providing care):   Patient Care Team:  Adarsh Blank DO as PCP - General (Family Medicine)  Adarsh Blank DO as PCP - [facility]  Leslie Haile MD as Cardiologist (Cardiology)     Reviewed and updated this visit:  Tobacco  Allergies  Meds  Problems  Med Hx  Surg Hx  Soc Hx  Fam Hx           Care plan reviewed with and given to patient.        Electronically signed by Adarsh Blank DO on 11/2/2023 at 3:56 PM

## 2023-11-28 NOTE — PATIENT PROFILE ADULT - ANY IMPAIRED UPPER EXTREMITY FUNCTION WITHIN A WEEK PRIOR TO ADMISSION RELATED TO?
Lipid abnormalities are unchanged.  Lipid-lowering therapy was not prescribed due to patient refusal, previous adverse reaction.  Lipids will be reassessed in 3 months.   no

## 2023-11-30 RX ORDER — LOVASTATIN 20 MG
1 TABLET ORAL
Qty: 0 | Refills: 0 | DISCHARGE

## 2023-11-30 RX ORDER — POLYETHYLENE GLYCOL 3350 17 G/17G
17 POWDER, FOR SOLUTION ORAL
Qty: 0 | Refills: 0 | DISCHARGE

## 2023-11-30 RX ORDER — POTASSIUM CHLORIDE 20 MEQ
15 PACKET (EA) ORAL
Qty: 0 | Refills: 0 | DISCHARGE

## 2023-11-30 RX ORDER — METOPROLOL TARTRATE 50 MG
0.5 TABLET ORAL
Qty: 0 | Refills: 0 | DISCHARGE

## 2023-11-30 RX ORDER — MULTIVIT-MIN/FERROUS GLUCONATE 9 MG/15 ML
1 LIQUID (ML) ORAL
Qty: 0 | Refills: 0 | DISCHARGE

## 2023-11-30 RX ORDER — FUROSEMIDE 40 MG
1 TABLET ORAL
Qty: 0 | Refills: 0 | DISCHARGE

## 2023-11-30 RX ORDER — CHOLECALCIFEROL (VITAMIN D3) 125 MCG
1 CAPSULE ORAL
Qty: 0 | Refills: 0 | DISCHARGE

## 2023-11-30 RX ORDER — APIXABAN 2.5 MG/1
1 TABLET, FILM COATED ORAL
Qty: 0 | Refills: 0 | DISCHARGE

## 2023-11-30 RX ORDER — MAGNESIUM HYDROXIDE 400 MG/1
30 TABLET, CHEWABLE ORAL
Qty: 0 | Refills: 0 | DISCHARGE

## 2024-03-18 NOTE — H&P ADULT - PROBLEM/PLAN-1
-- DO NOT REPLY / DO NOT REPLY ALL --  -- Message is from [facility] ([facility]) --    General Patient Message: Patient stated she missed a call in regards to scheduling her surgery: [phone]     Caller Information         Type Contact Phone/Fax    03/13/2024 12:22 PM CDT Phone (Incoming) Annel Ibarra (Self) [phone] (M)    03/14/2024 04:23 PM CDT Phone (Outgoing) Annel Ibarra (Self) [phone] (M)    Left Message     03/15/2024 09:50 AM CDT Phone (Incoming) FredEugenioya (Self) [phone] (M)          Alternative phone number: N/A     Can a detailed message be left? Yes    Message Turnaround:                -- DO NOT REPLY / DO NOT REPLY ALL --  -- Message is from [facility] ([facility]) --    General Patient Message: Please reach out to patient to assist with scheduling her surgery appointment with Sade Mario. Patient would like her pre op scheduled when she receives her call back    Caller Information         Type Contact Phone/Fax    03/13/2024 12:22 PM CDT Phone (Incoming) Annel Ibarra (Self) [phone] (M)          Alternative phone number:no    Can a detailed message be left? Yes    Message Turnaround:                4/5 or 4/12 thanks! Called and left message for patient with both dates offered by Dr Mario and locations for surgery. Will await call back from patient.    Called and spoke with patient regarding surgery. Patient would like to schedule for a date in May. Patient agreeable to May 15 at [facility] but wondering if May 29 or 31 available, message sent to Dr Mario to inquire about these dates for patient. Patient also inquired about recovery time. Advised will call the patient back once I hear back.     Pre-op instructions and soap instructions discussed with patient. Advised pre-op folder and soap will be at the  for  at the [facility] location. Patient to schedule post-op appointments at this time. All questions addressed at this time, encouraged to call with any further questions. Patient verbalized understanding.    [employer] message sent to patient regarding surgery dates and recovery.    no DISPLAY PLAN FREE TEXT

## 2025-04-24 NOTE — H&P ADULT - PROBLEM/PLAN-1
DISPLAY PLAN FREE TEXT